# Patient Record
Sex: MALE | Race: WHITE | NOT HISPANIC OR LATINO | Employment: OTHER | URBAN - NONMETROPOLITAN AREA
[De-identification: names, ages, dates, MRNs, and addresses within clinical notes are randomized per-mention and may not be internally consistent; named-entity substitution may affect disease eponyms.]

---

## 2018-03-23 ENCOUNTER — OFFICE VISIT (OUTPATIENT)
Dept: UROLOGY | Facility: CLINIC | Age: 83
End: 2018-03-23

## 2018-03-23 DIAGNOSIS — N40.1 BPH WITH URINARY OBSTRUCTION: Primary | ICD-10-CM

## 2018-03-23 DIAGNOSIS — R33.9 URINARY RETENTION: ICD-10-CM

## 2018-03-23 DIAGNOSIS — N13.8 BPH WITH URINARY OBSTRUCTION: Primary | ICD-10-CM

## 2018-03-23 PROCEDURE — 51798 US URINE CAPACITY MEASURE: CPT | Performed by: UROLOGY

## 2018-03-23 PROCEDURE — 99204 OFFICE O/P NEW MOD 45 MIN: CPT | Performed by: UROLOGY

## 2018-03-23 RX ORDER — GABAPENTIN 600 MG/1
800 TABLET ORAL 2 TIMES DAILY
COMMUNITY

## 2018-03-23 RX ORDER — OXYCODONE HCL 10 MG/1
10 TABLET, FILM COATED, EXTENDED RELEASE ORAL EVERY 12 HOURS
COMMUNITY
End: 2018-08-27

## 2018-03-23 RX ORDER — FUROSEMIDE 40 MG/1
20 TABLET ORAL DAILY
COMMUNITY
End: 2023-03-14 | Stop reason: SDUPTHER

## 2018-03-23 RX ORDER — ATORVASTATIN CALCIUM 10 MG/1
10 TABLET, FILM COATED ORAL DAILY
COMMUNITY

## 2018-03-23 RX ORDER — TAMSULOSIN HYDROCHLORIDE 0.4 MG/1
1 CAPSULE ORAL NIGHTLY
COMMUNITY
End: 2019-08-19 | Stop reason: CLARIF

## 2018-03-23 RX ORDER — LISINOPRIL 10 MG/1
20 TABLET ORAL DAILY
COMMUNITY
End: 2021-10-15 | Stop reason: DRUGHIGH

## 2018-03-23 RX ORDER — WARFARIN SODIUM 2 MG/1
2 TABLET ORAL
COMMUNITY

## 2018-03-23 NOTE — PROGRESS NOTES
Chief Complaint:          Chief Complaint   Patient presents with   • Benign Prostatic Hypertrophy       HPI:   86 y.o. male.  86-year-old very pleasant white male presents today with significant intermittency.  He has no nocturia, no real dysuria, no urinary tract infections, no blood in the urine, he had a TURP done by Dr. Ramon in [place] many years ago.  He has a decreased force of stream, he gets up 1-2 times a night, he sits to void, he has no family history of prostate cancer.  He's had a total hip, hemorrhoidectomy, cholecystectomy, colon cancer and an angioplasty with 2 stents.    Past Medical History:        Past Medical History:   Diagnosis Date   • Benign prostatic hyperplasia    • Cancer    • Coronary artery disease    • Heart attack    • Hypertension          Current Meds:     Current Outpatient Prescriptions   Medication Sig Dispense Refill   • atorvastatin (LIPITOR) 10 MG tablet Take 10 mg by mouth Daily.     • furosemide (LASIX) 40 MG tablet Take 40 mg by mouth 2 (Two) Times a Day.     • gabapentin (NEURONTIN) 600 MG tablet Take 600 mg by mouth 3 (Three) Times a Day.     • lisinopril (PRINIVIL,ZESTRIL) 10 MG tablet Take 10 mg by mouth Daily.     • oxyCODONE (oxyCONTIN) 10 MG 12 hr tablet Take 10 mg by mouth Every 12 (Twelve) Hours.     • tamsulosin (FLOMAX) 0.4 MG capsule 24 hr capsule Take 1 capsule by mouth Every Night.     • warfarin (COUMADIN) 2 MG tablet Take 2 mg by mouth Daily.       No current facility-administered medications for this visit.         Allergies:      No Known Allergies     Past Surgical History:     Past Surgical History:   Procedure Laterality Date   • CHOLECYSTECTOMY     • HEMORROIDECTOMY     • PACEMAKER IMPLANTATION     • PROSTATE SURGERY           Social History:     Social History     Social History   • Marital status: Unknown     Spouse name: N/A   • Number of children: N/A   • Years of education: N/A     Occupational History   • Not on file.     Social History Main  Topics   • Smoking status: Former Smoker   • Smokeless tobacco: Never Used   • Alcohol use Yes      Comment: Occasional   • Drug use: No   • Sexual activity: Not on file     Other Topics Concern   • Not on file     Social History Narrative   • No narrative on file       Family History:     Family History   Problem Relation Age of Onset   • No Known Problems Father    • No Known Problems Mother        Review of Systems:     Review of Systems   Constitutional: Negative.    HENT: Negative.    Eyes: Negative.    Respiratory: Negative.    Cardiovascular: Negative.    Gastrointestinal: Negative.    Endocrine: Negative.    Genitourinary: Positive for difficulty urinating.   Musculoskeletal: Negative.    Allergic/Immunologic: Negative.    Neurological: Negative.    Hematological: Negative.    Psychiatric/Behavioral: Negative.        Physical Exam:     Physical Exam   Constitutional: He is oriented to person, place, and time. He appears well-developed and well-nourished.   HENT:   Head: Normocephalic and atraumatic.   Eyes: Conjunctivae and EOM are normal. Pupils are equal, round, and reactive to light.   Neck: Normal range of motion.   Cardiovascular: Normal rate, regular rhythm, normal heart sounds and intact distal pulses.    Pulmonary/Chest: Effort normal and breath sounds normal.   Abdominal: Soft. Bowel sounds are normal.   Genitourinary: Rectum normal, prostate normal and penis normal.   Genitourinary Comments: Soft nontender abdomen with no organomegaly, rigidity, or tenderness.  He has normal external genitalia and uncircumcised phallus with a freely movable foreskin bilaterally descended testes without masses there is no inguinal hernias adenopathy or abnormalities he had tight rectal tone and a small smooth firm prostate.  There is no nodularity or any suspicious rectal abnormalities     Musculoskeletal: Normal range of motion.   Neurological: He is alert and oriented to person, place, and time. He has normal  reflexes.   Skin: Skin is warm and dry.   Psychiatric: He has a normal mood and affect. His behavior is normal. Judgment and thought content normal.   Nursing note and vitals reviewed.      I have reviewed the following portions of the patient's history: allergies, current medications, past family history, past medical history, past social history, past surgical history, problem list and ROS and confirm it's accurate.      Procedure:       Assessment/Plan:   Urinary retention-he had a postvoid residual of greater than a liter.  Given his history this is silent prostatism likely on the basis of the scar.  I'm going to initiate a workup with both an upper and lower tract investigation and follow-up with him based on this.           This document has been electronically signed by TINY PENG MD March 23, 2018 10:42 AM

## 2018-04-09 ENCOUNTER — PROCEDURE VISIT (OUTPATIENT)
Dept: UROLOGY | Facility: CLINIC | Age: 83
End: 2018-04-09

## 2018-04-09 ENCOUNTER — HOSPITAL ENCOUNTER (OUTPATIENT)
Dept: CT IMAGING | Facility: HOSPITAL | Age: 83
Discharge: HOME | End: 2018-04-09
Attending: UROLOGY | Admitting: UROLOGY

## 2018-04-09 VITALS — BODY MASS INDEX: 30.31 KG/M2 | WEIGHT: 200 LBS | HEIGHT: 68 IN

## 2018-04-09 DIAGNOSIS — N40.1 BPH WITH URINARY OBSTRUCTION: ICD-10-CM

## 2018-04-09 DIAGNOSIS — N40.0 BENIGN PROSTATIC HYPERPLASIA WITHOUT LOWER URINARY TRACT SYMPTOMS: Primary | ICD-10-CM

## 2018-04-09 DIAGNOSIS — N13.8 BPH WITH URINARY OBSTRUCTION: ICD-10-CM

## 2018-04-09 PROCEDURE — 99213 OFFICE O/P EST LOW 20 MIN: CPT | Performed by: UROLOGY

## 2018-04-09 PROCEDURE — 52000 CYSTOURETHROSCOPY: CPT | Performed by: UROLOGY

## 2018-04-09 PROCEDURE — 74176 CT ABD & PELVIS W/O CONTRAST: CPT

## 2018-04-09 PROCEDURE — 74176 CT ABD & PELVIS W/O CONTRAST: CPT | Performed by: RADIOLOGY

## 2018-04-09 PROCEDURE — 96372 THER/PROPH/DIAG INJ SC/IM: CPT | Performed by: UROLOGY

## 2018-04-09 RX ORDER — GENTAMICIN SULFATE 40 MG/ML
80 INJECTION, SOLUTION INTRAMUSCULAR; INTRAVENOUS ONCE
Status: DISCONTINUED | OUTPATIENT
Start: 2018-04-09 | End: 2021-10-15

## 2018-04-09 NOTE — PROGRESS NOTES
Chief Complaint:          No chief complaint on file.      HPI:   87 y.o. male.  87-year-old very pleasant white male presents today with significant intermittency.  He has no nocturia, no real dysuria, no urinary tract infections, no blood in the urine, he had a TURP done by Dr. Ramon in [place] many years ago.  He has a decreased force of stream, he gets up 1-2 times a night, he sits to void, he has no family history of prostate cancer.  He's had a total hip, hemorrhoidectomy, cholecystectomy, colon cancer and an angioplasty with 2 stents.  He was found to the last visit to have a fairly substantial obstruction.  He's had a TURP now presents for cystoscopy the upper tract was reviewed and was essentially negative for hydronephrosis.  He has severe spinal scoliosis.       Past Medical History:        Past Medical History:   Diagnosis Date   • Benign prostatic hyperplasia    • Cancer    • Coronary artery disease    • Heart attack    • Hypertension          Current Meds:     Current Outpatient Prescriptions   Medication Sig Dispense Refill   • atorvastatin (LIPITOR) 10 MG tablet Take 10 mg by mouth Daily.     • furosemide (LASIX) 40 MG tablet Take 40 mg by mouth 2 (Two) Times a Day.     • gabapentin (NEURONTIN) 600 MG tablet Take 600 mg by mouth 3 (Three) Times a Day.     • lisinopril (PRINIVIL,ZESTRIL) 10 MG tablet Take 10 mg by mouth Daily.     • oxyCODONE (oxyCONTIN) 10 MG 12 hr tablet Take 10 mg by mouth Every 12 (Twelve) Hours.     • tamsulosin (FLOMAX) 0.4 MG capsule 24 hr capsule Take 1 capsule by mouth Every Night.     • warfarin (COUMADIN) 2 MG tablet Take 2 mg by mouth Daily.       No current facility-administered medications for this visit.         Allergies:      No Known Allergies     Past Surgical History:     Past Surgical History:   Procedure Laterality Date   • CHOLECYSTECTOMY     • HEMORROIDECTOMY     • PACEMAKER IMPLANTATION     • PROSTATE SURGERY           Social History:     Social History      Social History   • Marital status: Unknown     Spouse name: N/A   • Number of children: N/A   • Years of education: N/A     Occupational History   • Not on file.     Social History Main Topics   • Smoking status: Former Smoker   • Smokeless tobacco: Never Used   • Alcohol use Yes      Comment: Occasional   • Drug use: No   • Sexual activity: Not on file     Other Topics Concern   • Not on file     Social History Narrative   • No narrative on file       Family History:     Family History   Problem Relation Age of Onset   • No Known Problems Father    • No Known Problems Mother        Review of Systems:     Review of Systems   Constitutional: Negative.    HENT: Negative.    Eyes: Negative.    Respiratory: Negative.    Cardiovascular: Negative.    Gastrointestinal: Negative.    Endocrine: Negative.    Musculoskeletal: Negative.    Allergic/Immunologic: Negative.    Neurological: Negative.    Hematological: Negative.    Psychiatric/Behavioral: Negative.        Physical Exam:     Physical Exam   Constitutional: He is oriented to person, place, and time. He appears well-developed and well-nourished.   HENT:   Head: Normocephalic and atraumatic.   Eyes: Conjunctivae and EOM are normal. Pupils are equal, round, and reactive to light.   Neck: Normal range of motion.   Cardiovascular: Normal rate, regular rhythm, normal heart sounds and intact distal pulses.    Pulmonary/Chest: Effort normal and breath sounds normal.   Abdominal: Soft. Bowel sounds are normal.   Genitourinary: Rectum normal, prostate normal and penis normal.   Musculoskeletal: Normal range of motion.   Neurological: He is alert and oriented to person, place, and time. He has normal reflexes.   Skin: Skin is warm and dry.   Psychiatric: He has a normal mood and affect. His behavior is normal. Judgment and thought content normal.   Nursing note and vitals reviewed.      I have reviewed the following portions of the patient's history: allergies, current  medications, past family history, past medical history, past social history, past surgical history, problem list and ROS and confirm it's accurate.      Procedure:   Cystoscopy:  Patient presents today for cystourethroscopy.  I went ahead and obtained an informed consent including the risk of anesthesia, bleeding, infection, etc.  After prep and drape in a sterile fashion in the low dorsal lithotomy position the urethra was gently anesthetized with 10 cc of 2% viscous Xylocaine jelly.  After an appropriate period of topical anesthesia I used the Olympus digital 14 [place] flexible cystoscope to examine the anterior urethra which was completely normal, the ureteral orifices were visualized and normal in position and configuration there were no stones,  or foreign bodies.  There was a small superficial bladder tumor over the right orifice.  There was a significant bladder neck contracture and a large residual  The blue light was enabled and was negative allowing us to see small mucosal lesions. The patient was given 80 mg of gentamicin in an intramuscular fashion  as prophylaxis for the cystoscopy and released from the clinic.    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 BMI is above normal parameters. Follow-up plan includes:  educational material.          This document has been electronically signed by TINY PENG MD April 9, 2018 12:47 PM

## 2018-04-13 PROBLEM — N40.0 BENIGN PROSTATIC HYPERPLASIA WITHOUT LOWER URINARY TRACT SYMPTOMS: Status: ACTIVE | Noted: 2018-04-13

## 2018-04-13 RX ORDER — GENTAMICIN SULFATE 80 MG/100ML
80 INJECTION, SOLUTION INTRAVENOUS ONCE
Status: CANCELLED | OUTPATIENT
Start: 2018-04-18 | End: 2018-04-18

## 2018-04-18 ENCOUNTER — ANESTHESIA EVENT (OUTPATIENT)
Dept: PERIOP | Facility: HOSPITAL | Age: 83
End: 2018-04-18

## 2018-04-18 ENCOUNTER — APPOINTMENT (OUTPATIENT)
Dept: GENERAL RADIOLOGY | Facility: HOSPITAL | Age: 83
End: 2018-04-18
Attending: UROLOGY

## 2018-04-18 ENCOUNTER — ANESTHESIA (OUTPATIENT)
Dept: PERIOP | Facility: HOSPITAL | Age: 83
End: 2018-04-18

## 2018-04-18 ENCOUNTER — HOSPITAL ENCOUNTER (OUTPATIENT)
Facility: HOSPITAL | Age: 83
Discharge: HOME | End: 2018-04-18
Attending: UROLOGY | Admitting: UROLOGY

## 2018-04-18 VITALS
HEIGHT: 76 IN | BODY MASS INDEX: 21.43 KG/M2 | DIASTOLIC BLOOD PRESSURE: 81 MMHG | SYSTOLIC BLOOD PRESSURE: 128 MMHG | OXYGEN SATURATION: 99 % | WEIGHT: 176 LBS | TEMPERATURE: 98.1 F | HEART RATE: 72 BPM | RESPIRATION RATE: 18 BRPM

## 2018-04-18 DIAGNOSIS — N40.0 BENIGN PROSTATIC HYPERPLASIA WITHOUT LOWER URINARY TRACT SYMPTOMS: ICD-10-CM

## 2018-04-18 PROCEDURE — 25010000002 PROPOFOL 10 MG/ML EMULSION: Performed by: NURSE ANESTHETIST, CERTIFIED REGISTERED

## 2018-04-18 PROCEDURE — 25010000002 GENTAMICIN PER 80 MG: Performed by: UROLOGY

## 2018-04-18 PROCEDURE — 25010000002 FENTANYL CITRATE (PF) 100 MCG/2ML SOLUTION: Performed by: NURSE ANESTHETIST, CERTIFIED REGISTERED

## 2018-04-18 PROCEDURE — 52234 CYSTOSCOPY AND TREATMENT: CPT | Performed by: UROLOGY

## 2018-04-18 PROCEDURE — 63710000001 OPIUM-BELLADONNA 16.2-60 MG SUPPOSITORY: Performed by: UROLOGY

## 2018-04-18 PROCEDURE — A9270 NON-COVERED ITEM OR SERVICE: HCPCS | Performed by: UROLOGY

## 2018-04-18 PROCEDURE — 25010000002 ONDANSETRON PER 1 MG: Performed by: NURSE ANESTHETIST, CERTIFIED REGISTERED

## 2018-04-18 RX ORDER — SODIUM CHLORIDE 0.9 % (FLUSH) 0.9 %
1-10 SYRINGE (ML) INJECTION AS NEEDED
Status: DISCONTINUED | OUTPATIENT
Start: 2018-04-18 | End: 2018-04-18 | Stop reason: HOSPADM

## 2018-04-18 RX ORDER — OXYCODONE AND ACETAMINOPHEN 10; 325 MG/1; MG/1
1 TABLET ORAL EVERY 4 HOURS PRN
Qty: 12 TABLET | Refills: 0 | Status: SHIPPED | OUTPATIENT
Start: 2018-04-18 | End: 2018-08-27

## 2018-04-18 RX ORDER — ATROPA BELLADONNA AND OPIUM 16.2; 6 MG/1; MG/1
SUPPOSITORY RECTAL AS NEEDED
Status: DISCONTINUED | OUTPATIENT
Start: 2018-04-18 | End: 2018-04-18 | Stop reason: HOSPADM

## 2018-04-18 RX ORDER — FENTANYL CITRATE 50 UG/ML
INJECTION, SOLUTION INTRAMUSCULAR; INTRAVENOUS AS NEEDED
Status: DISCONTINUED | OUTPATIENT
Start: 2018-04-18 | End: 2018-04-18 | Stop reason: SURG

## 2018-04-18 RX ORDER — MIDAZOLAM HYDROCHLORIDE 1 MG/ML
1 INJECTION INTRAMUSCULAR; INTRAVENOUS
Status: DISCONTINUED | OUTPATIENT
Start: 2018-04-18 | End: 2018-04-18 | Stop reason: HOSPADM

## 2018-04-18 RX ORDER — PROPOFOL 10 MG/ML
VIAL (ML) INTRAVENOUS AS NEEDED
Status: DISCONTINUED | OUTPATIENT
Start: 2018-04-18 | End: 2018-04-18 | Stop reason: SURG

## 2018-04-18 RX ORDER — MAGNESIUM HYDROXIDE 1200 MG/15ML
LIQUID ORAL AS NEEDED
Status: DISCONTINUED | OUTPATIENT
Start: 2018-04-18 | End: 2018-04-18 | Stop reason: HOSPADM

## 2018-04-18 RX ORDER — GENTAMICIN SULFATE 80 MG/100ML
80 INJECTION, SOLUTION INTRAVENOUS ONCE
Status: COMPLETED | OUTPATIENT
Start: 2018-04-18 | End: 2018-04-18

## 2018-04-18 RX ORDER — ONDANSETRON 2 MG/ML
INJECTION INTRAMUSCULAR; INTRAVENOUS AS NEEDED
Status: DISCONTINUED | OUTPATIENT
Start: 2018-04-18 | End: 2018-04-18 | Stop reason: SURG

## 2018-04-18 RX ORDER — LIDOCAINE HYDROCHLORIDE 20 MG/ML
INJECTION, SOLUTION INFILTRATION; PERINEURAL AS NEEDED
Status: DISCONTINUED | OUTPATIENT
Start: 2018-04-18 | End: 2018-04-18 | Stop reason: SURG

## 2018-04-18 RX ORDER — MIDAZOLAM HYDROCHLORIDE 1 MG/ML
2 INJECTION INTRAMUSCULAR; INTRAVENOUS
Status: DISCONTINUED | OUTPATIENT
Start: 2018-04-18 | End: 2018-04-18 | Stop reason: HOSPADM

## 2018-04-18 RX ORDER — SODIUM CHLORIDE, SODIUM LACTATE, POTASSIUM CHLORIDE, CALCIUM CHLORIDE 600; 310; 30; 20 MG/100ML; MG/100ML; MG/100ML; MG/100ML
125 INJECTION, SOLUTION INTRAVENOUS CONTINUOUS
Status: DISCONTINUED | OUTPATIENT
Start: 2018-04-18 | End: 2018-04-18 | Stop reason: HOSPADM

## 2018-04-18 RX ORDER — FAMOTIDINE 10 MG/ML
INJECTION, SOLUTION INTRAVENOUS AS NEEDED
Status: DISCONTINUED | OUTPATIENT
Start: 2018-04-18 | End: 2018-04-18 | Stop reason: SURG

## 2018-04-18 RX ADMIN — FENTANYL CITRATE 50 MCG: 50 INJECTION INTRAMUSCULAR; INTRAVENOUS at 12:32

## 2018-04-18 RX ADMIN — LIDOCAINE HYDROCHLORIDE 40 MG: 20 INJECTION, SOLUTION INFILTRATION; PERINEURAL at 12:37

## 2018-04-18 RX ADMIN — FENTANYL CITRATE 37.5 MCG: 50 INJECTION INTRAMUSCULAR; INTRAVENOUS at 12:58

## 2018-04-18 RX ADMIN — FENTANYL CITRATE 12.5 MCG: 50 INJECTION INTRAMUSCULAR; INTRAVENOUS at 12:50

## 2018-04-18 RX ADMIN — PROPOFOL 150 MG: 10 INJECTION, EMULSION INTRAVENOUS at 12:37

## 2018-04-18 RX ADMIN — GENTAMICIN SULFATE 80 MG: 80 INJECTION, SOLUTION INTRAVENOUS at 12:32

## 2018-04-18 RX ADMIN — SODIUM CHLORIDE, POTASSIUM CHLORIDE, SODIUM LACTATE AND CALCIUM CHLORIDE 125 ML/HR: 600; 310; 30; 20 INJECTION, SOLUTION INTRAVENOUS at 10:30

## 2018-04-18 RX ADMIN — FAMOTIDINE 20 MG: 10 INJECTION, SOLUTION INTRAVENOUS at 12:41

## 2018-04-18 RX ADMIN — ONDANSETRON 4 MG: 2 INJECTION, SOLUTION INTRAMUSCULAR; INTRAVENOUS at 12:41

## 2018-04-18 NOTE — H&P (VIEW-ONLY)
Chief Complaint:          No chief complaint on file.      HPI:   87 y.o. male.  87-year-old very pleasant white male presents today with significant intermittency.  He has no nocturia, no real dysuria, no urinary tract infections, no blood in the urine, he had a TURP done by Dr. Ramon in [place] many years ago.  He has a decreased force of stream, he gets up 1-2 times a night, he sits to void, he has no family history of prostate cancer.  He's had a total hip, hemorrhoidectomy, cholecystectomy, colon cancer and an angioplasty with 2 stents.  He was found to the last visit to have a fairly substantial obstruction.  He's had a TURP now presents for cystoscopy the upper tract was reviewed and was essentially negative for hydronephrosis.  He has severe spinal scoliosis.       Past Medical History:        Past Medical History:   Diagnosis Date   • Benign prostatic hyperplasia    • Cancer    • Coronary artery disease    • Heart attack    • Hypertension          Current Meds:     Current Outpatient Prescriptions   Medication Sig Dispense Refill   • atorvastatin (LIPITOR) 10 MG tablet Take 10 mg by mouth Daily.     • furosemide (LASIX) 40 MG tablet Take 40 mg by mouth 2 (Two) Times a Day.     • gabapentin (NEURONTIN) 600 MG tablet Take 600 mg by mouth 3 (Three) Times a Day.     • lisinopril (PRINIVIL,ZESTRIL) 10 MG tablet Take 10 mg by mouth Daily.     • oxyCODONE (oxyCONTIN) 10 MG 12 hr tablet Take 10 mg by mouth Every 12 (Twelve) Hours.     • tamsulosin (FLOMAX) 0.4 MG capsule 24 hr capsule Take 1 capsule by mouth Every Night.     • warfarin (COUMADIN) 2 MG tablet Take 2 mg by mouth Daily.       No current facility-administered medications for this visit.         Allergies:      No Known Allergies     Past Surgical History:     Past Surgical History:   Procedure Laterality Date   • CHOLECYSTECTOMY     • HEMORROIDECTOMY     • PACEMAKER IMPLANTATION     • PROSTATE SURGERY           Social History:     Social History      Social History   • Marital status: Unknown     Spouse name: N/A   • Number of children: N/A   • Years of education: N/A     Occupational History   • Not on file.     Social History Main Topics   • Smoking status: Former Smoker   • Smokeless tobacco: Never Used   • Alcohol use Yes      Comment: Occasional   • Drug use: No   • Sexual activity: Not on file     Other Topics Concern   • Not on file     Social History Narrative   • No narrative on file       Family History:     Family History   Problem Relation Age of Onset   • No Known Problems Father    • No Known Problems Mother        Review of Systems:     Review of Systems   Constitutional: Negative.    HENT: Negative.    Eyes: Negative.    Respiratory: Negative.    Cardiovascular: Negative.    Gastrointestinal: Negative.    Endocrine: Negative.    Musculoskeletal: Negative.    Allergic/Immunologic: Negative.    Neurological: Negative.    Hematological: Negative.    Psychiatric/Behavioral: Negative.        Physical Exam:     Physical Exam   Constitutional: He is oriented to person, place, and time. He appears well-developed and well-nourished.   HENT:   Head: Normocephalic and atraumatic.   Eyes: Conjunctivae and EOM are normal. Pupils are equal, round, and reactive to light.   Neck: Normal range of motion.   Cardiovascular: Normal rate, regular rhythm, normal heart sounds and intact distal pulses.    Pulmonary/Chest: Effort normal and breath sounds normal.   Abdominal: Soft. Bowel sounds are normal.   Genitourinary: Rectum normal, prostate normal and penis normal.   Musculoskeletal: Normal range of motion.   Neurological: He is alert and oriented to person, place, and time. He has normal reflexes.   Skin: Skin is warm and dry.   Psychiatric: He has a normal mood and affect. His behavior is normal. Judgment and thought content normal.   Nursing note and vitals reviewed.      I have reviewed the following portions of the patient's history: allergies, current  medications, past family history, past medical history, past social history, past surgical history, problem list and ROS and confirm it's accurate.      Procedure:   Cystoscopy:  Patient presents today for cystourethroscopy.  I went ahead and obtained an informed consent including the risk of anesthesia, bleeding, infection, etc.  After prep and drape in a sterile fashion in the low dorsal lithotomy position the urethra was gently anesthetized with 10 cc of 2% viscous Xylocaine jelly.  After an appropriate period of topical anesthesia I used the Olympus digital 14 [place] flexible cystoscope to examine the anterior urethra which was completely normal, the ureteral orifices were visualized and normal in position and configuration there were no stones,  or foreign bodies.  There was a small superficial bladder tumor over the right orifice.  There was a significant bladder neck contracture and a large residual  The blue light was enabled and was negative allowing us to see small mucosal lesions. The patient was given 80 mg of gentamicin in an intramuscular fashion  as prophylaxis for the cystoscopy and released from the clinic.    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 BMI is above normal parameters. Follow-up plan includes:  educational material.          This document has been electronically signed by TINY PENG MD April 9, 2018 12:47 PM

## 2018-04-18 NOTE — ANESTHESIA PREPROCEDURE EVALUATION
Anesthesia Evaluation     no history of anesthetic complications:  NPO Solid Status: > 8 hours  NPO Liquid Status: > 8 hours           Airway   Mallampati: II  TM distance: >3 FB  Neck ROM: full  No difficulty expected  Dental    (+) upper dentures    Pulmonary - normal exam   Cardiovascular - normal exam    Patient on routine beta blocker    (+) hypertension, past MI  1-7 days, CAD, DVT, hyperlipidemia,       Neuro/Psych  (+) CVA, numbness,     GI/Hepatic/Renal/Endo      Musculoskeletal     (+) back pain,   Abdominal  - normal exam   Substance History      OB/GYN          Other   (+) arthritis                     Anesthesia Plan    ASA 3     general     intravenous induction   Anesthetic plan and risks discussed with patient.

## 2018-04-18 NOTE — ANESTHESIA POSTPROCEDURE EVALUATION
Patient: Alberto Guzman    Procedure Summary     Date:  04/18/18 Room / Location:  [facility] OR 06 /  [facility] OR    Anesthesia Start:  1232 Anesthesia Stop:  1316    Procedure:  CYSTOSCOPY TRANSURETHRAL RESECTION OF SMALL BLADDER TUMOR (N/A ) Diagnosis:       Benign prostatic hyperplasia without lower urinary tract symptoms      (Benign prostatic hyperplasia without lower urinary tract symptoms [N40.0])    Surgeon:  Alfonso Collins MD Provider:  Shan Barber MD    Anesthesia Type:  general ASA Status:  3          Anesthesia Type: general  Last vitals  BP   114/86 (04/18/18 1355)   Temp   98.3 °F (36.8 °C) (04/18/18 1355)   Pulse   61 (04/18/18 1355)   Resp   16 (04/18/18 1355)     SpO2   97 % (04/18/18 1355)     Post Anesthesia Care and Evaluation    Patient location during evaluation: PACU  Patient participation: complete - patient participated  Level of consciousness: awake and alert  Pain score: 1  Pain management: adequate  Airway patency: patent  Anesthetic complications: No anesthetic complications  PONV Status: controlled  Cardiovascular status: acceptable  Respiratory status: acceptable  Hydration status: acceptable

## 2018-04-18 NOTE — OP NOTE
CYSTOSCOPY TRANSURETHRAL RESECTION OF BLADDER TUMOR  Procedure Note    Alberto Guzman  4/18/2018    Pre-op Diagnosis:   Benign prostatic hyperplasia without lower urinary tract symptoms [N40.0]    Post-op Diagnosis:     Post-Op Diagnosis Codes:     * Benign prostatic hyperplasia without lower urinary tract symptoms [N40.0]    Procedure/CPT® Codes:   87-year-old white male presented with difficulty urinating, stable upper tracts, a residual of 900 cc found to have a bladder neck contracture and a small noninvasive bladder tumor less than 1 cm on the right posterior floor following informed consent is brought the operative suite under induction of general anesthesia placed the low dorsal lithotomy position I went ahead and inserted the 26 Turkmen resectoscope sheath use the bipolar button and took down the bladder neck contracture easily entering the bladder there was trabeculation.  I went ahead and fulgurated the small bladder tumors clearly noninvasive was photographed and it was minimal fulgurated less than a centimeter of a completed the electro dissection of the bladder neck hemostasis was excellent I anchored 20 Turkmen Michael catheter gravity drainage    Procedure(s):  CYSTOSCOPY TRANSURETHRAL RESECTION OF SMALL BLADDER TUMOR    Surgeon(s):  Alfonso Collins MD    Anesthesia: General    Staff:   Circulator: Brittney Marquez RN  Scrub Person: Deena Gu  Other: Joy Hamm    Estimated Blood Loss: none  Urine Voided: * No values recorded between 4/18/2018 12:33 PM and 4/18/2018  1:02 PM *    Specimens:                None      Drains: Michael catheter    Findings: Small noninvasive right posterior wall bladder tumor less than 1 cm    Blood: N/A    Complications: None      Alfonso Collins MD     Date: 4/18/2018  Time: 1:02 PM

## 2018-04-20 ENCOUNTER — OFFICE VISIT (OUTPATIENT)
Dept: UROLOGY | Facility: CLINIC | Age: 83
End: 2018-04-20

## 2018-04-20 VITALS — WEIGHT: 175.93 LBS | BODY MASS INDEX: 21.42 KG/M2 | HEIGHT: 76 IN

## 2018-04-20 DIAGNOSIS — N40.0 BENIGN PROSTATIC HYPERPLASIA WITHOUT LOWER URINARY TRACT SYMPTOMS: Primary | ICD-10-CM

## 2018-04-20 DIAGNOSIS — D49.4 BLADDER TUMOR: ICD-10-CM

## 2018-04-20 DIAGNOSIS — N32.0 BLADDER NECK CONTRACTURE: ICD-10-CM

## 2018-04-20 PROCEDURE — 99213 OFFICE O/P EST LOW 20 MIN: CPT | Performed by: UROLOGY

## 2018-04-20 NOTE — PROGRESS NOTES
Chief Complaint:          Chief Complaint   Patient presents with   • surgery follow up       HPI:   87 y.o. male.87-year-old white male status post a bladder neck incision and fulguration of a small clearly noninvasive bladder tumor returns today the urine is crystal-clear the catheter was removed I'll see him back in 2 weeks overall he did quite well.  Past Medical History:        Past Medical History:   Diagnosis Date   • Arthritis    • Back pain    • Benign prostatic hyperplasia    • Bladder tumor    • Cancer     [id number]/melanoma   • Coronary artery disease    • DVT (deep venous thrombosis)     r leg   • Elevated cholesterol    • Heart attack    • Hypertension    • Numbness     feet/hands   • Stroke          Current Meds:     Current Outpatient Prescriptions   Medication Sig Dispense Refill   • atorvastatin (LIPITOR) 10 MG tablet Take 10 mg by mouth Daily.     • furosemide (LASIX) 40 MG tablet Take 40 mg by mouth 2 (Two) Times a Day.     • gabapentin (NEURONTIN) 600 MG tablet Take 600 mg by mouth 3 (Three) Times a Day.     • lisinopril (PRINIVIL,ZESTRIL) 10 MG tablet Take 10 mg by mouth Daily.     • oxyCODONE (oxyCONTIN) 10 MG 12 hr tablet Take 10 mg by mouth Every 12 (Twelve) Hours.     • oxyCODONE-acetaminophen (PERCOCET)  MG per tablet Take 1 tablet by mouth Every 4 (Four) Hours As Needed for Moderate Pain. 12 tablet 0   • tamsulosin (FLOMAX) 0.4 MG capsule 24 hr capsule Take 1 capsule by mouth Every Night.     • warfarin (COUMADIN) 2 MG tablet Take 2 mg by mouth Daily.       Current Facility-Administered Medications   Medication Dose Route Frequency Provider Last Rate Last Dose   • gentamicin (GARAMYCIN) injection 80 mg  80 mg Intramuscular Once Alfonso Collins MD            Allergies:      No Known Allergies     Past Surgical History:     Past Surgical History:   Procedure Laterality Date   • CATARACT EXTRACTION Left    • CHOLECYSTECTOMY     • COLON RESECTION     • HEMORROIDECTOMY     •  HERNIA REPAIR      left inguinal/umbilical   • HIP ARTHROPLASTY Right    • PACEMAKER IMPLANTATION     • PROSTATE SURGERY     • TRANSURETHRAL RESECTION OF BLADDER TUMOR N/A 4/18/2018    Procedure: CYSTOSCOPY TRANSURETHRAL RESECTION OF SMALL BLADDER TUMOR;  Surgeon: Alfonso Collins MD;  Location: [facility];  Service: Urology         Social History:     Social History     Social History   • Marital status:      Spouse name: N/A   • Number of children: N/A   • Years of education: N/A     Occupational History   • Not on file.     Social History Main Topics   • Smoking status: Former Smoker     Years: 0.50   • Smokeless tobacco: Never Used   • Alcohol use Yes      Comment: Occasional   • Drug use: No   • Sexual activity: Defer     Other Topics Concern   • Not on file     Social History Narrative   • No narrative on file       Family History:     Family History   Problem Relation Age of Onset   • No Known Problems Father    • No Known Problems Mother        Review of Systems:     Review of Systems   Constitutional: Negative.    HENT: Negative.    Eyes: Negative.    Respiratory: Negative.    Cardiovascular: Negative.    Gastrointestinal: Negative.    Endocrine: Negative.    Musculoskeletal: Negative.    Allergic/Immunologic: Negative.    Neurological: Negative.    Hematological: Negative.    Psychiatric/Behavioral: Negative.        Physical Exam:     Physical Exam   Constitutional: He is oriented to person, place, and time. He appears well-developed and well-nourished.   HENT:   Head: Normocephalic and atraumatic.   Eyes: Conjunctivae and EOM are normal. Pupils are equal, round, and reactive to light.   Neck: Normal range of motion.   Cardiovascular: Normal rate, regular rhythm, normal heart sounds and intact distal pulses.    Pulmonary/Chest: Effort normal and breath sounds normal.   Abdominal: Soft. Bowel sounds are normal.   Musculoskeletal: Normal range of motion.   Neurological: He is alert and oriented  to person, place, and time. He has normal reflexes.   Skin: Skin is warm and dry.   Psychiatric: He has a normal mood and affect. His behavior is normal. Judgment and thought content normal.   Nursing note and vitals reviewed.      I have reviewed the following portions of the patient's history: allergies, current medications, past family history, past medical history, past social history, past surgical history, problem list and ROS and confirm it's accurate.      Procedure:       Assessment/Plan:   Bladder tumor-noninvasive status post a fulguration  Bladder neck contracture-status post surgical division     Patient's Body mass index is 21.42 kg/m². BMI is within normal parameters. No follow-up required.          This document has been electronically signed by TINY PENG MD April 20, 2018 8:06 AM

## 2018-05-10 ENCOUNTER — OFFICE VISIT (OUTPATIENT)
Dept: UROLOGY | Facility: CLINIC | Age: 83
End: 2018-05-10

## 2018-05-10 VITALS — HEIGHT: 76 IN | BODY MASS INDEX: 21.42 KG/M2 | WEIGHT: 175.93 LBS

## 2018-05-10 DIAGNOSIS — D49.4 BLADDER TUMOR: ICD-10-CM

## 2018-05-10 DIAGNOSIS — N32.0 BLADDER NECK CONTRACTURE: Primary | ICD-10-CM

## 2018-05-10 PROCEDURE — 99213 OFFICE O/P EST LOW 20 MIN: CPT | Performed by: UROLOGY

## 2018-05-10 NOTE — PROGRESS NOTES
Chief Complaint:          Chief Complaint   Patient presents with   • Benign Prostatic Hypertrophy     3 week follow up       HPI:   87 y.o. male.  87-year-old white male returns today having had a transurethral incision of the bladder neck and a transurethral fulguration of a small noninvasive papillary bladder tumor.  It's now been 3 weeks.  It is of interest that he had a 900 cc residual with stable upper tracts.  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  Today he says he gets up 1 time a night to urinate with some dribbling and occasional overflow leakage he originally had a residual of 900 cc today he voided and had a residual 700 double voided and voided another 200 cc giving him a residual about 500 cc.  He does not want to proceed with catheterization his upper tracts are stable so that is not really an indication for it other than incontinence and frequency.  And since he is able to double void I think be fine with regards to the small bladder tumor he'll need appropriate imaging at the appropriate time that would be about 2-1/2 months from now I'm to see him back in 3 weeks these can continue to try to double void.      Past Medical History:        Past Medical History:   Diagnosis Date   • Arthritis    • Back pain    • Benign prostatic hyperplasia    • Bladder tumor    • Cancer     [id number]   • Coronary artery disease    • DVT (deep venous thrombosis)     r leg   • Elevated cholesterol    • Heart attack    • Hypertension    • Numbness     feet/hands   • Stroke          Current Meds:     Current Outpatient Prescriptions   Medication Sig Dispense Refill   • atorvastatin (LIPITOR) 10 MG tablet Take 10 mg by mouth Daily.     • furosemide (LASIX) 40 MG tablet Take 40 mg by mouth 2 (Two) Times a Day.     • gabapentin (NEURONTIN) 600 MG tablet Take 600 mg by mouth 3 (Three) Times a Day.     • lisinopril (PRINIVIL,ZESTRIL) 10 MG tablet Take 10 mg by mouth Daily.     • oxyCODONE  (oxyCONTIN) 10 MG 12 hr tablet Take 10 mg by mouth Every 12 (Twelve) Hours.     • oxyCODONE-acetaminophen (PERCOCET)  MG per tablet Take 1 tablet by mouth Every 4 (Four) Hours As Needed for Moderate Pain. 12 tablet 0   • tamsulosin (FLOMAX) 0.4 MG capsule 24 hr capsule Take 1 capsule by mouth Every Night.     • warfarin (COUMADIN) 2 MG tablet Take 2 mg by mouth Daily.       Current Facility-Administered Medications   Medication Dose Route Frequency Provider Last Rate Last Dose   • gentamicin (GARAMYCIN) injection 80 mg  80 mg Intramuscular Once Alfonso Collins MD            Allergies:      No Known Allergies     Past Surgical History:     Past Surgical History:   Procedure Laterality Date   • CATARACT EXTRACTION Left    • CHOLECYSTECTOMY     • COLON RESECTION     • HEMORROIDECTOMY     • HERNIA REPAIR      left inguinal/umbilical   • HIP ARTHROPLASTY Right    • PACEMAKER IMPLANTATION     • PROSTATE SURGERY     • TRANSURETHRAL RESECTION OF BLADDER TUMOR N/A 4/18/2018    Procedure: CYSTOSCOPY TRANSURETHRAL RESECTION OF SMALL BLADDER TUMOR;  Surgeon: Alfonso Collins MD;  Location: [facility];  Service: Urology         Social History:     Social History     Social History   • Marital status:      Spouse name: N/A   • Number of children: N/A   • Years of education: N/A     Occupational History   • Not on file.     Social History Main Topics   • Smoking status: Former Smoker     Years: 0.50   • Smokeless tobacco: Never Used   • Alcohol use Yes      Comment: Occasional   • Drug use: No   • Sexual activity: Defer     Other Topics Concern   • Not on file     Social History Narrative   • No narrative on file       Family History:     Family History   Problem Relation Age of Onset   • No Known Problems Father    • No Known Problems Mother        Review of Systems:     Review of Systems   Constitutional: Negative.    HENT: Negative.    Eyes: Negative.    Respiratory: Negative.    Cardiovascular:  Negative.    Gastrointestinal: Negative.    Endocrine: Negative.    Musculoskeletal: Negative.    Allergic/Immunologic: Negative.    Neurological: Negative.    Hematological: Negative.    Psychiatric/Behavioral: Negative.        Physical Exam:     Physical Exam   Constitutional: He is oriented to person, place, and time. He appears well-developed and well-nourished.   HENT:   Head: Normocephalic and atraumatic.   Eyes: Conjunctivae and EOM are normal. Pupils are equal, round, and reactive to light.   Neck: Normal range of motion.   Cardiovascular: Normal rate, regular rhythm, normal heart sounds and intact distal pulses.    Pulmonary/Chest: Effort normal and breath sounds normal.   Abdominal: Soft. Bowel sounds are normal.   Musculoskeletal: Normal range of motion.   Neurological: He is alert and oriented to person, place, and time. He has normal reflexes.   Skin: Skin is warm and dry.   Psychiatric: He has a normal mood and affect. His behavior is normal. Judgment and thought content normal.   Nursing note and vitals reviewed.      I have reviewed the following portions of the patient's history: allergies, current medications, past family history, past medical history, past social history, past surgical history, problem list and ROS and confirm it's accurate.      Procedure:       Assessment/Plan:   Bladder tumor- small noninvasive recommend routine postoperative surveillance   Bladder neck contracture-doing better with regards to emptying his bladder with double voiding, is down to a residual of about 500 cc and he has stable upper tracts    Patient's Body mass index is 21.42 kg/m². BMI is within normal parameters. No follow-up required.          This document has been electronically signed by TINY PENG MD May 10, 2018 10:15 AM

## 2018-05-31 ENCOUNTER — OFFICE VISIT (OUTPATIENT)
Dept: UROLOGY | Facility: CLINIC | Age: 83
End: 2018-05-31

## 2018-05-31 VITALS — HEIGHT: 76 IN | WEIGHT: 175 LBS | BODY MASS INDEX: 21.31 KG/M2

## 2018-05-31 DIAGNOSIS — R33.9 INCOMPLETE BLADDER EMPTYING: Primary | ICD-10-CM

## 2018-05-31 DIAGNOSIS — D49.4 BLADDER TUMOR: ICD-10-CM

## 2018-05-31 DIAGNOSIS — N32.0 BLADDER NECK CONTRACTURE: ICD-10-CM

## 2018-05-31 PROCEDURE — 99213 OFFICE O/P EST LOW 20 MIN: CPT | Performed by: UROLOGY

## 2018-05-31 PROCEDURE — 51798 US URINE CAPACITY MEASURE: CPT | Performed by: UROLOGY

## 2018-06-12 ENCOUNTER — PROCEDURE VISIT (OUTPATIENT)
Dept: UROLOGY | Facility: CLINIC | Age: 83
End: 2018-06-12

## 2018-06-12 VITALS — HEIGHT: 76 IN | BODY MASS INDEX: 21.31 KG/M2 | WEIGHT: 175 LBS

## 2018-06-12 DIAGNOSIS — D49.4 BLADDER TUMOR: ICD-10-CM

## 2018-06-12 DIAGNOSIS — Z48.816 SURGICAL AFTERCARE, GENITOURINARY SYSTEM: Primary | ICD-10-CM

## 2018-06-12 DIAGNOSIS — R33.9 URINARY RETENTION: ICD-10-CM

## 2018-06-12 DIAGNOSIS — N32.0 BLADDER NECK CONTRACTURE: ICD-10-CM

## 2018-06-12 LAB
BILIRUB BLD-MCNC: NEGATIVE MG/DL
CLARITY, POC: CLEAR
COLOR UR: YELLOW
GLUCOSE UR STRIP-MCNC: NEGATIVE MG/DL
KETONES UR QL: NEGATIVE
LEUKOCYTE EST, POC: ABNORMAL
NITRITE UR-MCNC: NEGATIVE MG/ML
PH UR: 5.5 [PH] (ref 5–8)
PROT UR STRIP-MCNC: NEGATIVE MG/DL
RBC # UR STRIP: ABNORMAL /UL
SP GR UR: 1.01 (ref 1–1.03)
UROBILINOGEN UR QL: NORMAL

## 2018-06-12 PROCEDURE — 51798 US URINE CAPACITY MEASURE: CPT | Performed by: UROLOGY

## 2018-06-12 PROCEDURE — 81003 URINALYSIS AUTO W/O SCOPE: CPT | Performed by: UROLOGY

## 2018-06-12 PROCEDURE — 99213 OFFICE O/P EST LOW 20 MIN: CPT | Performed by: UROLOGY

## 2018-06-12 RX ORDER — GENTAMICIN SULFATE 40 MG/ML
80 INJECTION, SOLUTION INTRAMUSCULAR; INTRAVENOUS ONCE
Status: DISCONTINUED | OUTPATIENT
Start: 2018-06-12 | End: 2018-06-12

## 2018-06-12 NOTE — PROGRESS NOTES
Chief Complaint:          Chief Complaint   Patient presents with   • Urinary Retention       HPI:   87 y.o. male.  87-year-old white male status the bladder neck incision he's doing fantastic his postvoid stent 74 streams dramatically better he has the tiny superficial bladder tumor that will require endoscopic surveillance in about 2 months.  Overall is done extremely well from her standpoint    Past Medical History:        Past Medical History:   Diagnosis Date   • Arthritis    • Back pain    • Benign prostatic hyperplasia    • Bladder tumor    • Cancer     [id number]/melanoma   • Coronary artery disease    • DVT (deep venous thrombosis)     r leg   • Elevated cholesterol    • Heart attack    • Hypertension    • Numbness     feet/hands   • Stroke          Current Meds:     Current Outpatient Prescriptions   Medication Sig Dispense Refill   • atorvastatin (LIPITOR) 10 MG tablet Take 10 mg by mouth Daily.     • furosemide (LASIX) 40 MG tablet Take 40 mg by mouth 2 (Two) Times a Day.     • gabapentin (NEURONTIN) 600 MG tablet Take 600 mg by mouth 3 (Three) Times a Day.     • lisinopril (PRINIVIL,ZESTRIL) 10 MG tablet Take 10 mg by mouth Daily.     • oxyCODONE (oxyCONTIN) 10 MG 12 hr tablet Take 10 mg by mouth Every 12 (Twelve) Hours.     • oxyCODONE-acetaminophen (PERCOCET)  MG per tablet Take 1 tablet by mouth Every 4 (Four) Hours As Needed for Moderate Pain. 12 tablet 0   • tamsulosin (FLOMAX) 0.4 MG capsule 24 hr capsule Take 1 capsule by mouth Every Night.     • warfarin (COUMADIN) 2 MG tablet Take 2 mg by mouth Daily.       Current Facility-Administered Medications   Medication Dose Route Frequency Provider Last Rate Last Dose   • gentamicin (GARAMYCIN) injection 80 mg  80 mg Intramuscular Once Alfonso Collins MD            Allergies:      No Known Allergies     Past Surgical History:     Past Surgical History:   Procedure Laterality Date   • CATARACT EXTRACTION Left    • CHOLECYSTECTOMY     • COLON  RESECTION     • HEMORROIDECTOMY     • HERNIA REPAIR      left inguinal/umbilical   • HIP ARTHROPLASTY Right    • PACEMAKER IMPLANTATION     • PROSTATE SURGERY     • TRANSURETHRAL RESECTION OF BLADDER TUMOR N/A 4/18/2018    Procedure: CYSTOSCOPY TRANSURETHRAL RESECTION OF SMALL BLADDER TUMOR;  Surgeon: Alfonso Collins MD;  Location: [facility];  Service: Urology         Social History:     Social History     Social History   • Marital status:      Spouse name: N/A   • Number of children: N/A   • Years of education: N/A     Occupational History   • Not on file.     Social History Main Topics   • Smoking status: Former Smoker     Years: 0.50   • Smokeless tobacco: Never Used   • Alcohol use Yes      Comment: Occasional   • Drug use: No   • Sexual activity: Defer     Other Topics Concern   • Not on file     Social History Narrative   • No narrative on file       Family History:     Family History   Problem Relation Age of Onset   • No Known Problems Father    • No Known Problems Mother        Review of Systems:     Review of Systems   Constitutional: Negative.    HENT: Negative.    Eyes: Negative.    Respiratory: Negative.    Cardiovascular: Negative.    Gastrointestinal: Negative.    Endocrine: Negative.    Musculoskeletal: Negative.    Allergic/Immunologic: Negative.    Neurological: Negative.    Hematological: Negative.    Psychiatric/Behavioral: Negative.        Physical Exam:     Physical Exam   Constitutional: He is oriented to person, place, and time. He appears well-developed and well-nourished.   HENT:   Head: Normocephalic and atraumatic.   Eyes: Conjunctivae and EOM are normal. Pupils are equal, round, and reactive to light.   Neck: Normal range of motion.   Cardiovascular: Normal rate, regular rhythm, normal heart sounds and intact distal pulses.    Pulmonary/Chest: Effort normal and breath sounds normal.   Abdominal: Soft. Bowel sounds are normal.   Genitourinary: Rectum normal, prostate normal  and penis normal.   Musculoskeletal: Normal range of motion.   Neurological: He is alert and oriented to person, place, and time. He has normal reflexes.   Skin: Skin is warm and dry.   Psychiatric: He has a normal mood and affect. His behavior is normal. Judgment and thought content normal.   Nursing note and vitals reviewed.      I have reviewed the following portions of the patient's history: allergies, current medications, past family history, past medical history, past social history, past surgical history, problem list and ROS and confirm it's accurate.      Procedure:       Assessment/Plan:   Bladder neck contracture-status post endoscopic cautery and resection  Bladder tumor-will have surveillance cystoscopy in 2 months     Patient's Body mass index is 21.3 kg/m². BMI is within normal parameters. No follow-up required.          This document has been electronically signed by TINY PENG MD June 12, 2018 1:59 PM

## 2018-08-13 ENCOUNTER — PROCEDURE VISIT (OUTPATIENT)
Dept: UROLOGY | Facility: CLINIC | Age: 83
End: 2018-08-13

## 2018-08-13 VITALS — WEIGHT: 175 LBS | BODY MASS INDEX: 21.31 KG/M2 | HEIGHT: 76 IN

## 2018-08-13 DIAGNOSIS — D49.4 BLADDER TUMOR: ICD-10-CM

## 2018-08-13 DIAGNOSIS — Z48.816 AFTERCARE FOLLOWING SURGERY OF THE GENITOURINARY SYSTEM: Primary | ICD-10-CM

## 2018-08-13 PROCEDURE — 96372 THER/PROPH/DIAG INJ SC/IM: CPT | Performed by: UROLOGY

## 2018-08-13 PROCEDURE — 52000 CYSTOURETHROSCOPY: CPT | Performed by: UROLOGY

## 2018-08-13 RX ORDER — GENTAMICIN SULFATE 40 MG/ML
80 INJECTION, SOLUTION INTRAMUSCULAR; INTRAVENOUS ONCE
Status: COMPLETED | OUTPATIENT
Start: 2018-08-13 | End: 2018-08-13

## 2018-08-13 RX ADMIN — GENTAMICIN SULFATE 80 MG: 40 INJECTION, SOLUTION INTRAMUSCULAR; INTRAVENOUS at 13:37

## 2018-08-14 ENCOUNTER — DOCUMENTATION (OUTPATIENT)
Dept: UROLOGY | Facility: CLINIC | Age: 83
End: 2018-08-14

## 2018-08-14 ENCOUNTER — PRIOR AUTHORIZATION (OUTPATIENT)
Dept: UROLOGY | Facility: CLINIC | Age: 83
End: 2018-08-14

## 2018-08-14 RX ORDER — GENTAMICIN SULFATE 80 MG/50ML
80 INJECTION, SOLUTION INTRAVENOUS ONCE
Status: CANCELLED | OUTPATIENT
Start: 2018-08-29 | End: 2018-08-29

## 2018-08-14 NOTE — PROGRESS NOTES
Patient notified of all pre-op and surgery appointment dates and times. Pre-op instructions were reviewed with the patient, patient voiced understanding. Surgery instruction letter also mailed to patient.

## 2018-08-27 ENCOUNTER — APPOINTMENT (OUTPATIENT)
Dept: PREADMISSION TESTING | Facility: HOSPITAL | Age: 83
End: 2018-08-27

## 2018-08-27 PROCEDURE — 80048 BASIC METABOLIC PNL TOTAL CA: CPT | Performed by: ANESTHESIOLOGY

## 2018-08-27 PROCEDURE — 85027 COMPLETE CBC AUTOMATED: CPT | Performed by: ANESTHESIOLOGY

## 2018-08-27 PROCEDURE — 36415 COLL VENOUS BLD VENIPUNCTURE: CPT

## 2018-08-27 RX ORDER — METOPROLOL TARTRATE 50 MG/1
50 TABLET, FILM COATED ORAL DAILY
COMMUNITY

## 2018-08-29 ENCOUNTER — ANESTHESIA (OUTPATIENT)
Dept: PERIOP | Facility: HOSPITAL | Age: 83
End: 2018-08-29

## 2018-08-29 ENCOUNTER — ANESTHESIA EVENT (OUTPATIENT)
Dept: PERIOP | Facility: HOSPITAL | Age: 83
End: 2018-08-29

## 2018-08-29 ENCOUNTER — HOSPITAL ENCOUNTER (OUTPATIENT)
Facility: HOSPITAL | Age: 83
Discharge: HOME | End: 2018-08-29
Attending: UROLOGY | Admitting: UROLOGY

## 2018-08-29 ENCOUNTER — APPOINTMENT (OUTPATIENT)
Dept: GENERAL RADIOLOGY | Facility: HOSPITAL | Age: 83
End: 2018-08-29
Attending: UROLOGY

## 2018-08-29 VITALS
BODY MASS INDEX: 21.92 KG/M2 | RESPIRATION RATE: 15 BRPM | OXYGEN SATURATION: 99 % | SYSTOLIC BLOOD PRESSURE: 126 MMHG | HEART RATE: 68 BPM | HEIGHT: 76 IN | WEIGHT: 180 LBS | TEMPERATURE: 97.2 F | DIASTOLIC BLOOD PRESSURE: 78 MMHG

## 2018-08-29 DIAGNOSIS — D49.4 BLADDER TUMOR: ICD-10-CM

## 2018-08-29 PROBLEM — Z48.816 AFTERCARE FOLLOWING SURGERY OF THE GENITOURINARY SYSTEM: Status: ACTIVE | Noted: 2018-08-29

## 2018-08-29 PROCEDURE — 88307 TISSUE EXAM BY PATHOLOGIST: CPT | Performed by: UROLOGY

## 2018-08-29 PROCEDURE — 25010000002 PROPOFOL 10 MG/ML EMULSION: Performed by: NURSE ANESTHETIST, CERTIFIED REGISTERED

## 2018-08-29 PROCEDURE — 25010000002 FENTANYL CITRATE (PF) 100 MCG/2ML SOLUTION: Performed by: NURSE ANESTHETIST, CERTIFIED REGISTERED

## 2018-08-29 PROCEDURE — 25010000002 GENTAMICIN PER 80 MG: Performed by: UROLOGY

## 2018-08-29 PROCEDURE — 25010000002 ONDANSETRON PER 1 MG: Performed by: NURSE ANESTHETIST, CERTIFIED REGISTERED

## 2018-08-29 PROCEDURE — 52234 CYSTOSCOPY AND TREATMENT: CPT | Performed by: UROLOGY

## 2018-08-29 RX ORDER — FENTANYL CITRATE 50 UG/ML
INJECTION, SOLUTION INTRAMUSCULAR; INTRAVENOUS AS NEEDED
Status: DISCONTINUED | OUTPATIENT
Start: 2018-08-29 | End: 2018-08-29 | Stop reason: SURG

## 2018-08-29 RX ORDER — MIDAZOLAM HYDROCHLORIDE 1 MG/ML
1 INJECTION INTRAMUSCULAR; INTRAVENOUS
Status: DISCONTINUED | OUTPATIENT
Start: 2018-08-29 | End: 2018-08-29 | Stop reason: HOSPADM

## 2018-08-29 RX ORDER — FAMOTIDINE 10 MG/ML
INJECTION, SOLUTION INTRAVENOUS AS NEEDED
Status: DISCONTINUED | OUTPATIENT
Start: 2018-08-29 | End: 2018-08-29 | Stop reason: SURG

## 2018-08-29 RX ORDER — ONDANSETRON 2 MG/ML
INJECTION INTRAMUSCULAR; INTRAVENOUS AS NEEDED
Status: DISCONTINUED | OUTPATIENT
Start: 2018-08-29 | End: 2018-08-29 | Stop reason: SURG

## 2018-08-29 RX ORDER — ONDANSETRON 2 MG/ML
4 INJECTION INTRAMUSCULAR; INTRAVENOUS ONCE AS NEEDED
Status: DISCONTINUED | OUTPATIENT
Start: 2018-08-29 | End: 2018-08-29 | Stop reason: HOSPADM

## 2018-08-29 RX ORDER — OXYCODONE HYDROCHLORIDE AND ACETAMINOPHEN 5; 325 MG/1; MG/1
1 TABLET ORAL ONCE AS NEEDED
Status: DISCONTINUED | OUTPATIENT
Start: 2018-08-29 | End: 2018-08-29 | Stop reason: HOSPADM

## 2018-08-29 RX ORDER — SODIUM CHLORIDE, SODIUM LACTATE, POTASSIUM CHLORIDE, CALCIUM CHLORIDE 600; 310; 30; 20 MG/100ML; MG/100ML; MG/100ML; MG/100ML
125 INJECTION, SOLUTION INTRAVENOUS CONTINUOUS
Status: DISCONTINUED | OUTPATIENT
Start: 2018-08-29 | End: 2018-08-29 | Stop reason: HOSPADM

## 2018-08-29 RX ORDER — OXYCODONE AND ACETAMINOPHEN 10; 325 MG/1; MG/1
1 TABLET ORAL EVERY 4 HOURS PRN
Qty: 12 TABLET | Refills: 0 | Status: SHIPPED | OUTPATIENT
Start: 2018-08-29 | End: 2020-03-05 | Stop reason: ALTCHOICE

## 2018-08-29 RX ORDER — PROPOFOL 10 MG/ML
VIAL (ML) INTRAVENOUS AS NEEDED
Status: DISCONTINUED | OUTPATIENT
Start: 2018-08-29 | End: 2018-08-29 | Stop reason: SURG

## 2018-08-29 RX ORDER — MIDAZOLAM HYDROCHLORIDE 1 MG/ML
2 INJECTION INTRAMUSCULAR; INTRAVENOUS
Status: DISCONTINUED | OUTPATIENT
Start: 2018-08-29 | End: 2018-08-29 | Stop reason: HOSPADM

## 2018-08-29 RX ORDER — GENTAMICIN SULFATE 80 MG/50ML
80 INJECTION, SOLUTION INTRAVENOUS ONCE
Status: COMPLETED | OUTPATIENT
Start: 2018-08-29 | End: 2018-08-29

## 2018-08-29 RX ORDER — IPRATROPIUM BROMIDE AND ALBUTEROL SULFATE 2.5; .5 MG/3ML; MG/3ML
3 SOLUTION RESPIRATORY (INHALATION) ONCE AS NEEDED
Status: DISCONTINUED | OUTPATIENT
Start: 2018-08-29 | End: 2018-08-29 | Stop reason: HOSPADM

## 2018-08-29 RX ORDER — MEPERIDINE HYDROCHLORIDE 50 MG/ML
12.5 INJECTION INTRAMUSCULAR; INTRAVENOUS; SUBCUTANEOUS
Status: DISCONTINUED | OUTPATIENT
Start: 2018-08-29 | End: 2018-08-29 | Stop reason: HOSPADM

## 2018-08-29 RX ORDER — LIDOCAINE HYDROCHLORIDE 20 MG/ML
INJECTION, SOLUTION INFILTRATION; PERINEURAL AS NEEDED
Status: DISCONTINUED | OUTPATIENT
Start: 2018-08-29 | End: 2018-08-29 | Stop reason: SURG

## 2018-08-29 RX ORDER — SODIUM CHLORIDE 0.9 % (FLUSH) 0.9 %
1-10 SYRINGE (ML) INJECTION AS NEEDED
Status: DISCONTINUED | OUTPATIENT
Start: 2018-08-29 | End: 2018-08-29 | Stop reason: HOSPADM

## 2018-08-29 RX ORDER — FENTANYL CITRATE 50 UG/ML
50 INJECTION, SOLUTION INTRAMUSCULAR; INTRAVENOUS
Status: DISCONTINUED | OUTPATIENT
Start: 2018-08-29 | End: 2018-08-29 | Stop reason: HOSPADM

## 2018-08-29 RX ADMIN — ONDANSETRON 4 MG: 2 INJECTION, SOLUTION INTRAMUSCULAR; INTRAVENOUS at 08:13

## 2018-08-29 RX ADMIN — PROPOFOL 100 MG: 10 INJECTION, EMULSION INTRAVENOUS at 08:18

## 2018-08-29 RX ADMIN — SODIUM CHLORIDE, POTASSIUM CHLORIDE, SODIUM LACTATE AND CALCIUM CHLORIDE 125 ML/HR: 600; 310; 30; 20 INJECTION, SOLUTION INTRAVENOUS at 07:53

## 2018-08-29 RX ADMIN — FAMOTIDINE 20 MG: 10 INJECTION, SOLUTION INTRAVENOUS at 08:13

## 2018-08-29 RX ADMIN — FENTANYL CITRATE 50 MCG: 50 INJECTION INTRAMUSCULAR; INTRAVENOUS at 08:13

## 2018-08-29 RX ADMIN — FENTANYL CITRATE 50 MCG: 50 INJECTION INTRAMUSCULAR; INTRAVENOUS at 08:24

## 2018-08-29 RX ADMIN — LIDOCAINE HYDROCHLORIDE 40 MG: 20 INJECTION, SOLUTION INFILTRATION; PERINEURAL at 08:18

## 2018-08-29 RX ADMIN — GENTAMICIN SULFATE 80 MG: 80 INJECTION, SOLUTION INTRAVENOUS at 08:13

## 2018-08-29 NOTE — ANESTHESIA PREPROCEDURE EVALUATION
Anesthesia Evaluation     NPO Solid Status: > 8 hours  NPO Liquid Status: > 8 hours           Airway   Mallampati: II  TM distance: >3 FB  Neck ROM: full  No difficulty expected  Dental    (+) upper dentures    Pulmonary - normal exam   Cardiovascular - normal exam    Patient on routine beta blocker    (+) hypertension, past MI  1-7 days, CAD, DVT, hyperlipidemia,       Neuro/Psych  (+) CVA, numbness,     GI/Hepatic/Renal/Endo      Musculoskeletal     (+) back pain,   Abdominal  - normal exam   Substance History      OB/GYN          Other   (+) arthritis                       Anesthesia Plan    ASA 3     general     intravenous induction   Anesthetic plan and risks discussed with patient.

## 2018-08-29 NOTE — ANESTHESIA PROCEDURE NOTES
Airway  Urgency: elective    Date/Time: 8/29/2018 8:18 AM  Airway not difficult    General Information and Staff    Patient location during procedure: OR  Anesthesiologist: SAE RUIZ  CRNA: MONALISA GRACE    Indications and Patient Condition  Indications for airway management: airway protection    Preoxygenated: yes  Mask difficulty assessment: 0 - not attempted    Final Airway Details  Final airway type: supraglottic airway      Successful airway: classic  Size 4    Number of attempts at approach: 1    Additional Comments  LMA placed with no trauma noted. Patient tolerated well. Good seal. Secured.

## 2018-08-31 LAB
LAB AP CASE REPORT: NORMAL
PATH REPORT.FINAL DX SPEC: NORMAL

## 2018-09-04 ENCOUNTER — TRANSCRIBE ORDERS (OUTPATIENT)
Dept: ADMINISTRATIVE | Facility: HOSPITAL | Age: 83
End: 2018-09-04

## 2018-09-04 DIAGNOSIS — M79.605 PAIN IN LEFT LEG: Primary | ICD-10-CM

## 2018-09-04 DIAGNOSIS — I73.9 INTERMITTENT CLAUDICATION (HCC): ICD-10-CM

## 2018-09-04 DIAGNOSIS — M79.604 PAIN IN RIGHT LEG: ICD-10-CM

## 2018-09-06 ENCOUNTER — HOSPITAL ENCOUNTER (OUTPATIENT)
Dept: CARDIOLOGY | Facility: HOSPITAL | Age: 83
Discharge: HOME | End: 2018-09-06
Admitting: NURSE PRACTITIONER

## 2018-09-06 ENCOUNTER — HOSPITAL ENCOUNTER (OUTPATIENT)
Dept: CARDIOLOGY | Facility: HOSPITAL | Age: 83
Discharge: HOME | End: 2018-09-06

## 2018-09-06 ENCOUNTER — HOSPITAL ENCOUNTER (OUTPATIENT)
Dept: GENERAL RADIOLOGY | Facility: HOSPITAL | Age: 83
Discharge: HOME | End: 2018-09-06

## 2018-09-06 ENCOUNTER — TRANSCRIBE ORDERS (OUTPATIENT)
Dept: PET IMAGING | Facility: HOSPITAL | Age: 83
End: 2018-09-06

## 2018-09-06 DIAGNOSIS — I73.9 INTERMITTENT CLAUDICATION (HCC): ICD-10-CM

## 2018-09-06 DIAGNOSIS — M54.5 LOW BACK PAIN, UNSPECIFIED BACK PAIN LATERALITY, UNSPECIFIED CHRONICITY, WITH SCIATICA PRESENCE UNSPECIFIED: Primary | ICD-10-CM

## 2018-09-06 DIAGNOSIS — M54.5 LOW BACK PAIN, UNSPECIFIED BACK PAIN LATERALITY, UNSPECIFIED CHRONICITY, WITH SCIATICA PRESENCE UNSPECIFIED: ICD-10-CM

## 2018-09-06 DIAGNOSIS — M79.604 PAIN IN RIGHT LEG: ICD-10-CM

## 2018-09-06 DIAGNOSIS — M79.605 PAIN IN LEFT LEG: ICD-10-CM

## 2018-09-06 PROCEDURE — 72110 X-RAY EXAM L-2 SPINE 4/>VWS: CPT

## 2018-09-06 PROCEDURE — 93923 UPR/LXTR ART STDY 3+ LVLS: CPT

## 2018-09-06 PROCEDURE — 93970 EXTREMITY STUDY: CPT | Performed by: RADIOLOGY

## 2018-09-06 PROCEDURE — 93923 UPR/LXTR ART STDY 3+ LVLS: CPT | Performed by: RADIOLOGY

## 2018-09-06 PROCEDURE — 72110 X-RAY EXAM L-2 SPINE 4/>VWS: CPT | Performed by: RADIOLOGY

## 2018-09-06 PROCEDURE — 93970 EXTREMITY STUDY: CPT

## 2018-09-20 ENCOUNTER — OFFICE VISIT (OUTPATIENT)
Dept: UROLOGY | Facility: CLINIC | Age: 83
End: 2018-09-20

## 2018-09-20 VITALS — BODY MASS INDEX: 22.04 KG/M2 | HEIGHT: 76 IN | WEIGHT: 181 LBS

## 2018-09-20 DIAGNOSIS — N32.0 BLADDER NECK CONTRACTURE: Primary | ICD-10-CM

## 2018-09-20 DIAGNOSIS — D49.4 BLADDER TUMOR: ICD-10-CM

## 2018-09-20 PROCEDURE — 99024 POSTOP FOLLOW-UP VISIT: CPT | Performed by: UROLOGY

## 2018-09-20 NOTE — PROGRESS NOTES
Chief Complaint:          Chief Complaint   Patient presents with   • surgery follow up       HPI:   87 y.o. male.  87-year-old white male status post resection of some necrotic prostate tissue and fulguration of a small bladder tumor he's doing fantastic.  He has no burning, no discharge, no nocturia, I'll see him back in 6 months for a surveillance cystoscopy for what is a very superficial bladder tumor    Past Medical History:        Past Medical History:   Diagnosis Date   • Arthritis    • Back pain    • Benign prostatic hyperplasia    • Bladder tumor    • Cancer (CMS/HCC)     [id number]/melanoma   • Coronary artery disease    • DVT (deep venous thrombosis) (CMS/HCC)     r leg   • Elevated cholesterol    • Heart attack    • Hypertension    • Numbness     feet/hands   • Stroke (CMS/HCC)          Current Meds:     Current Outpatient Prescriptions   Medication Sig Dispense Refill   • atorvastatin (LIPITOR) 10 MG tablet Take 10 mg by mouth Daily.     • furosemide (LASIX) 40 MG tablet Take 40 mg by mouth Daily.     • gabapentin (NEURONTIN) 600 MG tablet Take 800 mg by mouth 2 (Two) Times a Day.     • lisinopril (PRINIVIL,ZESTRIL) 10 MG tablet Take 10 mg by mouth Daily.     • metoprolol tartrate (LOPRESSOR) 50 MG tablet Take 50 mg by mouth Daily.     • oxyCODONE-acetaminophen (PERCOCET)  MG per tablet Take 1 tablet by mouth Every 4 (Four) Hours As Needed for Moderate Pain 12 tablet 0   • tamsulosin (FLOMAX) 0.4 MG capsule 24 hr capsule Take 1 capsule by mouth Every Night.     • warfarin (COUMADIN) 2 MG tablet Take 2 mg by mouth Daily.       Current Facility-Administered Medications   Medication Dose Route Frequency Provider Last Rate Last Dose   • gentamicin (GARAMYCIN) injection 80 mg  80 mg Intramuscular Once Alfonso Collins MD            Allergies:      No Known Allergies     Past Surgical History:     Past Surgical History:   Procedure Laterality Date   • CATARACT EXTRACTION Left    • CHOLECYSTECTOMY      • COLON RESECTION     • COLONOSCOPY     • HEMORROIDECTOMY     • HERNIA REPAIR      left inguinal/umbilical   • HIP ARTHROPLASTY Right    • JOINT REPLACEMENT     • PACEMAKER IMPLANTATION     • PROSTATE SURGERY     • TRANSURETHRAL RESECTION OF BLADDER TUMOR N/A 4/18/2018    Procedure: CYSTOSCOPY TRANSURETHRAL RESECTION OF SMALL BLADDER TUMOR;  Surgeon: Alfonso Collins MD;  Location: [facility] OR;  Service: Urology   • TRANSURETHRAL RESECTION OF BLADDER TUMOR N/A 8/29/2018    Procedure: CYSTOSCOPY TRANSURETHRAL RESECTION OF BLADDER TUMOR;  Surgeon: Alfonso Collins MD;  Location: [facility];  Service: Urology         Social History:     Social History     Social History   • Marital status:      Spouse name: N/A   • Number of children: N/A   • Years of education: N/A     Occupational History   • Not on file.     Social History Main Topics   • Smoking status: Former Smoker     Years: 0.50   • Smokeless tobacco: Never Used   • Alcohol use Yes      Comment: Occasional   • Drug use: No   • Sexual activity: Defer     Other Topics Concern   • Not on file     Social History Narrative   • No narrative on file       Family History:     Family History   Problem Relation Age of Onset   • No Known Problems Father    • No Known Problems Mother        Review of Systems:     Review of Systems   Constitutional: Negative.    HENT: Negative.    Eyes: Negative.    Respiratory: Negative.    Cardiovascular: Negative.    Gastrointestinal: Negative.    Endocrine: Negative.    Musculoskeletal: Negative.    Allergic/Immunologic: Negative.    Neurological: Negative.    Hematological: Negative.    Psychiatric/Behavioral: Negative.        Physical Exam:     Physical Exam   Constitutional: He is oriented to person, place, and time. He appears well-developed and well-nourished.   HENT:   Head: Normocephalic and atraumatic.   Eyes: Pupils are equal, round, and reactive to light. Conjunctivae and EOM are normal.   Neck: Normal  range of motion.   Cardiovascular: Normal rate, regular rhythm, normal heart sounds and intact distal pulses.    Pulmonary/Chest: Effort normal and breath sounds normal.   Abdominal: Soft. Bowel sounds are normal.   Musculoskeletal: Normal range of motion.   Neurological: He is alert and oriented to person, place, and time. He has normal reflexes.   Skin: Skin is warm and dry.   Psychiatric: He has a normal mood and affect. His behavior is normal. Judgment and thought content normal.   Nursing note and vitals reviewed.      I have reviewed the following portions of the patient's history: allergies, current medications, past family history, past medical history, past social history, past surgical history, problem list and ROS and confirm it's accurate.      Procedure:       Assessment/Plan:   Bladder neck contracture-status post division doing great   Bladder tumor- small , noninvasive    Patient's Body mass index is 22.03 kg/m². BMI is within normal parameters. No follow-up required.          This document has been electronically signed by TINY PENG MD September 20, 2018 9:56 AM

## 2018-10-05 ENCOUNTER — TRANSCRIBE ORDERS (OUTPATIENT)
Dept: ADMINISTRATIVE | Facility: HOSPITAL | Age: 83
End: 2018-10-05

## 2018-10-05 DIAGNOSIS — M54.5 LOW BACK PAIN, UNSPECIFIED BACK PAIN LATERALITY, UNSPECIFIED CHRONICITY, WITH SCIATICA PRESENCE UNSPECIFIED: Primary | ICD-10-CM

## 2018-10-09 ENCOUNTER — HOSPITAL ENCOUNTER (OUTPATIENT)
Dept: MRI IMAGING | Facility: HOSPITAL | Age: 83
Discharge: HOME | End: 2018-10-09

## 2018-10-09 DIAGNOSIS — M54.5 LOW BACK PAIN, UNSPECIFIED BACK PAIN LATERALITY, UNSPECIFIED CHRONICITY, WITH SCIATICA PRESENCE UNSPECIFIED: ICD-10-CM

## 2018-10-10 ENCOUNTER — TRANSCRIBE ORDERS (OUTPATIENT)
Dept: ADMINISTRATIVE | Facility: HOSPITAL | Age: 83
End: 2018-10-10

## 2018-10-10 DIAGNOSIS — M54.5 LOW BACK PAIN, UNSPECIFIED BACK PAIN LATERALITY, UNSPECIFIED CHRONICITY, WITH SCIATICA PRESENCE UNSPECIFIED: Primary | ICD-10-CM

## 2018-10-15 ENCOUNTER — HOSPITAL ENCOUNTER (OUTPATIENT)
Dept: CT IMAGING | Facility: HOSPITAL | Age: 83
Discharge: HOME | End: 2018-10-15
Admitting: NURSE PRACTITIONER

## 2018-10-15 DIAGNOSIS — M54.5 LOW BACK PAIN, UNSPECIFIED BACK PAIN LATERALITY, UNSPECIFIED CHRONICITY, WITH SCIATICA PRESENCE UNSPECIFIED: ICD-10-CM

## 2018-10-15 PROCEDURE — 72131 CT LUMBAR SPINE W/O DYE: CPT | Performed by: RADIOLOGY

## 2018-10-15 PROCEDURE — 72131 CT LUMBAR SPINE W/O DYE: CPT

## 2019-03-21 ENCOUNTER — PROCEDURE VISIT (OUTPATIENT)
Dept: UROLOGY | Facility: CLINIC | Age: 84
End: 2019-03-21

## 2019-03-21 VITALS — WEIGHT: 181 LBS | BODY MASS INDEX: 22.04 KG/M2 | HEIGHT: 76 IN

## 2019-03-21 DIAGNOSIS — Z48.816 AFTERCARE FOLLOWING SURGERY OF THE GENITOURINARY SYSTEM: Primary | ICD-10-CM

## 2019-03-21 DIAGNOSIS — D49.4 BLADDER TUMOR: ICD-10-CM

## 2019-03-21 PROCEDURE — 96372 THER/PROPH/DIAG INJ SC/IM: CPT | Performed by: UROLOGY

## 2019-03-21 PROCEDURE — 52000 CYSTOURETHROSCOPY: CPT | Performed by: UROLOGY

## 2019-03-21 RX ORDER — GENTAMICIN SULFATE 40 MG/ML
80 INJECTION, SOLUTION INTRAMUSCULAR; INTRAVENOUS ONCE
Status: COMPLETED | OUTPATIENT
Start: 2019-03-21 | End: 2019-03-21

## 2019-03-21 RX ADMIN — GENTAMICIN SULFATE 80 MG: 40 INJECTION, SOLUTION INTRAMUSCULAR; INTRAVENOUS at 09:27

## 2019-03-21 NOTE — PROGRESS NOTES
Chief Complaint:          Chief Complaint   Patient presents with   • Bladder Tumor     6 month Cystoscopy        HPI:   87 y.o. male.  87-year-old white male status post a bladder neck incision and fulguration of small bladder tumor returns today.  He is doing great he has no recurrence  Past Medical History:        Past Medical History:   Diagnosis Date   • Arthritis    • Back pain    • Benign prostatic hyperplasia    • Bladder tumor    • Cancer (CMS/HCC)     [id number]   • Coronary artery disease    • DVT (deep venous thrombosis) (CMS/HCC)     r leg   • Elevated cholesterol    • Heart attack (CMS/HCC)    • Hypertension    • Numbness     feet/hands   • Stroke (CMS/HCC)          Current Meds:     Current Outpatient Medications   Medication Sig Dispense Refill   • atorvastatin (LIPITOR) 10 MG tablet Take 10 mg by mouth Daily.     • furosemide (LASIX) 40 MG tablet Take 40 mg by mouth Daily.     • gabapentin (NEURONTIN) 600 MG tablet Take 800 mg by mouth 2 (Two) Times a Day.     • lisinopril (PRINIVIL,ZESTRIL) 10 MG tablet Take 10 mg by mouth Daily.     • metoprolol tartrate (LOPRESSOR) 50 MG tablet Take 50 mg by mouth Daily.     • oxyCODONE-acetaminophen (PERCOCET)  MG per tablet Take 1 tablet by mouth Every 4 (Four) Hours As Needed for Moderate Pain 12 tablet 0   • tamsulosin (FLOMAX) 0.4 MG capsule 24 hr capsule Take 1 capsule by mouth Every Night.     • warfarin (COUMADIN) 2 MG tablet Take 2 mg by mouth Daily.       Current Facility-Administered Medications   Medication Dose Route Frequency Provider Last Rate Last Dose   • gentamicin (GARAMYCIN) injection 80 mg  80 mg Intramuscular Once Alfonso Collins MD            Allergies:      No Known Allergies     Past Surgical History:     Past Surgical History:   Procedure Laterality Date   • CATARACT EXTRACTION Left    • CHOLECYSTECTOMY     • COLON RESECTION     • COLONOSCOPY     • HEMORROIDECTOMY     • HERNIA REPAIR      left inguinal/umbilical   •  HIP ARTHROPLASTY Right    • JOINT REPLACEMENT     • PACEMAKER IMPLANTATION     • PROSTATE SURGERY     • TRANSURETHRAL RESECTION OF BLADDER TUMOR N/A 4/18/2018    Procedure: CYSTOSCOPY TRANSURETHRAL RESECTION OF SMALL BLADDER TUMOR;  Surgeon: Alfonso Collins MD;  Location: [facility];  Service: Urology   • TRANSURETHRAL RESECTION OF BLADDER TUMOR N/A 8/29/2018    Procedure: CYSTOSCOPY TRANSURETHRAL RESECTION OF BLADDER TUMOR;  Surgeon: Alfonso Collins MD;  Location: [facility];  Service: Urology         Social History:     Social History     Socioeconomic History   • Marital status:      Spouse name: Not on file   • Number of children: Not on file   • Years of education: Not on file   • Highest education level: Not on file   Tobacco Use   • Smoking status: Former Smoker     Years: 0.50   • Smokeless tobacco: Never Used   Substance and Sexual Activity   • Alcohol use: Yes     Comment: Occasional   • Drug use: No   • Sexual activity: Defer       Family History:     Family History   Problem Relation Age of Onset   • No Known Problems Father    • No Known Problems Mother        Review of Systems:     Review of Systems   Constitutional: Negative.    HENT: Negative.    Eyes: Negative.    Respiratory: Negative.    Cardiovascular: Negative.    Gastrointestinal: Negative.    Endocrine: Negative.    Musculoskeletal: Negative.    Allergic/Immunologic: Negative.    Neurological: Negative.    Hematological: Negative.    Psychiatric/Behavioral: Negative.        Physical Exam:     Physical Exam   Constitutional: He is oriented to person, place, and time. He appears well-developed and well-nourished.   HENT:   Head: Normocephalic and atraumatic.   Eyes: Conjunctivae and EOM are normal. Pupils are equal, round, and reactive to light.   Neck: Normal range of motion.   Cardiovascular: Normal rate, regular rhythm, normal heart sounds and intact distal pulses.   Pulmonary/Chest: Effort normal and breath sounds normal.    Abdominal: Soft. Bowel sounds are normal.   Genitourinary: Rectum normal, prostate normal and penis normal.   Musculoskeletal: Normal range of motion.   Neurological: He is alert and oriented to person, place, and time. He has normal reflexes.   Skin: Skin is warm and dry.   Psychiatric: He has a normal mood and affect. His behavior is normal. Judgment and thought content normal.   Nursing note and vitals reviewed.      I have reviewed the following portions of the patient's history: allergies, current medications, past family history, past medical history, past social history, past surgical history, problem list and ROS and confirm it's accurate.      Procedure:     Cystoscopy:  Patient presents today for cystourethroscopy.  I went ahead and obtained an informed consent including the risk of anesthesia, bleeding, infection, etc.  After prep and drape in a sterile fashion in the low dorsal lithotomy position the urethra was gently anesthetized with 10 cc of 2% viscous Xylocaine jelly.  After an appropriate period of topical anesthesia I used the Olympus digital 14 [place] flexible cystoscope to examine the anterior urethra which was completely normal, the ureteral orifices were visualized and normal in position and configuration there were no stones, tumors or foreign bodies.  The blue light was enabled and was negative allowing us to see small mucosal lesions. The patient was given 80 mg of gentamicin in an intramuscular fashion  as prophylaxis for the cystoscopy and released from the clinic.  Assessment/Plan:   Bladder cancer surveillance-patient returns today for surveillance cystoscopy.  We discussed the use of cystoscopy and the fact that we do it every 3 months for the first year to rule out recurrent tumors.  We discussed the use of the new technology using the blue light to outline even smaller areas of recurrence.  We discussed preventative mechanisms including increasing fluids use of broad-spectrum B  complex vitamins and cessation of smoking and behaviors that contributed to the diagnosis.    Patient's Body mass index is 22.03 kg/m². BMI is within normal parameters. No follow-up required..          This document has been electronically signed by TINY PENG MD March 21, 2019 9:20 AM

## 2019-07-29 ENCOUNTER — TRANSCRIBE ORDERS (OUTPATIENT)
Dept: ADMINISTRATIVE | Facility: HOSPITAL | Age: 84
End: 2019-07-29

## 2019-07-29 ENCOUNTER — HOSPITAL ENCOUNTER (OUTPATIENT)
Dept: GENERAL RADIOLOGY | Facility: HOSPITAL | Age: 84
Discharge: HOME | End: 2019-07-29
Admitting: NURSE PRACTITIONER

## 2019-07-29 DIAGNOSIS — M25.511 RIGHT SHOULDER PAIN, UNSPECIFIED CHRONICITY: ICD-10-CM

## 2019-07-29 DIAGNOSIS — M25.511 RIGHT SHOULDER PAIN, UNSPECIFIED CHRONICITY: Primary | ICD-10-CM

## 2019-07-29 PROCEDURE — 73030 X-RAY EXAM OF SHOULDER: CPT

## 2019-07-29 PROCEDURE — 73030 X-RAY EXAM OF SHOULDER: CPT | Performed by: RADIOLOGY

## 2019-08-19 ENCOUNTER — OFFICE VISIT (OUTPATIENT)
Dept: ORTHOPEDIC SURGERY | Facility: CLINIC | Age: 84
End: 2019-08-19

## 2019-08-19 VITALS
DIASTOLIC BLOOD PRESSURE: 73 MMHG | HEIGHT: 76 IN | WEIGHT: 181 LBS | HEART RATE: 81 BPM | SYSTOLIC BLOOD PRESSURE: 126 MMHG | BODY MASS INDEX: 22.04 KG/M2

## 2019-08-19 DIAGNOSIS — M19.011 OSTEOARTHRITIS OF RIGHT GLENOHUMERAL JOINT: Primary | ICD-10-CM

## 2019-08-19 PROCEDURE — 20610 DRAIN/INJ JOINT/BURSA W/O US: CPT | Performed by: ORTHOPAEDIC SURGERY

## 2019-08-19 PROCEDURE — 99203 OFFICE O/P NEW LOW 30 MIN: CPT | Performed by: ORTHOPAEDIC SURGERY

## 2019-08-19 RX ORDER — PANTOPRAZOLE SODIUM 40 MG/1
40 TABLET, DELAYED RELEASE ORAL DAILY
COMMUNITY

## 2019-08-19 RX ADMIN — METHYLPREDNISOLONE ACETATE 40 MG: 40 INJECTION, SUSPENSION INTRA-ARTICULAR; INTRALESIONAL; INTRAMUSCULAR; SOFT TISSUE at 11:14

## 2019-08-19 RX ADMIN — LIDOCAINE HYDROCHLORIDE 2 ML: 20 INJECTION, SOLUTION INFILTRATION; PERINEURAL at 11:14

## 2019-08-19 NOTE — PROGRESS NOTES
New Patient Visit      Patient: Alberto Guzman  YOB: 1931  Date of Encounter: 08/19/2019        Chief Complaint:   Chief Complaint   Patient presents with   • Right Shoulder - Pain         HPI:   Alberto Guzman, 88 y.o. male, referred by Lianet Dia APRN presents today with right shoulder pain by his report began last winter when he was getting on a tractor and he felt and heard a pop in his shoulder.  He does acknowledge some shoulder pain before then but pain progressed after that episode.  He continues to struggle with pain and is somewhat limited his activity he has pain at rest as well.  He denies weakness or numbness in his arm has no significant neck pain.  He also denies contralateral shoulder pain.      Active Problem List:  Patient Active Problem List   Diagnosis   • Urinary retention   • Bladder neck contracture   • Bladder tumor   • Aftercare following surgery of the genitourinary system         Past Medical History:  Past Medical History:   Diagnosis Date   • Arthritis    • Back pain    • Benign prostatic hyperplasia    • Bladder tumor    • Cancer (CMS/HCC)     [id number]/melanoma   • Colon cancer (CMS/HCC)    • Coronary artery disease    • DVT (deep venous thrombosis) (CMS/HCC)     r leg   • Elevated cholesterol    • Heart attack (CMS/HCC)    • Hypertension    • Numbness     feet/hands   • Osteoporosis    • Rheumatoid arthritis (CMS/HCC)    • Stroke (CMS/HCC)          Past Surgical History:  Past Surgical History:   Procedure Laterality Date   • CATARACT EXTRACTION Left    • CHOLECYSTECTOMY     • COLON RESECTION     • COLONOSCOPY     • HEMORROIDECTOMY     • HERNIA REPAIR      left inguinal/umbilical   • HIP ARTHROPLASTY Right    • JOINT REPLACEMENT     • PACEMAKER IMPLANTATION     • PROSTATE SURGERY     • TRANSURETHRAL RESECTION OF BLADDER TUMOR N/A 4/18/2018    Procedure: CYSTOSCOPY TRANSURETHRAL RESECTION OF SMALL BLADDER TUMOR;  Surgeon: Alfonso Collins MD;   Location: [facility];  Service: Urology   • TRANSURETHRAL RESECTION OF BLADDER TUMOR N/A 8/29/2018    Procedure: CYSTOSCOPY TRANSURETHRAL RESECTION OF BLADDER TUMOR;  Surgeon: Alfonso Collins MD;  Location: [facility];  Service: Urology         Family History:  Family History   Problem Relation Age of Onset   • No Known Problems Father    • No Known Problems Mother          Social History:  Social History     Socioeconomic History   • Marital status:      Spouse name: Not on file   • Number of children: Not on file   • Years of education: Not on file   • Highest education level: Not on file   Tobacco Use   • Smoking status: Former Smoker     Years: 0.50   • Smokeless tobacco: Never Used   Substance and Sexual Activity   • Alcohol use: Yes     Comment: Occasional   • Drug use: No   • Sexual activity: Defer     Body mass index is 22.03 kg/m².      Medications:  Current Outpatient Medications   Medication Sig Dispense Refill   • atorvastatin (LIPITOR) 10 MG tablet Take 10 mg by mouth Daily.     • furosemide (LASIX) 40 MG tablet Take 40 mg by mouth Daily.     • gabapentin (NEURONTIN) 600 MG tablet Take 800 mg by mouth 2 (Two) Times a Day.     • lisinopril (PRINIVIL,ZESTRIL) 10 MG tablet Take 10 mg by mouth Daily.     • metoprolol tartrate (LOPRESSOR) 50 MG tablet Take 50 mg by mouth Daily.     • oxyCODONE-acetaminophen (PERCOCET)  MG per tablet Take 1 tablet by mouth Every 4 (Four) Hours As Needed for Moderate Pain 12 tablet 0   • pantoprazole (PROTONIX) 40 MG EC tablet Take 40 mg by mouth Daily.     • warfarin (COUMADIN) 2 MG tablet Take 2 mg by mouth Daily.       Current Facility-Administered Medications   Medication Dose Route Frequency Provider Last Rate Last Dose   • gentamicin (GARAMYCIN) injection 80 mg  80 mg Intramuscular Once Alfonso Collins MD             Allergies:  No Known Allergies      Review of Systems:   Review of Systems   Constitutional: Positive for activity change and  "fatigue.   HENT: Positive for hearing loss.    Eyes: Negative.    Respiratory: Negative.    Cardiovascular: Positive for leg swelling.   Gastrointestinal: Positive for diarrhea.   Endocrine: Negative.    Genitourinary: Negative.    Musculoskeletal: Positive for arthralgias, back pain and myalgias.   Skin: Negative.    Allergic/Immunologic: Negative.    Neurological: Positive for weakness and numbness.   Hematological: Negative.    Psychiatric/Behavioral: Positive for dysphoric mood and sleep disturbance.         Physical Exam:   Physical Exam  GENERAL: 88 y.o. male, alert and oriented X 3 in no acute distress.   Visit Vitals  /73   Pulse 81   Ht 193 cm (76\")   Wt 82.1 kg (181 lb)   BMI 22.03 kg/m²     Musculoskeletal:   Right shoulder evaluation reveals normal muscle contour.  He has forward elevation to 160 degrees and only mild discomfort with Jobes maneuver, no significant weakness, he is slightly limited in his external rotation.  He has no tenderness along the bicipital groove or acromioclavicular joint, he does demonstrate moderate crepitance with motion.  Neurovascular examination grossly intact.      Radiology/Labs: Moderate osteoarthritis of right shoulder glenohumeral joint, subacromial space preserved.         Assessment & Plan:   88 y.o. male clinical findings today consistent with degenerative arthritis right glenohumeral joint we discussed his options and after that discussion he was provided Depo-Medrol 40 mg lidocaine block intra-articular right shoulder.  He demonstrated dramatic and almost complete relief of his shoulder pain.  I think his primary problem is his osteoarthritis I am not suspecting rotator cuff pathology.  He will return in the future as needed for repeat steroid injections.      ICD-10-CM ICD-9-CM   1. Osteoarthritis of right glenohumeral joint M19.011 715.91     Large Joint Arthrocentesis: R glenohumeral  Date/Time: 8/19/2019 11:14 AM  Consent given by: patient  Site marked: " site marked  Timeout: Immediately prior to procedure a time out was called to verify the correct patient, procedure, equipment, support staff and site/side marked as required   Supporting Documentation  Indications: pain   Procedure Details  Location: shoulder - R glenohumeral  Preparation: Patient was prepped and draped in the usual sterile fashion  Needle size: 25 G  Approach: lateral  Medications administered: 2 mL lidocaine 2%; 40 mg methylPREDNISolone acetate 40 MG/ML  Patient tolerance: patient tolerated the procedure well with no immediate complications            Cc:   Lianet Dia, FLOWER                This document has been electronically signed by Kannan Francis MD   August 21, 2019 11:14 AM

## 2019-08-19 NOTE — PROGRESS NOTES
Follow-up Visit         Patient: Alberto Guzman  YOB: 1931  Date of Encounter: 08/19/2019      Chief  Complaint:   Chief Complaint   Patient presents with   • Right Shoulder - Pain         HPI:  Alberto Guzman, 88 y.o. male     Medical History:  Patient Active Problem List   Diagnosis   • Urinary retention   • Bladder neck contracture   • Bladder tumor   • Aftercare following surgery of the genitourinary system     Past Medical History:   Diagnosis Date   • Arthritis    • Back pain    • Benign prostatic hyperplasia    • Bladder tumor    • Cancer (CMS/HCC)     [id number]/melanoma   • Coronary artery disease    • DVT (deep venous thrombosis) (CMS/HCC)     r leg   • Elevated cholesterol    • Heart attack (CMS/HCC)    • Hypertension    • Numbness     feet/hands   • Stroke (CMS/HCC)        Social History:  Social History     Socioeconomic History   • Marital status:      Spouse name: Not on file   • Number of children: Not on file   • Years of education: Not on file   • Highest education level: Not on file   Tobacco Use   • Smoking status: Former Smoker     Years: 0.50   • Smokeless tobacco: Never Used   Substance and Sexual Activity   • Alcohol use: Yes     Comment: Occasional   • Drug use: No   • Sexual activity: Defer       Surgical History:  Past Surgical History:   Procedure Laterality Date   • CATARACT EXTRACTION Left    • CHOLECYSTECTOMY     • COLON RESECTION     • COLONOSCOPY     • HEMORROIDECTOMY     • HERNIA REPAIR      left inguinal/umbilical   • HIP ARTHROPLASTY Right    • JOINT REPLACEMENT     • PACEMAKER IMPLANTATION     • PROSTATE SURGERY     • TRANSURETHRAL RESECTION OF BLADDER TUMOR N/A 4/18/2018    Procedure: CYSTOSCOPY TRANSURETHRAL RESECTION OF SMALL BLADDER TUMOR;  Surgeon: Alfonso Collins MD;  Location: [facility];  Service: Urology   • TRANSURETHRAL RESECTION OF BLADDER TUMOR N/A 8/29/2018    Procedure: CYSTOSCOPY TRANSURETHRAL RESECTION OF BLADDER TUMOR;  Surgeon:  Alfonso Collins MD;  Location: [facility];  Service: Urology       Radiology:       Examination:       Assessment & Plan:   88 y.o. male         No diagnosis found.          Cc:  Lianet Dia, FLOWER              This document has been electronically signed by Kannan Francis MD   August 19, 2019 2:38 PM

## 2019-08-22 RX ORDER — LIDOCAINE HYDROCHLORIDE 20 MG/ML
2 INJECTION, SOLUTION INFILTRATION; PERINEURAL
Status: COMPLETED | OUTPATIENT
Start: 2019-08-19 | End: 2019-08-19

## 2019-08-22 RX ORDER — METHYLPREDNISOLONE ACETATE 40 MG/ML
40 INJECTION, SUSPENSION INTRA-ARTICULAR; INTRALESIONAL; INTRAMUSCULAR; SOFT TISSUE
Status: COMPLETED | OUTPATIENT
Start: 2019-08-19 | End: 2019-08-19

## 2019-09-18 ENCOUNTER — OFFICE VISIT (OUTPATIENT)
Dept: ORTHOPEDIC SURGERY | Facility: CLINIC | Age: 84
End: 2019-09-18

## 2019-09-18 VITALS — BODY MASS INDEX: 22.04 KG/M2 | WEIGHT: 181 LBS | HEIGHT: 76 IN

## 2019-09-18 DIAGNOSIS — M19.011 OSTEOARTHRITIS OF RIGHT GLENOHUMERAL JOINT: Primary | ICD-10-CM

## 2019-09-18 PROCEDURE — 99212 OFFICE O/P EST SF 10 MIN: CPT | Performed by: ORTHOPAEDIC SURGERY

## 2019-09-18 NOTE — PROGRESS NOTES
Follow-up Visit         Patient: Alberto Guzman  YOB: 1931  Date of Encounter: 09/18/2019      Chief  Complaint:   Chief Complaint   Patient presents with   • Right Shoulder - Follow-up, Pain         HPI:  Alberto Guzman, 88 y.o. male resents today with complaints of right shoulder pain.  He was last treated with acromial steroid injection August 19 of 2019 he had good response for many weeks and then yesterday he was out helping a neighbor start his truck when he states he reinjured his shoulder and has had increased pain and some limitation of motion.    Medical History:  Patient Active Problem List   Diagnosis   • Urinary retention   • Bladder neck contracture   • Bladder tumor   • Aftercare following surgery of the genitourinary system     Past Medical History:   Diagnosis Date   • Arthritis    • Back pain    • Benign prostatic hyperplasia    • Bladder tumor    • Cancer (CMS/HCC)     [id number]/melanoma   • Colon cancer (CMS/HCC)    • Coronary artery disease    • DVT (deep venous thrombosis) (CMS/HCC)     r leg   • Elevated cholesterol    • Heart attack (CMS/HCC)    • Hypertension    • Numbness     feet/hands   • Osteoporosis    • Rheumatoid arthritis (CMS/HCC)    • Stroke (CMS/HCC)        Social History:  Social History     Socioeconomic History   • Marital status:      Spouse name: Not on file   • Number of children: Not on file   • Years of education: Not on file   • Highest education level: Not on file   Tobacco Use   • Smoking status: Former Smoker     Years: 0.50   • Smokeless tobacco: Never Used   Substance and Sexual Activity   • Alcohol use: Yes     Comment: Occasional   • Drug use: No   • Sexual activity: Defer       Surgical History:  Past Surgical History:   Procedure Laterality Date   • CATARACT EXTRACTION Left    • CHOLECYSTECTOMY     • COLON RESECTION     • COLONOSCOPY     • HEMORROIDECTOMY     • HERNIA REPAIR      left inguinal/umbilical   • HIP ARTHROPLASTY Right    •  JOINT REPLACEMENT     • PACEMAKER IMPLANTATION     • PROSTATE SURGERY     • TRANSURETHRAL RESECTION OF BLADDER TUMOR N/A 4/18/2018    Procedure: CYSTOSCOPY TRANSURETHRAL RESECTION OF SMALL BLADDER TUMOR;  Surgeon: Alfonso Collins MD;  Location: [facility];  Service: Urology   • TRANSURETHRAL RESECTION OF BLADDER TUMOR N/A 8/29/2018    Procedure: CYSTOSCOPY TRANSURETHRAL RESECTION OF BLADDER TUMOR;  Surgeon: Alfonso Collins MD;  Location: [facility];  Service: Urology         Examination:   Shoulder evaluation reveals forward elevation to 90 degrees, he has moderate pain with Jobes maneuver and moderate weakness.  He has good strength with internal and external rotation neurovascular examination grossly intact.    Assessment & Plan:   88 y.o. male with reinjury to right shoulder with known osteoarthritis glenohumeral joint..  We discussed his options unfortunately he is not a candidate for repeat injection today.  I do not think his symptoms warrant MRI as he is not a surgical candidate.  He was instructed to return in about 8 weeks and we will reevaluate and most likely provide steroid injection.         Diagnosis Plan   1. Osteoarthritis of right glenohumeral joint               Cc:  Lianet Dia, APRN              This document has been electronically signed by Kannan Francis MD   September 18, 2019 2:09 PM

## 2019-09-20 ENCOUNTER — PROCEDURE VISIT (OUTPATIENT)
Dept: UROLOGY | Facility: CLINIC | Age: 84
End: 2019-09-20

## 2019-09-20 VITALS — WEIGHT: 181 LBS | HEIGHT: 76 IN | BODY MASS INDEX: 22.04 KG/M2

## 2019-09-20 DIAGNOSIS — D49.4 BLADDER TUMOR: ICD-10-CM

## 2019-09-20 DIAGNOSIS — Z79.2 PROPHYLACTIC ANTIBIOTIC: Primary | ICD-10-CM

## 2019-09-20 DIAGNOSIS — N32.0 BLADDER NECK CONTRACTURE: ICD-10-CM

## 2019-09-20 PROCEDURE — 96372 THER/PROPH/DIAG INJ SC/IM: CPT | Performed by: UROLOGY

## 2019-09-20 PROCEDURE — 52000 CYSTOURETHROSCOPY: CPT | Performed by: UROLOGY

## 2019-09-20 RX ORDER — GENTAMICIN SULFATE 40 MG/ML
80 INJECTION, SOLUTION INTRAMUSCULAR; INTRAVENOUS ONCE
Status: COMPLETED | OUTPATIENT
Start: 2019-09-20 | End: 2019-09-20

## 2019-09-20 RX ADMIN — GENTAMICIN SULFATE 80 MG: 40 INJECTION, SOLUTION INTRAMUSCULAR; INTRAVENOUS at 13:46

## 2019-09-20 NOTE — PROGRESS NOTES
Chief Complaint:          Chief Complaint   Patient presents with   • Cystoscopy       HPI:   88 y.o. male here for follow-up with a history of a bladder neck contracture and small bladder tumor.  His wife of 68 years just passed away.  He is doing great he has no nocturia he has a great stream no burning blood discharge any other problems a cystoscopy was completely unremarkable I gave him reassurance and I will see him back in 1 year      Past Medical History:        Past Medical History:   Diagnosis Date   • Arthritis    • Back pain    • Benign prostatic hyperplasia    • Bladder tumor    • Cancer (CMS/HCC)     [id number]   • Colon cancer (CMS/HCC)    • Coronary artery disease    • DVT (deep venous thrombosis) (CMS/HCC)     r leg   • Elevated cholesterol    • Heart attack (CMS/HCC)    • Hypertension    • Numbness     feet/hands   • Osteoporosis    • Rheumatoid arthritis (CMS/HCC)    • Stroke (CMS/HCC)          Current Meds:     Current Outpatient Medications   Medication Sig Dispense Refill   • atorvastatin (LIPITOR) 10 MG tablet Take 10 mg by mouth Daily.     • furosemide (LASIX) 40 MG tablet Take 40 mg by mouth Daily.     • gabapentin (NEURONTIN) 600 MG tablet Take 800 mg by mouth 2 (Two) Times a Day.     • lisinopril (PRINIVIL,ZESTRIL) 10 MG tablet Take 10 mg by mouth Daily.     • metoprolol tartrate (LOPRESSOR) 50 MG tablet Take 50 mg by mouth Daily.     • oxyCODONE-acetaminophen (PERCOCET)  MG per tablet Take 1 tablet by mouth Every 4 (Four) Hours As Needed for Moderate Pain 12 tablet 0   • pantoprazole (PROTONIX) 40 MG EC tablet Take 40 mg by mouth Daily.     • warfarin (COUMADIN) 2 MG tablet Take 2 mg by mouth Daily.       Current Facility-Administered Medications   Medication Dose Route Frequency Provider Last Rate Last Dose   • gentamicin (GARAMYCIN) injection 80 mg  80 mg Intramuscular Once Alfonso Collins MD            Allergies:      No Known Allergies     Past Surgical History:      Past Surgical History:   Procedure Laterality Date   • CATARACT EXTRACTION Left    • CHOLECYSTECTOMY     • COLON RESECTION     • COLONOSCOPY     • HEMORROIDECTOMY     • HERNIA REPAIR      left inguinal/umbilical   • HIP ARTHROPLASTY Right    • JOINT REPLACEMENT     • PACEMAKER IMPLANTATION     • PROSTATE SURGERY     • TRANSURETHRAL RESECTION OF BLADDER TUMOR N/A 4/18/2018    Procedure: CYSTOSCOPY TRANSURETHRAL RESECTION OF SMALL BLADDER TUMOR;  Surgeon: Alfonso Collins MD;  Location: [facility];  Service: Urology   • TRANSURETHRAL RESECTION OF BLADDER TUMOR N/A 8/29/2018    Procedure: CYSTOSCOPY TRANSURETHRAL RESECTION OF BLADDER TUMOR;  Surgeon: Alfonso Collins MD;  Location: [facility] OR;  Service: Urology         Social History:     Social History     Socioeconomic History   • Marital status:      Spouse name: Not on file   • Number of children: Not on file   • Years of education: Not on file   • Highest education level: Not on file   Tobacco Use   • Smoking status: Former Smoker     Years: 0.50   • Smokeless tobacco: Never Used   Substance and Sexual Activity   • Alcohol use: Yes     Comment: Occasional   • Drug use: No   • Sexual activity: Defer       Family History:     Family History   Problem Relation Age of Onset   • No Known Problems Father    • No Known Problems Mother        Review of Systems:     Review of Systems   Constitutional: Negative.    HENT: Negative.    Eyes: Negative.    Respiratory: Negative.    Cardiovascular: Negative.    Gastrointestinal: Negative.    Endocrine: Negative.    Musculoskeletal: Negative.    Allergic/Immunologic: Negative.    Neurological: Negative.    Hematological: Negative.    Psychiatric/Behavioral: Negative.        Physical Exam:     Physical Exam   Constitutional: He is oriented to person, place, and time. He appears well-developed and well-nourished.   HENT:   Head: Normocephalic and atraumatic.   Eyes: Conjunctivae and EOM are normal. Pupils are  equal, round, and reactive to light.   Neck: Normal range of motion.   Cardiovascular: Normal rate, regular rhythm, normal heart sounds and intact distal pulses.   Pulmonary/Chest: Effort normal and breath sounds normal.   Abdominal: Soft. Bowel sounds are normal.   Genitourinary: Penis normal.   Musculoskeletal: Normal range of motion.   Neurological: He is alert and oriented to person, place, and time. He has normal reflexes.   Skin: Skin is warm and dry.   Psychiatric: He has a normal mood and affect. His behavior is normal. Judgment and thought content normal.   Nursing note and vitals reviewed.      I have reviewed the following portions of the patient's history: allergies, current medications, past family history, past medical history, past social history, past surgical history, problem list and ROS and confirm it's accurate.      Procedure:   Cystoscopy:  Patient presents today for cystourethroscopy.  I went ahead and obtained an informed consent including the risk of anesthesia, bleeding, infection, etc.  After prep and drape in a sterile fashion in the low dorsal lithotomy position the urethra was gently anesthetized with 10 cc of 2% viscous Xylocaine jelly.  After an appropriate period of topical anesthesia I used the Olympus digital 14 [place] flexible cystoscope to examine the anterior urethra which was completely normal, the ureteral orifices were visualized and normal in position and configuration there were no stones, tumors or foreign bodies.  He had a previously resected bladder neck that looked great. the patient was given 80 mg of gentamicin in an intramuscular fashion  as prophylaxis for the cystoscopy and released from the clinic.    Assessment/Plan:   Bladder neck contracture-resolved  Bladder cancer surveillance-patient returns today for surveillance cystoscopy.  We discussed the use of cystoscopy and the fact that we do it every 3 months for the first year to rule out recurrent tumors.  We  discussed the use of the new technology using the blue light to outline even smaller areas of recurrence.  We discussed preventative mechanisms including increasing fluids use of broad-spectrum B complex vitamins and cessation of smoking and behaviors that contributed to the diagnosis.            Patient's Body mass index is 22.03 kg/m². BMI is within normal parameters. No follow-up required..              This document has been electronically signed by TINY PENG MD September 20, 2019 2:39 PM

## 2020-03-04 ENCOUNTER — OFFICE VISIT (OUTPATIENT)
Dept: CARDIOLOGY | Facility: CLINIC | Age: 85
End: 2020-03-04

## 2020-03-04 VITALS
SYSTOLIC BLOOD PRESSURE: 128 MMHG | HEIGHT: 76 IN | BODY MASS INDEX: 25.57 KG/M2 | OXYGEN SATURATION: 99 % | WEIGHT: 210 LBS | HEART RATE: 88 BPM | DIASTOLIC BLOOD PRESSURE: 70 MMHG

## 2020-03-04 DIAGNOSIS — Z95.0 PRESENCE OF CARDIAC PACEMAKER: ICD-10-CM

## 2020-03-04 DIAGNOSIS — I25.10 CORONARY ARTERY DISEASE INVOLVING NATIVE CORONARY ARTERY OF NATIVE HEART WITHOUT ANGINA PECTORIS: Primary | ICD-10-CM

## 2020-03-04 DIAGNOSIS — I47.29 VENTRICULAR TACHYCARDIA (PAROXYSMAL) (HCC): ICD-10-CM

## 2020-03-04 DIAGNOSIS — Z79.01 CHRONIC ANTICOAGULATION: ICD-10-CM

## 2020-03-04 DIAGNOSIS — I48.92 ATRIAL FLUTTER WITH CONTROLLED RESPONSE (HCC): ICD-10-CM

## 2020-03-04 DIAGNOSIS — I73.9 ASYMPTOMATIC PVD (PERIPHERAL VASCULAR DISEASE) (HCC): ICD-10-CM

## 2020-03-04 DIAGNOSIS — E78.2 MIXED HYPERLIPIDEMIA: ICD-10-CM

## 2020-03-04 DIAGNOSIS — I25.5 ISCHEMIC CARDIOMYOPATHY: ICD-10-CM

## 2020-03-04 DIAGNOSIS — I10 ESSENTIAL HYPERTENSION: ICD-10-CM

## 2020-03-04 PROCEDURE — 99204 OFFICE O/P NEW MOD 45 MIN: CPT | Performed by: INTERNAL MEDICINE

## 2020-03-04 PROCEDURE — 93000 ELECTROCARDIOGRAM COMPLETE: CPT | Performed by: INTERNAL MEDICINE

## 2020-03-04 RX ORDER — HYDROCODONE BITARTRATE AND ACETAMINOPHEN 10; 325 MG/1; MG/1
1 TABLET ORAL EVERY 6 HOURS PRN
COMMUNITY
Start: 2020-02-06

## 2020-03-05 PROBLEM — I73.9 ASYMPTOMATIC PVD (PERIPHERAL VASCULAR DISEASE) (HCC): Status: ACTIVE | Noted: 2020-03-05

## 2020-03-05 PROBLEM — I48.92 ATRIAL FLUTTER WITH CONTROLLED RESPONSE: Status: ACTIVE | Noted: 2020-03-05

## 2020-03-05 PROBLEM — E78.2 MIXED HYPERLIPIDEMIA: Status: ACTIVE | Noted: 2020-03-05

## 2020-03-05 PROBLEM — Z79.01 CHRONIC ANTICOAGULATION: Status: ACTIVE | Noted: 2020-03-05

## 2020-03-05 PROBLEM — I47.20 VENTRICULAR TACHYCARDIA (PAROXYSMAL): Status: ACTIVE | Noted: 2020-03-05

## 2020-03-05 PROBLEM — I25.5 ISCHEMIC CARDIOMYOPATHY: Status: ACTIVE | Noted: 2020-03-05

## 2020-03-05 PROBLEM — I25.10 CORONARY ARTERY DISEASE INVOLVING NATIVE CORONARY ARTERY OF NATIVE HEART WITHOUT ANGINA PECTORIS: Status: ACTIVE | Noted: 2020-03-05

## 2020-03-05 PROBLEM — I47.29 VENTRICULAR TACHYCARDIA (PAROXYSMAL) (HCC): Status: ACTIVE | Noted: 2020-03-05

## 2020-03-05 PROBLEM — Z95.0 PRESENCE OF CARDIAC PACEMAKER: Status: ACTIVE | Noted: 2020-03-05

## 2020-03-05 PROBLEM — I10 ESSENTIAL HYPERTENSION: Status: ACTIVE | Noted: 2020-03-05

## 2020-03-05 NOTE — PROGRESS NOTES
Subjective   Chief Complaint   Patient presents with   • Establish Care     Transferring care from [facility] (retired)       History of Present Illness  Patient is 88 years old elderly gentleman who has known coronary artery disease patient had anterior wall myocardial infarction requiring directional atherectomy of LAD in 1983 and required bare-metal stent to LAD in 1995.  In 2015 patient had coronary angiography and showed nonobstructive disease.  Patient has been seeing Dr. Kim who is retiring.  Patient was to establish as a cardiology patient.  He states that he is on medical management and is not interested in interventional therapy at this time.    He denies any chest pain or palpitations.  Patient also has peripheral vascular disease however he is ambulatory but cannot walk too long.  Patient also has 4+ bilateral lower extremity edema.  He is taking Lasix 20 mg daily  Blood pressure is well controlled he is taking lisinopril and Lopressor.    Because of hyperlipidemia he is taking Lipitor 10 mg daily and is getting his lab work monitored closely.  He is anticoagulated with Coumadin because of chronic atrial fibrillation.  Patient has cardiac pacemaker in place  Patient is not taking any antiplatelet therapy at this time  Overall patient is asymptomatic    Past Surgical History:   Procedure Laterality Date   • CATARACT EXTRACTION Left    • CHOLECYSTECTOMY     • COLON RESECTION     • COLONOSCOPY     • HEMORROIDECTOMY     • HERNIA REPAIR      left inguinal/umbilical   • HIP ARTHROPLASTY Right    • INSERT / REPLACE / REMOVE PACEMAKER     • JOINT REPLACEMENT     • PACEMAKER IMPLANTATION     • PROSTATE SURGERY     • TRANSURETHRAL RESECTION OF BLADDER TUMOR N/A 4/18/2018    Procedure: CYSTOSCOPY TRANSURETHRAL RESECTION OF SMALL BLADDER TUMOR;  Surgeon: Alfonso Collins MD;  Location: [facility];  Service: Urology   • TRANSURETHRAL RESECTION OF BLADDER TUMOR N/A 8/29/2018    Procedure: CYSTOSCOPY  TRANSURETHRAL RESECTION OF BLADDER TUMOR;  Surgeon: Alfonso Collins MD;  Location: [facility];  Service: Urology     Family History   Problem Relation Age of Onset   • No Known Problems Father    • No Known Problems Mother      Past Medical History:   Diagnosis Date   • Arthritis    • Atrial flutter (CMS/HCC)    • Back pain    • Benign prostatic hyperplasia    • Bladder tumor    • Cancer (CMS/HCC)     [id number]/melanoma   • Colon cancer (CMS/HCC)    • Coronary artery disease    • DVT (deep venous thrombosis) (CMS/HCC)     r leg   • Elevated cholesterol    • Heart attack (CMS/HCC)    • Hypertension    • Numbness     feet/hands   • Osteoporosis    • PVD (peripheral vascular disease) (CMS/HCC)    • Rheumatoid arthritis (CMS/HCC)    • Stroke (CMS/HCC)    • Ventricular tachycardia (CMS/HCC)        Patient Active Problem List   Diagnosis   • Urinary retention   • Bladder neck contracture   • Bladder tumor   • Aftercare following surgery of the genitourinary system   • Atrial flutter with controlled response (CMS/HCC)   • Presence of cardiac pacemaker   • Mixed hyperlipidemia   • Essential hypertension   • Chronic anticoagulation with Coumadin   • Ventricular tachycardia (paroxysmal) (CMS/HCC)   • Asymptomatic PVD (peripheral vascular disease) (CMS/HCC)   • Coronary artery disease, anterior wall myocardial infarction with directional atherectomy of LAD in 1983 and bare-metal stent to the LAD in 1995, nonobstructive disease 2015   • Ischemic cardiomyopathy, LV ejection fraction around 45 to 50%         Social History     Tobacco Use   • Smoking status: Former Smoker     Years: 0.50   • Smokeless tobacco: Never Used   Substance Use Topics   • Alcohol use: Yes     Drinks per session: 1 or 2     Binge frequency: Less than monthly     Comment: Occasional few times a year   • Drug use: No         The following portions of the patient's history were reviewed and updated as appropriate: allergies, current medications, past  "family history, past medical history, past social history, past surgical history and problem list.    Not on File      Current Outpatient Medications:   •  atorvastatin (LIPITOR) 10 MG tablet, Take 10 mg by mouth Daily., Disp: , Rfl:   •  furosemide (LASIX) 40 MG tablet, Take 20 mg by mouth Daily., Disp: , Rfl:   •  gabapentin (NEURONTIN) 600 MG tablet, Take 800 mg by mouth 2 (Two) Times a Day., Disp: , Rfl:   •  HYDROcodone-acetaminophen (NORCO)  MG per tablet, , Disp: , Rfl:   •  lisinopril (PRINIVIL,ZESTRIL) 10 MG tablet, Take 20 mg by mouth Daily., Disp: , Rfl:   •  metoprolol tartrate (LOPRESSOR) 50 MG tablet, Take 50 mg by mouth Daily., Disp: , Rfl:   •  pantoprazole (PROTONIX) 40 MG EC tablet, Take 40 mg by mouth Daily., Disp: , Rfl:   •  warfarin (COUMADIN) 2 MG tablet, Take 2 mg by mouth Daily., Disp: , Rfl:   •  oxyCODONE-acetaminophen (PERCOCET)  MG per tablet, Take 1 tablet by mouth Every 4 (Four) Hours As Needed for Moderate Pain, Disp: 12 tablet, Rfl: 0    Current Facility-Administered Medications:   •  gentamicin (GARAMYCIN) injection 80 mg, 80 mg, Intramuscular, Once, Alfonso Collins MD    Review of Systems   Constitution: Negative.   HENT: Negative.  Negative for congestion.    Eyes: Negative.    Cardiovascular: Positive for leg swelling and palpitations. Negative for chest pain, cyanosis, dyspnea on exertion, irregular heartbeat, near-syncope, orthopnea, paroxysmal nocturnal dyspnea and syncope.   Respiratory: Negative.  Negative for shortness of breath.    Endocrine: Negative.    Hematologic/Lymphatic: Negative.    Skin: Negative.    Musculoskeletal: Negative.    Gastrointestinal: Negative.    Genitourinary: Negative.    Neurological: Negative.  Negative for headaches.        Objective      /70 (BP Location: Left arm, Patient Position: Sitting, Cuff Size: Adult)   Pulse 88   Ht 193 cm (76\")   Wt 95.3 kg (210 lb)   SpO2 99%   BMI 25.56 kg/m²     Physical Exam   "   Constitutional: He appears well-developed and well-nourished. No distress.   HENT:   Head: Normocephalic and atraumatic.   Mouth/Throat: Oropharynx is clear and moist. No oropharyngeal exudate.   Eyes: Pupils are equal, round, and reactive to light. Conjunctivae and EOM are normal. No scleral icterus.   Neck: Normal range of motion. Neck supple. No JVD present. No tracheal deviation present. No thyromegaly present.   Cardiovascular: Normal rate, regular rhythm and normal heart sounds. PMI is not displaced. Exam reveals no gallop, no friction rub and no decreased pulses.   No murmur heard.  Pulses:       Carotid pulses are 3+ on the right side, and 3+ on the left side.       Radial pulses are 3+ on the right side, and 3+ on the left side.   Pulmonary/Chest: Effort normal and breath sounds normal. No respiratory distress. He has no wheezes. He has no rales. He exhibits no tenderness.   Abdominal: Soft. Bowel sounds are normal. He exhibits no distension, no abdominal bruit and no mass. There is no splenomegaly or hepatomegaly. There is no tenderness. There is no rebound and no guarding.   Musculoskeletal: Normal range of motion. He exhibits edema. He exhibits no tenderness or deformity.   Lymphadenopathy:     He has no cervical adenopathy.   Neurological: He is alert. He has normal reflexes. No cranial nerve deficit. He exhibits normal muscle tone. Coordination normal.   Skin: Skin is warm and dry. No rash noted. He is not diaphoretic. No erythema.   Psychiatric: He has a normal mood and affect. His behavior is normal. Judgment and thought content normal.       Lab Review:              ECG 12 Lead  Date/Time: 3/5/2020 1:34 PM  Performed by: Selam Alcaraz MD  Authorized by: Selam Alcaraz MD   Comparison: compared with previous ECG from 2/19/2020  Similar to previous ECG  Rhythm: atrial flutter and paced  BPM: 62    Clinical impression: abnormal EKG  Comments: Ventricular paced rhythm at  62/min            I reviewed the patient's new clinical results.  I personally viewed and interpreted the patient's EKG/lab data        Assessment:   Diagnosis Plan   1. Coronary artery disease, anterior wall myocardial infarction with directional atherectomy of LAD in 1983 and bare-metal stent to the LAD in 1995, nonobstructive disease 2015  ECG 12 Lead   2. Ischemic cardiomyopathy, LV ejection fraction around 45 to 50%     3. Essential hypertension     4. Mixed hyperlipidemia     5. Presence of cardiac pacemaker     6. Ventricular tachycardia (paroxysmal) (CMS/HCC)     7. Atrial flutter with controlled response (CMS/HCC)     8. Asymptomatic PVD (peripheral vascular disease) (CMS/HCC)     9. Chronic anticoagulation with Coumadin            Plan:    Patient is here to establish as a cardiology patient.  He is regularly followed by Dr. Kim who is retiring.  Patient had myocardial infarction in 1983 requiring LAD atherectomy followed by bare-metal stent LAD in 1995.  Patient is on medical management and is doing very well.  He is not taking any antiplatelet therapy because of Coumadin.  He was advised to start baby aspirin daily.    Patient also has ischemic cardiomyopathy with LV ejection fraction of 45 to 50%  He is taking beta-blocker therapy lisinopril and Lasix.  No change in therapy was made.    Blood pressure is also very well controlled.  EKG today shows chronic atrial flutter with ventricular pacing.  Pacemaker will be checked every 6 months.  Lifestyle discussed and explained.  He also had nonsustained V. tach on the pacemaker monitoring and was asymptomatic with beta-blocker therapy he is doing well.  Pacemaker check in 3 months and follow-up in 6 months.    Thank you for giving me the oppertunity to participate in your patient's cardiac care.    Sincerely,    NIK Alcaraz M.D. FACP FACC     No follow-ups on file.

## 2020-05-07 ENCOUNTER — TRANSCRIBE ORDERS (OUTPATIENT)
Dept: ADMINISTRATIVE | Facility: HOSPITAL | Age: 85
End: 2020-05-07

## 2020-05-07 DIAGNOSIS — R42 DIZZINESS AND GIDDINESS: Primary | ICD-10-CM

## 2020-05-18 ENCOUNTER — TELEPHONE (OUTPATIENT)
Dept: GENERAL RADIOLOGY | Facility: HOSPITAL | Age: 85
End: 2020-05-18

## 2020-05-18 ENCOUNTER — HOSPITAL ENCOUNTER (OUTPATIENT)
Dept: CT IMAGING | Facility: HOSPITAL | Age: 85
Discharge: HOME | End: 2020-05-18
Admitting: NURSE PRACTITIONER

## 2020-05-18 ENCOUNTER — HOSPITAL ENCOUNTER (OUTPATIENT)
Dept: CARDIOLOGY | Facility: HOSPITAL | Age: 85
Discharge: HOME | End: 2020-05-18

## 2020-05-18 DIAGNOSIS — R42 DIZZINESS AND GIDDINESS: ICD-10-CM

## 2020-05-18 PROCEDURE — 93880 EXTRACRANIAL BILAT STUDY: CPT | Performed by: RADIOLOGY

## 2020-05-18 PROCEDURE — 70450 CT HEAD/BRAIN W/O DYE: CPT | Performed by: RADIOLOGY

## 2020-05-18 PROCEDURE — 93880 EXTRACRANIAL BILAT STUDY: CPT

## 2020-05-18 PROCEDURE — 70450 CT HEAD/BRAIN W/O DYE: CPT

## 2020-06-04 ENCOUNTER — TRANSCRIBE ORDERS (OUTPATIENT)
Dept: ADMINISTRATIVE | Facility: HOSPITAL | Age: 85
End: 2020-06-04

## 2020-06-04 DIAGNOSIS — R42 DIZZINESS AND GIDDINESS: Primary | ICD-10-CM

## 2020-06-04 DIAGNOSIS — R94.02 ABNORMAL BRAIN SCAN: ICD-10-CM

## 2020-06-12 ENCOUNTER — HOSPITAL ENCOUNTER (OUTPATIENT)
Dept: CT IMAGING | Facility: HOSPITAL | Age: 85
Discharge: HOME | End: 2020-06-12
Admitting: NURSE PRACTITIONER

## 2020-06-12 DIAGNOSIS — R94.02 ABNORMAL BRAIN SCAN: ICD-10-CM

## 2020-06-12 DIAGNOSIS — R42 DIZZINESS AND GIDDINESS: ICD-10-CM

## 2020-06-12 PROCEDURE — 70460 CT HEAD/BRAIN W/DYE: CPT

## 2020-06-12 PROCEDURE — 70460 CT HEAD/BRAIN W/DYE: CPT | Performed by: RADIOLOGY

## 2020-06-12 PROCEDURE — 82565 ASSAY OF CREATININE: CPT

## 2020-06-12 PROCEDURE — 0 IOVERSOL 68 % SOLUTION: Performed by: NURSE PRACTITIONER

## 2020-06-12 RX ADMIN — IOVERSOL 100 ML: 678 INJECTION INTRA-ARTERIAL; INTRAVENOUS at 09:13

## 2020-06-21 LAB — CREAT BLDA-MCNC: 1 MG/DL (ref 0.6–1.3)

## 2020-09-11 ENCOUNTER — OFFICE VISIT (OUTPATIENT)
Dept: CARDIOLOGY | Facility: CLINIC | Age: 85
End: 2020-09-11

## 2020-09-11 VITALS
HEART RATE: 66 BPM | BODY MASS INDEX: 24.84 KG/M2 | RESPIRATION RATE: 16 BRPM | TEMPERATURE: 98 F | DIASTOLIC BLOOD PRESSURE: 60 MMHG | WEIGHT: 204 LBS | SYSTOLIC BLOOD PRESSURE: 122 MMHG | HEIGHT: 76 IN | OXYGEN SATURATION: 95 %

## 2020-09-11 DIAGNOSIS — Z79.01 CHRONIC ANTICOAGULATION: ICD-10-CM

## 2020-09-11 DIAGNOSIS — I25.5 ISCHEMIC CARDIOMYOPATHY: ICD-10-CM

## 2020-09-11 DIAGNOSIS — I10 ESSENTIAL HYPERTENSION: ICD-10-CM

## 2020-09-11 DIAGNOSIS — I25.10 CORONARY ARTERY DISEASE INVOLVING NATIVE CORONARY ARTERY OF NATIVE HEART WITHOUT ANGINA PECTORIS: Primary | ICD-10-CM

## 2020-09-11 DIAGNOSIS — E78.2 MIXED HYPERLIPIDEMIA: ICD-10-CM

## 2020-09-11 PROCEDURE — 99214 OFFICE O/P EST MOD 30 MIN: CPT | Performed by: INTERNAL MEDICINE

## 2020-09-12 NOTE — PROGRESS NOTES
subjective     Chief Complaint   Patient presents with   • Hypertension   • Hyperlipidemia     History of Present Illness  Patient is 89 years old elderly gentleman who appears much younger than his stated age, he is here for cardiology follow-up.  Patient had anterior wall myocardial infarction requiring atherectomy of LAD and 83 followed by bare-metal stent in 1995.  Last coronary angiography in 2015 showed nonobstructive disease.  Currently patient is on medical therapy.  He has no symptoms and is not interested in any further invasive work-up or therapy.  He is taking baby aspirin daily along with beta-blocker therapy and statin therapy.  He denies any chest pain palpitations or shortness of breath.    Blood pressure is very well controlled he is taking lisinopril along with Lasix and Lopressor.  Hyperlipidemia is controlled with Lipitor.    Patient also has chronic atrial fibrillation rate is controlled, patient has cardiac pacemaker in VVI mode and is anticoagulated with Coumadin.  Ankle edema is better with Lasix therapy and pressure stockings.  Overall patient is doing very well with no new complaints.    Patient recently had CT scan of the brain and carotid Doppler study.  He states that he was feeling dizzy    Past Surgical History:   Procedure Laterality Date   • CATARACT EXTRACTION Left    • CHOLECYSTECTOMY     • COLON RESECTION     • COLONOSCOPY     • HEMORROIDECTOMY     • HERNIA REPAIR      left inguinal/umbilical   • HIP ARTHROPLASTY Right    • INSERT / REPLACE / REMOVE PACEMAKER     • JOINT REPLACEMENT     • PACEMAKER IMPLANTATION     • PROSTATE SURGERY     • TRANSURETHRAL RESECTION OF BLADDER TUMOR N/A 4/18/2018    Procedure: CYSTOSCOPY TRANSURETHRAL RESECTION OF SMALL BLADDER TUMOR;  Surgeon: Alfonso Collins MD;  Location: [facility];  Service: Urology   • TRANSURETHRAL RESECTION OF BLADDER TUMOR N/A 8/29/2018    Procedure: CYSTOSCOPY TRANSURETHRAL RESECTION OF BLADDER TUMOR;  Surgeon:  Alfonso Collins MD;  Location: [facility];  Service: Urology     Family History   Problem Relation Age of Onset   • No Known Problems Father    • No Known Problems Mother      Past Medical History:   Diagnosis Date   • Arthritis    • Atrial flutter (CMS/HCC)    • Back pain    • Benign prostatic hyperplasia    • Bladder tumor    • Cancer (CMS/HCC)     [id number]/melanoma   • Colon cancer (CMS/HCC)    • Coronary artery disease    • DVT (deep venous thrombosis) (CMS/HCC)     r leg   • Elevated cholesterol    • Heart attack (CMS/HCC)    • Hypertension    • Numbness     feet/hands   • Osteoporosis    • PVD (peripheral vascular disease) (CMS/HCC)    • Rheumatoid arthritis (CMS/HCC)    • Stroke (CMS/HCC)    • Ventricular tachycardia (CMS/HCC)      Patient Active Problem List   Diagnosis   • Urinary retention   • Bladder neck contracture   • Bladder tumor   • Aftercare following surgery of the genitourinary system   • Atrial flutter with controlled response (CMS/HCC)   • Presence of cardiac pacemaker   • Mixed hyperlipidemia   • Essential hypertension   • Chronic anticoagulation with Coumadin   • Ventricular tachycardia (paroxysmal) (CMS/HCC)   • Asymptomatic PVD (peripheral vascular disease) (CMS/HCC)   • Coronary artery disease, anterior wall myocardial infarction with directional atherectomy of LAD in 1983 and bare-metal stent to the LAD in 1995, nonobstructive disease 2015   • Ischemic cardiomyopathy, LV ejection fraction around 45 to 50%       Social History     Tobacco Use   • Smoking status: Former Smoker     Years: 0.50   • Smokeless tobacco: Never Used   Substance Use Topics   • Alcohol use: Yes     Drinks per session: 1 or 2     Binge frequency: Less than monthly     Comment: Occasional few times a year   • Drug use: No       No Known Allergies    Current Outpatient Medications on File Prior to Visit   Medication Sig   • aspirin 81 MG tablet Take 1 tablet by mouth Daily.   • atorvastatin (LIPITOR) 10 MG  "tablet Take 10 mg by mouth Daily.   • furosemide (LASIX) 40 MG tablet Take 20 mg by mouth Daily.   • gabapentin (NEURONTIN) 600 MG tablet Take 800 mg by mouth 2 (Two) Times a Day.   • HYDROcodone-acetaminophen (NORCO)  MG per tablet    • lisinopril (PRINIVIL,ZESTRIL) 10 MG tablet Take 20 mg by mouth Daily.   • metoprolol tartrate (LOPRESSOR) 50 MG tablet Take 50 mg by mouth Daily.   • pantoprazole (PROTONIX) 40 MG EC tablet Take 40 mg by mouth Daily.   • warfarin (COUMADIN) 2 MG tablet Take 2 mg by mouth Daily.     Current Facility-Administered Medications on File Prior to Visit   Medication   • gentamicin (GARAMYCIN) injection 80 mg         The following portions of the patient's history were reviewed and updated as appropriate: allergies, current medications, past family history, past medical history, past social history, past surgical history and problem list.    Review of Systems   Constitution: Negative.   HENT: Negative.  Negative for congestion.    Eyes: Negative.    Cardiovascular: Positive for leg swelling. Negative for chest pain, cyanosis, dyspnea on exertion, irregular heartbeat, near-syncope, orthopnea, palpitations, paroxysmal nocturnal dyspnea and syncope.   Respiratory: Negative.  Negative for shortness of breath.    Hematologic/Lymphatic: Negative.    Musculoskeletal: Negative.    Gastrointestinal: Negative.    Neurological: Negative.  Negative for headaches.          Objective:     /60 (BP Location: Left arm, Patient Position: Sitting, Cuff Size: Adult)   Pulse 66   Temp 98 °F (36.7 °C) (Temporal)   Resp 16   Ht 193 cm (75.98\")   Wt 92.5 kg (204 lb)   SpO2 95%   BMI 24.84 kg/m²   Constitutional:       Appearance: Healthy appearance. Not in distress.   Neck:      Vascular: No JVR. JVD normal.   Pulmonary:      Effort: Pulmonary effort is normal.      Breath sounds: Normal breath sounds. No wheezing. No rhonchi. No rales.   Chest:      Chest wall: Not tender to palpatation. "   Cardiovascular:      PMI at left midclavicular line. Normal rate. Regular rhythm.      Murmurs: There is no murmur.   Pulses:     Intact distal pulses.   Edema:     Ankle: bilateral 2+ edema of the ankle.     Feet: bilateral 2+ edema of the feet.  Abdominal:      General: Bowel sounds are normal.      Palpations: Abdomen is soft.      Tenderness: There is no abdominal tenderness.   Musculoskeletal: Normal range of motion.         General: No tenderness.   Skin:     General: Skin is warm and dry.   Neurological:      General: No focal deficit present.      Mental Status: Alert and oriented to person, place and time.           Lab Review  No lab work available this visit  Procedures       I personally viewed and interpreted the patient's LAB data         Assessment:     1. Coronary artery disease, anterior wall myocardial infarction with directional atherectomy of LAD in 1983 and bare-metal stent to the LAD in 1995, nonobstructive disease 2015    2. Ischemic cardiomyopathy, LV ejection fraction around 45 to 50%    3. Chronic anticoagulation with Coumadin    4. Essential hypertension    5. Mixed hyperlipidemia          Plan:     Patient is doing very well from cardiac standpoint he was advised to continue baby aspirin along with statin therapy and beta-blocker therapy.      He will continue Coumadin his INR has been therapeutic according to him.  He is scheduled to have pacemaker follow-up soon.  He has not had any further palpitations he has history of short run of nonsustained V. tach but is doing very well with beta-blocker therapy.  Blood pressure is controlled with lisinopril.  Ankle edema is also better.  Healthy lifestyle discussed.  Follow-up in 6 months.  CT scan of the brain and carotid Doppler was reviewed and discussed with the patient        No follow-ups on file.

## 2020-09-30 ENCOUNTER — CLINICAL SUPPORT (OUTPATIENT)
Dept: CARDIOLOGY | Facility: CLINIC | Age: 85
End: 2020-09-30

## 2020-09-30 DIAGNOSIS — Z95.0 PRESENCE OF CARDIAC PACEMAKER: ICD-10-CM

## 2020-09-30 DIAGNOSIS — I48.92 ATRIAL FLUTTER WITH CONTROLLED RESPONSE (HCC): ICD-10-CM

## 2020-09-30 PROCEDURE — 93288 INTERROG EVL PM/LDLS PM IP: CPT | Performed by: INTERNAL MEDICINE

## 2020-10-13 ENCOUNTER — PROCEDURE VISIT (OUTPATIENT)
Dept: UROLOGY | Facility: CLINIC | Age: 85
End: 2020-10-13

## 2020-10-13 VITALS — HEIGHT: 60 IN | BODY MASS INDEX: 160.29 KG/M2

## 2020-10-13 DIAGNOSIS — D49.4 BLADDER TUMOR: Primary | ICD-10-CM

## 2020-10-13 PROCEDURE — 99213 OFFICE O/P EST LOW 20 MIN: CPT | Performed by: UROLOGY

## 2020-10-13 PROCEDURE — 52000 CYSTOURETHROSCOPY: CPT | Performed by: UROLOGY

## 2020-10-13 NOTE — PROGRESS NOTES
Chief Complaint:          Chief Complaint   Patient presents with   • Incomplete emptying     Cystoscopy        HPI:   89 y.o. male with a history of bladder tumor.  He is currently completely asymptomatic.  There is no burning, blood in the urine, discharge, fevers, chills weight loss or constitutional symptomatology.      Past Medical History:        Past Medical History:   Diagnosis Date   • Arthritis    • Atrial flutter (CMS/HCC)    • Back pain    • Benign prostatic hyperplasia    • Bladder tumor    • Cancer (CMS/HCC)     [id number]   • Colon cancer (CMS/HCC)    • Coronary artery disease    • DVT (deep venous thrombosis) (CMS/HCC)     r leg   • Elevated cholesterol    • Heart attack (CMS/HCC)    • Hypertension    • Numbness     feet/hands   • Osteoporosis    • PVD (peripheral vascular disease) (CMS/HCC)    • Rheumatoid arthritis (CMS/HCC)    • Stroke (CMS/HCC)    • Ventricular tachycardia (CMS/HCC)          Current Meds:     Current Outpatient Medications   Medication Sig Dispense Refill   • aspirin 81 MG tablet Take 1 tablet by mouth Daily. 30 tablet 11   • atorvastatin (LIPITOR) 10 MG tablet Take 10 mg by mouth Daily.     • furosemide (LASIX) 40 MG tablet Take 20 mg by mouth Daily.     • gabapentin (NEURONTIN) 600 MG tablet Take 800 mg by mouth 2 (Two) Times a Day.     • HYDROcodone-acetaminophen (NORCO)  MG per tablet      • lisinopril (PRINIVIL,ZESTRIL) 10 MG tablet Take 20 mg by mouth Daily.     • metoprolol tartrate (LOPRESSOR) 50 MG tablet Take 50 mg by mouth Daily.     • pantoprazole (PROTONIX) 40 MG EC tablet Take 40 mg by mouth Daily.     • warfarin (COUMADIN) 2 MG tablet Take 2 mg by mouth Daily.       Current Facility-Administered Medications   Medication Dose Route Frequency Provider Last Rate Last Dose   • gentamicin (GARAMYCIN) injection 80 mg  80 mg Intramuscular Once Alfonso Collins MD            Allergies:      No Known Allergies     Past Surgical History:     Past  Surgical History:   Procedure Laterality Date   • CATARACT EXTRACTION Left    • CHOLECYSTECTOMY     • COLON RESECTION     • COLONOSCOPY     • HEMORROIDECTOMY     • HERNIA REPAIR      left inguinal/umbilical   • HIP ARTHROPLASTY Right    • INSERT / REPLACE / REMOVE PACEMAKER     • JOINT REPLACEMENT     • PACEMAKER IMPLANTATION     • PROSTATE SURGERY     • TRANSURETHRAL RESECTION OF BLADDER TUMOR N/A 4/18/2018    Procedure: CYSTOSCOPY TRANSURETHRAL RESECTION OF SMALL BLADDER TUMOR;  Surgeon: Alfonso Collins MD;  Location: [facility];  Service: Urology   • TRANSURETHRAL RESECTION OF BLADDER TUMOR N/A 8/29/2018    Procedure: CYSTOSCOPY TRANSURETHRAL RESECTION OF BLADDER TUMOR;  Surgeon: Alfonso Collins MD;  Location: [facility];  Service: Urology         Social History:     Social History     Socioeconomic History   • Marital status:      Spouse name: Not on file   • Number of children: Not on file   • Years of education: Not on file   • Highest education level: Not on file   Tobacco Use   • Smoking status: Former Smoker     Years: 0.50   • Smokeless tobacco: Never Used   Substance and Sexual Activity   • Alcohol use: Yes     Drinks per session: 1 or 2     Binge frequency: Less than monthly     Comment: Occasional few times a year   • Drug use: No   • Sexual activity: Defer       Family History:     Family History   Problem Relation Age of Onset   • No Known Problems Father    • No Known Problems Mother        Review of Systems:     Review of Systems   Constitutional: Negative.    HENT: Negative.    Eyes: Negative.    Respiratory: Negative.    Cardiovascular: Negative.    Gastrointestinal: Negative.    Endocrine: Negative.    Musculoskeletal: Negative.    Allergic/Immunologic: Negative.    Neurological: Negative.    Hematological: Negative.    Psychiatric/Behavioral: Negative.        Physical Exam:     Physical Exam  Vitals signs and nursing note reviewed.   Constitutional:       Appearance: He is  well-developed.   HENT:      Head: Normocephalic and atraumatic.   Eyes:      Conjunctiva/sclera: Conjunctivae normal.      Pupils: Pupils are equal, round, and reactive to light.   Neck:      Musculoskeletal: Normal range of motion.   Cardiovascular:      Rate and Rhythm: Normal rate and regular rhythm.      Heart sounds: Normal heart sounds.   Pulmonary:      Effort: Pulmonary effort is normal.      Breath sounds: Normal breath sounds.   Abdominal:      General: Bowel sounds are normal.      Palpations: Abdomen is soft.   Genitourinary:     Penis: Normal.       Scrotum/Testes: Normal.   Musculoskeletal: Normal range of motion.   Skin:     General: Skin is warm and dry.   Neurological:      Mental Status: He is alert and oriented to person, place, and time.      Deep Tendon Reflexes: Reflexes are normal and symmetric.   Psychiatric:         Behavior: Behavior normal.         Thought Content: Thought content normal.         Judgment: Judgment normal.         I have reviewed the following portions of the patient's history: allergies, current medications, past family history, past medical history, past social history, past surgical history, problem list and ROS and confirm it's accurate.      Procedure:   Cystoscopy:  Patient presents today for cystourethroscopy.  I went ahead and obtained an informed consent including the risk of anesthesia, bleeding, infection, etc.  After prep and drape in a sterile fashion in the low dorsal lithotomy position the urethra was gently anesthetized with 10 cc of 2% viscous Xylocaine jelly.  After an appropriate period of topical anesthesia I used the Olympus digital 14 [place] flexible cystoscope to examine the anterior urethra which was completely normal, the ureteral orifices were visualized and normal in position and configuration there were no stones, tumors or foreign bodies.  The blue light was enabled and was negative allowing us to see small mucosal lesions. The patient was  given 80 mg of gentamicin in an intramuscular fashion  as prophylaxis for the cystoscopy and released from the clinic.    Assessment/Plan:   Bladder cancer surveillance-patient returns today for surveillance cystoscopy.  We discussed the use of cystoscopy and the fact that we do it every 3 months for the first year to rule out recurrent tumors.  We discussed the use of the new technology using the blue light to outline even smaller areas of recurrence.  We discussed preventative mechanisms including increasing fluids use of broad-spectrum B complex vitamins and cessation of smoking and behaviors that contributed to the diagnosis.            Patient's Body mass index is 160.29 kg/m². BMI is within normal parameters. No follow-up required..              This document has been electronically signed by TINY PENG MD October 13, 2020 14:40 EDT

## 2021-03-11 ENCOUNTER — OFFICE VISIT (OUTPATIENT)
Dept: CARDIOLOGY | Facility: CLINIC | Age: 86
End: 2021-03-11

## 2021-03-11 ENCOUNTER — TRANSCRIBE ORDERS (OUTPATIENT)
Dept: ADMINISTRATIVE | Facility: HOSPITAL | Age: 86
End: 2021-03-11

## 2021-03-11 ENCOUNTER — HOSPITAL ENCOUNTER (OUTPATIENT)
Dept: GENERAL RADIOLOGY | Facility: HOSPITAL | Age: 86
Discharge: HOME | End: 2021-03-11
Admitting: REGISTERED NURSE

## 2021-03-11 VITALS
TEMPERATURE: 96.4 F | HEIGHT: 76 IN | SYSTOLIC BLOOD PRESSURE: 122 MMHG | BODY MASS INDEX: 25.74 KG/M2 | RESPIRATION RATE: 16 BRPM | WEIGHT: 211.4 LBS | HEART RATE: 66 BPM | DIASTOLIC BLOOD PRESSURE: 60 MMHG

## 2021-03-11 DIAGNOSIS — I48.92 ATRIAL FLUTTER WITH CONTROLLED RESPONSE (HCC): ICD-10-CM

## 2021-03-11 DIAGNOSIS — I47.29 VENTRICULAR TACHYCARDIA (PAROXYSMAL) (HCC): ICD-10-CM

## 2021-03-11 DIAGNOSIS — I10 ESSENTIAL HYPERTENSION: ICD-10-CM

## 2021-03-11 DIAGNOSIS — I25.10 CORONARY ARTERY DISEASE INVOLVING NATIVE CORONARY ARTERY OF NATIVE HEART WITHOUT ANGINA PECTORIS: Primary | ICD-10-CM

## 2021-03-11 DIAGNOSIS — Z79.01 CHRONIC ANTICOAGULATION: ICD-10-CM

## 2021-03-11 DIAGNOSIS — R10.30 LOWER ABDOMINAL PAIN: Primary | ICD-10-CM

## 2021-03-11 DIAGNOSIS — I25.5 ISCHEMIC CARDIOMYOPATHY: ICD-10-CM

## 2021-03-11 DIAGNOSIS — Z95.0 PRESENCE OF CARDIAC PACEMAKER: ICD-10-CM

## 2021-03-11 DIAGNOSIS — R10.30 LOWER ABDOMINAL PAIN: ICD-10-CM

## 2021-03-11 PROCEDURE — 74018 RADEX ABDOMEN 1 VIEW: CPT | Performed by: RADIOLOGY

## 2021-03-11 PROCEDURE — 74018 RADEX ABDOMEN 1 VIEW: CPT

## 2021-03-11 PROCEDURE — 99214 OFFICE O/P EST MOD 30 MIN: CPT | Performed by: INTERNAL MEDICINE

## 2021-03-11 PROCEDURE — 93000 ELECTROCARDIOGRAM COMPLETE: CPT | Performed by: INTERNAL MEDICINE

## 2021-03-11 NOTE — PROGRESS NOTES
subjective     Chief Complaint   Patient presents with   • Coronary Artery Disease     no chest pain and EKG done today   • Hyperlipidemia     follow up   • Hypertension     follow up     History of Present Illness  Patient is 89 years old gentleman who appears much younger than his stated age.  He is here for cardiology follow-up.  He denies any chest pain palpitations or shortness of breath.  He has history of coronary artery disease with prior atherectomy and LAD stent.  He is taking beta-blocker therapy with Lopressor, statin therapy with Lipitor and antiplatelet therapy with baby aspirin.    He also has history of hypertension and has been taking Lopressor and lisinopril.  Blood pressure is very well controlled according to him.  Hyperlipidemia is controlled with the Lipitor as mentioned.  Patient has no symptoms today.  He has history of ischemic cardiomyopathy ventricular tachycardia and has pacemaker in place.  He has atrial flutter and is anticoagulated with Coumadin.    Past Surgical History:   Procedure Laterality Date   • CATARACT EXTRACTION Left    • CHOLECYSTECTOMY     • COLON RESECTION     • COLONOSCOPY     • HEMORROIDECTOMY     • HERNIA REPAIR      left inguinal/umbilical   • HIP ARTHROPLASTY Right    • INSERT / REPLACE / REMOVE PACEMAKER     • JOINT REPLACEMENT     • PACEMAKER IMPLANTATION     • PROSTATE SURGERY     • TRANSURETHRAL RESECTION OF BLADDER TUMOR N/A 4/18/2018    Procedure: CYSTOSCOPY TRANSURETHRAL RESECTION OF SMALL BLADDER TUMOR;  Surgeon: Alfonso Collins MD;  Location: [facility] OR;  Service: Urology   • TRANSURETHRAL RESECTION OF BLADDER TUMOR N/A 8/29/2018    Procedure: CYSTOSCOPY TRANSURETHRAL RESECTION OF BLADDER TUMOR;  Surgeon: Alfonso Collins MD;  Location: [facility];  Service: Urology     Family History   Problem Relation Age of Onset   • No Known Problems Father    • No Known Problems Mother      Past Medical History:   Diagnosis Date   • Arthritis    • Atrial  flutter (CMS/HCC)    • Back pain    • Benign prostatic hyperplasia    • Bladder tumor    • Cancer (CMS/HCC)     [id number]   • Colon cancer (CMS/HCC)    • Coronary artery disease    • DVT (deep venous thrombosis) (CMS/HCC)     r leg   • Elevated cholesterol    • Heart attack (CMS/HCC)    • Hypertension    • Numbness     feet/hands   • Osteoporosis    • PVD (peripheral vascular disease) (CMS/HCC)    • Rheumatoid arthritis (CMS/HCC)    • Stroke (CMS/HCC)    • Ventricular tachycardia (CMS/HCC)      Patient Active Problem List   Diagnosis   • Urinary retention   • Bladder neck contracture   • Bladder tumor   • Aftercare following surgery of the genitourinary system   • Atrial flutter with controlled response (CMS/HCC)   • Presence of cardiac pacemaker   • Mixed hyperlipidemia   • Essential hypertension   • Chronic anticoagulation with Coumadin   • Ventricular tachycardia (paroxysmal) (CMS/HCC)   • Asymptomatic PVD (peripheral vascular disease) (CMS/HCC)   • Coronary artery disease, anterior wall myocardial infarction with directional atherectomy of LAD in 1983 and bare-metal stent to the LAD in 1995, nonobstructive disease 2015   • Ischemic cardiomyopathy, LV ejection fraction around 45 to 50%       Social History     Tobacco Use   • Smoking status: Former Smoker     Years: 0.50   • Smokeless tobacco: Never Used   Substance Use Topics   • Alcohol use: Yes     Comment: Occasional few times a year   • Drug use: No       No Known Allergies    Current Outpatient Medications on File Prior to Visit   Medication Sig   • aspirin 81 MG tablet Take 1 tablet by mouth Daily.   • atorvastatin (LIPITOR) 10 MG tablet Take 10 mg by mouth Daily.   • furosemide (LASIX) 40 MG tablet Take 20 mg by mouth Daily.   • gabapentin (NEURONTIN) 600 MG tablet Take 800 mg by mouth 2 (Two) Times a Day.   • HYDROcodone-acetaminophen (NORCO)  MG per tablet    • lisinopril (PRINIVIL,ZESTRIL) 10 MG tablet Take 20 mg by mouth Daily.   •  "metoprolol tartrate (LOPRESSOR) 50 MG tablet Take 50 mg by mouth Daily.   • pantoprazole (PROTONIX) 40 MG EC tablet Take 40 mg by mouth Daily.   • warfarin (COUMADIN) 2 MG tablet Take 2 mg by mouth Daily.     Current Facility-Administered Medications on File Prior to Visit   Medication   • gentamicin (GARAMYCIN) injection 80 mg         The following portions of the patient's history were reviewed and updated as appropriate: allergies, current medications, past family history, past medical history, past social history, past surgical history and problem list.    Review of Systems   Constitutional: Negative.   HENT: Negative.  Negative for congestion.    Eyes: Negative.    Cardiovascular: Negative.  Negative for chest pain, cyanosis, dyspnea on exertion, irregular heartbeat, leg swelling, near-syncope, orthopnea, palpitations, paroxysmal nocturnal dyspnea and syncope.   Respiratory: Negative.  Negative for shortness of breath.    Hematologic/Lymphatic: Negative.    Musculoskeletal: Negative.    Gastrointestinal: Negative.    Neurological: Negative.  Negative for headaches.          Objective:     /60 (BP Location: Left arm, Patient Position: Sitting, Cuff Size: Adult)   Pulse 66   Temp 96.4 °F (35.8 °C) (Temporal)   Resp 16   Ht 193 cm (76\")   Wt 95.9 kg (211 lb 6.4 oz)   BMI 25.73 kg/m²   Vitals and nursing note reviewed.   Constitutional:       General: Not in acute distress.     Appearance: Healthy appearance. Well-developed and not in distress. Not diaphoretic.   Eyes:      General: No scleral icterus.     Conjunctiva/sclera: Conjunctivae normal.      Pupils: Pupils are equal, round, and reactive to light.   HENT:      Head: Normocephalic and atraumatic.   Neck:      Thyroid: Thyroid normal. No thyromegaly.      Vascular: No JVD. JVD normal.      Trachea: No tracheal deviation.      Lymphadenopathy: No cervical adenopathy.   Pulmonary:      Effort: Pulmonary effort is normal. No respiratory distress.    "   Breath sounds: Normal breath sounds and air entry.   Chest:      Chest wall: Not tender to palpatation.   Cardiovascular:      PMI at left midclavicular line. Normal rate. Regular rhythm.      No gallop.   Pulses:     Intact distal pulses. No decreased pulses.   Abdominal:      General: Bowel sounds are normal. There is no distension or abdominal bruit.      Palpations: Abdomen is soft. There is no hepatomegaly, splenomegaly or abdominal mass.      Tenderness: There is no abdominal tenderness. There is no guarding or rebound.   Musculoskeletal:      Cervical back: Normal range of motion and neck supple. Skin:     General: Skin is warm and dry. There is no cyanosis.      Coloration: Skin is not jaundiced or pale.      Findings: No erythema or rash.   Neurological:      Mental Status: Alert, oriented to person, place, and time and oriented to person, place and time.   Psychiatric:         Attention and Perception: Attention normal.         Mood and Affect: Mood and affect normal.         Speech: Speech normal.         Behavior: Behavior is cooperative.           Lab Review  Lab Results   Component Value Date     08/27/2018    K 3.7 08/27/2018     08/27/2018    BUN 8 08/27/2018    CREATININE 1.00 06/12/2020    GLUCOSE 117 (H) 08/27/2018    CALCIUM 8.8 08/27/2018     No results found for: CKTOTAL  Lab Results   Component Value Date    WBC 6.26 08/27/2018    HGB 12.2 (L) 08/27/2018    HCT 36.7 (L) 08/27/2018     08/27/2018         ECG 12 Lead    Date/Time: 3/11/2021 4:36 PM  Performed by: Selam Alcaraz MD  Authorized by: Selam Alcaraz MD   Comparison: compared with previous ECG from 3/5/2020  Similar to previous ECG  Rhythm: atrial flutter and paced  Rate: normal  BPM: 78  QRS axis: left    Clinical impression: abnormal EKG               I personally viewed and interpreted the patient's LAB data         Assessment:     1. Coronary artery disease, anterior wall myocardial  infarction with directional atherectomy of LAD in 1983 and bare-metal stent to the LAD in 1995, nonobstructive disease 2015    2. Essential hypertension    3. Ischemic cardiomyopathy, LV ejection fraction around 45 to 50%    4. Atrial flutter with controlled response (CMS/HCC)    5. Chronic anticoagulation with Coumadin    6. Presence of cardiac pacemaker    7. Ventricular tachycardia (paroxysmal) (CMS/HCC)          Plan:   Patient is 89 years old gentleman who has known coronary artery disease requiring LAD atherectomy and bare-metal stent 1995 followed by nonobstructive disease in 2015.  He is doing very well was advised to continue baby aspirin and statin therapy and beta-blocker therapy.    EKG today does not show any acute changes it shows atrial flutter with ventricular pacemaker.    Blood pressure is also very well controlled no change in therapy was made.  Patient has hyperlipidemia and is taking Lipitor lab work from PCP were requested.  Clinically patient has no evidence of heart failure he does have ischemic cardiomyopathy but is stable.  Beta-blocker therapy as mentioned was continued.  He will have pacemaker follow-up scheduled.  Healthy lifestyle emphasized he is also anticoagulated with Coumadin.  Follow-up scheduled           No follow-ups on file.

## 2021-04-27 ENCOUNTER — CLINICAL SUPPORT (OUTPATIENT)
Dept: CARDIOLOGY | Facility: CLINIC | Age: 86
End: 2021-04-27

## 2021-04-27 DIAGNOSIS — N32.0 BLADDER NECK CONTRACTURE: ICD-10-CM

## 2021-04-27 DIAGNOSIS — Z95.0 PRESENCE OF CARDIAC PACEMAKER: ICD-10-CM

## 2021-04-27 PROCEDURE — 93288 INTERROG EVL PM/LDLS PM IP: CPT | Performed by: INTERNAL MEDICINE

## 2021-05-14 ENCOUNTER — HOSPITAL ENCOUNTER (EMERGENCY)
Facility: HOSPITAL | Age: 86
Discharge: HOME | End: 2021-05-14
Attending: STUDENT IN AN ORGANIZED HEALTH CARE EDUCATION/TRAINING PROGRAM | Admitting: STUDENT IN AN ORGANIZED HEALTH CARE EDUCATION/TRAINING PROGRAM

## 2021-05-14 ENCOUNTER — APPOINTMENT (OUTPATIENT)
Dept: ULTRASOUND IMAGING | Facility: HOSPITAL | Age: 86
End: 2021-05-14

## 2021-05-14 VITALS
DIASTOLIC BLOOD PRESSURE: 74 MMHG | WEIGHT: 210 LBS | HEART RATE: 89 BPM | BODY MASS INDEX: 25.57 KG/M2 | RESPIRATION RATE: 16 BRPM | OXYGEN SATURATION: 97 % | HEIGHT: 76 IN | SYSTOLIC BLOOD PRESSURE: 128 MMHG | TEMPERATURE: 97.3 F

## 2021-05-14 DIAGNOSIS — L03.115 CELLULITIS OF RIGHT LOWER EXTREMITY: Primary | ICD-10-CM

## 2021-05-14 LAB
ALBUMIN SERPL-MCNC: 4.05 G/DL (ref 3.5–5.2)
ALBUMIN/GLOB SERPL: 1.5 G/DL
ALP SERPL-CCNC: 88 U/L (ref 39–117)
ALT SERPL W P-5'-P-CCNC: 9 U/L (ref 1–41)
ANION GAP SERPL CALCULATED.3IONS-SCNC: 8.7 MMOL/L (ref 5–15)
AST SERPL-CCNC: 21 U/L (ref 1–40)
BASOPHILS # BLD AUTO: 0.01 10*3/MM3 (ref 0–0.2)
BASOPHILS NFR BLD AUTO: 0.2 % (ref 0–1.5)
BILIRUB SERPL-MCNC: 1.2 MG/DL (ref 0–1.2)
BUN SERPL-MCNC: 10 MG/DL (ref 8–23)
BUN/CREAT SERPL: 10.3 (ref 7–25)
CALCIUM SPEC-SCNC: 9.1 MG/DL (ref 8.2–9.6)
CHLORIDE SERPL-SCNC: 102 MMOL/L (ref 98–107)
CO2 SERPL-SCNC: 27.3 MMOL/L (ref 22–29)
CREAT SERPL-MCNC: 0.97 MG/DL (ref 0.76–1.27)
CRP SERPL-MCNC: <0.3 MG/DL (ref 0–0.5)
DEPRECATED RDW RBC AUTO: 46.6 FL (ref 37–54)
EOSINOPHIL # BLD AUTO: 0.05 10*3/MM3 (ref 0–0.4)
EOSINOPHIL NFR BLD AUTO: 1.2 % (ref 0.3–6.2)
ERYTHROCYTE [DISTWIDTH] IN BLOOD BY AUTOMATED COUNT: 12.4 % (ref 12.3–15.4)
GFR SERPL CREATININE-BSD FRML MDRD: 73 ML/MIN/1.73
GLOBULIN UR ELPH-MCNC: 2.8 GM/DL
GLUCOSE SERPL-MCNC: 99 MG/DL (ref 65–99)
HCT VFR BLD AUTO: 41.6 % (ref 37.5–51)
HGB BLD-MCNC: 13.6 G/DL (ref 13–17.7)
HOLD SPECIMEN: NORMAL
HOLD SPECIMEN: NORMAL
IMM GRANULOCYTES # BLD AUTO: 0.01 10*3/MM3 (ref 0–0.05)
IMM GRANULOCYTES NFR BLD AUTO: 0.2 % (ref 0–0.5)
LYMPHOCYTES # BLD AUTO: 0.77 10*3/MM3 (ref 0.7–3.1)
LYMPHOCYTES NFR BLD AUTO: 18.1 % (ref 19.6–45.3)
MCH RBC QN AUTO: 33.3 PG (ref 26.6–33)
MCHC RBC AUTO-ENTMCNC: 32.7 G/DL (ref 31.5–35.7)
MCV RBC AUTO: 101.7 FL (ref 79–97)
MONOCYTES # BLD AUTO: 0.46 10*3/MM3 (ref 0.1–0.9)
MONOCYTES NFR BLD AUTO: 10.8 % (ref 5–12)
NEUTROPHILS NFR BLD AUTO: 2.95 10*3/MM3 (ref 1.7–7)
NEUTROPHILS NFR BLD AUTO: 69.5 % (ref 42.7–76)
NRBC BLD AUTO-RTO: 0 /100 WBC (ref 0–0.2)
PLATELET # BLD AUTO: 148 10*3/MM3 (ref 140–450)
PMV BLD AUTO: 10.2 FL (ref 6–12)
POTASSIUM SERPL-SCNC: 4.3 MMOL/L (ref 3.5–5.2)
PROT SERPL-MCNC: 6.8 G/DL (ref 6–8.5)
RBC # BLD AUTO: 4.09 10*6/MM3 (ref 4.14–5.8)
SODIUM SERPL-SCNC: 138 MMOL/L (ref 136–145)
WBC # BLD AUTO: 4.25 10*3/MM3 (ref 3.4–10.8)
WHOLE BLOOD HOLD SPECIMEN: NORMAL
WHOLE BLOOD HOLD SPECIMEN: NORMAL

## 2021-05-14 PROCEDURE — 93971 EXTREMITY STUDY: CPT

## 2021-05-14 PROCEDURE — 96372 THER/PROPH/DIAG INJ SC/IM: CPT

## 2021-05-14 PROCEDURE — 93971 EXTREMITY STUDY: CPT | Performed by: RADIOLOGY

## 2021-05-14 PROCEDURE — 25010000002 CEFTRIAXONE PER 250 MG: Performed by: PHYSICIAN ASSISTANT

## 2021-05-14 PROCEDURE — 85025 COMPLETE CBC W/AUTO DIFF WBC: CPT | Performed by: PHYSICIAN ASSISTANT

## 2021-05-14 PROCEDURE — 86140 C-REACTIVE PROTEIN: CPT | Performed by: PHYSICIAN ASSISTANT

## 2021-05-14 PROCEDURE — 99283 EMERGENCY DEPT VISIT LOW MDM: CPT

## 2021-05-14 PROCEDURE — 80053 COMPREHEN METABOLIC PANEL: CPT | Performed by: PHYSICIAN ASSISTANT

## 2021-05-14 RX ORDER — CEPHALEXIN 500 MG/1
500 CAPSULE ORAL 3 TIMES DAILY
Qty: 30 CAPSULE | Refills: 0 | Status: SHIPPED | OUTPATIENT
Start: 2021-05-14 | End: 2021-09-14

## 2021-05-14 RX ADMIN — LIDOCAINE HYDROCHLORIDE 1 G: 10 INJECTION, SOLUTION EPIDURAL; INFILTRATION; INTRACAUDAL; PERINEURAL at 16:44

## 2021-09-14 ENCOUNTER — OFFICE VISIT (OUTPATIENT)
Dept: CARDIOLOGY | Facility: CLINIC | Age: 86
End: 2021-09-14

## 2021-09-14 VITALS
TEMPERATURE: 97.7 F | WEIGHT: 211 LBS | SYSTOLIC BLOOD PRESSURE: 122 MMHG | BODY MASS INDEX: 25.69 KG/M2 | HEART RATE: 78 BPM | HEIGHT: 76 IN | OXYGEN SATURATION: 97 % | DIASTOLIC BLOOD PRESSURE: 70 MMHG

## 2021-09-14 DIAGNOSIS — Z95.0 PRESENCE OF CARDIAC PACEMAKER: ICD-10-CM

## 2021-09-14 DIAGNOSIS — R60.0 BILATERAL LOWER EXTREMITY EDEMA: ICD-10-CM

## 2021-09-14 DIAGNOSIS — I50.22 CHRONIC SYSTOLIC HEART FAILURE (HCC): ICD-10-CM

## 2021-09-14 DIAGNOSIS — I25.5 ISCHEMIC CARDIOMYOPATHY: ICD-10-CM

## 2021-09-14 DIAGNOSIS — I10 ESSENTIAL HYPERTENSION: ICD-10-CM

## 2021-09-14 DIAGNOSIS — I48.92 ATRIAL FLUTTER WITH CONTROLLED RESPONSE (HCC): ICD-10-CM

## 2021-09-14 DIAGNOSIS — I25.10 CORONARY ARTERY DISEASE INVOLVING NATIVE CORONARY ARTERY OF NATIVE HEART WITHOUT ANGINA PECTORIS: Primary | ICD-10-CM

## 2021-09-14 PROBLEM — E78.2 MIXED HYPERLIPIDEMIA: Status: RESOLVED | Noted: 2020-03-05 | Resolved: 2021-09-14

## 2021-09-14 PROCEDURE — 99214 OFFICE O/P EST MOD 30 MIN: CPT | Performed by: INTERNAL MEDICINE

## 2021-09-14 NOTE — PROGRESS NOTES
subjective     Chief Complaint   Patient presents with   • Coronary Artery Disease     follow up   • Foot Swelling     bilateral, worse in left      History of Present Illness  Patient is 89 years old white male who is here for cardiology follow-up.  He states that his feet are swelling, left is slightly worse than the right .  He is taking Lasix which was recently increased very his PCP to 40 mg daily.  He still has mild bilateral lower extremity edema.  He denies any orthopnea or PND.  No chest pain or shortness of breath.    Patient has history of coronary artery disease with prior anterior wall myocardial infarction requiring atherectomy and stenting in 1995 and ischemic cardiomyopathy with ejection fraction 45 to 50% in the past.  He is taking beta-blocker therapy, lisinopril and Lasix.  Ankle swelling is getting worse.    Hyperlipidemia on Lipitor therapy.  He also has history of atrial flutter with controlled ventricular rate and ventricular paced rhythm  He is anticoagulated with Coumadin.    Past Surgical History:   Procedure Laterality Date   • CATARACT EXTRACTION Left    • CHOLECYSTECTOMY     • COLON RESECTION     • COLONOSCOPY     • HEMORROIDECTOMY     • HERNIA REPAIR      left inguinal/umbilical   • HIP ARTHROPLASTY Right    • INSERT / REPLACE / REMOVE PACEMAKER     • JOINT REPLACEMENT     • PACEMAKER IMPLANTATION     • PROSTATE SURGERY     • TRANSURETHRAL RESECTION OF BLADDER TUMOR N/A 4/18/2018    Procedure: CYSTOSCOPY TRANSURETHRAL RESECTION OF SMALL BLADDER TUMOR;  Surgeon: Alfonso Collins MD;  Location: [facility] OR;  Service: Urology   • TRANSURETHRAL RESECTION OF BLADDER TUMOR N/A 8/29/2018    Procedure: CYSTOSCOPY TRANSURETHRAL RESECTION OF BLADDER TUMOR;  Surgeon: Alfonso Collins MD;  Location: [facility] OR;  Service: Urology     Family History   Problem Relation Age of Onset   • No Known Problems Father    • No Known Problems Mother      Past Medical History:   Diagnosis Date   •  Arthritis    • Atrial flutter (CMS/HCC)    • Back pain    • Benign prostatic hyperplasia    • Bladder tumor    • Cancer (CMS/HCC)     [id number]/melanoma   • Chronic systolic heart failure (CMS/HCC) 9/14/2021   • Colon cancer (CMS/HCC)    • Coronary artery disease    • DVT (deep venous thrombosis) (CMS/HCC)     r leg   • Elevated cholesterol    • Heart attack (CMS/HCC)    • Hypertension    • Numbness     feet/hands   • Osteoporosis    • PVD (peripheral vascular disease) (CMS/HCC)    • Rheumatoid arthritis (CMS/HCC)    • Stroke (CMS/HCC)    • Ventricular tachycardia (CMS/HCC)      Patient Active Problem List   Diagnosis   • Urinary retention   • Bladder neck contracture   • Bladder tumor   • Aftercare following surgery of the genitourinary system   • Atrial flutter with controlled response (CMS/HCC)   • Presence of cardiac pacemaker   • Essential hypertension   • Chronic anticoagulation with Coumadin   • Ventricular tachycardia (paroxysmal) (CMS/HCC)   • Asymptomatic PVD (peripheral vascular disease) (CMS/HCC)   • Coronary artery disease, anterior wall myocardial infarction with directional atherectomy of LAD in 1983 and bare-metal stent to the LAD in 1995, nonobstructive disease 2015   • Ischemic cardiomyopathy, LV ejection fraction around 45 to 50%   • Chronic systolic heart failure (CMS/HCC)   • Bilateral lower extremity edema       Social History     Tobacco Use   • Smoking status: Former Smoker     Years: 0.50   • Smokeless tobacco: Never Used   Substance Use Topics   • Alcohol use: Yes     Comment: Occasional few times a year   • Drug use: No       No Known Allergies    Current Outpatient Medications on File Prior to Visit   Medication Sig   • aspirin 81 MG tablet Take 1 tablet by mouth Daily.   • atorvastatin (LIPITOR) 10 MG tablet Take 10 mg by mouth Daily.   • furosemide (LASIX) 40 MG tablet Take 20 mg by mouth Daily.   • gabapentin (NEURONTIN) 600 MG tablet Take 800 mg by mouth 2 (Two) Times a Day.   •  "HYDROcodone-acetaminophen (NORCO)  MG per tablet 1 tablet Every 6 (Six) Hours As Needed.   • lisinopril (PRINIVIL,ZESTRIL) 10 MG tablet Take 20 mg by mouth Daily.   • metoprolol tartrate (LOPRESSOR) 50 MG tablet Take 50 mg by mouth Daily.   • pantoprazole (PROTONIX) 40 MG EC tablet Take 40 mg by mouth Daily.   • warfarin (COUMADIN) 2 MG tablet Take 2 mg by mouth Daily.   • [DISCONTINUED] cephalexin (KEFLEX) 500 MG capsule Take 1 capsule by mouth 3 (Three) Times a Day.     Current Facility-Administered Medications on File Prior to Visit   Medication   • gentamicin (GARAMYCIN) injection 80 mg         The following portions of the patient's history were reviewed and updated as appropriate: allergies, current medications, past family history, past medical history, past social history, past surgical history and problem list.    Review of Systems   Constitutional: Negative.   HENT: Negative.  Negative for congestion.    Eyes: Negative.    Cardiovascular: Positive for leg swelling. Negative for chest pain, cyanosis, dyspnea on exertion, irregular heartbeat, near-syncope, orthopnea, palpitations, paroxysmal nocturnal dyspnea and syncope.   Respiratory: Negative.  Negative for shortness of breath.    Hematologic/Lymphatic: Negative.    Musculoskeletal: Positive for arthritis and joint pain.   Gastrointestinal: Negative.    Neurological: Negative.  Negative for headaches.          Objective:     /70 (BP Location: Left arm, Patient Position: Sitting, Cuff Size: Adult)   Pulse 78   Temp 97.7 °F (36.5 °C)   Ht 193 cm (76\")   Wt 95.7 kg (211 lb)   SpO2 97%   BMI 25.68 kg/m²   Cardiovascular:      PMI at left midclavicular line. Normal rate. Regular rhythm. Normal S1. Normal S2.      Murmurs: There is no murmur.      No gallop. No click. No rub.   Pulses:     Intact distal pulses.   Edema:     Peripheral edema present.     Pretibial: bilateral 3+ pitting edema of the pretibial area.     Ankle: bilateral 3+ " pitting edema of the ankle.     Feet: bilateral 3+ pitting edema of the feet.          Lab Review  Lab Results   Component Value Date     05/14/2021    K 4.3 05/14/2021     05/14/2021    BUN 10 05/14/2021    CREATININE 0.97 05/14/2021    GLUCOSE 99 05/14/2021    CALCIUM 9.1 05/14/2021    ALT 9 05/14/2021    ALKPHOS 88 05/14/2021     No results found for: CKTOTAL  Lab Results   Component Value Date    WBC 4.25 05/14/2021    HGB 13.6 05/14/2021    HCT 41.6 05/14/2021     05/14/2021     No results found for: INR  No results found for: MG  No results found for: PSA, TSH  No results found for: BNP  No results found for: CHLPL, CHOL, TRIG, HDL, VLDL, LDLHDL  No results found for: LDL    Procedures     EKG from PCP dated 9/9/2021 showing atrial flutter and ventricular paced rhythm         I personally viewed and interpreted the patient's LAB data         Assessment:     1. Coronary artery disease, anterior wall myocardial infarction with directional atherectomy of LAD in 1983 and bare-metal stent to the LAD in 1995, nonobstructive disease 2015    2. Ischemic cardiomyopathy, LV ejection fraction around 45 to 50%    3. Chronic systolic heart failure (CMS/HCC)    4. Atrial flutter with controlled response (CMS/HCC)    5. Presence of cardiac pacemaker    6. Essential hypertension    7. Bilateral lower extremity edema          Plan:     Patient is 89 years old white male with chronic systolic heart failure is here for follow-up.  Patient states that he has been having a lot of bilateral lower extremity edema and is taking Lasix 40 mg daily he is also taking beta-blocker therapy which is keeping his flutter rate very well controlled  Blood pressure is controlled with lisinopril.    Because of bilateral lower extremity edema we will check echocardiogram and proBNP  Salt restriction was emphasized  Electrolytes and renal functions are normal    Depending on echocardiogram findings and proBNP medication may need  to be adjusted.  Follow-up scheduled        No follow-ups on file.

## 2021-09-21 ENCOUNTER — HOSPITAL ENCOUNTER (OUTPATIENT)
Dept: CARDIOLOGY | Facility: HOSPITAL | Age: 86
Discharge: HOME | End: 2021-09-21
Admitting: INTERNAL MEDICINE

## 2021-09-21 DIAGNOSIS — I50.22 CHRONIC SYSTOLIC HEART FAILURE (HCC): ICD-10-CM

## 2021-09-21 LAB
BH CV ECHO MEAS - ACS: 3 CM
BH CV ECHO MEAS - AI DEC SLOPE: 196 CM/SEC^2
BH CV ECHO MEAS - AI MAX PG: 43.8 MMHG
BH CV ECHO MEAS - AI MAX VEL: 331 CM/SEC
BH CV ECHO MEAS - AI P1/2T: 494.6 MSEC
BH CV ECHO MEAS - AO MAX PG: 5.6 MMHG
BH CV ECHO MEAS - AO MEAN PG: 3 MMHG
BH CV ECHO MEAS - AO ROOT AREA (BSA CORRECTED): 1.7
BH CV ECHO MEAS - AO ROOT AREA: 11.9 CM^2
BH CV ECHO MEAS - AO ROOT DIAM: 3.9 CM
BH CV ECHO MEAS - AO V2 MAX: 118 CM/SEC
BH CV ECHO MEAS - AO V2 MEAN: 78.3 CM/SEC
BH CV ECHO MEAS - AO V2 VTI: 23.3 CM
BH CV ECHO MEAS - BSA(HAYCOCK): 2.3 M^2
BH CV ECHO MEAS - BSA: 2.3 M^2
BH CV ECHO MEAS - BZI_BMI: 25.7 KILOGRAMS/M^2
BH CV ECHO MEAS - BZI_METRIC_HEIGHT: 193 CM
BH CV ECHO MEAS - BZI_METRIC_WEIGHT: 95.7 KG
BH CV ECHO MEAS - EDV(CUBED): 103.8 ML
BH CV ECHO MEAS - EDV(MOD-SP4): 71.3 ML
BH CV ECHO MEAS - EDV(TEICH): 102.4 ML
BH CV ECHO MEAS - EF(CUBED): 55.1 %
BH CV ECHO MEAS - EF(MOD-SP4): 46.4 %
BH CV ECHO MEAS - EF(TEICH): 46.8 %
BH CV ECHO MEAS - ESV(CUBED): 46.7 ML
BH CV ECHO MEAS - ESV(MOD-SP4): 38.2 ML
BH CV ECHO MEAS - ESV(TEICH): 54.4 ML
BH CV ECHO MEAS - FS: 23.4 %
BH CV ECHO MEAS - IVS/LVPW: 1.1
BH CV ECHO MEAS - IVSD: 1.3 CM
BH CV ECHO MEAS - LA DIMENSION: 5 CM
BH CV ECHO MEAS - LA/AO: 1.3
BH CV ECHO MEAS - LV DIASTOLIC VOL/BSA (35-75): 31.5 ML/M^2
BH CV ECHO MEAS - LV MASS(C)D: 224.8 GRAMS
BH CV ECHO MEAS - LV MASS(C)DI: 99.2 GRAMS/M^2
BH CV ECHO MEAS - LV SYSTOLIC VOL/BSA (12-30): 16.9 ML/M^2
BH CV ECHO MEAS - LVIDD: 4.7 CM
BH CV ECHO MEAS - LVIDS: 3.6 CM
BH CV ECHO MEAS - LVLD AP4: 7.3 CM
BH CV ECHO MEAS - LVLS AP4: 6.9 CM
BH CV ECHO MEAS - LVOT AREA (M): 5.3 CM^2
BH CV ECHO MEAS - LVOT AREA: 5.3 CM^2
BH CV ECHO MEAS - LVOT DIAM: 2.6 CM
BH CV ECHO MEAS - LVPWD: 1.2 CM
BH CV ECHO MEAS - MV A MAX VEL: 49.4 CM/SEC
BH CV ECHO MEAS - MV E MAX VEL: 100 CM/SEC
BH CV ECHO MEAS - MV E/A: 2
BH CV ECHO MEAS - PA ACC TIME: 0.06 SEC
BH CV ECHO MEAS - PA PR(ACCEL): 53.8 MMHG
BH CV ECHO MEAS - RAP SYSTOLE: 10 MMHG
BH CV ECHO MEAS - RVSP: 44.2 MMHG
BH CV ECHO MEAS - SI(AO): 122.8 ML/M^2
BH CV ECHO MEAS - SI(CUBED): 25.2 ML/M^2
BH CV ECHO MEAS - SI(MOD-SP4): 14.6 ML/M^2
BH CV ECHO MEAS - SI(TEICH): 21.1 ML/M^2
BH CV ECHO MEAS - SV(AO): 278.3 ML
BH CV ECHO MEAS - SV(CUBED): 57.2 ML
BH CV ECHO MEAS - SV(MOD-SP4): 33.1 ML
BH CV ECHO MEAS - SV(TEICH): 47.9 ML
BH CV ECHO MEAS - TR MAX VEL: 292.5 CM/SEC
LV EF 2D ECHO EST: 46 %
MAXIMAL PREDICTED HEART RATE: 130 BPM
STRESS TARGET HR: 111 BPM

## 2021-09-21 PROCEDURE — 93306 TTE W/DOPPLER COMPLETE: CPT | Performed by: INTERNAL MEDICINE

## 2021-09-21 PROCEDURE — 93306 TTE W/DOPPLER COMPLETE: CPT

## 2021-09-22 ENCOUNTER — TELEPHONE (OUTPATIENT)
Dept: CARDIOLOGY | Facility: CLINIC | Age: 86
End: 2021-09-22

## 2021-09-22 NOTE — TELEPHONE ENCOUNTER
----- Message from Selam Alcaraz MD sent at 9/21/2021  4:21 PM EDT -----  Echocardiogram does not show any significant change. Heart muscle is slightly weak and strength is 46 to 50% like before.

## 2021-09-22 NOTE — TELEPHONE ENCOUNTER
Called daughter and given message per Dr Alcaraz.  She voiced understanding.  She requested to send copy to PCP.  She was asking if this would clear him for surgery.   I advised her that the surgeon he will see needs to send us a request for clearance for Alberto to have hip surgery.  Also will need to have a follow up visit for the clearance as well.  She will let us know when this will be.

## 2021-10-13 ENCOUNTER — CLINICAL SUPPORT (OUTPATIENT)
Dept: CARDIOLOGY | Facility: CLINIC | Age: 86
End: 2021-10-13

## 2021-10-13 DIAGNOSIS — Z95.0 PRESENCE OF CARDIAC PACEMAKER: ICD-10-CM

## 2021-10-13 PROCEDURE — 93288 INTERROG EVL PM/LDLS PM IP: CPT | Performed by: INTERNAL MEDICINE

## 2021-10-14 NOTE — PROGRESS NOTES
subjective   No chief complaint on file.    History of Present Illness      Past Surgical History:   Procedure Laterality Date   • CATARACT EXTRACTION Left    • CHOLECYSTECTOMY     • COLON RESECTION     • COLONOSCOPY     • HEMORROIDECTOMY     • HERNIA REPAIR      left inguinal/umbilical   • HIP ARTHROPLASTY Right    • INSERT / REPLACE / REMOVE PACEMAKER     • JOINT REPLACEMENT     • PACEMAKER IMPLANTATION     • PROSTATE SURGERY     • TRANSURETHRAL RESECTION OF BLADDER TUMOR N/A 4/18/2018    Procedure: CYSTOSCOPY TRANSURETHRAL RESECTION OF SMALL BLADDER TUMOR;  Surgeon: Alfonso Collins MD;  Location: [facility];  Service: Urology   • TRANSURETHRAL RESECTION OF BLADDER TUMOR N/A 8/29/2018    Procedure: CYSTOSCOPY TRANSURETHRAL RESECTION OF BLADDER TUMOR;  Surgeon: Alfonso Collins MD;  Location: [facility];  Service: Urology     Family History   Problem Relation Age of Onset   • No Known Problems Father    • No Known Problems Mother      Past Medical History:   Diagnosis Date   • Arthritis    • Atrial flutter (CMS/HCC)    • Back pain    • Benign prostatic hyperplasia    • Bladder tumor    • Cancer (CMS/HCC)     [id number]   • Chronic systolic heart failure (CMS/HCC) 9/14/2021   • Colon cancer (CMS/HCC)    • Coronary artery disease    • DVT (deep venous thrombosis) (CMS/HCC)     r leg   • Elevated cholesterol    • Heart attack (CMS/HCC)    • Hypertension    • Numbness     feet/hands   • Osteoporosis    • PVD (peripheral vascular disease) (CMS/HCC)    • Rheumatoid arthritis (CMS/HCC)    • Stroke (CMS/HCC)    • Ventricular tachycardia (CMS/HCC)      Patient Active Problem List   Diagnosis   • Urinary retention   • Bladder neck contracture   • Bladder tumor   • Aftercare following surgery of the genitourinary system   • Atrial flutter with controlled response (HCC)   • Presence of cardiac pacemaker   • Essential hypertension   • Chronic anticoagulation with Coumadin   • Ventricular tachycardia  (paroxysmal) (HCC)   • Asymptomatic PVD (peripheral vascular disease) (HCC)   • Coronary artery disease, anterior wall myocardial infarction with directional atherectomy of LAD in 1983 and bare-metal stent to the LAD in 1995, nonobstructive disease 2015   • Ischemic cardiomyopathy, LV ejection fraction around 45 to 50%   • Chronic systolic heart failure (HCC)   • Bilateral lower extremity edema       Social History     Tobacco Use   • Smoking status: Former Smoker     Years: 0.50   • Smokeless tobacco: Never Used   Substance Use Topics   • Alcohol use: Yes     Comment: Occasional few times a year   • Drug use: No       No Known Allergies    Current Outpatient Medications on File Prior to Visit   Medication Sig   • aspirin 81 MG tablet Take 1 tablet by mouth Daily.   • atorvastatin (LIPITOR) 10 MG tablet Take 10 mg by mouth Daily.   • furosemide (LASIX) 40 MG tablet Take 20 mg by mouth Daily.   • gabapentin (NEURONTIN) 600 MG tablet Take 800 mg by mouth 2 (Two) Times a Day.   • HYDROcodone-acetaminophen (NORCO)  MG per tablet 1 tablet Every 6 (Six) Hours As Needed.   • lisinopril (PRINIVIL,ZESTRIL) 10 MG tablet Take 20 mg by mouth Daily.   • metoprolol tartrate (LOPRESSOR) 50 MG tablet Take 50 mg by mouth Daily.   • pantoprazole (PROTONIX) 40 MG EC tablet Take 40 mg by mouth Daily.   • warfarin (COUMADIN) 2 MG tablet Take 2 mg by mouth Daily.     Current Facility-Administered Medications on File Prior to Visit   Medication   • gentamicin (GARAMYCIN) injection 80 mg         The following portions of the patient's history were reviewed and updated as appropriate: allergies, current medications, past family history, past medical history, past social history, past surgical history and problem list.    ROS       Objective:     There were no vitals taken for this visit.  Physical Exam      Lab Review  Lab Results   Component Value Date     05/14/2021    K 4.3 05/14/2021     05/14/2021    BUN 10 05/14/2021     CREATININE 0.97 05/14/2021    GLUCOSE 99 05/14/2021    CALCIUM 9.1 05/14/2021    ALT 9 05/14/2021    ALKPHOS 88 05/14/2021     No results found for: CKTOTAL  Lab Results   Component Value Date    WBC 4.25 05/14/2021    HGB 13.6 05/14/2021    HCT 41.6 05/14/2021     05/14/2021     No results found for: INR  No results found for: MG  No results found for: PSA, TSH  No results found for: BNP  No results found for: CHLPL, CHOL, TRIG, HDL, VLDL, LDLHDL  No results found for: LDL    Procedures       I personally viewed and interpreted the patient's LAB data         Assessment:   No diagnosis found.      Plan:              No follow-ups on file.

## 2021-10-15 ENCOUNTER — OFFICE VISIT (OUTPATIENT)
Dept: UROLOGY | Facility: CLINIC | Age: 86
End: 2021-10-15

## 2021-10-15 VITALS — BODY MASS INDEX: 25.68 KG/M2 | HEIGHT: 76 IN

## 2021-10-15 DIAGNOSIS — D49.4 BLADDER TUMOR: ICD-10-CM

## 2021-10-15 DIAGNOSIS — N32.0 BLADDER NECK CONTRACTURE: ICD-10-CM

## 2021-10-15 DIAGNOSIS — Z48.816 AFTERCARE FOLLOWING SURGERY OF THE GENITOURINARY SYSTEM: Primary | ICD-10-CM

## 2021-10-15 PROCEDURE — 52000 CYSTOURETHROSCOPY: CPT | Performed by: UROLOGY

## 2021-10-15 PROCEDURE — 96372 THER/PROPH/DIAG INJ SC/IM: CPT | Performed by: UROLOGY

## 2021-10-15 RX ORDER — GENTAMICIN SULFATE 40 MG/ML
80 INJECTION, SOLUTION INTRAMUSCULAR; INTRAVENOUS ONCE
Status: COMPLETED | OUTPATIENT
Start: 2021-10-15 | End: 2021-10-15

## 2021-10-15 RX ORDER — CHOLECALCIFEROL (VITAMIN D3) 1250 MCG
CAPSULE ORAL
COMMUNITY
Start: 2021-08-24

## 2021-10-15 RX ORDER — LISINOPRIL 20 MG/1
10 TABLET ORAL DAILY
COMMUNITY
Start: 2021-07-12 | End: 2023-03-14

## 2021-10-15 RX ADMIN — GENTAMICIN SULFATE 80 MG: 40 INJECTION, SOLUTION INTRAMUSCULAR; INTRAVENOUS at 16:07

## 2021-10-15 NOTE — PROGRESS NOTES
Chief Complaint:          Chief Complaint   Patient presents with   • Bladder neck contracture     cysto       HPI:   89 y.o. male here for follow-up.  He complains that he has some difficulty emptying his bladder.  He is here for bladder tumor surveillance he had a small bladder tumor noted on his treatment of his bladder neck contracture.  He is here for surveillance cystoscopy.      Past Medical History:        Past Medical History:   Diagnosis Date   • Arthritis    • Atrial flutter (HCC)    • Back pain    • Benign prostatic hyperplasia    • Bladder tumor    • Cancer (HCC)     [id number]   • Chronic systolic heart failure (HCC) 9/14/2021   • Colon cancer (HCC)    • Coronary artery disease    • DVT (deep venous thrombosis) (HCC)     r leg   • Elevated cholesterol    • Heart attack (HCC)    • Hypertension    • Numbness     feet/hands   • Osteoporosis    • PVD (peripheral vascular disease) (HCC)    • Rheumatoid arthritis (HCC)    • Stroke (HCC)    • Ventricular tachycardia (HCC)          Current Meds:     Current Outpatient Medications   Medication Sig Dispense Refill   • aspirin 81 MG tablet Take 1 tablet by mouth Daily. 30 tablet 11   • atorvastatin (LIPITOR) 10 MG tablet Take 10 mg by mouth Daily.     • Cholecalciferol (Vitamin D3) 1.25 MG (40864 UT) capsule TAKE ONE CAPSULE BY MOUTH ONCE A WEEK ON THE SAME DAY EVERY WEEK     • furosemide (LASIX) 40 MG tablet Take 20 mg by mouth Daily.     • gabapentin (NEURONTIN) 600 MG tablet Take 800 mg by mouth 2 (Two) Times a Day.     • HYDROcodone-acetaminophen (NORCO)  MG per tablet 1 tablet Every 6 (Six) Hours As Needed.     • lisinopril (PRINIVIL,ZESTRIL) 20 MG tablet Take 10 mg by mouth Daily.     • metoprolol tartrate (LOPRESSOR) 50 MG tablet Take 50 mg by mouth Daily.     • pantoprazole (PROTONIX) 40 MG EC tablet Take 40 mg by mouth Daily.     • warfarin (COUMADIN) 2 MG tablet Take 2 mg by mouth Daily.       Current Facility-Administered Medications    Medication Dose Route Frequency Provider Last Rate Last Admin   • gentamicin (GARAMYCIN) injection 80 mg  80 mg Intramuscular Once Alfonso Collins MD            Allergies:      No Known Allergies     Past Surgical History:     Past Surgical History:   Procedure Laterality Date   • CATARACT EXTRACTION Left    • CHOLECYSTECTOMY     • COLON RESECTION     • COLONOSCOPY     • HEMORROIDECTOMY     • HERNIA REPAIR      left inguinal/umbilical   • HIP ARTHROPLASTY Right    • INSERT / REPLACE / REMOVE PACEMAKER     • JOINT REPLACEMENT     • PACEMAKER IMPLANTATION     • PROSTATE SURGERY     • TRANSURETHRAL RESECTION OF BLADDER TUMOR N/A 4/18/2018    Procedure: CYSTOSCOPY TRANSURETHRAL RESECTION OF SMALL BLADDER TUMOR;  Surgeon: Alfonso Collins MD;  Location: [facility];  Service: Urology   • TRANSURETHRAL RESECTION OF BLADDER TUMOR N/A 8/29/2018    Procedure: CYSTOSCOPY TRANSURETHRAL RESECTION OF BLADDER TUMOR;  Surgeon: Alfonso Collins MD;  Location: [facility];  Service: Urology         Social History:     Social History     Socioeconomic History   • Marital status:    Tobacco Use   • Smoking status: Former Smoker     Years: 0.50   • Smokeless tobacco: Never Used   Substance and Sexual Activity   • Alcohol use: Yes     Comment: Occasional few times a year   • Drug use: No   • Sexual activity: Defer       Family History:     Family History   Problem Relation Age of Onset   • No Known Problems Father    • No Known Problems Mother        Review of Systems:     Review of Systems   Constitutional: Negative.    HENT: Negative.    Eyes: Negative.    Respiratory: Negative.    Cardiovascular: Negative.    Gastrointestinal: Negative.    Endocrine: Negative.    Musculoskeletal: Negative.    Allergic/Immunologic: Negative.    Neurological: Negative.    Hematological: Negative.    Psychiatric/Behavioral: Negative.        Physical Exam:     Physical Exam  Vitals and nursing note reviewed.   Constitutional:        Appearance: He is well-developed.   HENT:      Head: Normocephalic and atraumatic.   Eyes:      Conjunctiva/sclera: Conjunctivae normal.      Pupils: Pupils are equal, round, and reactive to light.   Cardiovascular:      Rate and Rhythm: Normal rate and regular rhythm.      Heart sounds: Normal heart sounds.   Pulmonary:      Effort: Pulmonary effort is normal.      Breath sounds: Normal breath sounds.   Abdominal:      General: Bowel sounds are normal.      Palpations: Abdomen is soft.   Genitourinary:     Penis: Normal.       Testes: Normal.   Musculoskeletal:         General: Normal range of motion.      Cervical back: Normal range of motion.   Skin:     General: Skin is warm and dry.   Neurological:      Mental Status: He is alert and oriented to person, place, and time.      Deep Tendon Reflexes: Reflexes are normal and symmetric.   Psychiatric:         Behavior: Behavior normal.         Thought Content: Thought content normal.         Judgment: Judgment normal.         I have reviewed the following portions of the patient's history: allergies, current medications, past family history, past medical history, past social history, past surgical history, problem list and ROS and confirm it's accurate.      Procedure:   Cystoscopy:  Patient presents today for cystourethroscopy.  I went ahead and obtained an informed consent including the risk of anesthesia, bleeding, infection, etc.  After prep and drape in a sterile fashion in the low dorsal lithotomy position the urethra was gently anesthetized with 10 cc of 2% viscous Xylocaine jelly.  After an appropriate period of topical anesthesia I used the Olympus digital 14 [place] flexible cystoscope to examine the anterior urethra which was completely normal, the ureteral orifices were visualized and normal in position and configuration there were no stones, tumors or foreign bodies.  The blue light was enabled and was negative allowing us to see small mucosal  lesions.  Bladder neck contracture is resolved there are no recurrent tumors the patient was given 80 mg of gentamicin in an intramuscular fashion  as prophylaxis for the cystoscopy and released from the clinic.    Assessment/Plan:   Bladder cancer-patient had bladder cancer we discussed the pathophysiology including staging a grading with low-grade to high-grade.  We discussed the use of intravesical chemotherapy and more aggressive grades and the recommendation for radical cystectomy in the face of muscle invasive disease.  We discussed the importance of every 3-month cystoscopy for 2 years in the postop surveillance.  We discussed avoidance of tobacco.  We discussed increasing fluid.  And we discussed his current pathology report.  Bladder neck contracture-resolved                    This document has been electronically signed by TINY PENG MD October 15, 2021 14:35 EDT

## 2022-03-14 ENCOUNTER — OFFICE VISIT (OUTPATIENT)
Dept: CARDIOLOGY | Facility: CLINIC | Age: 87
End: 2022-03-14

## 2022-03-14 VITALS
SYSTOLIC BLOOD PRESSURE: 122 MMHG | DIASTOLIC BLOOD PRESSURE: 64 MMHG | WEIGHT: 200.4 LBS | TEMPERATURE: 97.7 F | OXYGEN SATURATION: 95 % | HEIGHT: 76 IN | HEART RATE: 85 BPM | BODY MASS INDEX: 24.4 KG/M2

## 2022-03-14 DIAGNOSIS — I50.22 CHRONIC SYSTOLIC HEART FAILURE: ICD-10-CM

## 2022-03-14 DIAGNOSIS — I47.29 VENTRICULAR TACHYCARDIA (PAROXYSMAL): ICD-10-CM

## 2022-03-14 DIAGNOSIS — Z95.0 PRESENCE OF CARDIAC PACEMAKER: ICD-10-CM

## 2022-03-14 DIAGNOSIS — I25.10 CORONARY ARTERY DISEASE INVOLVING NATIVE CORONARY ARTERY OF NATIVE HEART WITHOUT ANGINA PECTORIS: ICD-10-CM

## 2022-03-14 DIAGNOSIS — I10 ESSENTIAL HYPERTENSION: ICD-10-CM

## 2022-03-14 DIAGNOSIS — I48.92 ATRIAL FLUTTER WITH CONTROLLED RESPONSE: Primary | ICD-10-CM

## 2022-03-14 DIAGNOSIS — I25.5 ISCHEMIC CARDIOMYOPATHY: ICD-10-CM

## 2022-03-14 PROCEDURE — 99214 OFFICE O/P EST MOD 30 MIN: CPT | Performed by: INTERNAL MEDICINE

## 2022-03-14 PROCEDURE — 93000 ELECTROCARDIOGRAM COMPLETE: CPT | Performed by: INTERNAL MEDICINE

## 2022-03-14 NOTE — PROGRESS NOTES
subjective     Chief Complaint   Patient presents with   • Coronary Artery Disease     History of Present Illness  Patient is 89 years old gentleman who is here for cardiology follow-up.    Patient has coronary artery disease with prior anterior wall myocardial infarction with atherectomy and bare-metal stent to LAD.  He is on antiplatelet therapy with aspirin beta-blocker therapy with metoprolol and statin therapy with Lipitor.   He denies any chest pain palpitations or shortness of breath.      He has history of atrial fibrillation with controlled ventricular rate.  Rate is controlled with metoprolol.    He is anticoagulated with Coumadin and is following with his PCP for Coumadin management.    Patient also has essential hypertension and has been taking lisinopril and metoprolol.    Hyperlipidemia on Lipitor therapy.    Patient has ischemic cardiomyopathy with LV dysfunction ejection fraction around 40 to 45%.  He is taking Lasix and beta-blocker therapy.  Ankle edema is significantly better.  He still has 1+ left lower extremity edema.  He denies orthopnea or PND.    He also has ventricular pacemaker      Past Surgical History:   Procedure Laterality Date   • CATARACT EXTRACTION Left    • CHOLECYSTECTOMY     • COLON RESECTION     • COLONOSCOPY     • HEMORROIDECTOMY     • HERNIA REPAIR      left inguinal/umbilical   • HIP ARTHROPLASTY Right    • INSERT / REPLACE / REMOVE PACEMAKER     • JOINT REPLACEMENT     • PACEMAKER IMPLANTATION     • PROSTATE SURGERY     • TRANSURETHRAL RESECTION OF BLADDER TUMOR N/A 4/18/2018    Procedure: CYSTOSCOPY TRANSURETHRAL RESECTION OF SMALL BLADDER TUMOR;  Surgeon: Alfonso Collins MD;  Location: [facility];  Service: Urology   • TRANSURETHRAL RESECTION OF BLADDER TUMOR N/A 8/29/2018    Procedure: CYSTOSCOPY TRANSURETHRAL RESECTION OF BLADDER TUMOR;  Surgeon: Alfonso Collins MD;  Location: [facility] OR;  Service: Urology     Family History   Problem Relation Age of Onset    • No Known Problems Father    • No Known Problems Mother      Past Medical History:   Diagnosis Date   • Arthritis    • Atrial flutter (HCC)    • Back pain    • Benign prostatic hyperplasia    • Bladder tumor    • Cancer (HCC)     [id number]/melanoma   • Chronic systolic heart failure (HCC) 9/14/2021   • Colon cancer (HCC)    • Coronary artery disease    • DVT (deep venous thrombosis) (HCC)     r leg   • Elevated cholesterol    • Heart attack (HCC)    • Hypertension    • Numbness     feet/hands   • Osteoporosis    • PVD (peripheral vascular disease) (HCC)    • Rheumatoid arthritis (HCC)    • Stroke (HCC)    • Ventricular tachycardia (HCC)      Patient Active Problem List   Diagnosis   • Urinary retention   • Bladder neck contracture   • Bladder tumor   • Aftercare following surgery of the genitourinary system   • Atrial flutter with controlled response (HCC)   • Presence of cardiac pacemaker   • Essential hypertension   • Chronic anticoagulation with Coumadin   • Ventricular tachycardia (paroxysmal) (HCC)   • Asymptomatic PVD (peripheral vascular disease) (HCC)   • Coronary artery disease, anterior wall myocardial infarction with directional atherectomy of LAD in 1983 and bare-metal stent to the LAD in 1995, nonobstructive disease 2015   • Ischemic cardiomyopathy, LV ejection fraction around 45 to 50%   • Chronic systolic heart failure (HCC)   • Bilateral lower extremity edema       Social History     Tobacco Use   • Smoking status: Former Smoker     Years: 0.50   • Smokeless tobacco: Never Used   Vaping Use   • Vaping Use: Never used   Substance Use Topics   • Alcohol use: Yes     Comment: Occasional few times a year   • Drug use: No       No Known Allergies    Current Outpatient Medications on File Prior to Visit   Medication Sig   • aspirin 81 MG tablet Take 1 tablet by mouth Daily.   • atorvastatin (LIPITOR) 10 MG tablet Take 10 mg by mouth Daily.   • Cholecalciferol (Vitamin D3) 1.25 MG (01454 UT) capsule TAKE  "ONE CAPSULE BY MOUTH ONCE A WEEK ON THE SAME DAY EVERY WEEK   • furosemide (LASIX) 40 MG tablet Take 20 mg by mouth Daily.   • gabapentin (NEURONTIN) 600 MG tablet Take 800 mg by mouth 2 (Two) Times a Day.   • HYDROcodone-acetaminophen (NORCO)  MG per tablet 1 tablet Every 6 (Six) Hours As Needed.   • lisinopril (PRINIVIL,ZESTRIL) 20 MG tablet Take 10 mg by mouth Daily.   • metoprolol tartrate (LOPRESSOR) 50 MG tablet Take 50 mg by mouth Daily.   • pantoprazole (PROTONIX) 40 MG EC tablet Take 40 mg by mouth Daily.   • warfarin (COUMADIN) 2 MG tablet Take 2 mg by mouth Daily.     No current facility-administered medications on file prior to visit.         The following portions of the patient's history were reviewed and updated as appropriate: allergies, current medications, past family history, past medical history, past social history, past surgical history and problem list.    Review of Systems   Constitutional: Negative.   HENT: Negative.  Negative for congestion.    Eyes: Negative.    Cardiovascular: Negative.  Negative for chest pain, cyanosis, dyspnea on exertion, irregular heartbeat, leg swelling, near-syncope, orthopnea, palpitations, paroxysmal nocturnal dyspnea and syncope.   Respiratory: Negative.  Negative for shortness of breath.    Hematologic/Lymphatic: Negative.    Musculoskeletal: Negative.    Gastrointestinal: Negative.    Neurological: Negative.  Negative for headaches.          Objective:     /64 (BP Location: Left arm, Patient Position: Sitting, Cuff Size: Adult)   Pulse 85   Temp 97.7 °F (36.5 °C) (Temporal)   Ht 193 cm (76\")   Wt 90.9 kg (200 lb 6.4 oz)   SpO2 95%   BMI 24.39 kg/m²   Pulmonary:      Effort: Pulmonary effort is normal.      Breath sounds: Normal breath sounds. No stridor. No wheezing. No rhonchi. No rales.   Cardiovascular:      PMI at left midclavicular line. Normal rate. Regular rhythm. Normal S1. Normal S2.      Murmurs: There is no murmur.      No gallop. " No click. No rub.   Pulses:     Intact distal pulses.   Edema:     Peripheral edema absent.           Lab Review            ECG 12 Lead    Date/Time: 3/14/2022 11:05 AM  Performed by: Selam Alcaraz MD  Authorized by: Selam Alcaraz MD   Comparison: compared with previous ECG from 9/9/2021  Similar to previous ECG  Rhythm: atrial flutter and paced  Rate: normal  BPM: 74  QRS axis: left  Other findings: non-specific ST-T wave changes  Pacing: ventricular paced rhythm  Clinical impression: abnormal EKG               I personally viewed and interpreted the patient's LAB data         Assessment:     1. Atrial flutter with controlled response ([facility])    2. Chronic systolic heart failure ([facility])    3. Coronary artery disease, anterior wall myocardial infarction with directional atherectomy of LAD in 1983 and bare-metal stent to the LAD in 1995, nonobstructive disease 2015    4. Essential hypertension    5. Ischemic cardiomyopathy, LV ejection fraction around 45 to 50%    6. Presence of cardiac pacemaker    7. Ventricular tachycardia (paroxysmal) ([facility])          Plan:     Patient has chronic atrial fibrillation rate is very well controlled with metoprolol.  Is also anticoagulated with Coumadin.    Pacemaker in place EKG shows normal pacemaker capture.    Lab work reviewed BNP is mildly elevated.  Patient is doing very well there is no evidence of decompensation of heart failure.  He will continue Lasix 20 mg daily and will take extra Lasix depending on weight and edema.  We will also continue antiplatelet therapy beta-blocker therapy and statin therapy with Lipitor.  Because of hypertension and heart failure he will also continue lisinopril along with metoprolol and Lasix.  Patient is doing well follow-up scheduled  He is taking metoprolol tartrate.  Tartrate is twice daily dose he is to switch it to succinate which is metoprolol ER 50 mg daily or metoprolol tartrate 25 twice daily.  Follow-up  scheduled      No follow-ups on file.

## 2022-04-20 ENCOUNTER — CLINICAL SUPPORT (OUTPATIENT)
Dept: CARDIOLOGY | Facility: CLINIC | Age: 87
End: 2022-04-20

## 2022-04-20 DIAGNOSIS — Z95.0 PRESENCE OF CARDIAC PACEMAKER: ICD-10-CM

## 2022-04-20 PROCEDURE — 93280 PM DEVICE PROGR EVAL DUAL: CPT | Performed by: INTERNAL MEDICINE

## 2022-05-10 ENCOUNTER — APPOINTMENT (OUTPATIENT)
Dept: CT IMAGING | Facility: HOSPITAL | Age: 87
End: 2022-05-10

## 2022-05-10 ENCOUNTER — HOSPITAL ENCOUNTER (EMERGENCY)
Facility: HOSPITAL | Age: 87
Discharge: HOME | End: 2022-05-10
Attending: STUDENT IN AN ORGANIZED HEALTH CARE EDUCATION/TRAINING PROGRAM | Admitting: STUDENT IN AN ORGANIZED HEALTH CARE EDUCATION/TRAINING PROGRAM

## 2022-05-10 VITALS
BODY MASS INDEX: 25.33 KG/M2 | HEART RATE: 78 BPM | OXYGEN SATURATION: 100 % | RESPIRATION RATE: 18 BRPM | SYSTOLIC BLOOD PRESSURE: 107 MMHG | DIASTOLIC BLOOD PRESSURE: 70 MMHG | HEIGHT: 76 IN | TEMPERATURE: 98.7 F | WEIGHT: 208 LBS

## 2022-05-10 DIAGNOSIS — R33.8 ACUTE URINARY RETENTION: Primary | ICD-10-CM

## 2022-05-10 DIAGNOSIS — K59.00 CONSTIPATION, UNSPECIFIED CONSTIPATION TYPE: ICD-10-CM

## 2022-05-10 LAB
ALBUMIN SERPL-MCNC: 4.06 G/DL (ref 3.5–5.2)
ALBUMIN/GLOB SERPL: 1.5 G/DL
ALP SERPL-CCNC: 94 U/L (ref 39–117)
ALT SERPL W P-5'-P-CCNC: 11 U/L (ref 1–41)
ANION GAP SERPL CALCULATED.3IONS-SCNC: 10.7 MMOL/L (ref 5–15)
AST SERPL-CCNC: 20 U/L (ref 1–40)
BASOPHILS # BLD AUTO: 0.01 10*3/MM3 (ref 0–0.2)
BASOPHILS NFR BLD AUTO: 0.1 % (ref 0–1.5)
BILIRUB SERPL-MCNC: 1.8 MG/DL (ref 0–1.2)
BILIRUB UR QL STRIP: NEGATIVE
BUN SERPL-MCNC: 14 MG/DL (ref 8–23)
BUN/CREAT SERPL: 11.4 (ref 7–25)
CALCIUM SPEC-SCNC: 9.1 MG/DL (ref 8.2–9.6)
CHLORIDE SERPL-SCNC: 97 MMOL/L (ref 98–107)
CLARITY UR: CLEAR
CO2 SERPL-SCNC: 24.3 MMOL/L (ref 22–29)
COLOR UR: YELLOW
CREAT SERPL-MCNC: 1.23 MG/DL (ref 0.76–1.27)
D-LACTATE SERPL-SCNC: 1.2 MMOL/L (ref 0.5–2)
DEPRECATED RDW RBC AUTO: 48.5 FL (ref 37–54)
EGFRCR SERPLBLD CKD-EPI 2021: 55.4 ML/MIN/1.73
EOSINOPHIL # BLD AUTO: 0.02 10*3/MM3 (ref 0–0.4)
EOSINOPHIL NFR BLD AUTO: 0.2 % (ref 0.3–6.2)
ERYTHROCYTE [DISTWIDTH] IN BLOOD BY AUTOMATED COUNT: 13.1 % (ref 12.3–15.4)
GLOBULIN UR ELPH-MCNC: 2.6 GM/DL
GLUCOSE SERPL-MCNC: 108 MG/DL (ref 65–99)
GLUCOSE UR STRIP-MCNC: NEGATIVE MG/DL
HCT VFR BLD AUTO: 37.3 % (ref 37.5–51)
HGB BLD-MCNC: 12.4 G/DL (ref 13–17.7)
HGB UR QL STRIP.AUTO: NEGATIVE
HOLD SPECIMEN: NORMAL
HOLD SPECIMEN: NORMAL
IMM GRANULOCYTES # BLD AUTO: 0.02 10*3/MM3 (ref 0–0.05)
IMM GRANULOCYTES NFR BLD AUTO: 0.2 % (ref 0–0.5)
INR PPP: 2.66 (ref 0.9–1.1)
KETONES UR QL STRIP: NEGATIVE
LEUKOCYTE ESTERASE UR QL STRIP.AUTO: NEGATIVE
LIPASE SERPL-CCNC: 12 U/L (ref 13–60)
LYMPHOCYTES # BLD AUTO: 0.81 10*3/MM3 (ref 0.7–3.1)
LYMPHOCYTES NFR BLD AUTO: 9.6 % (ref 19.6–45.3)
MCH RBC QN AUTO: 33.3 PG (ref 26.6–33)
MCHC RBC AUTO-ENTMCNC: 33.2 G/DL (ref 31.5–35.7)
MCV RBC AUTO: 100.3 FL (ref 79–97)
MONOCYTES # BLD AUTO: 0.93 10*3/MM3 (ref 0.1–0.9)
MONOCYTES NFR BLD AUTO: 11 % (ref 5–12)
NEUTROPHILS NFR BLD AUTO: 6.67 10*3/MM3 (ref 1.7–7)
NEUTROPHILS NFR BLD AUTO: 78.9 % (ref 42.7–76)
NITRITE UR QL STRIP: NEGATIVE
NRBC BLD AUTO-RTO: 0 /100 WBC (ref 0–0.2)
PH UR STRIP.AUTO: 6.5 [PH] (ref 5–8)
PLATELET # BLD AUTO: 168 10*3/MM3 (ref 140–450)
PMV BLD AUTO: 9.6 FL (ref 6–12)
POTASSIUM SERPL-SCNC: 4.3 MMOL/L (ref 3.5–5.2)
PROT SERPL-MCNC: 6.7 G/DL (ref 6–8.5)
PROT UR QL STRIP: NEGATIVE
PROTHROMBIN TIME: 29.1 SECONDS (ref 12.1–14.7)
RBC # BLD AUTO: 3.72 10*6/MM3 (ref 4.14–5.8)
SODIUM SERPL-SCNC: 132 MMOL/L (ref 136–145)
SP GR UR STRIP: 1.01 (ref 1–1.03)
UROBILINOGEN UR QL STRIP: NORMAL
WBC NRBC COR # BLD: 8.46 10*3/MM3 (ref 3.4–10.8)
WHOLE BLOOD HOLD COAG: NORMAL
WHOLE BLOOD HOLD SPECIMEN: NORMAL

## 2022-05-10 PROCEDURE — 51798 US URINE CAPACITY MEASURE: CPT

## 2022-05-10 PROCEDURE — 74176 CT ABD & PELVIS W/O CONTRAST: CPT | Performed by: RADIOLOGY

## 2022-05-10 PROCEDURE — 83605 ASSAY OF LACTIC ACID: CPT | Performed by: STUDENT IN AN ORGANIZED HEALTH CARE EDUCATION/TRAINING PROGRAM

## 2022-05-10 PROCEDURE — 81003 URINALYSIS AUTO W/O SCOPE: CPT | Performed by: STUDENT IN AN ORGANIZED HEALTH CARE EDUCATION/TRAINING PROGRAM

## 2022-05-10 PROCEDURE — 51702 INSERT TEMP BLADDER CATH: CPT

## 2022-05-10 PROCEDURE — 83690 ASSAY OF LIPASE: CPT | Performed by: STUDENT IN AN ORGANIZED HEALTH CARE EDUCATION/TRAINING PROGRAM

## 2022-05-10 PROCEDURE — 85610 PROTHROMBIN TIME: CPT | Performed by: STUDENT IN AN ORGANIZED HEALTH CARE EDUCATION/TRAINING PROGRAM

## 2022-05-10 PROCEDURE — 99284 EMERGENCY DEPT VISIT MOD MDM: CPT

## 2022-05-10 PROCEDURE — 80053 COMPREHEN METABOLIC PANEL: CPT | Performed by: STUDENT IN AN ORGANIZED HEALTH CARE EDUCATION/TRAINING PROGRAM

## 2022-05-10 PROCEDURE — 74176 CT ABD & PELVIS W/O CONTRAST: CPT

## 2022-05-10 PROCEDURE — 36415 COLL VENOUS BLD VENIPUNCTURE: CPT

## 2022-05-10 PROCEDURE — 85025 COMPLETE CBC W/AUTO DIFF WBC: CPT | Performed by: STUDENT IN AN ORGANIZED HEALTH CARE EDUCATION/TRAINING PROGRAM

## 2022-05-10 RX ORDER — SODIUM CHLORIDE 0.9 % (FLUSH) 0.9 %
10 SYRINGE (ML) INJECTION AS NEEDED
Status: DISCONTINUED | OUTPATIENT
Start: 2022-05-10 | End: 2022-05-10 | Stop reason: HOSPADM

## 2022-05-10 RX ORDER — HYDROCODONE BITARTRATE AND ACETAMINOPHEN 5; 325 MG/1; MG/1
1 TABLET ORAL ONCE
Status: COMPLETED | OUTPATIENT
Start: 2022-05-10 | End: 2022-05-10

## 2022-05-10 RX ADMIN — HYDROCODONE BITARTRATE AND ACETAMINOPHEN 1 TABLET: 5; 325 TABLET ORAL at 14:24

## 2022-05-10 NOTE — ED NOTES
Small amount of stool removed via manual disimpaction, stool was soft, large ammount noted in rectum

## 2022-05-10 NOTE — ED PROVIDER NOTES
Subjective   89-year-old male with a past medical history of colon cancer and BPH presents to the ER due to concerns for severe abdominal pain, difficulty urinating, and constipation.  Patient has symptoms been present for the last 2 to 3 days.  Denied significant fever or chills.  Denied cough or congestion.  Denies significant nausea or vomiting.  Noted radiating abdominal pain without obvious injury or trauma.  No obvious aggravating or alleviating factors.  Vitals stable          Review of Systems   Gastrointestinal: Positive for abdominal pain and constipation.   Genitourinary: Positive for difficulty urinating.       Past Medical History:   Diagnosis Date   • Arthritis    • Atrial flutter (HCC)    • Back pain    • Benign prostatic hyperplasia    • Bladder tumor    • Cancer (HCC)     [id number]   • Chronic systolic heart failure (HCC) 9/14/2021   • Colon cancer (HCC)    • Coronary artery disease    • DVT (deep venous thrombosis) (HCC)     r leg   • Elevated cholesterol    • Heart attack (HCC)    • Hypertension    • Numbness     feet/hands   • Osteoporosis    • PVD (peripheral vascular disease) (HCC)    • Rheumatoid arthritis (HCC)    • Stroke (HCC)    • Ventricular tachycardia (HCC)        No Known Allergies    Past Surgical History:   Procedure Laterality Date   • CATARACT EXTRACTION Left    • CHOLECYSTECTOMY     • COLON RESECTION     • COLONOSCOPY     • HEMORROIDECTOMY     • HERNIA REPAIR      left inguinal/umbilical   • HIP ARTHROPLASTY Right    • INSERT / REPLACE / REMOVE PACEMAKER     • JOINT REPLACEMENT     • PACEMAKER IMPLANTATION     • PROSTATE SURGERY     • TRANSURETHRAL RESECTION OF BLADDER TUMOR N/A 4/18/2018    Procedure: CYSTOSCOPY TRANSURETHRAL RESECTION OF SMALL BLADDER TUMOR;  Surgeon: Alfonso Collins MD;  Location: [facility];  Service: Urology   • TRANSURETHRAL RESECTION OF BLADDER TUMOR N/A 8/29/2018    Procedure: CYSTOSCOPY TRANSURETHRAL RESECTION OF BLADDER TUMOR;  Surgeon:  Alfonso Collins MD;  Location: [facility];  Service: Urology       Family History   Problem Relation Age of Onset   • No Known Problems Father    • No Known Problems Mother        Social History     Socioeconomic History   • Marital status:    Tobacco Use   • Smoking status: Former Smoker     Years: 0.50   • Smokeless tobacco: Never Used   Vaping Use   • Vaping Use: Never used   Substance and Sexual Activity   • Alcohol use: Yes     Comment: Occasional few times a year   • Drug use: No   • Sexual activity: Defer           Objective   Physical Exam  Constitutional:       General: He is not in acute distress.     Appearance: He is well-developed. He is not ill-appearing.   HENT:      Head: Normocephalic and atraumatic.   Eyes:      Extraocular Movements: Extraocular movements intact.      Pupils: Pupils are equal, round, and reactive to light.   Neck:      Vascular: No JVD.   Cardiovascular:      Rate and Rhythm: Normal rate and regular rhythm.      Heart sounds: Normal heart sounds. No murmur heard.  Pulmonary:      Effort: No tachypnea, accessory muscle usage or respiratory distress.      Breath sounds: Normal breath sounds. No stridor. No decreased breath sounds, wheezing, rhonchi or rales.   Chest:      Chest wall: No deformity, tenderness or crepitus.   Abdominal:      General: Bowel sounds are normal.      Palpations: Abdomen is soft.      Tenderness: There is generalized abdominal tenderness and tenderness in the suprapubic area. There is no guarding or rebound.   Musculoskeletal:         General: Normal range of motion.      Cervical back: Normal range of motion and neck supple.      Right lower leg: No tenderness. No edema.      Left lower leg: No tenderness. No edema.   Lymphadenopathy:      Cervical: No cervical adenopathy.   Skin:     General: Skin is warm and dry.      Coloration: Skin is not cyanotic.      Findings: No ecchymosis or erythema.   Neurological:      General: No focal deficit  present.      Mental Status: He is alert and oriented to person, place, and time.      Cranial Nerves: No cranial nerve deficit.      Motor: No weakness.   Psychiatric:         Mood and Affect: Mood normal. Mood is not anxious.         Behavior: Behavior normal. Behavior is not agitated.         Procedures           ED Course  ED Course as of 05/10/22 1550   Tue May 10, 2022   1548 Initial bladder scan imaging noted 900+ cc of urine.  Michael catheter was placed.  Over 900 cc of urine was removed.  Michael catheter will remain in place.  CT of the abdomen pelvis noted no acute abnormality but noted prominent stool within the rectal vault.  Rectal disimpaction will be attempted.  CBC and CCP unremarkable.  Urinalysis unremarkable.  Coagulation studies unremarkable.  Lipase within normal limits.  Patient will be discharged with a indwelling Michael catheter and leg bag in to have close follow-up with outpatient urology for further treatment and care.  Work up and results were discussed throughly with the patient.  The patient will be discharged for further monitoring and management with their PCP.  Red flags, warning signs, worsening symptoms, and when to return to the ER discussed with and understood by the patient.  Patient will follow up with their PCP in a timely manner.  Vitals stable at discharge. [SF]      ED Course User Index  [SF] Deandre Tovar DO                                                 [facility]    Final diagnoses:   Acute urinary retention   Constipation, unspecified constipation type       ED Disposition  ED Disposition     ED Disposition   Discharge    Condition   Stable    Comment   --             No follow-up provider specified.       Medication List      No changes were made to your prescriptions during this visit.          Deandre Tovar DO  05/10/22 155

## 2022-05-12 ENCOUNTER — OFFICE VISIT (OUTPATIENT)
Dept: UROLOGY | Facility: CLINIC | Age: 87
End: 2022-05-12

## 2022-05-12 VITALS — WEIGHT: 208 LBS | BODY MASS INDEX: 25.33 KG/M2 | HEIGHT: 76 IN

## 2022-05-12 DIAGNOSIS — R33.9 URINARY RETENTION: Primary | ICD-10-CM

## 2022-05-12 PROCEDURE — 99213 OFFICE O/P EST LOW 20 MIN: CPT | Performed by: UROLOGY

## 2022-05-12 NOTE — PROGRESS NOTES
Chief Complaint:          Chief Complaint   Patient presents with   • Urinary Retention     Er follow up        HPI:   89 y.o. maleVery well-known to me who had a small bladder tumor and a bladder neck contracture he has had a catheter since Tuesday went into retention he went into severe constipation had to be disimpacted in the emergency room and had retention he had a 900 cc residual.  He was then given Lasix.  His legs are swollen.  He is very well-known to me he has no outlet obstruction he is done this before I move his catheter and I will see him back on Tuesday 5-17 for rechecking of his residual    Past Medical History:        Past Medical History:   Diagnosis Date   • Arthritis    • Atrial flutter (HCC)    • Back pain    • Benign prostatic hyperplasia    • Bladder tumor    • Cancer (HCC)     [id number]/melanoma   • Chronic systolic heart failure (HCC) 9/14/2021   • Colon cancer (HCC)    • Coronary artery disease    • DVT (deep venous thrombosis) (HCC)     r leg   • Elevated cholesterol    • Heart attack (HCC)    • Hypertension    • Numbness     feet/hands   • Osteoporosis    • PVD (peripheral vascular disease) (HCC)    • Rheumatoid arthritis (HCC)    • Stroke (HCC)    • Ventricular tachycardia (HCC)          Current Meds:     Current Outpatient Medications   Medication Sig Dispense Refill   • aspirin 81 MG tablet Take 1 tablet by mouth Daily. 30 tablet 11   • atorvastatin (LIPITOR) 10 MG tablet Take 10 mg by mouth Daily.     • Cholecalciferol (Vitamin D3) 1.25 MG (15238 UT) capsule TAKE ONE CAPSULE BY MOUTH ONCE A WEEK ON THE SAME DAY EVERY WEEK     • furosemide (LASIX) 40 MG tablet Take 20 mg by mouth Daily.     • gabapentin (NEURONTIN) 600 MG tablet Take 800 mg by mouth 2 (Two) Times a Day.     • HYDROcodone-acetaminophen (NORCO)  MG per tablet 1 tablet Every 6 (Six) Hours As Needed.     • lisinopril (PRINIVIL,ZESTRIL) 20 MG tablet Take 10 mg by mouth Daily.     • metoprolol tartrate (LOPRESSOR) 50  MG tablet Take 50 mg by mouth Daily.     • pantoprazole (PROTONIX) 40 MG EC tablet Take 40 mg by mouth Daily.     • warfarin (COUMADIN) 2 MG tablet Take 2 mg by mouth Daily.       No current facility-administered medications for this visit.        Allergies:      No Known Allergies     Past Surgical History:     Past Surgical History:   Procedure Laterality Date   • CATARACT EXTRACTION Left    • CHOLECYSTECTOMY     • COLON RESECTION     • COLONOSCOPY     • HEMORROIDECTOMY     • HERNIA REPAIR      left inguinal/umbilical   • HIP ARTHROPLASTY Right    • INSERT / REPLACE / REMOVE PACEMAKER     • JOINT REPLACEMENT     • PACEMAKER IMPLANTATION     • PROSTATE SURGERY     • TRANSURETHRAL RESECTION OF BLADDER TUMOR N/A 4/18/2018    Procedure: CYSTOSCOPY TRANSURETHRAL RESECTION OF SMALL BLADDER TUMOR;  Surgeon: Alfonso Collins MD;  Location: [facility];  Service: Urology   • TRANSURETHRAL RESECTION OF BLADDER TUMOR N/A 8/29/2018    Procedure: CYSTOSCOPY TRANSURETHRAL RESECTION OF BLADDER TUMOR;  Surgeon: Alfonso Collins MD;  Location: [facility];  Service: Urology         Social History:     Social History     Socioeconomic History   • Marital status:    Tobacco Use   • Smoking status: Former Smoker     Years: 0.50   • Smokeless tobacco: Never Used   Vaping Use   • Vaping Use: Never used   Substance and Sexual Activity   • Alcohol use: Yes     Comment: Occasional few times a year   • Drug use: No   • Sexual activity: Defer       Family History:     Family History   Problem Relation Age of Onset   • No Known Problems Father    • No Known Problems Mother        Review of Systems:     Review of Systems   Constitutional: Negative.    HENT: Negative.    Eyes: Negative.    Respiratory: Negative.    Cardiovascular: Negative.    Gastrointestinal: Negative.    Endocrine: Negative.    Musculoskeletal: Negative.    Allergic/Immunologic: Negative.    Neurological: Negative.    Hematological: Negative.     Psychiatric/Behavioral: Negative.        Physical Exam:     Physical Exam  Vitals and nursing note reviewed.   Constitutional:       Appearance: He is well-developed.   HENT:      Head: Normocephalic and atraumatic.   Eyes:      Conjunctiva/sclera: Conjunctivae normal.      Pupils: Pupils are equal, round, and reactive to light.   Cardiovascular:      Rate and Rhythm: Normal rate and regular rhythm.      Heart sounds: Normal heart sounds.   Pulmonary:      Effort: Pulmonary effort is normal.      Breath sounds: Normal breath sounds.   Abdominal:      General: Bowel sounds are normal.      Palpations: Abdomen is soft.   Musculoskeletal:         General: Normal range of motion.      Cervical back: Normal range of motion.   Skin:     General: Skin is warm and dry.   Neurological:      Mental Status: He is alert and oriented to person, place, and time.      Deep Tendon Reflexes: Reflexes are normal and symmetric.   Psychiatric:         Behavior: Behavior normal.         Thought Content: Thought content normal.         Judgment: Judgment normal.         I have reviewed the following portions of the patient's history: Allergies, current medications, past family history, past medical history, past social history, past surgical history, problem list, and ROS and confirm it is accurate.  Urinary retention secondary to BPH and severe constipation he was disimpacted he had a 900 cc residual catheter is out I am sure he will void fine    Procedure:       Assessment/Plan:   Urinary retention secondary to BPH and severe constipation he was disimpacted he had a 900 cc residual catheter is out I am sure he will void fine                       This document has been electronically signed by TINY PENG MD May 12, 2022 14:56 EDT

## 2022-05-17 ENCOUNTER — OFFICE VISIT (OUTPATIENT)
Dept: UROLOGY | Facility: CLINIC | Age: 87
End: 2022-05-17

## 2022-05-17 VITALS — BODY MASS INDEX: 25.45 KG/M2 | HEIGHT: 76 IN | WEIGHT: 209 LBS

## 2022-05-17 DIAGNOSIS — R33.9 INCOMPLETE EMPTYING OF BLADDER: ICD-10-CM

## 2022-05-17 DIAGNOSIS — N32.0 BLADDER NECK CONTRACTURE: Primary | ICD-10-CM

## 2022-05-17 DIAGNOSIS — R33.9 URINARY RETENTION: ICD-10-CM

## 2022-05-17 PROCEDURE — 99213 OFFICE O/P EST LOW 20 MIN: CPT | Performed by: UROLOGY

## 2022-05-17 PROCEDURE — 51798 US URINE CAPACITY MEASURE: CPT | Performed by: UROLOGY

## 2022-06-16 ENCOUNTER — OFFICE VISIT (OUTPATIENT)
Dept: UROLOGY | Facility: CLINIC | Age: 87
End: 2022-06-16

## 2022-06-16 VITALS — BODY MASS INDEX: 25.45 KG/M2 | HEIGHT: 76 IN | WEIGHT: 209 LBS

## 2022-06-16 DIAGNOSIS — D49.4 BLADDER TUMOR: ICD-10-CM

## 2022-06-16 DIAGNOSIS — R33.9 URINARY RETENTION: ICD-10-CM

## 2022-06-16 DIAGNOSIS — N32.0 BLADDER NECK CONTRACTURE: ICD-10-CM

## 2022-06-16 DIAGNOSIS — R33.9 INCOMPLETE EMPTYING OF BLADDER: Primary | ICD-10-CM

## 2022-06-16 PROCEDURE — 51798 US URINE CAPACITY MEASURE: CPT | Performed by: UROLOGY

## 2022-06-16 PROCEDURE — 99213 OFFICE O/P EST LOW 20 MIN: CPT | Performed by: UROLOGY

## 2022-07-28 ENCOUNTER — OFFICE VISIT (OUTPATIENT)
Dept: UROLOGY | Facility: CLINIC | Age: 87
End: 2022-07-28

## 2022-07-28 DIAGNOSIS — N32.0 BLADDER NECK CONTRACTURE: ICD-10-CM

## 2022-07-28 DIAGNOSIS — D49.4 BLADDER TUMOR: ICD-10-CM

## 2022-07-28 DIAGNOSIS — R33.9 INCOMPLETE EMPTYING OF BLADDER: ICD-10-CM

## 2022-07-28 DIAGNOSIS — R33.9 URINARY RETENTION: Primary | ICD-10-CM

## 2022-07-28 PROCEDURE — 51798 US URINE CAPACITY MEASURE: CPT | Performed by: UROLOGY

## 2022-07-28 PROCEDURE — 99213 OFFICE O/P EST LOW 20 MIN: CPT | Performed by: UROLOGY

## 2022-07-28 NOTE — PROGRESS NOTES
Chief Complaint:   Urinary retention    HPI:   89 y.o. male with a history of a tiny bladder neck tumor bladder neck contracture and postop retention he recently had a procedure he went into retention he is doing much better he has no nocturia his residuals down to 200 cc I will see him back in 1 year.  I do not think he needs further surveillance cystoscopy at this juncture    Past Medical History:     Past Medical History:   Diagnosis Date   • Arthritis    • Atrial flutter (HCC)    • Back pain    • Benign prostatic hyperplasia    • Bladder tumor    • Cancer (HCC)     [id number]/melanoma   • Chronic systolic heart failure (HCC) 9/14/2021   • Colon cancer (HCC)    • Coronary artery disease    • DVT (deep venous thrombosis) (HCC)     r leg   • Elevated cholesterol    • Heart attack (HCC)    • Hypertension    • Numbness     feet/hands   • Osteoporosis    • PVD (peripheral vascular disease) (HCC)    • Rheumatoid arthritis (HCC)    • Stroke (HCC)    • Ventricular tachycardia (HCC)        Current Meds:     Current Outpatient Medications   Medication Sig Dispense Refill   • aspirin 81 MG tablet Take 1 tablet by mouth Daily. 30 tablet 11   • atorvastatin (LIPITOR) 10 MG tablet Take 10 mg by mouth Daily.     • Cholecalciferol (Vitamin D3) 1.25 MG (82878 UT) capsule TAKE ONE CAPSULE BY MOUTH ONCE A WEEK ON THE SAME DAY EVERY WEEK     • furosemide (LASIX) 40 MG tablet Take 20 mg by mouth Daily.     • gabapentin (NEURONTIN) 600 MG tablet Take 800 mg by mouth 2 (Two) Times a Day.     • HYDROcodone-acetaminophen (NORCO)  MG per tablet 1 tablet Every 6 (Six) Hours As Needed.     • lisinopril (PRINIVIL,ZESTRIL) 20 MG tablet Take 10 mg by mouth Daily.     • metoprolol tartrate (LOPRESSOR) 50 MG tablet Take 50 mg by mouth Daily.     • pantoprazole (PROTONIX) 40 MG EC tablet Take 40 mg by mouth Daily.     • warfarin (COUMADIN) 2 MG tablet Take 2 mg by mouth Daily.       No current facility-administered medications for this  visit.        Allergies:      No Known Allergies     Past Surgical History:     Past Surgical History:   Procedure Laterality Date   • CATARACT EXTRACTION Left    • CHOLECYSTECTOMY     • COLON RESECTION     • COLONOSCOPY     • HEMORROIDECTOMY     • HERNIA REPAIR      left inguinal/umbilical   • HIP ARTHROPLASTY Right    • INSERT / REPLACE / REMOVE PACEMAKER     • JOINT REPLACEMENT     • PACEMAKER IMPLANTATION     • PROSTATE SURGERY     • TRANSURETHRAL RESECTION OF BLADDER TUMOR N/A 4/18/2018    Procedure: CYSTOSCOPY TRANSURETHRAL RESECTION OF SMALL BLADDER TUMOR;  Surgeon: Alfonso Collins MD;  Location: [facility];  Service: Urology   • TRANSURETHRAL RESECTION OF BLADDER TUMOR N/A 8/29/2018    Procedure: CYSTOSCOPY TRANSURETHRAL RESECTION OF BLADDER TUMOR;  Surgeon: Alfonso Collins MD;  Location: [facility];  Service: Urology       Social History:     Social History     Socioeconomic History   • Marital status:    Tobacco Use   • Smoking status: Former Smoker     Years: 0.50   • Smokeless tobacco: Never Used   Vaping Use   • Vaping Use: Never used   Substance and Sexual Activity   • Alcohol use: Yes     Comment: Occasional few times a year   • Drug use: No   • Sexual activity: Defer       Family History:     Family History   Problem Relation Age of Onset   • No Known Problems Father    • No Known Problems Mother        Review of Systems:     Review of Systems   Constitutional: Negative.    HENT: Negative.    Eyes: Negative.    Respiratory: Negative.    Cardiovascular: Negative.    Gastrointestinal: Negative.    Endocrine: Negative.    Musculoskeletal: Negative.    Allergic/Immunologic: Negative.    Neurological: Negative.    Hematological: Negative.    Psychiatric/Behavioral: Negative.        Physical Exam:     Physical Exam  Vitals and nursing note reviewed.   Constitutional:       Appearance: He is well-developed.   HENT:      Head: Normocephalic and atraumatic.   Eyes:      Conjunctiva/sclera:  Conjunctivae normal.      Pupils: Pupils are equal, round, and reactive to light.   Cardiovascular:      Rate and Rhythm: Normal rate and regular rhythm.      Heart sounds: Normal heart sounds.   Pulmonary:      Effort: Pulmonary effort is normal.      Breath sounds: Normal breath sounds.   Abdominal:      General: Bowel sounds are normal.      Palpations: Abdomen is soft.   Musculoskeletal:         General: Normal range of motion.      Cervical back: Normal range of motion.   Skin:     General: Skin is warm and dry.   Neurological:      Mental Status: He is alert and oriented to person, place, and time.      Deep Tendon Reflexes: Reflexes are normal and symmetric.   Psychiatric:         Behavior: Behavior normal.         Thought Content: Thought content normal.         Judgment: Judgment normal.         I have reviewed the following portions of the patient's history: Allergies, current medications, past family history, past medical history, past social history, past surgical history, problem list, and ROS and confirm it is accurate.    Recent Image (CT and/or KUB):      CT Abdomen and Pelvis: No results found for this or any previous visit.       CT Stone Protocol: Results for orders placed during the hospital encounter of 04/09/18    CT Abdomen Pelvis Stone Protocol    Narrative  CT ABDOMEN AND PELVIS STONE PROTOCOL-    REASON FOR EXAM: Abdominal pain, unspecified; N40.1-Benign prostatic  hyperplasia with lower urinary tract symptoms; N13.8-Other obstructive  and reflux uropathy.    FINDINGS: Spiral scans were obtained through the kidneys, ureterS and  bladder. The kidneys showed no evidence of hydronephrosis. There were no  stones in the intrarenal collecting systems or in the intrarenal  parenchyma. Ureters were not dilated as they coursed through the abdomen  and pelvis. No stones were noted along the course of the ureters.  Urinary bladder was fairly well-distended with urine and was normal in  contour. No  focal areas of bladder wall thickening were demonstrated.    Impression  No evidence of stone or obstruction was seen involving the  collecting system of either kidney. The CT does demonstrate marked  scoliosis and degenerative changes in the lumbar spine.      The radiation dose reduction device was utilized for each scan per the  ALARA (as low as reasonably achievable) protocol.    This report was finalized on 4/9/2018 2:16 PM by Dr. Jairo Maya II, MD.       KUB: Results for orders placed during the hospital encounter of 03/11/21    XR Abdomen KUB    Narrative  XR ABDOMEN KUB-    REASON FOR EXAM:  R10.30; R10.30-Lower abdominal pain, unspecified    COMPARISON: None.    FINDINGS: The bowel gas pattern of the abdomen shows no evidence of  obstruction. No soft tissue masses or pathological calcifications other  than vascular ones are demonstrated. There are advanced degenerative  disc changes in the lumbar spine with scoliosis. A prosthesis is noted  in the right hip. 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    Impression  A source of patient's lower abdominal pain is not  identified.    This report was finalized on 3/11/2021 1:18 PM by Dr. Jairo Maya II, MD.       Labs (past 3 months):      Admission on 05/10/2022, Discharged on 05/10/2022   Component Date Value Ref Range Status   • Extra Tube 05/10/2022 Hold for add-ons.   Final    Auto resulted.   • Extra Tube 05/10/2022 hold for add-on   Final    Auto resulted   • Extra Tube 05/10/2022 Hold for add-ons.   Final    Auto resulted.   • Extra Tube 05/10/2022 Hold for add-ons.   Final    Auto resulted   • Glucose 05/10/2022 108 (A) 65 - 99 mg/dL Final   • BUN 05/10/2022 14  8 - 23 mg/dL Final   • Creatinine 05/10/2022 1.23  0.76 - 1.27 mg/dL Final   • Sodium 05/10/2022 132 (A) 136 - 145 mmol/L Final   • Potassium 05/10/2022 4.3  3.5 - 5.2 mmol/L Final   • Chloride 05/10/2022 97 (A) 98 - 107 mmol/L Final   • CO2 05/10/2022 24.3  22.0 - 29.0 mmol/L  Final   • Calcium 05/10/2022 9.1  8.2 - 9.6 mg/dL Final   • Total Protein 05/10/2022 6.7  6.0 - 8.5 g/dL Final   • Albumin 05/10/2022 4.06  3.50 - 5.20 g/dL Final   • ALT (SGPT) 05/10/2022 11  1 - 41 U/L Final   • AST (SGOT) 05/10/2022 20  1 - 40 U/L Final   • Alkaline Phosphatase 05/10/2022 94  39 - 117 U/L Final   • Total Bilirubin 05/10/2022 1.8 (A) 0.0 - 1.2 mg/dL Final   • Globulin 05/10/2022 2.6  gm/dL Final   • A/G Ratio 05/10/2022 1.5  g/dL Final   • BUN/Creatinine Ratio 05/10/2022 11.4  7.0 - 25.0 Final   • Anion Gap 05/10/2022 10.7  5.0 - 15.0 mmol/L Final   • eGFR 05/10/2022 55.4 (A) >60.0 mL/min/1.73 Final    National Kidney Foundation and American Society of Nephrology (ASN) Task Force recommended calculation based on the Chronic Kidney Disease Epidemiology Collaboration (CKD-EPI) equation refit without adjustment for race.   • Lipase 05/10/2022 12 (A) 13 - 60 U/L Final   • Color, UA 05/10/2022 Yellow  Yellow, Straw Final   • Appearance, UA 05/10/2022 Clear  Clear Final   • pH, UA 05/10/2022 6.5  5.0 - 8.0 Final   • Specific Gravity, UA 05/10/2022 1.009  1.005 - 1.030 Final   • Glucose, UA 05/10/2022 Negative  Negative Final   • Ketones, UA 05/10/2022 Negative  Negative Final   • Bilirubin, UA 05/10/2022 Negative  Negative Final   • Blood, UA 05/10/2022 Negative  Negative Final   • Protein, UA 05/10/2022 Negative  Negative Final   • Leuk Esterase, UA 05/10/2022 Negative  Negative Final   • Nitrite, UA 05/10/2022 Negative  Negative Final   • Urobilinogen, UA 05/10/2022 0.2 E.U./dL  0.2 - 1.0 E.U./dL Final   • Lactate 05/10/2022 1.2  0.5 - 2.0 mmol/L Final   • Protime 05/10/2022 29.1 (A) 12.1 - 14.7 Seconds Final   • INR 05/10/2022 2.66 (A) 0.90 - 1.10 Final   • WBC 05/10/2022 8.46  3.40 - 10.80 10*3/mm3 Final   • RBC 05/10/2022 3.72 (A) 4.14 - 5.80 10*6/mm3 Final   • Hemoglobin 05/10/2022 12.4 (A) 13.0 - 17.7 g/dL Final   • Hematocrit 05/10/2022 37.3 (A) 37.5 - 51.0 % Final   • MCV 05/10/2022 100.3 (A)  79.0 - 97.0 fL Final   • MCH 05/10/2022 33.3 (A) 26.6 - 33.0 pg Final   • MCHC 05/10/2022 33.2  31.5 - 35.7 g/dL Final   • RDW 05/10/2022 13.1  12.3 - 15.4 % Final   • RDW-SD 05/10/2022 48.5  37.0 - 54.0 fl Final   • MPV 05/10/2022 9.6  6.0 - 12.0 fL Final   • Platelets 05/10/2022 168  140 - 450 10*3/mm3 Final   • Neutrophil % 05/10/2022 78.9 (A) 42.7 - 76.0 % Final   • Lymphocyte % 05/10/2022 9.6 (A) 19.6 - 45.3 % Final   • Monocyte % 05/10/2022 11.0  5.0 - 12.0 % Final   • Eosinophil % 05/10/2022 0.2 (A) 0.3 - 6.2 % Final   • Basophil % 05/10/2022 0.1  0.0 - 1.5 % Final   • Immature Grans % 05/10/2022 0.2  0.0 - 0.5 % Final   • Neutrophils, Absolute 05/10/2022 6.67  1.70 - 7.00 10*3/mm3 Final   • Lymphocytes, Absolute 05/10/2022 0.81  0.70 - 3.10 10*3/mm3 Final   • Monocytes, Absolute 05/10/2022 0.93 (A) 0.10 - 0.90 10*3/mm3 Final   • Eosinophils, Absolute 05/10/2022 0.02  0.00 - 0.40 10*3/mm3 Final   • Basophils, Absolute 05/10/2022 0.01  0.00 - 0.20 10*3/mm3 Final   • Immature Grans, Absolute 05/10/2022 0.02  0.00 - 0.05 10*3/mm3 Final   • nRBC 05/10/2022 0.0  0.0 - 0.2 /100 WBC Final        Procedure:       Assessment/Plan:   Urinary retention-postop was resolved  Bladder cancer-patient had bladder cancer. We discussed the pathophysiology including staging a grading with low-grade to high-grade.  We discussed the use of intravesical chemotherapy and more aggressive grades and the recommendation for radical cystectomy in the face of muscle invasive disease.  We discussed the importance of every 3-month cystoscopy for 2 years in the postop surveillance.  We discussed avoidance of tobacco.  We discussed increasing fluid and we discussed his current pathology report.                This document has been electronically signed by TINY PENG MD July 28, 2022 13:44 EDT    Dictated Utilizing Dragon Dictation: Part of this note may be an electronic transcription/translation of spoken language to printed text  using the Dragon Dictation System.

## 2022-09-14 ENCOUNTER — OFFICE VISIT (OUTPATIENT)
Dept: CARDIOLOGY | Facility: CLINIC | Age: 87
End: 2022-09-14

## 2022-09-14 VITALS
DIASTOLIC BLOOD PRESSURE: 74 MMHG | OXYGEN SATURATION: 98 % | BODY MASS INDEX: 25.79 KG/M2 | SYSTOLIC BLOOD PRESSURE: 124 MMHG | HEIGHT: 76 IN | WEIGHT: 211.8 LBS | HEART RATE: 88 BPM

## 2022-09-14 DIAGNOSIS — Z95.0 PRESENCE OF CARDIAC PACEMAKER: ICD-10-CM

## 2022-09-14 DIAGNOSIS — I48.92 ATRIAL FLUTTER WITH CONTROLLED RESPONSE: ICD-10-CM

## 2022-09-14 DIAGNOSIS — I25.5 ISCHEMIC CARDIOMYOPATHY: Primary | ICD-10-CM

## 2022-09-14 DIAGNOSIS — I25.10 CORONARY ARTERY DISEASE INVOLVING NATIVE CORONARY ARTERY OF NATIVE HEART WITHOUT ANGINA PECTORIS: ICD-10-CM

## 2022-09-14 DIAGNOSIS — I10 ESSENTIAL HYPERTENSION: ICD-10-CM

## 2022-09-14 DIAGNOSIS — I50.22 CHRONIC SYSTOLIC HEART FAILURE: ICD-10-CM

## 2022-09-14 DIAGNOSIS — Z79.01 CHRONIC ANTICOAGULATION: ICD-10-CM

## 2022-09-14 PROBLEM — I63.9 STROKE (CEREBRUM): Status: ACTIVE | Noted: 2022-09-14

## 2022-09-14 PROCEDURE — 99214 OFFICE O/P EST MOD 30 MIN: CPT | Performed by: INTERNAL MEDICINE

## 2022-09-14 PROCEDURE — 93000 ELECTROCARDIOGRAM COMPLETE: CPT | Performed by: INTERNAL MEDICINE

## 2022-09-14 NOTE — PROGRESS NOTES
subjective     Chief Complaint   Patient presents with   • Atrial Fibrillation     F/U   • Coronary Artery Disease     F/U   • Follow-up     6MO F/U,pt denies CP,SOA on exertion,severe left leg swelling   • Med Management     verbally     History of Present Illness  Patient is 89 years old white male who is here for cardiology follow-up.  He is doing remarkably well for his age  He denies any chest pain palpitations or shortness of breath.  He has prior history of anterior wall myocardial infarction with atherectomy and bare-metal stent to LAD.  He is taking antiplatelet therapy with aspirin, beta-blocker therapy with metoprolol and statin therapy with Lipitor.    Chronic atrial fibrillation ventricular rate is controlled with metoprolol.  He is also anticoagulated with Coumadin  Blood pressure is normal he is taking lisinopril and metoprolol.    Patient still has lower extremity edema he is taking Lasix    Past Surgical History:   Procedure Laterality Date   • CATARACT EXTRACTION Left    • CHOLECYSTECTOMY     • COLON RESECTION     • COLONOSCOPY     • HEMORROIDECTOMY     • HERNIA REPAIR      left inguinal/umbilical   • HIP ARTHROPLASTY Right    • INSERT / REPLACE / REMOVE PACEMAKER     • JOINT REPLACEMENT     • PACEMAKER IMPLANTATION     • PROSTATE SURGERY     • TRANSURETHRAL RESECTION OF BLADDER TUMOR N/A 4/18/2018    Procedure: CYSTOSCOPY TRANSURETHRAL RESECTION OF SMALL BLADDER TUMOR;  Surgeon: Aflonso Collins MD;  Location: [facility] OR;  Service: Urology   • TRANSURETHRAL RESECTION OF BLADDER TUMOR N/A 8/29/2018    Procedure: CYSTOSCOPY TRANSURETHRAL RESECTION OF BLADDER TUMOR;  Surgeon: Alfonso Collins MD;  Location: [facility];  Service: Urology     Family History   Problem Relation Age of Onset   • No Known Problems Mother    • No Known Problems Father    • Heart disease Brother      Past Medical History:   Diagnosis Date   • Arthritis    • Atrial flutter (HCC)    • Back pain    • Benign prostatic  hyperplasia    • Bladder tumor    • Cancer (HCC)     [id number]   • Chronic systolic heart failure (HCC) 9/14/2021   • Colon cancer (HCC)    • Coronary artery disease    • DVT (deep venous thrombosis) (HCC)     r leg   • Elevated cholesterol    • Heart attack (HCC)    • Hypertension    • Numbness     feet/hands   • Osteoporosis    • PVD (peripheral vascular disease) (HCC)    • Rheumatoid arthritis (HCC)    • Stroke (HCC)    • Ventricular tachycardia (HCC)      Patient Active Problem List   Diagnosis   • Urinary retention   • Bladder neck contracture   • Bladder tumor   • Aftercare following surgery of the genitourinary system   • Atrial flutter with controlled response (HCC)   • Presence of cardiac pacemaker   • Essential hypertension   • Chronic anticoagulation with Coumadin   • Ventricular tachycardia (paroxysmal) (HCC)   • Asymptomatic PVD (peripheral vascular disease) (HCC)   • Coronary artery disease, anterior wall myocardial infarction with directional atherectomy of LAD in 1983 and bare-metal stent to the LAD in 1995, nonobstructive disease 2015   • Ischemic cardiomyopathy, LV ejection fraction around 45 to 50%   • Chronic systolic heart failure (HCC)   • Bilateral lower extremity edema   • Stroke (cerebrum) (HCC)       Social History     Tobacco Use   • Smoking status: Former Smoker     Years: 0.50   • Smokeless tobacco: Never Used   Vaping Use   • Vaping Use: Never used   Substance Use Topics   • Alcohol use: Yes     Comment: Occasional few times a year   • Drug use: No       No Known Allergies    Current Outpatient Medications on File Prior to Visit   Medication Sig   • aspirin 81 MG tablet Take 1 tablet by mouth Daily.   • atorvastatin (LIPITOR) 10 MG tablet Take 10 mg by mouth Daily.   • Cholecalciferol (Vitamin D3) 1.25 MG (62437 UT) capsule TAKE ONE CAPSULE BY MOUTH ONCE A WEEK ON THE SAME DAY EVERY WEEK   • furosemide (LASIX) 40 MG tablet Take 20 mg by mouth Daily.   • gabapentin (NEURONTIN)  "600 MG tablet Take 800 mg by mouth 2 (Two) Times a Day.   • HYDROcodone-acetaminophen (NORCO)  MG per tablet 1 tablet Every 6 (Six) Hours As Needed.   • lisinopril (PRINIVIL,ZESTRIL) 20 MG tablet Take 10 mg by mouth Daily.   • metoprolol tartrate (LOPRESSOR) 50 MG tablet Take 50 mg by mouth Daily.   • pantoprazole (PROTONIX) 40 MG EC tablet Take 40 mg by mouth Daily.   • warfarin (COUMADIN) 2 MG tablet Take 2 mg by mouth Daily.     No current facility-administered medications on file prior to visit.         The following portions of the patient's history were reviewed and updated as appropriate: allergies, current medications, past family history, past medical history, past social history, past surgical history and problem list.    Review of Systems   Constitutional: Negative.   HENT: Negative.  Negative for congestion.    Eyes: Negative.    Cardiovascular: Negative.  Negative for chest pain, cyanosis, dyspnea on exertion, irregular heartbeat, leg swelling, near-syncope, orthopnea, palpitations, paroxysmal nocturnal dyspnea and syncope.   Respiratory: Negative.  Negative for shortness of breath.    Hematologic/Lymphatic: Negative.    Musculoskeletal: Negative.    Gastrointestinal: Negative.    Neurological: Negative.  Negative for headaches.          Objective:     /74   Pulse 88   Ht 193 cm (76\")   Wt 96.1 kg (211 lb 12.8 oz)   SpO2 98%   BMI 25.78 kg/m²   Pulmonary:      Effort: Pulmonary effort is normal.      Breath sounds: Normal breath sounds. No stridor. No wheezing. No rhonchi. No rales.   Cardiovascular:      PMI at left midclavicular line. Normal rate. Irregular rhythm. Normal S1. Normal S2.      Murmurs: There is no murmur.      No gallop. No click. No rub.   Pulses:     Intact distal pulses.   Edema:     Peripheral edema absent.           Lab Review  Lab Results   Component Value Date     (L) 05/10/2022    K 4.3 05/10/2022    CL 97 (L) 05/10/2022    BUN 14 05/10/2022    CREATININE " 1.23 05/10/2022    GLUCOSE 108 (H) 05/10/2022    CALCIUM 9.1 05/10/2022    ALT 11 05/10/2022    ALKPHOS 94 05/10/2022     No results found for: CKTOTAL  Lab Results   Component Value Date    WBC 8.46 05/10/2022    HGB 12.4 (L) 05/10/2022    HCT 37.3 (L) 05/10/2022     05/10/2022     Lab Results   Component Value Date    INR 2.66 (H) 05/10/2022         ECG 12 Lead    Date/Time: 2022 5:13 PM  Performed by: Selam Alcaraz MD  Authorized by: Selam Alcaraz MD   Comparison: compared with previous ECG from 3/14/2022  Similar to previous ECG  Rhythm: paced  Rate: normal  BPM: 76  QRS axis: left  Pacin% capture and ventricular pacing  Clinical impression: abnormal EKG               I personally viewed and interpreted the patient's LAB data         Assessment:     1. Ischemic cardiomyopathy, LV ejection fraction around 45 to 50%    2. Coronary artery disease, anterior wall myocardial infarction with directional atherectomy of LAD in  and bare-metal stent to the LAD in , nonobstructive disease     3. Chronic systolic heart failure (HCC)    4. Presence of cardiac pacemaker    5. Essential hypertension    6. Atrial flutter with controlled response (HCC)    7. Chronic anticoagulation with Coumadin          Plan:     Patient is 89 years old gentleman who is doing very well  Patient has history of coronary artery disease with anterior wall myocardial infarction in the past history of atherectomy and bare-metal stent to LAD  He denies any chest pain palpitations or shortness of breath.  He is taking antiplatelet therapy statin therapy and beta-blocker therapy.    Patient has chronic systolic heart failure and ischemic cardiomyopathy LV ejection fraction 45 to 50%.  He is taking metoprolol lisinopril and Lasix.    Entresto is an option however he will require holding of lisinopril.  Patient is doing very well on current therapy.  Chronic anticoagulation with Coumadin.  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          No follow-ups on file.

## 2022-10-18 ENCOUNTER — OFFICE VISIT (OUTPATIENT)
Dept: UROLOGY | Facility: CLINIC | Age: 87
End: 2022-10-18

## 2022-10-18 VITALS — BODY MASS INDEX: 25.69 KG/M2 | HEIGHT: 76 IN | WEIGHT: 211 LBS

## 2022-10-18 DIAGNOSIS — D49.4 BLADDER TUMOR: ICD-10-CM

## 2022-10-18 DIAGNOSIS — N32.0 BLADDER NECK CONTRACTURE: Primary | ICD-10-CM

## 2022-10-18 PROCEDURE — 99213 OFFICE O/P EST LOW 20 MIN: CPT | Performed by: UROLOGY

## 2022-10-18 NOTE — PROGRESS NOTES
Chief Complaint:      Chief Complaint   Patient presents with   • Urinary Retention       HPI:   89 y.o. male with a history of a tiny neoplasm is not recurrent history of a bladder neck contracture doing great no nocturia minimal residual he is doing fantastic I will see him back in a year given his age he is fantastic shape    Past Medical History:     Past Medical History:   Diagnosis Date   • Arthritis    • Atrial flutter (HCC)    • Back pain    • Benign prostatic hyperplasia    • Bladder tumor    • Cancer (HCC)     [id number]/melanoma   • Chronic systolic heart failure (HCC) 9/14/2021   • Colon cancer (HCC)    • Coronary artery disease    • DVT (deep venous thrombosis) (HCC)     r leg   • Elevated cholesterol    • Heart attack (HCC)    • Hypertension    • Numbness     feet/hands   • Osteoporosis    • PVD (peripheral vascular disease) (HCC)    • Rheumatoid arthritis (HCC)    • Stroke (HCC)    • Ventricular tachycardia        Current Meds:     Current Outpatient Medications   Medication Sig Dispense Refill   • aspirin 81 MG tablet Take 1 tablet by mouth Daily. 30 tablet 11   • atorvastatin (LIPITOR) 10 MG tablet Take 10 mg by mouth Daily.     • Cholecalciferol (Vitamin D3) 1.25 MG (01551 UT) capsule TAKE ONE CAPSULE BY MOUTH ONCE A WEEK ON THE SAME DAY EVERY WEEK     • furosemide (LASIX) 40 MG tablet Take 20 mg by mouth Daily.     • gabapentin (NEURONTIN) 600 MG tablet Take 800 mg by mouth 2 (Two) Times a Day.     • HYDROcodone-acetaminophen (NORCO)  MG per tablet 1 tablet Every 6 (Six) Hours As Needed.     • lisinopril (PRINIVIL,ZESTRIL) 20 MG tablet Take 10 mg by mouth Daily.     • metoprolol tartrate (LOPRESSOR) 50 MG tablet Take 50 mg by mouth Daily.     • pantoprazole (PROTONIX) 40 MG EC tablet Take 40 mg by mouth Daily.     • warfarin (COUMADIN) 2 MG tablet Take 2 mg by mouth Daily.       No current facility-administered medications for this visit.        Allergies:      No Known Allergies     Past  Surgical History:     Past Surgical History:   Procedure Laterality Date   • CATARACT EXTRACTION Left    • CHOLECYSTECTOMY     • COLON RESECTION     • COLONOSCOPY     • HEMORROIDECTOMY     • HERNIA REPAIR      left inguinal/umbilical   • HIP ARTHROPLASTY Right    • INSERT / REPLACE / REMOVE PACEMAKER     • JOINT REPLACEMENT     • PACEMAKER IMPLANTATION     • PROSTATE SURGERY     • TRANSURETHRAL RESECTION OF BLADDER TUMOR N/A 4/18/2018    Procedure: CYSTOSCOPY TRANSURETHRAL RESECTION OF SMALL BLADDER TUMOR;  Surgeon: Alfonso Collins MD;  Location: [facility];  Service: Urology   • TRANSURETHRAL RESECTION OF BLADDER TUMOR N/A 8/29/2018    Procedure: CYSTOSCOPY TRANSURETHRAL RESECTION OF BLADDER TUMOR;  Surgeon: Alfonso Collins MD;  Location: [facility];  Service: Urology       Social History:     Social History     Socioeconomic History   • Marital status:    Tobacco Use   • Smoking status: Former     Years: 0.50     Types: Cigarettes   • Smokeless tobacco: Never   Vaping Use   • Vaping Use: Never used   Substance and Sexual Activity   • Alcohol use: Yes     Comment: Occasional few times a year   • Drug use: No   • Sexual activity: Defer       Family History:     Family History   Problem Relation Age of Onset   • No Known Problems Mother    • No Known Problems Father    • Heart disease Brother        Review of Systems:     Review of Systems   Constitutional: Negative.    HENT: Negative.    Eyes: Negative.    Respiratory: Negative.    Cardiovascular: Negative.    Gastrointestinal: Negative.    Endocrine: Negative.    Musculoskeletal: Negative.    Allergic/Immunologic: Negative.    Neurological: Negative.    Hematological: Negative.    Psychiatric/Behavioral: Negative.        Physical Exam:     Physical Exam  Vitals and nursing note reviewed.   Constitutional:       Appearance: He is well-developed.   HENT:      Head: Normocephalic and atraumatic.   Eyes:      Conjunctiva/sclera: Conjunctivae normal.       Pupils: Pupils are equal, round, and reactive to light.   Cardiovascular:      Rate and Rhythm: Normal rate and regular rhythm.      Heart sounds: Normal heart sounds.   Pulmonary:      Effort: Pulmonary effort is normal.      Breath sounds: Normal breath sounds.   Abdominal:      General: Bowel sounds are normal.      Palpations: Abdomen is soft.   Musculoskeletal:         General: Normal range of motion.      Cervical back: Normal range of motion.   Skin:     General: Skin is warm and dry.   Neurological:      Mental Status: He is alert and oriented to person, place, and time.      Deep Tendon Reflexes: Reflexes are normal and symmetric.   Psychiatric:         Behavior: Behavior normal.         Thought Content: Thought content normal.         Judgment: Judgment normal.         I have reviewed the following portions of the patient's history: Allergies, current medications, past family history, past medical history, past social history, past surgical history, problem list, and ROS and confirm it is accurate.    Recent Image (CT and/or KUB):      CT Abdomen and Pelvis: No results found for this or any previous visit.       CT Stone Protocol: Results for orders placed during the hospital encounter of 04/09/18    CT Abdomen Pelvis Stone Protocol    Narrative  CT ABDOMEN AND PELVIS STONE PROTOCOL-    REASON FOR EXAM: Abdominal pain, unspecified; N40.1-Benign prostatic  hyperplasia with lower urinary tract symptoms; N13.8-Other obstructive  and reflux uropathy.    FINDINGS: Spiral scans were obtained through the kidneys, ureterS and  bladder. The kidneys showed no evidence of hydronephrosis. There were no  stones in the intrarenal collecting systems or in the intrarenal  parenchyma. Ureters were not dilated as they coursed through the abdomen  and pelvis. No stones were noted along the course of the ureters.  Urinary bladder was fairly well-distended with urine and was normal in  contour. No focal areas of bladder  wall thickening were demonstrated.    Impression  No evidence of stone or obstruction was seen involving the  collecting system of either kidney. The CT does demonstrate marked  scoliosis and degenerative changes in the lumbar spine.      The radiation dose reduction device was utilized for each scan per the  ALARA (as low as reasonably achievable) protocol.    This report was finalized on 4/9/2018 2:16 PM by Dr. Jairo Maya II, MD.       KUB: Results for orders placed during the hospital encounter of 03/11/21    XR Abdomen KUB    Narrative  XR ABDOMEN KUB-    REASON FOR EXAM:  R10.30; R10.30-Lower abdominal pain, unspecified    COMPARISON: None.    FINDINGS: The bowel gas pattern of the abdomen shows no evidence of  obstruction. No soft tissue masses or pathological calcifications other  than vascular ones are demonstrated. There are advanced degenerative  disc changes in the lumbar spine with scoliosis. A prosthesis is noted  in the right hip. 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    Impression  A source of patient's lower abdominal pain is not  identified.    This report was finalized on 3/11/2021 1:18 PM by Dr. Jairo Maya II, MD.       Labs (past 3 months):      No visits with results within 3 Month(s) from this visit.   Latest known visit with results is:   Admission on 05/10/2022, Discharged on 05/10/2022   Component Date Value Ref Range Status   • Extra Tube 05/10/2022 Hold for add-ons.   Final    Auto resulted.   • Extra Tube 05/10/2022 hold for add-on   Final    Auto resulted   • Extra Tube 05/10/2022 Hold for add-ons.   Final    Auto resulted.   • Extra Tube 05/10/2022 Hold for add-ons.   Final    Auto resulted   • Glucose 05/10/2022 108 (H)  65 - 99 mg/dL Final   • BUN 05/10/2022 14  8 - 23 mg/dL Final   • Creatinine 05/10/2022 1.23  0.76 - 1.27 mg/dL Final   • Sodium 05/10/2022 132 (L)  136 - 145 mmol/L Final   • Potassium 05/10/2022 4.3  3.5 - 5.2 mmol/L Final   • Chloride  05/10/2022 97 (L)  98 - 107 mmol/L Final   • CO2 05/10/2022 24.3  22.0 - 29.0 mmol/L Final   • Calcium 05/10/2022 9.1  8.2 - 9.6 mg/dL Final   • Total Protein 05/10/2022 6.7  6.0 - 8.5 g/dL Final   • Albumin 05/10/2022 4.06  3.50 - 5.20 g/dL Final   • ALT (SGPT) 05/10/2022 11  1 - 41 U/L Final   • AST (SGOT) 05/10/2022 20  1 - 40 U/L Final   • Alkaline Phosphatase 05/10/2022 94  39 - 117 U/L Final   • Total Bilirubin 05/10/2022 1.8 (H)  0.0 - 1.2 mg/dL Final   • Globulin 05/10/2022 2.6  gm/dL Final   • A/G Ratio 05/10/2022 1.5  g/dL Final   • BUN/Creatinine Ratio 05/10/2022 11.4  7.0 - 25.0 Final   • Anion Gap 05/10/2022 10.7  5.0 - 15.0 mmol/L Final   • eGFR 05/10/2022 55.4 (L)  >60.0 mL/min/1.73 Final    National Kidney Foundation and American Society of Nephrology (ASN) Task Force recommended calculation based on the Chronic Kidney Disease Epidemiology Collaboration (CKD-EPI) equation refit without adjustment for race.   • Lipase 05/10/2022 12 (L)  13 - 60 U/L Final   • Color, UA 05/10/2022 Yellow  Yellow, Straw Final   • Appearance, UA 05/10/2022 Clear  Clear Final   • pH, UA 05/10/2022 6.5  5.0 - 8.0 Final   • Specific Gravity, UA 05/10/2022 1.009  1.005 - 1.030 Final   • Glucose, UA 05/10/2022 Negative  Negative Final   • Ketones, UA 05/10/2022 Negative  Negative Final   • Bilirubin, UA 05/10/2022 Negative  Negative Final   • Blood, UA 05/10/2022 Negative  Negative Final   • Protein, UA 05/10/2022 Negative  Negative Final   • Leuk Esterase, UA 05/10/2022 Negative  Negative Final   • Nitrite, UA 05/10/2022 Negative  Negative Final   • Urobilinogen, UA 05/10/2022 0.2 E.U./dL  0.2 - 1.0 E.U./dL Final   • Lactate 05/10/2022 1.2  0.5 - 2.0 mmol/L Final   • Protime 05/10/2022 29.1 (H)  12.1 - 14.7 Seconds Final   • INR 05/10/2022 2.66 (H)  0.90 - 1.10 Final   • WBC 05/10/2022 8.46  3.40 - 10.80 10*3/mm3 Final   • RBC 05/10/2022 3.72 (L)  4.14 - 5.80 10*6/mm3 Final   • Hemoglobin 05/10/2022 12.4 (L)  13.0 - 17.7 g/dL  Final   • Hematocrit 05/10/2022 37.3 (L)  37.5 - 51.0 % Final   • MCV 05/10/2022 100.3 (H)  79.0 - 97.0 fL Final   • MCH 05/10/2022 33.3 (H)  26.6 - 33.0 pg Final   • MCHC 05/10/2022 33.2  31.5 - 35.7 g/dL Final   • RDW 05/10/2022 13.1  12.3 - 15.4 % Final   • RDW-SD 05/10/2022 48.5  37.0 - 54.0 fl Final   • MPV 05/10/2022 9.6  6.0 - 12.0 fL Final   • Platelets 05/10/2022 168  140 - 450 10*3/mm3 Final   • Neutrophil % 05/10/2022 78.9 (H)  42.7 - 76.0 % Final   • Lymphocyte % 05/10/2022 9.6 (L)  19.6 - 45.3 % Final   • Monocyte % 05/10/2022 11.0  5.0 - 12.0 % Final   • Eosinophil % 05/10/2022 0.2 (L)  0.3 - 6.2 % Final   • Basophil % 05/10/2022 0.1  0.0 - 1.5 % Final   • Immature Grans % 05/10/2022 0.2  0.0 - 0.5 % Final   • Neutrophils, Absolute 05/10/2022 6.67  1.70 - 7.00 10*3/mm3 Final   • Lymphocytes, Absolute 05/10/2022 0.81  0.70 - 3.10 10*3/mm3 Final   • Monocytes, Absolute 05/10/2022 0.93 (H)  0.10 - 0.90 10*3/mm3 Final   • Eosinophils, Absolute 05/10/2022 0.02  0.00 - 0.40 10*3/mm3 Final   • Basophils, Absolute 05/10/2022 0.01  0.00 - 0.20 10*3/mm3 Final   • Immature Grans, Absolute 05/10/2022 0.02  0.00 - 0.05 10*3/mm3 Final   • nRBC 05/10/2022 0.0  0.0 - 0.2 /100 WBC Final        Procedure:       Assessment/Plan:   Bladder neck contracture-resolved  Bladder tumor-tiny I do not think further interventions indicated            This document has been electronically signed by TINY PENG MD October 18, 2022 12:41 EDT    Dictated Utilizing Dragon Dictation: Part of this note may be an electronic transcription/translation of spoken language to printed text using the Dragon Dictation System.

## 2022-11-02 ENCOUNTER — CLINICAL SUPPORT NO REQUIREMENTS (OUTPATIENT)
Dept: CARDIOLOGY | Facility: CLINIC | Age: 87
End: 2022-11-02

## 2022-11-02 DIAGNOSIS — Z95.0 PRESENCE OF CARDIAC PACEMAKER: ICD-10-CM

## 2022-11-02 PROCEDURE — 93280 PM DEVICE PROGR EVAL DUAL: CPT | Performed by: INTERNAL MEDICINE

## 2023-03-14 ENCOUNTER — OFFICE VISIT (OUTPATIENT)
Dept: CARDIOLOGY | Facility: CLINIC | Age: 88
End: 2023-03-14
Payer: MEDICARE

## 2023-03-14 VITALS
DIASTOLIC BLOOD PRESSURE: 76 MMHG | HEIGHT: 76 IN | HEART RATE: 86 BPM | WEIGHT: 229 LBS | BODY MASS INDEX: 27.89 KG/M2 | OXYGEN SATURATION: 98 % | SYSTOLIC BLOOD PRESSURE: 142 MMHG

## 2023-03-14 DIAGNOSIS — E78.5 HYPERLIPIDEMIA LDL GOAL <70: ICD-10-CM

## 2023-03-14 DIAGNOSIS — I50.22 CHRONIC SYSTOLIC HEART FAILURE: Primary | ICD-10-CM

## 2023-03-14 DIAGNOSIS — I25.10 CORONARY ARTERY DISEASE INVOLVING NATIVE CORONARY ARTERY OF NATIVE HEART WITHOUT ANGINA PECTORIS: ICD-10-CM

## 2023-03-14 DIAGNOSIS — I10 ESSENTIAL HYPERTENSION: ICD-10-CM

## 2023-03-14 PROCEDURE — 99214 OFFICE O/P EST MOD 30 MIN: CPT | Performed by: INTERNAL MEDICINE

## 2023-03-14 PROCEDURE — 93000 ELECTROCARDIOGRAM COMPLETE: CPT | Performed by: INTERNAL MEDICINE

## 2023-03-14 RX ORDER — FUROSEMIDE 40 MG/1
40 TABLET ORAL DAILY
Qty: 90 TABLET | Refills: 0 | Status: SHIPPED | OUTPATIENT
Start: 2023-03-14

## 2023-03-14 NOTE — PROGRESS NOTES
subjective     Chief Complaint   Patient presents with   • Coronary Artery Disease     Follow up   • Hypertension     today     History of Present Illness     Patient is 89 years old gentleman who is here for follow-up.  Blood pressure is running high.  He is taking lisinopril.  He complains of cough and bilateral lower extremity edema.    He has chronic HFrEF with ejection fraction around 45 to 50%    Patient also has coronary artery disease with prior anterior wall myocardial infarction requiring coronary artery stents.  He denies any chest pain palpitations or shortness of breath.    Hyperlipidemia on Lipitor therapy which is tolerating very well.  History of ventricular tachycardia nonsustained on beta-blocker therapy with Lopressor overall he is doing well except for blood pressure is high and ankle edema.    Past Surgical History:   Procedure Laterality Date   • CATARACT EXTRACTION Left    • CHOLECYSTECTOMY     • COLON RESECTION     • COLONOSCOPY     • HEMORROIDECTOMY     • HERNIA REPAIR      left inguinal/umbilical   • HIP ARTHROPLASTY Right    • INSERT / REPLACE / REMOVE PACEMAKER     • JOINT REPLACEMENT     • PACEMAKER IMPLANTATION     • PROSTATE SURGERY     • TRANSURETHRAL RESECTION OF BLADDER TUMOR N/A 4/18/2018    Procedure: CYSTOSCOPY TRANSURETHRAL RESECTION OF SMALL BLADDER TUMOR;  Surgeon: Alfonso Collins MD;  Location: [facility] OR;  Service: Urology   • TRANSURETHRAL RESECTION OF BLADDER TUMOR N/A 8/29/2018    Procedure: CYSTOSCOPY TRANSURETHRAL RESECTION OF BLADDER TUMOR;  Surgeon: Alfonso Collins MD;  Location: [facility];  Service: Urology     Family History   Problem Relation Age of Onset   • No Known Problems Mother    • No Known Problems Father    • Heart disease Brother      Past Medical History:   Diagnosis Date   • Arthritis    • Atrial flutter (HCC)    • Back pain    • Benign prostatic hyperplasia    • Bladder tumor    • Cancer (HCC)     [id number]   • Chronic systolic  heart failure ([facility]) 9/14/2021   • Colon cancer ([facility])    • Coronary artery disease    • DVT (deep venous thrombosis) ([facility])     r leg   • Elevated cholesterol    • Heart attack ([facility])    • Hypertension    • Numbness     feet/hands   • Osteoporosis    • PVD (peripheral vascular disease) ([facility])    • Rheumatoid arthritis ([facility])    • Stroke ([facility])    • Ventricular tachycardia      Patient Active Problem List   Diagnosis   • Urinary retention   • Bladder neck contracture   • Bladder tumor   • Aftercare following surgery of the genitourinary system   • Atrial flutter with controlled response ([facility])   • Presence of cardiac pacemaker   • Hyperlipidemia LDL goal <70   • Essential hypertension   • Chronic anticoagulation with Coumadin   • Ventricular tachycardia (paroxysmal)   • Asymptomatic PVD (peripheral vascular disease) ([facility])   • Coronary artery disease, anterior wall myocardial infarction with directional atherectomy of LAD in 1983 and bare-metal stent to the LAD in 1995, nonobstructive disease 2015   • Ischemic cardiomyopathy, LV ejection fraction around 45 to 50%   • Chronic systolic heart failure ([facility])   • Bilateral lower extremity edema   • Stroke (cerebrum) ([facility])       Social History     Tobacco Use   • Smoking status: Former     Years: 0.50     Types: Cigarettes   • Smokeless tobacco: Never   Vaping Use   • Vaping Use: Never used   Substance Use Topics   • Alcohol use: Yes     Comment: Occasional few times a year   • Drug use: No       No Known Allergies    Current Outpatient Medications on File Prior to Visit   Medication Sig   • aspirin 81 MG tablet Take 1 tablet by mouth Daily.   • atorvastatin (LIPITOR) 10 MG tablet Take 1 tablet by mouth Daily.   • Cholecalciferol (Vitamin D3) 1.25 MG (78746 UT) capsule TAKE ONE CAPSULE BY MOUTH ONCE A WEEK ON THE SAME DAY EVERY WEEK   • gabapentin (NEURONTIN) 600 MG tablet Take 800 mg by mouth 2 (Two) Times a Day.   • HYDROcodone-acetaminophen (NORCO)  MG per tablet 1 tablet  "Every 6 (Six) Hours As Needed.   • metoprolol tartrate (LOPRESSOR) 50 MG tablet Take 1 tablet by mouth Daily.   • pantoprazole (PROTONIX) 40 MG EC tablet Take 1 tablet by mouth Daily.   • warfarin (COUMADIN) 2 MG tablet Take 1 tablet by mouth Daily.     No current facility-administered medications on file prior to visit.         The following portions of the patient's history were reviewed and updated as appropriate: allergies, current medications, past family history, past medical history, past social history, past surgical history and problem list.    Review of Systems   Constitutional: Negative.   HENT: Negative.  Negative for congestion.    Eyes: Negative.    Cardiovascular: Positive for leg swelling. Negative for chest pain, cyanosis, dyspnea on exertion, irregular heartbeat, near-syncope, orthopnea, palpitations, paroxysmal nocturnal dyspnea and syncope.   Respiratory: Negative.  Negative for shortness of breath.    Hematologic/Lymphatic: Negative.    Musculoskeletal: Negative.    Gastrointestinal: Negative.    Neurological: Negative.  Negative for headaches.          Objective:     /76 (BP Location: Left arm, Patient Position: Sitting, Cuff Size: Adult)   Pulse 86   Ht 193 cm (75.98\")   Wt 104 kg (229 lb)   SpO2 98%   BMI 27.89 kg/m²   Cardiovascular:      PMI at left midclavicular line. Normal rate. Regular rhythm. Normal S1. Normal S2.      Murmurs: There is no murmur.      No gallop. No click. No rub.   Pulses:     Intact distal pulses.   Edema:     Peripheral edema present.     Pretibial: bilateral 1+ edema of the pretibial area.     Ankle: bilateral 2+ edema of the ankle.     Feet: bilateral 2+ edema of the feet.          Lab Review  Lab Results   Component Value Date     (L) 05/10/2022    K 4.3 05/10/2022    CL 97 (L) 05/10/2022    BUN 14 05/10/2022    CREATININE 1.23 05/10/2022    GLUCOSE 108 (H) 05/10/2022    CALCIUM 9.1 05/10/2022    ALT 11 05/10/2022    ALKPHOS 94 05/10/2022     No " results found for: CKTOTAL  Lab Results   Component Value Date    WBC 8.46 05/10/2022    HGB 12.4 (L) 05/10/2022    HCT 37.3 (L) 05/10/2022     05/10/2022     Lab Results   Component Value Date    INR 2.66 (H) 05/10/2022         ECG 12 Lead    Date/Time: 3/26/2023 11:07 AM  Performed by: Selam Alcaraz MD  Authorized by: Selam Alcaraz MD   Comparison: compared with previous ECG from 9/14/2022  Similar to previous ECG  Rhythm: paced  Rate: normal  BPM: 71  QRS axis: left  Pacing: ventricular paced rhythm  Clinical impression: abnormal EKG               I personally viewed and interpreted the patient's LAB data         Assessment:     1. Chronic systolic heart failure (HCC)    2. Hyperlipidemia LDL goal <70    3. Coronary artery disease, anterior wall myocardial infarction with directional atherectomy of LAD in 1983 and bare-metal stent to the LAD in 1995, nonobstructive disease 2015    4. Essential hypertension          Plan:     Chronic HFrEF  Patient was advised to DC lisinopril.  Was started on Entresto 24/26 twice daily.  He was also started on Lasix 40 mg daily.  He will check his blood pressure closely and will call for further instructions.  Lisinopril was discontinued.    Hyperlipidemia  Patient is taking Lipitor 10 mg daily.  Lab work scheduled for next visit.    Coronary artery disease  Stable asymptomatic.  He will continue antiplatelet therapy statin therapy and beta-blocker therapy.    Hypertension  Not well controlled.  Lisinopril was switched to Entresto.    Follow-up scheduled        No follow-ups on file.

## 2023-04-18 ENCOUNTER — OFFICE VISIT (OUTPATIENT)
Dept: CARDIOLOGY | Facility: CLINIC | Age: 88
End: 2023-04-18
Payer: MEDICARE

## 2023-04-18 VITALS
WEIGHT: 227.8 LBS | HEIGHT: 78 IN | HEART RATE: 91 BPM | DIASTOLIC BLOOD PRESSURE: 69 MMHG | OXYGEN SATURATION: 97 % | BODY MASS INDEX: 26.36 KG/M2 | SYSTOLIC BLOOD PRESSURE: 121 MMHG

## 2023-04-18 DIAGNOSIS — I50.22 CHRONIC HFREF (HEART FAILURE WITH REDUCED EJECTION FRACTION): ICD-10-CM

## 2023-04-18 DIAGNOSIS — I25.10 CORONARY ARTERY DISEASE INVOLVING NATIVE CORONARY ARTERY OF NATIVE HEART WITHOUT ANGINA PECTORIS: Primary | ICD-10-CM

## 2023-04-18 DIAGNOSIS — I48.92 ATRIAL FLUTTER WITH CONTROLLED RESPONSE: ICD-10-CM

## 2023-04-18 PROCEDURE — 99214 OFFICE O/P EST MOD 30 MIN: CPT | Performed by: INTERNAL MEDICINE

## 2023-04-18 RX ORDER — FUROSEMIDE 40 MG/1
20 TABLET ORAL 2 TIMES WEEKLY
Qty: 90 TABLET | Refills: 0 | Status: SHIPPED | OUTPATIENT
Start: 2023-04-20

## 2023-04-18 RX ORDER — LISINOPRIL 10 MG/1
10 TABLET ORAL DAILY
Qty: 90 TABLET | Refills: 3 | Status: SHIPPED | OUTPATIENT
Start: 2023-04-18

## 2023-04-18 NOTE — LETTER
April 18, 2023     FLOWER Pickard  [address] [place] [address]    Patient: Alberto Guzman   YOB: 1931   Date of Visit: 4/18/2023       Dear FLOWER Pickard    Alberto Guzman was in my office today. Below is a copy of my note.    If you have questions, please do not hesitate to call me. I look forward to following Alberto along with you.         Sincerely,        Selam Alcaraz MD        CC: No Recipients    subjective     Chief Complaint   Patient presents with   • Heart Problem     Follow up      History of Present Illness          Past Surgical History:   Procedure Laterality Date   • CATARACT EXTRACTION Left    • CHOLECYSTECTOMY     • COLON RESECTION     • COLONOSCOPY     • HEMORROIDECTOMY     • HERNIA REPAIR      left inguinal/umbilical   • HIP ARTHROPLASTY Right    • INSERT / REPLACE / REMOVE PACEMAKER     • JOINT REPLACEMENT     • PACEMAKER IMPLANTATION     • PROSTATE SURGERY     • TRANSURETHRAL RESECTION OF BLADDER TUMOR N/A 4/18/2018    Procedure: CYSTOSCOPY TRANSURETHRAL RESECTION OF SMALL BLADDER TUMOR;  Surgeon: Alfonso Collins MD;  Location: [facility];  Service: Urology   • TRANSURETHRAL RESECTION OF BLADDER TUMOR N/A 8/29/2018    Procedure: CYSTOSCOPY TRANSURETHRAL RESECTION OF BLADDER TUMOR;  Surgeon: Alfonso Collins MD;  Location: [facility];  Service: Urology     Family History   Problem Relation Age of Onset   • No Known Problems Mother    • No Known Problems Father    • Heart disease Brother      Past Medical History:   Diagnosis Date   • Arthritis    • Atrial flutter    • Back pain    • Benign prostatic hyperplasia    • Bladder tumor    • Cancer     [id number]/melanoma   • Chronic systolic heart failure 9/14/2021   • Colon cancer    • Coronary artery disease    • DVT (deep venous thrombosis)     r leg   • Elevated cholesterol    • Heart attack    • Hypertension    • Numbness     feet/hands   • Osteoporosis    • PVD (peripheral  vascular disease)    • Rheumatoid arthritis    • Stroke    • Ventricular tachycardia      Patient Active Problem List   Diagnosis   • Urinary retention   • Bladder neck contracture   • Bladder tumor   • Aftercare following surgery of the genitourinary system   • Atrial flutter with controlled response   • Presence of cardiac pacemaker   • Hyperlipidemia LDL goal <70   • Essential hypertension   • Chronic anticoagulation with Coumadin   • Ventricular tachycardia (paroxysmal)   • Asymptomatic PVD (peripheral vascular disease)   • Coronary artery disease, anterior wall myocardial infarction with directional atherectomy of LAD in 1983 and bare-metal stent to the LAD in 1995, nonobstructive disease 2015   • Ischemic cardiomyopathy, LV ejection fraction around 45 to 50%   • Chronic systolic heart failure   • Bilateral lower extremity edema   • Stroke (cerebrum)       Social History     Tobacco Use   • Smoking status: Former     Years: 0.50     Types: Cigarettes   • Smokeless tobacco: Never   Vaping Use   • Vaping Use: Never used   Substance Use Topics   • Alcohol use: Yes     Comment: Occasional few times a year   • Drug use: No       No Known Allergies    Current Outpatient Medications on File Prior to Visit   Medication Sig   • aspirin 81 MG tablet Take 1 tablet by mouth Daily.   • atorvastatin (LIPITOR) 10 MG tablet Take 1 tablet by mouth Daily.   • Cholecalciferol (Vitamin D3) 1.25 MG (31218 UT) capsule TAKE ONE CAPSULE BY MOUTH ONCE A WEEK ON THE SAME DAY EVERY WEEK   • furosemide (LASIX) 40 MG tablet Take 1 tablet by mouth Daily.   • gabapentin (NEURONTIN) 600 MG tablet Take 800 mg by mouth 2 (Two) Times a Day.   • HYDROcodone-acetaminophen (NORCO)  MG per tablet 1 tablet Every 6 (Six) Hours As Needed.   • metoprolol tartrate (LOPRESSOR) 50 MG tablet Take 1 tablet by mouth Daily.   • pantoprazole (PROTONIX) 40 MG EC tablet Take 1 tablet by mouth Daily.   • sacubitril-valsartan (ENTRESTO) 24-26 MG tablet Take 1  "tablet by mouth 2 (Two) Times a Day.   • warfarin (COUMADIN) 2 MG tablet Take 1 tablet by mouth Daily.     No current facility-administered medications on file prior to visit.         The following portions of the patient's history were reviewed and updated as appropriate: allergies, current medications, past family history, past medical history, past social history, past surgical history and problem list.    ROS      Objective:     /69 (BP Location: Left arm, Patient Position: Sitting, Cuff Size: Adult)   Pulse 91   Ht 231.1 cm (91\")   Wt 103 kg (227 lb 12.8 oz)   SpO2 97%   BMI 19.34 kg/m²   Physical Exam      Lab Review  Lab Results   Component Value Date     (L) 05/10/2022    K 4.3 05/10/2022    CL 97 (L) 05/10/2022    BUN 14 05/10/2022    CREATININE 1.23 05/10/2022    GLUCOSE 108 (H) 05/10/2022    CALCIUM 9.1 05/10/2022    ALT 11 05/10/2022    ALKPHOS 94 05/10/2022     No results found for: CKTOTAL  Lab Results   Component Value Date    WBC 8.46 05/10/2022    HGB 12.4 (L) 05/10/2022    HCT 37.3 (L) 05/10/2022     05/10/2022     Lab Results   Component Value Date    INR 2.66 (H) 05/10/2022     No results found for: MG  No results found for: PSA, TSH  No results found for: BNP  No results found for: CHLPL, CHOL, TRIG, HDL, VLDL, LDLHDL  No results found for: LDL  No results found for: PROBNP            Procedures       I personally viewed and interpreted the patient's LAB data        Assessment:   No diagnosis found.      Plan:              No follow-ups on file.    "

## 2023-04-18 NOTE — PROGRESS NOTES
subjective     Chief Complaint   Patient presents with   • Heart Problem     Follow up      History of Present Illness    Patient is 89 years old gentleman who is here for cardiology follow-up.  He has known coronary artery disease status post anterior wall myocardial infarction with a directional atherectomy of LAD in 1983, bare-metal stent to LAD in 1995.  He is found to have nonobstructive disease in 2015.  He has been doing very well .    Last visit his lisinopril was switched to Entresto and he was also started on Lasix.  He states that Entresto cost him $600 per month and he cannot afford it even with the samples.  He feels that he definitely cannot use it long-term even with samples.  He was to go back to lisinopril and will be switched however we will give 36 hours gap.  Patient has HFrEF and had bilateral lower extremity edema however with Lasix edema is completely gone and he complains of dry mouth.    Lasix will be decreased to 20 mg twice a week.    He denies any chest pain or palpitations.  He is taking metoprolol 50 mg daily        Past Surgical History:   Procedure Laterality Date   • CATARACT EXTRACTION Left    • CHOLECYSTECTOMY     • COLON RESECTION     • COLONOSCOPY     • HEMORROIDECTOMY     • HERNIA REPAIR      left inguinal/umbilical   • HIP ARTHROPLASTY Right    • INSERT / REPLACE / REMOVE PACEMAKER     • JOINT REPLACEMENT     • PACEMAKER IMPLANTATION     • PROSTATE SURGERY     • TRANSURETHRAL RESECTION OF BLADDER TUMOR N/A 4/18/2018    Procedure: CYSTOSCOPY TRANSURETHRAL RESECTION OF SMALL BLADDER TUMOR;  Surgeon: Alfonso Collins MD;  Location: [facility] OR;  Service: Urology   • TRANSURETHRAL RESECTION OF BLADDER TUMOR N/A 8/29/2018    Procedure: CYSTOSCOPY TRANSURETHRAL RESECTION OF BLADDER TUMOR;  Surgeon: Alfonso Collins MD;  Location: [facility] OR;  Service: Urology     Family History   Problem Relation Age of Onset   • No Known Problems Mother    • No Known Problems Father    •  Heart disease Brother      Past Medical History:   Diagnosis Date   • Arthritis    • Atrial flutter    • Back pain    • Benign prostatic hyperplasia    • Bladder tumor    • Cancer     [id number]/melanoma   • Chronic systolic heart failure 9/14/2021   • Colon cancer    • Coronary artery disease    • DVT (deep venous thrombosis)     r leg   • Elevated cholesterol    • Heart attack    • Hypertension    • Numbness     feet/hands   • Osteoporosis    • PVD (peripheral vascular disease)    • Rheumatoid arthritis    • Stroke    • Ventricular tachycardia      Patient Active Problem List   Diagnosis   • Urinary retention   • Bladder neck contracture   • Bladder tumor   • Aftercare following surgery of the genitourinary system   • Atrial flutter , ventricular paced rhythm   • Presence of cardiac pacemaker   • Hyperlipidemia LDL goal <70   • Essential hypertension   • Chronic anticoagulation with Coumadin   • Ventricular tachycardia (paroxysmal)   • Asymptomatic PVD (peripheral vascular disease)   • Coronary artery disease, anterior wall myocardial infarction with directional atherectomy of LAD in 1983 and bare-metal stent to the LAD in 1995, nonobstructive disease 2015   • Ischemic cardiomyopathy, LV ejection fraction around 45 to 50%   • 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, 46 to 50%)   • Bilateral lower extremity edema   • Stroke (cerebrum)       Social History     Tobacco Use   • Smoking status: Former     Years: 0.50     Types: Cigarettes   • Smokeless tobacco: Never   Vaping Use   • Vaping Use: Never used   Substance Use Topics   • Alcohol use: Yes     Comment: Occasional few times a year   • Drug use: No       No Known Allergies    Current Outpatient Medications on File Prior to Visit   Medication Sig   • aspirin 81 MG tablet Take 1 tablet by mouth Daily.   • atorvastatin (LIPITOR) 10 MG tablet Take 1 tablet by mouth Daily.   • Cholecalciferol (Vitamin D3) 1.25 MG (01037 UT) capsule TAKE ONE  "CAPSULE BY MOUTH ONCE A WEEK ON THE SAME DAY EVERY WEEK   • gabapentin (NEURONTIN) 600 MG tablet Take 800 mg by mouth 2 (Two) Times a Day.   • HYDROcodone-acetaminophen (NORCO)  MG per tablet 1 tablet Every 6 (Six) Hours As Needed.   • metoprolol tartrate (LOPRESSOR) 50 MG tablet Take 1 tablet by mouth Daily.   • pantoprazole (PROTONIX) 40 MG EC tablet Take 1 tablet by mouth Daily.   • warfarin (COUMADIN) 2 MG tablet Take 1 tablet by mouth Daily.   • [DISCONTINUED] furosemide (LASIX) 40 MG tablet Take 1 tablet by mouth Daily.   • [DISCONTINUED] sacubitril-valsartan (ENTRESTO) 24-26 MG tablet Take 1 tablet by mouth 2 (Two) Times a Day.     No current facility-administered medications on file prior to visit.         The following portions of the patient's history were reviewed and updated as appropriate: allergies, current medications, past family history, past medical history, past social history, past surgical history and problem list.    Review of Systems   Constitutional: Negative.   HENT: Negative.  Negative for congestion.         Dry mouth.   Eyes: Negative.    Cardiovascular: Negative.  Negative for chest pain, cyanosis, dyspnea on exertion, irregular heartbeat, leg swelling, near-syncope, orthopnea, palpitations, paroxysmal nocturnal dyspnea and syncope.   Respiratory: Negative.  Negative for shortness of breath.    Hematologic/Lymphatic: Negative.    Musculoskeletal: Negative.    Gastrointestinal: Negative.    Neurological: Negative.  Negative for headaches.          Objective:     /69 (BP Location: Left arm, Patient Position: Sitting, Cuff Size: Adult)   Pulse 91   Ht 231.1 cm (91\")   Wt 103 kg (227 lb 12.8 oz)   SpO2 97%   BMI 19.34 kg/m²   Physical Exam      Lab Review  Lab Results   Component Value Date     (L) 05/10/2022    K 4.3 05/10/2022    CL 97 (L) 05/10/2022    BUN 14 05/10/2022    CREATININE 1.23 05/10/2022    GLUCOSE 108 (H) 05/10/2022    CALCIUM 9.1 05/10/2022    ALT 11 " 05/10/2022    ALKPHOS 94 05/10/2022     No results found for: CKTOTAL  Lab Results   Component Value Date    WBC 8.46 05/10/2022    HGB 12.4 (L) 05/10/2022    HCT 37.3 (L) 05/10/2022     05/10/2022     Lab Results   Component Value Date    INR 2.66 (H) 05/10/2022     Lab work was requested last visit including proBNP lipid panel, CMP and CBC however patient has not had lab work done yet.            Procedures               Assessment:     1. Coronary artery disease, anterior wall myocardial infarction with directional atherectomy of LAD in 1983 and bare-metal stent to the LAD in 1995, nonobstructive disease 2015    2. Atrial flutter with controlled response    3. 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, 46 to 50%)          Plan:     Patient is 89 years old gentleman with HFrEF ejection fraction 46 to 50%.  He was started on Entresto and Lasix.  Ankle edema is all gone and he complains of dry mouth.  Blood pressure is much better controlled however patient states that he cannot continue with Entresto because of cost.  Even with samples he cannot go on on long-term.  Cost him $600 a month.    Entresto was discontinued.  Patient was to go back on Zestril 10 mg daily which was started.  He will check his blood pressure closely.  He was advised to start Zestril 36 hours after stopping Entresto.    Patient has chronic atrial flutter with controlled rate and ventricular paced rhythm    He did not have lab work done he was advised to have lab work done before next visit.    Follow-up scheduled        No follow-ups on file.

## 2023-07-25 ENCOUNTER — TELEPHONE (OUTPATIENT)
Dept: CARDIOLOGY | Facility: CLINIC | Age: 88
End: 2023-07-25
Payer: MEDICARE

## 2023-07-25 NOTE — TELEPHONE ENCOUNTER
----- Message from Selam Alcaraz MD sent at 7/25/2023  9:46 AM EDT -----  Lab work is good.  There is still evidence of extra fluid retention.  Continue current medications

## 2023-07-25 NOTE — TELEPHONE ENCOUNTER
Called and given message per Dr Alcaraz.  He thanked me for calling.      Lab work is good. There is still evidence of extra fluid retention. Continue current medications

## 2023-09-28 ENCOUNTER — TELEPHONE (OUTPATIENT)
Dept: CARDIOLOGY | Facility: CLINIC | Age: 88
End: 2023-09-28

## 2023-09-28 NOTE — TELEPHONE ENCOUNTER
Caller: Alberto Guzman    Relationship: Self    Best call back number: [phone]     What is the best time to reach you: ANYTIME BETWEEN 10AM-12PM     What was the call regarding: PT REPORTS THAT THEY WERE GIVEN A CALL TODAY @ 9:44AM  BUT THERE IS NO DOCUMENTATION IN THE CHART ABOUT THIS. IF THIS WAS NOT A MISTAKE, PLEASE CALL PT BACK.     Is it okay if the provider responds through MyChart: CALL

## 2023-10-11 ENCOUNTER — PATIENT ROUNDING (BHMG ONLY) (OUTPATIENT)
Dept: CARDIOLOGY | Facility: CLINIC | Age: 88
End: 2023-10-11
Payer: MEDICARE

## 2023-10-11 ENCOUNTER — OFFICE VISIT (OUTPATIENT)
Dept: CARDIOLOGY | Facility: CLINIC | Age: 88
End: 2023-10-11
Payer: MEDICARE

## 2023-10-11 VITALS
DIASTOLIC BLOOD PRESSURE: 82 MMHG | BODY MASS INDEX: 27.79 KG/M2 | HEIGHT: 76 IN | SYSTOLIC BLOOD PRESSURE: 126 MMHG | WEIGHT: 228.2 LBS | HEART RATE: 77 BPM | OXYGEN SATURATION: 96 %

## 2023-10-11 DIAGNOSIS — I50.22 CHRONIC HFREF (HEART FAILURE WITH REDUCED EJECTION FRACTION): Primary | ICD-10-CM

## 2023-10-11 DIAGNOSIS — Z79.01 CHRONIC ANTICOAGULATION: ICD-10-CM

## 2023-10-11 DIAGNOSIS — I10 ESSENTIAL HYPERTENSION: ICD-10-CM

## 2023-10-11 DIAGNOSIS — I25.10 CORONARY ARTERY DISEASE INVOLVING NATIVE CORONARY ARTERY OF NATIVE HEART WITHOUT ANGINA PECTORIS: ICD-10-CM

## 2023-10-11 DIAGNOSIS — Z95.0 PRESENCE OF CARDIAC PACEMAKER: ICD-10-CM

## 2023-10-11 DIAGNOSIS — I48.92 ATRIAL FLUTTER WITH CONTROLLED RESPONSE: ICD-10-CM

## 2023-10-11 RX ORDER — FUROSEMIDE 40 MG/1
60 TABLET ORAL DAILY
Qty: 135 TABLET | Refills: 0 | OUTPATIENT
Start: 2023-10-11

## 2023-10-11 NOTE — PROGRESS NOTES
subjective     Chief Complaint   Patient presents with    Chronic HFrEF     Follow up        Problems Addressed This Visit  1. 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 Coronary artery disease, anterior wall myocardial infarction with directional atherectomy of LAD in 1983 and bare-metal stent to the LAD in 1995, nonobstructive disease 2015    3. Essential hypertension    4. Presence of cardiac pacemaker    5. Atrial flutter , ventricular paced rhythm    6. Chronic anticoagulation with Coumadin        History of Present Illness    Patient is 89 years old white male who is here for cardiology follow-up.  Chronic HFrEF  Patient is doing very well he still complains of lot of lower extremity edema.  He is taking Lasix 40 mg daily but could not afford Entresto.  He is back on lisinopril Lasix and metoprolol.  Denies orthopnea or PND but 4+ bilateral lower extremity edema.    Coronary artery disease prior history of anterior wall myocardial infarction with LAD atherectomy and 83 and bare-metal stent in 95.  No chest pain palpitations or shortness of breath.    Chronic atrial flutter and VVI pacemaker which has been functioning normally.  Pacemaker checkup this month sometimes.  Will notify patient with the exact date.    Chronic anticoagulation with Coumadin.  INR has been therapeutic.  Hyperlipidemia on Lipitor.  Overall patient is doing better.      Past Surgical History:   Procedure Laterality Date    CATARACT EXTRACTION Left     CHOLECYSTECTOMY      COLON RESECTION      COLONOSCOPY      HEMORROIDECTOMY      HERNIA REPAIR      left inguinal/umbilical    HIP ARTHROPLASTY Right     INSERT / REPLACE / REMOVE PACEMAKER      JOINT REPLACEMENT      PACEMAKER IMPLANTATION      PROSTATE SURGERY      TRANSURETHRAL RESECTION OF BLADDER TUMOR N/A 4/18/2018    Procedure: CYSTOSCOPY TRANSURETHRAL RESECTION OF SMALL BLADDER TUMOR;  Surgeon: Alfonso Collins MD;  Location: [facility] OR;   Service: Urology    TRANSURETHRAL RESECTION OF BLADDER TUMOR N/A 8/29/2018    Procedure: CYSTOSCOPY TRANSURETHRAL RESECTION OF BLADDER TUMOR;  Surgeon: Alfonso Collins MD;  Location: [facility];  Service: Urology     Family History   Problem Relation Age of Onset    No Known Problems Mother     No Known Problems Father     Heart disease Brother      Past Medical History:   Diagnosis Date    Arthritis     Atrial flutter     Back pain     Benign prostatic hyperplasia     Bladder tumor     Cancer     [id number]    Chronic systolic heart failure 9/14/2021    Colon cancer     Coronary artery disease     DVT (deep venous thrombosis)     r leg    Elevated cholesterol     Heart attack     Hypertension     Numbness     feet/hands    Osteoporosis     PVD (peripheral vascular disease)     Rheumatoid arthritis     Stroke     Ventricular tachycardia      Patient Active Problem List   Diagnosis    Urinary retention    Bladder neck contracture    Bladder tumor    Aftercare following surgery of the genitourinary system    Atrial flutter , ventricular paced rhythm    Presence of cardiac pacemaker    Hyperlipidemia LDL goal <70    Essential hypertension    Chronic anticoagulation with Coumadin    Ventricular tachycardia (paroxysmal)    Asymptomatic PVD (peripheral vascular disease)    Coronary artery disease, anterior wall myocardial infarction with directional atherectomy of LAD in 1983 and bare-metal stent to the LAD in 1995, nonobstructive disease 2015    Ischemic cardiomyopathy, LV ejection fraction around 45 to 50%    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, 46 to 50%)    Bilateral lower extremity edema    Stroke (cerebrum)       Social History     Tobacco Use    Smoking status: Former     Years: .5     Types: Cigarettes    Smokeless tobacco: Never   Vaping Use    Vaping Use: Never used   Substance Use Topics    Alcohol use: Yes     Comment: Occasional few times a year    Drug use: No       No  "Known Allergies    Current Outpatient Medications on File Prior to Visit   Medication Sig    aspirin 81 MG tablet Take 1 tablet by mouth Daily.    atorvastatin (LIPITOR) 10 MG tablet Take 1 tablet by mouth Daily.    Cholecalciferol (Vitamin D3) 1.25 MG (48614 UT) capsule TAKE ONE CAPSULE BY MOUTH ONCE A WEEK ON THE SAME DAY EVERY WEEK    gabapentin (NEURONTIN) 600 MG tablet Take 800 mg by mouth 2 (Two) Times a Day.    HYDROcodone-acetaminophen (NORCO)  MG per tablet 1 tablet Every 6 (Six) Hours As Needed.    lisinopril (PRINIVIL,ZESTRIL) 10 MG tablet Take 1 tablet by mouth Daily.    metoprolol tartrate (LOPRESSOR) 50 MG tablet Take 1 tablet by mouth Daily.    pantoprazole (PROTONIX) 40 MG EC tablet Take 1 tablet by mouth Daily.    warfarin (COUMADIN) 2 MG tablet Take 1 tablet by mouth Daily.    [DISCONTINUED] furosemide (LASIX) 40 MG tablet Take 1 tablet by mouth Daily.     No current facility-administered medications on file prior to visit.         The following portions of the patient's history were reviewed and updated as appropriate: allergies, current medications, past family history, past medical history, past social history, past surgical history and problem list.    Review of Systems   Constitutional: Negative.   HENT: Negative.  Negative for congestion.    Eyes: Negative.    Cardiovascular:  Positive for leg swelling. Negative for chest pain, cyanosis, dyspnea on exertion, irregular heartbeat, near-syncope, orthopnea, palpitations, paroxysmal nocturnal dyspnea and syncope.   Respiratory: Negative.  Negative for shortness of breath.    Hematologic/Lymphatic: Negative.    Musculoskeletal: Negative.    Gastrointestinal: Negative.    Neurological: Negative.  Negative for headaches.          Objective:     /82 (BP Location: Right arm, Patient Position: Sitting, Cuff Size: Adult)   Pulse 77   Ht 193 cm (76\")   Wt 104 kg (228 lb 3.2 oz)   SpO2 96%   BMI 27.78 kg/mý   Cardiovascular:      PMI at " left midclavicular line. Normal rate. Regular rhythm. Normal S1. Normal S2.       Murmurs: There is no murmur.      No gallop.  No click. No rub.   Pulses:     Intact distal pulses.   Edema:     Peripheral edema present.     Pretibial: bilateral 2+ pitting edema of the pretibial area.     Ankle: bilateral 3+ pitting edema of the ankle.     Feet: bilateral 3+ pitting edema of the feet.          Lab Review              ECG 12 Lead    Date/Time: 10/11/2023 1:42 PM  Performed by: Selam Alcaraz MD    Authorized by: Selam Alcaraz MD  Comparison: compared with previous ECG from 3/14/2023  Similar to previous ECG  Rhythm: atrial flutter and paced  Rate: normal  BPM: 71  QRS axis: left  Other findings: non-specific ST-T wave changes  Pacin% capture and ventricular pacing  Clinical impression: abnormal EKG             I personally viewed and interpreted the patient's LAB data         Assessment:     1. 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 Coronary artery disease, anterior wall myocardial infarction with directional atherectomy of LAD in  and bare-metal stent to the LAD in , nonobstructive disease     3. Essential hypertension    4. Presence of cardiac pacemaker    5. Atrial flutter , ventricular paced rhythm    6. Chronic anticoagulation with Coumadin          Plan:     Patient has known coronary artery disease with prior myocardial infarction and multiple stents.  He is doing very well he denies any chest pain palpitations or shortness of breath.  He will continue current medications including beta-blocker therapy with Lopressor and statin therapy with Lipitor.    Chronic HFrEF  Patient could not afford Entresto but is taking lisinopril 40 mg daily along with beta-blocker therapy and Lasix.  Because of bilateral lower extremity edema Lasix dose was increased to 60 mg daily.  Hypertension is very well controlled with lisinopril which was  continued.    Pacemaker  Patient has VVI pacemaker checkup will be in October.  Exact date will be notified.    Chronic anticoagulation with Coumadin INR has been therapeutic.  Follow-up scheduled        No follow-ups on file.

## 2023-10-11 NOTE — PROGRESS NOTES
Rambo. My name is Eleonora. I am a MA at [facility] [place]      I want to officially welcome you to our practice and ask about your recent visit.         What things do you feel went well with your visit?        Do you have recommendations on ways we may improve?        Overall were you satisfied with your first visit to our practice?        I appreciate you taking the time to answer a few questions today.         We're always looking for ways to make our patients' experiences even better. Please complete the [employer] Survey after your visits. We would love to receive feedback about your visits. You may also share any suggestions or concerns by replying to this message or calling our office.         Thank you for allowing us to participate in your healthcare. Please let me know if I can be of further assistance.                Kind regards,      Eleonora

## 2023-10-11 NOTE — LETTER
October 11, 2023     [facility]  [address]  [place] [address]    Patient: Alberto Guzman   YOB: 1931   Date of Visit: 10/11/2023       Dear [facility]ard    Alberto Guzman was in my office today. Below is a copy of my note.    If you have questions, please do not hesitate to call me. I look forward to following Alberto along with you.         Sincerely,        Selam Alcaraz MD        CC: No Recipients    subjective     Chief Complaint   Patient presents with    Chronic HFrEF     Follow up        Problems Addressed This Visit  No diagnosis found.    History of Present Illness          Past Surgical History:   Procedure Laterality Date    CATARACT EXTRACTION Left     CHOLECYSTECTOMY      COLON RESECTION      COLONOSCOPY      HEMORROIDECTOMY      HERNIA REPAIR      left inguinal/umbilical    HIP ARTHROPLASTY Right     INSERT / REPLACE / REMOVE PACEMAKER      JOINT REPLACEMENT      PACEMAKER IMPLANTATION      PROSTATE SURGERY      TRANSURETHRAL RESECTION OF BLADDER TUMOR N/A 4/18/2018    Procedure: CYSTOSCOPY TRANSURETHRAL RESECTION OF SMALL BLADDER TUMOR;  Surgeon: Alfonso Collins MD;  Location: [facility];  Service: Urology    TRANSURETHRAL RESECTION OF BLADDER TUMOR N/A 8/29/2018    Procedure: CYSTOSCOPY TRANSURETHRAL RESECTION OF BLADDER TUMOR;  Surgeon: Alfonso Collins MD;  Location: [facility];  Service: Urology     Family History   Problem Relation Age of Onset    No Known Problems Mother     No Known Problems Father     Heart disease Brother      Past Medical History:   Diagnosis Date    Arthritis     Atrial flutter     Back pain     Benign prostatic hyperplasia     Bladder tumor     Cancer     [id number]    Chronic systolic heart failure 9/14/2021    Colon cancer     Coronary artery disease     DVT (deep venous thrombosis)     r leg    Elevated cholesterol     Heart attack     Hypertension      Numbness     feet/hands    Osteoporosis     PVD (peripheral vascular disease)     Rheumatoid arthritis     Stroke     Ventricular tachycardia      Patient Active Problem List   Diagnosis    Urinary retention    Bladder neck contracture    Bladder tumor    Aftercare following surgery of the genitourinary system    Atrial flutter , ventricular paced rhythm    Presence of cardiac pacemaker    Hyperlipidemia LDL goal <70    Essential hypertension    Chronic anticoagulation with Coumadin    Ventricular tachycardia (paroxysmal)    Asymptomatic PVD (peripheral vascular disease)    Coronary artery disease, anterior wall myocardial infarction with directional atherectomy of LAD in 1983 and bare-metal stent to the LAD in 1995, nonobstructive disease 2015    Ischemic cardiomyopathy, LV ejection fraction around 45 to 50%    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, 46 to 50%)    Bilateral lower extremity edema    Stroke (cerebrum)       Social History     Tobacco Use    Smoking status: Former     Years: .5     Types: Cigarettes    Smokeless tobacco: Never   Vaping Use    Vaping Use: Never used   Substance Use Topics    Alcohol use: Yes     Comment: Occasional few times a year    Drug use: No       No Known Allergies    Current Outpatient Medications on File Prior to Visit   Medication Sig    aspirin 81 MG tablet Take 1 tablet by mouth Daily.    atorvastatin (LIPITOR) 10 MG tablet Take 1 tablet by mouth Daily.    Cholecalciferol (Vitamin D3) 1.25 MG (66033 UT) capsule TAKE ONE CAPSULE BY MOUTH ONCE A WEEK ON THE SAME DAY EVERY WEEK    furosemide (LASIX) 40 MG tablet Take 1 tablet by mouth Daily.    gabapentin (NEURONTIN) 600 MG tablet Take 800 mg by mouth 2 (Two) Times a Day.    HYDROcodone-acetaminophen (NORCO)  MG per tablet 1 tablet Every 6 (Six) Hours As Needed.    lisinopril (PRINIVIL,ZESTRIL) 10 MG tablet Take 1 tablet by mouth Daily.    metoprolol tartrate  "(LOPRESSOR) 50 MG tablet Take 1 tablet by mouth Daily.    pantoprazole (PROTONIX) 40 MG EC tablet Take 1 tablet by mouth Daily.    warfarin (COUMADIN) 2 MG tablet Take 1 tablet by mouth Daily.     No current facility-administered medications on file prior to visit.         The following portions of the patient's history were reviewed and updated as appropriate: allergies, current medications, past family history, past medical history, past social history, past surgical history and problem list.    ROS       Objective:     /82 (BP Location: Right arm, Patient Position: Sitting, Cuff Size: Adult)   Pulse 77   Ht 193 cm (76\")   Wt 104 kg (228 lb 3.2 oz)   SpO2 96%   BMI 27.78 kg/mý   Physical Exam      Lab Review  Lab Results   Component Value Date     (L) 05/10/2022    K 4.3 05/10/2022    CL 97 (L) 05/10/2022    BUN 14 05/10/2022    CREATININE 1.23 05/10/2022    GLUCOSE 108 (H) 05/10/2022    CALCIUM 9.1 05/10/2022    ALT 11 05/10/2022    ALKPHOS 94 05/10/2022     No results found for: \"CKTOTAL\"  Lab Results   Component Value Date    WBC 8.46 05/10/2022    HGB 12.4 (L) 05/10/2022    HCT 37.3 (L) 05/10/2022     05/10/2022     Lab Results   Component Value Date    INR 2.66 (H) 05/10/2022     No results found for: \"MG\"  No results found for: \"PSA\", \"TSH\"  No results found for: \"BNP\"  No results found for: \"CHLPL\", \"CHOL\", \"TRIG\", \"HDL\", \"VLDL\", \"LDL\"  No results found for: \"LDL\"  No results found for: \"PROBNP\"            Procedures       I personally viewed and interpreted the patient's LAB data         Assessment:   No diagnosis found.      Plan:              No follow-ups on file.    "

## 2023-10-17 ENCOUNTER — OFFICE VISIT (OUTPATIENT)
Dept: UROLOGY | Facility: CLINIC | Age: 88
End: 2023-10-17
Payer: MEDICARE

## 2023-10-17 VITALS
HEART RATE: 93 BPM | WEIGHT: 227 LBS | HEIGHT: 76 IN | SYSTOLIC BLOOD PRESSURE: 146 MMHG | DIASTOLIC BLOOD PRESSURE: 74 MMHG | BODY MASS INDEX: 27.64 KG/M2

## 2023-10-17 DIAGNOSIS — N32.0 BLADDER NECK CONTRACTURE: ICD-10-CM

## 2023-10-17 DIAGNOSIS — D49.4 BLADDER TUMOR: Primary | ICD-10-CM

## 2023-10-17 PROCEDURE — 1159F MED LIST DOCD IN RCRD: CPT | Performed by: UROLOGY

## 2023-10-17 PROCEDURE — 99213 OFFICE O/P EST LOW 20 MIN: CPT | Performed by: UROLOGY

## 2023-10-17 PROCEDURE — 1160F RVW MEDS BY RX/DR IN RCRD: CPT | Performed by: UROLOGY

## 2023-10-17 NOTE — PROGRESS NOTES
Chief Complaint:      Chief Complaint   Patient presents with    Bladder neck contracture       HPI:   89 y.o. 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  Had a bladder neck contracture that was resolved he has no symptoms whatsoever given his age I will see him back on an as-needed basis.  There is nothing else I have to offer him at this time.    Past Medical History:     Past Medical History:   Diagnosis Date    Arthritis     Atrial flutter     Back pain     Benign prostatic hyperplasia     Bladder tumor     Cancer     [id number]    Chronic systolic heart failure 9/14/2021    Colon cancer     Coronary artery disease     DVT (deep venous thrombosis)     r leg    Elevated cholesterol     Heart attack     Hypertension     Numbness     feet/hands    Osteoporosis     PVD (peripheral vascular disease)     Rheumatoid arthritis     Stroke     Ventricular tachycardia        Current Meds:     Current Outpatient Medications   Medication Sig Dispense Refill    aspirin 81 MG tablet Take 1 tablet by mouth Daily. 30 tablet 11    atorvastatin (LIPITOR) 10 MG tablet Take 1 tablet by mouth Daily.      Cholecalciferol (Vitamin D3) 1.25 MG (26334 UT) capsule TAKE ONE CAPSULE BY MOUTH ONCE A WEEK ON THE SAME DAY EVERY WEEK      furosemide (LASIX) 40 MG tablet Take 1.5 tablets by mouth Daily. 135 tablet 0    gabapentin (NEURONTIN) 600 MG tablet Take 800 mg by mouth 2 (Two) Times a Day.      HYDROcodone-acetaminophen (NORCO)  MG per tablet 1 tablet Every 6 (Six) Hours As Needed.      lisinopril (PRINIVIL,ZESTRIL) 10 MG tablet Take 1 tablet by mouth Daily. 90 tablet 3    metoprolol tartrate (LOPRESSOR) 50 MG tablet Take 1 tablet by mouth Daily.      pantoprazole (PROTONIX) 40 MG EC tablet Take 1 tablet by mouth Daily.      warfarin (COUMADIN) 2 MG tablet Take 1 tablet by mouth Daily.       No current facility-administered medications for this visit.         Allergies:      No Known Allergies     Past Surgical History:     Past Surgical History:   Procedure Laterality Date    CATARACT EXTRACTION Left     CHOLECYSTECTOMY      COLON RESECTION      COLONOSCOPY      HEMORROIDECTOMY      HERNIA REPAIR      left inguinal/umbilical    HIP ARTHROPLASTY Right     INSERT / REPLACE / REMOVE PACEMAKER      JOINT REPLACEMENT      PACEMAKER IMPLANTATION      PROSTATE SURGERY      TRANSURETHRAL RESECTION OF BLADDER TUMOR N/A 4/18/2018    Procedure: CYSTOSCOPY TRANSURETHRAL RESECTION OF SMALL BLADDER TUMOR;  Surgeon: Alfonso Collins MD;  Location: [facility];  Service: Urology    TRANSURETHRAL RESECTION OF BLADDER TUMOR N/A 8/29/2018    Procedure: CYSTOSCOPY TRANSURETHRAL RESECTION OF BLADDER TUMOR;  Surgeon: Alfonso Collins MD;  Location: [facility];  Service: Urology       Social History:     Social History     Socioeconomic History    Marital status:    Tobacco Use    Smoking status: Former     Years: .5     Types: Cigarettes    Smokeless tobacco: Never   Vaping Use    Vaping Use: Never used   Substance and Sexual Activity    Alcohol use: Yes     Comment: Occasional few times a year    Drug use: No    Sexual activity: Defer       Family History:     Family History   Problem Relation Age of Onset    No Known Problems Mother     No Known Problems Father     Heart disease Brother        Review of Systems:     Review of Systems   Constitutional: Negative.    HENT: Negative.     Eyes: Negative.    Respiratory: Negative.     Cardiovascular: Negative.    Gastrointestinal: Negative.    Endocrine: Negative.    Musculoskeletal: Negative.    Allergic/Immunologic: Negative.    Neurological: Negative.    Hematological: Negative.    Psychiatric/Behavioral: Negative.         Physical Exam:     Physical Exam  Vitals and nursing note reviewed.   Constitutional:       Appearance: He is well-developed.   HENT:      Head: Normocephalic and atraumatic.   Eyes:       Conjunctiva/sclera: Conjunctivae normal.      Pupils: Pupils are equal, round, and reactive to light.   Cardiovascular:      Rate and Rhythm: Normal rate and regular rhythm.      Heart sounds: Normal heart sounds.   Pulmonary:      Effort: Pulmonary effort is normal.      Breath sounds: Normal breath sounds.   Abdominal:      General: Bowel sounds are normal.      Palpations: Abdomen is soft.   Musculoskeletal:         General: Normal range of motion.      Cervical back: Normal range of motion.   Skin:     General: Skin is warm and dry.   Neurological:      Mental Status: He is alert and oriented to person, place, and time.      Deep Tendon Reflexes: Reflexes are normal and symmetric.   Psychiatric:         Behavior: Behavior normal.         Thought Content: Thought content normal.         Judgment: Judgment normal.         I have reviewed the following portions of the patient's history: Allergies, current medications, past family history, past medical history, past social history, past surgical history, problem list, and ROS and confirm it is accurate.    Recent Image (CT and/or KUB):      CT Abdomen and Pelvis: No results found for this or any previous visit.       CT Stone Protocol: Results for orders placed during the hospital encounter of 04/09/18    CT Abdomen Pelvis Stone Protocol    Narrative  CT ABDOMEN AND PELVIS STONE PROTOCOL-    REASON FOR EXAM: Abdominal pain, unspecified; N40.1-Benign prostatic  hyperplasia with lower urinary tract symptoms; N13.8-Other obstructive  and reflux uropathy.    FINDINGS: Spiral scans were obtained through the kidneys, ureterS and  bladder. The kidneys showed no evidence of hydronephrosis. There were no  stones in the intrarenal collecting systems or in the intrarenal  parenchyma. Ureters were not dilated as they coursed through the abdomen  and pelvis. No stones were noted along the course of the ureters.  Urinary bladder was fairly well-distended with urine and was normal  in  contour. No focal areas of bladder wall thickening were demonstrated.    Impression  No evidence of stone or obstruction was seen involving the  collecting system of either kidney. The CT does demonstrate marked  scoliosis and degenerative changes in the lumbar spine.      The radiation dose reduction device was utilized for each scan per the  ALARA (as low as reasonably achievable) protocol.    This report was finalized on 4/9/2018 2:16 PM by Dr. Jairo Maya II, MD.       KUB: Results for orders placed during the hospital encounter of 03/11/21    XR Abdomen KUB    Narrative  XR ABDOMEN KUB-    REASON FOR EXAM:  R10.30; R10.30-Lower abdominal pain, unspecified    COMPARISON: None.    FINDINGS: The bowel gas pattern of the abdomen shows no evidence of  obstruction. No soft tissue masses or pathological calcifications other  than vascular ones are demonstrated. There are advanced degenerative  disc changes in the lumbar spine with scoliosis. A prosthesis is noted  in the right hip. 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    Impression  A source of patient's lower abdominal pain is not  identified.    This report was finalized on 3/11/2021 1:18 PM by Dr. Jairo Maya II, MD.       Labs (past 3 months):      No visits with results within 3 Month(s) from this visit.   Latest known visit with results is:   Admission on 05/10/2022, Discharged on 05/10/2022   Component Date Value Ref Range Status    Extra Tube 05/10/2022 Hold for add-ons.   Final    Auto resulted.    Extra Tube 05/10/2022 hold for add-on   Final    Auto resulted    Extra Tube 05/10/2022 Hold for add-ons.   Final    Auto resulted.    Extra Tube 05/10/2022 Hold for add-ons.   Final    Auto resulted    Glucose 05/10/2022 108 (H)  65 - 99 mg/dL Final    BUN 05/10/2022 14  8 - 23 mg/dL Final    Creatinine 05/10/2022 1.23  0.76 - 1.27 mg/dL Final    Sodium 05/10/2022 132 (L)  136 - 145 mmol/L Final    Potassium 05/10/2022 4.3  3.5 - 5.2  mmol/L Final    Chloride 05/10/2022 97 (L)  98 - 107 mmol/L Final    CO2 05/10/2022 24.3  22.0 - 29.0 mmol/L Final    Calcium 05/10/2022 9.1  8.2 - 9.6 mg/dL Final    Total Protein 05/10/2022 6.7  6.0 - 8.5 g/dL Final    Albumin 05/10/2022 4.06  3.50 - 5.20 g/dL Final    ALT (SGPT) 05/10/2022 11  1 - 41 U/L Final    AST (SGOT) 05/10/2022 20  1 - 40 U/L Final    Alkaline Phosphatase 05/10/2022 94  39 - 117 U/L Final    Total Bilirubin 05/10/2022 1.8 (H)  0.0 - 1.2 mg/dL Final    Globulin 05/10/2022 2.6  gm/dL Final    A/G Ratio 05/10/2022 1.5  g/dL Final    BUN/Creatinine Ratio 05/10/2022 11.4  7.0 - 25.0 Final    Anion Gap 05/10/2022 10.7  5.0 - 15.0 mmol/L Final    eGFR 05/10/2022 55.4 (L)  >60.0 mL/min/1.73 Final    National Kidney Foundation and American Society of Nephrology (ASN) Task Force recommended calculation based on the Chronic Kidney Disease Epidemiology Collaboration (CKD-EPI) equation refit without adjustment for race.    Lipase 05/10/2022 12 (L)  13 - 60 U/L Final    Color, UA 05/10/2022 Yellow  Yellow, Straw Final    Appearance, UA 05/10/2022 Clear  Clear Final    pH, UA 05/10/2022 6.5  5.0 - 8.0 Final    Specific Gravity, UA 05/10/2022 1.009  1.005 - 1.030 Final    Glucose, UA 05/10/2022 Negative  Negative Final    Ketones, UA 05/10/2022 Negative  Negative Final    Bilirubin, UA 05/10/2022 Negative  Negative Final    Blood, UA 05/10/2022 Negative  Negative Final    Protein, UA 05/10/2022 Negative  Negative Final    Leuk Esterase, UA 05/10/2022 Negative  Negative Final    Nitrite, UA 05/10/2022 Negative  Negative Final    Urobilinogen, UA 05/10/2022 0.2 E.U./dL  0.2 - 1.0 E.U./dL Final    Lactate 05/10/2022 1.2  0.5 - 2.0 mmol/L Final    Protime 05/10/2022 29.1 (H)  12.1 - 14.7 Seconds Final    INR 05/10/2022 2.66 (H)  0.90 - 1.10 Final    WBC 05/10/2022 8.46  3.40 - 10.80 10*3/mm3 Final    RBC 05/10/2022 3.72 (L)  4.14 - 5.80 10*6/mm3 Final    Hemoglobin 05/10/2022 12.4 (L)  13.0 - 17.7 g/dL Final     Hematocrit 05/10/2022 37.3 (L)  37.5 - 51.0 % Final    MCV 05/10/2022 100.3 (H)  79.0 - 97.0 fL Final    MCH 05/10/2022 33.3 (H)  26.6 - 33.0 pg Final    MCHC 05/10/2022 33.2  31.5 - 35.7 g/dL Final    RDW 05/10/2022 13.1  12.3 - 15.4 % Final    RDW-SD 05/10/2022 48.5  37.0 - 54.0 fl Final    MPV 05/10/2022 9.6  6.0 - 12.0 fL Final    Platelets 05/10/2022 168  140 - 450 10*3/mm3 Final    Neutrophil % 05/10/2022 78.9 (H)  42.7 - 76.0 % Final    Lymphocyte % 05/10/2022 9.6 (L)  19.6 - 45.3 % Final    Monocyte % 05/10/2022 11.0  5.0 - 12.0 % Final    Eosinophil % 05/10/2022 0.2 (L)  0.3 - 6.2 % Final    Basophil % 05/10/2022 0.1  0.0 - 1.5 % Final    Immature Grans % 05/10/2022 0.2  0.0 - 0.5 % Final    Neutrophils, Absolute 05/10/2022 6.67  1.70 - 7.00 10*3/mm3 Final    Lymphocytes, Absolute 05/10/2022 0.81  0.70 - 3.10 10*3/mm3 Final    Monocytes, Absolute 05/10/2022 0.93 (H)  0.10 - 0.90 10*3/mm3 Final    Eosinophils, Absolute 05/10/2022 0.02  0.00 - 0.40 10*3/mm3 Final    Basophils, Absolute 05/10/2022 0.01  0.00 - 0.20 10*3/mm3 Final    Immature Grans, Absolute 05/10/2022 0.02  0.00 - 0.05 10*3/mm3 Final    nRBC 05/10/2022 0.0  0.0 - 0.2 /100 WBC Final        Procedure:       Assessment/Plan:   Bladder papilloma-resolved  Bladder neck contracture-resolved        This document has been electronically signed by TINY PENG MD October 17, 2023 13:13 EDT    Dictated Utilizing Dragon Dictation: Part of this note may be an electronic transcription/translation of spoken language to printed text using the Dragon Dictation System.

## 2023-11-01 ENCOUNTER — CLINICAL SUPPORT NO REQUIREMENTS (OUTPATIENT)
Dept: CARDIOLOGY | Facility: CLINIC | Age: 88
End: 2023-11-01
Payer: MEDICARE

## 2023-11-01 DIAGNOSIS — Z95.0 PRESENCE OF CARDIAC PACEMAKER: Primary | ICD-10-CM

## 2024-01-09 ENCOUNTER — OFFICE VISIT (OUTPATIENT)
Dept: CARDIOLOGY | Facility: CLINIC | Age: 89
End: 2024-01-09
Payer: MEDICARE

## 2024-01-09 VITALS
BODY MASS INDEX: 27.2 KG/M2 | WEIGHT: 223.4 LBS | HEIGHT: 76 IN | RESPIRATION RATE: 18 BRPM | HEART RATE: 87 BPM | SYSTOLIC BLOOD PRESSURE: 146 MMHG | OXYGEN SATURATION: 99 % | DIASTOLIC BLOOD PRESSURE: 80 MMHG

## 2024-01-09 DIAGNOSIS — R60.0 BILATERAL LOWER EXTREMITY EDEMA: ICD-10-CM

## 2024-01-09 DIAGNOSIS — Z79.01 CHRONIC ANTICOAGULATION: ICD-10-CM

## 2024-01-09 DIAGNOSIS — I25.10 CORONARY ARTERY DISEASE INVOLVING NATIVE CORONARY ARTERY OF NATIVE HEART WITHOUT ANGINA PECTORIS: ICD-10-CM

## 2024-01-09 DIAGNOSIS — I48.92 ATRIAL FLUTTER WITH CONTROLLED RESPONSE: ICD-10-CM

## 2024-01-09 DIAGNOSIS — I50.22 CHRONIC HFREF (HEART FAILURE WITH REDUCED EJECTION FRACTION): ICD-10-CM

## 2024-01-09 DIAGNOSIS — E78.5 HYPERLIPIDEMIA LDL GOAL <70: ICD-10-CM

## 2024-01-09 DIAGNOSIS — I10 ESSENTIAL HYPERTENSION: Primary | ICD-10-CM

## 2024-01-09 NOTE — LETTER
January 9, 2024       No Recipients    Patient: Alberto Guzman   YOB: 1931   Date of Visit: 1/9/2024     Dear FLOWER Pickard:       Thank you for referring Alberto Guzman to me for evaluation. Below are the relevant portions of my assessment and plan of care.    If you have questions, please do not hesitate to call me. I look forward to following Alberto along with you.         Sincerely,        Selam Alcaraz MD        CC:   No Recipients    Selam Alcaraz MD  01/09/24 1501  Signed  subjective     Chief Complaint   Patient presents with   • Chronic HFrEF       Problems Addressed This Visit  1. Essential hypertension    2. 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, 46 to 50%)    3. Coronary artery disease, anterior wall myocardial infarction with directional atherectomy of LAD in 1983 and bare-metal stent to the LAD in 1995, nonobstructive disease 2015    4. Hyperlipidemia LDL goal <70    5. Atrial flutter , ventricular paced rhythm    6. Chronic anticoagulation with Coumadin    7. Bilateral lower extremity edema        History of Present Illness  Patient is 89 years old elderly gentleman who is doing very well for his age.  He is here for cardiology follow-up.    Blood pressure is mildly elevated today  He states that he was feeling slightly dizzy and his PCP decrease lisinopril from 10 mg to 5 mg daily.  Blood pressure is around 146/80.  Patient is afraid of increasing the medication.  He was advised to continue lisinopril 5 mg in the morning.  Check blood pressure in the evening and if it is more than 140 he can take extra 5 mg .  He will need to check her blood pressure every night also.    Coronary artery disease status post anterior wall myocardial infarction with bare-metal stent and HFrEF.  He was started on Entresto however he could not  afford it.  He is doing fairly well with the lisinopril.  He denies any orthopnea or PND but has  significant lower extremity edema.  He is taking Lasix 60 mg daily.    Atrial flutter with ventricular pacemaker functioning normally.  He is anticoagulated with Coumadin INR has been therapeutic.    Paroxysmal ventricular tachycardia.  No recent V. tach on the monitor no dizziness syncope or presyncope.  He is taking beta-blocker therapy with Lopressor 50 twice daily.      Past Surgical History:   Procedure Laterality Date   • CATARACT EXTRACTION Left    • CHOLECYSTECTOMY     • COLON RESECTION     • COLONOSCOPY     • HEMORROIDECTOMY     • HERNIA REPAIR      left inguinal/umbilical   • HIP ARTHROPLASTY Right    • INSERT / REPLACE / REMOVE PACEMAKER     • JOINT REPLACEMENT     • PACEMAKER IMPLANTATION     • PROSTATE SURGERY     • TRANSURETHRAL RESECTION OF BLADDER TUMOR N/A 4/18/2018    Procedure: CYSTOSCOPY TRANSURETHRAL RESECTION OF SMALL BLADDER TUMOR;  Surgeon: Alfonso Collins MD;  Location: [facility];  Service: Urology   • TRANSURETHRAL RESECTION OF BLADDER TUMOR N/A 8/29/2018    Procedure: CYSTOSCOPY TRANSURETHRAL RESECTION OF BLADDER TUMOR;  Surgeon: Alfonso Collins MD;  Location: [facility];  Service: Urology     Family History   Problem Relation Age of Onset   • No Known Problems Mother    • No Known Problems Father    • Heart disease Brother      Past Medical History:   Diagnosis Date   • Arthritis    • Atrial flutter    • Back pain    • Benign prostatic hyperplasia    • Bladder tumor    • Cancer     [id number]   • Chronic systolic heart failure 9/14/2021   • Colon cancer    • Coronary artery disease    • DVT (deep venous thrombosis)     r leg   • Elevated cholesterol    • Heart attack    • Hypertension    • Numbness     feet/hands   • Osteoporosis    • PVD (peripheral vascular disease)    • Rheumatoid arthritis    • Stroke    • Ventricular tachycardia      Patient Active Problem List   Diagnosis   • Urinary retention   • Bladder neck contracture   • Bladder tumor   • Aftercare  following surgery of the genitourinary system   • Atrial flutter , ventricular paced rhythm   • Presence of cardiac pacemaker   • Hyperlipidemia LDL goal <70   • Essential hypertension   • Chronic anticoagulation with Coumadin   • Ventricular tachycardia (paroxysmal)   • Asymptomatic PVD (peripheral vascular disease)   • Coronary artery disease, anterior wall myocardial infarction with directional atherectomy of LAD in 1983 and bare-metal stent to the LAD in 1995, nonobstructive disease 2015   • Ischemic cardiomyopathy, LV ejection fraction around 45 to 50%   • 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, 46 to 50%)   • Bilateral lower extremity edema   • Stroke (cerebrum)       Social History     Tobacco Use   • Smoking status: Former     Years: .5     Types: Cigarettes   • Smokeless tobacco: Never   Vaping Use   • Vaping Use: Never used   Substance Use Topics   • Alcohol use: Yes     Comment: Occasional few times a year   • Drug use: No       No Known Allergies    Current Outpatient Medications on File Prior to Visit   Medication Sig   • aspirin 81 MG tablet Take 1 tablet by mouth Daily.   • atorvastatin (LIPITOR) 10 MG tablet Take 1 tablet by mouth Daily.   • Cholecalciferol (Vitamin D3) 1.25 MG (60019 UT) capsule TAKE ONE CAPSULE BY MOUTH ONCE A WEEK ON THE SAME DAY EVERY WEEK   • furosemide (LASIX) 40 MG tablet Take 1.5 tablets by mouth Daily.   • gabapentin (NEURONTIN) 600 MG tablet Take 800 mg by mouth 2 (Two) Times a Day.   • HYDROcodone-acetaminophen (NORCO)  MG per tablet 1 tablet Every 6 (Six) Hours As Needed.   • lisinopril (PRINIVIL,ZESTRIL) 10 MG tablet Take 1 tablet by mouth Daily.   • metoprolol tartrate (LOPRESSOR) 50 MG tablet Take 1 tablet by mouth Daily.   • pantoprazole (PROTONIX) 40 MG EC tablet Take 1 tablet by mouth Daily.   • warfarin (COUMADIN) 2 MG tablet Take 1 tablet by mouth Daily.     No current facility-administered medications on file prior to visit.         The  "following portions of the patient's history were reviewed and updated as appropriate: allergies, current medications, past family history, past medical history, past social history, past surgical history and problem list.    Review of Systems   Constitutional: Negative.   HENT: Negative.  Negative for congestion.    Eyes: Negative.    Cardiovascular:  Positive for leg swelling. Negative for chest pain, cyanosis, dyspnea on exertion, irregular heartbeat, near-syncope, orthopnea, palpitations, paroxysmal nocturnal dyspnea and syncope.   Respiratory: Negative.  Negative for shortness of breath.    Hematologic/Lymphatic: Negative.    Musculoskeletal: Negative.    Gastrointestinal: Negative.    Neurological: Negative.  Negative for headaches.          Objective:     /80   Pulse 87   Resp 18   Ht 193 cm (75.98\")   Wt 101 kg (223 lb 6.4 oz)   SpO2 99%   BMI 27.21 kg/m²   Pulmonary:      Effort: Pulmonary effort is normal.      Breath sounds: Normal breath sounds. No stridor. No wheezing. No rhonchi. No rales.   Cardiovascular:      PMI at left midclavicular line. Normal rate. Regular rhythm. Normal S1. Normal S2.       Murmurs: There is no murmur.      No gallop.  No click. No rub.   Pulses:     Intact distal pulses.   Edema:     Peripheral edema present.     Pretibial: 3+ edema of the left pretibial area and 2+ edema of the right pretibial area.     Ankle: 3+ edema of the left ankle and 2+ edema of the right ankle.     Feet: bilateral 2+ edema of the feet.          Lab Review                      Procedures       I personally viewed and interpreted the patient's LAB data         Assessment:     1. Essential hypertension    2. 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, 46 to 50%)    3. Coronary artery disease, anterior wall myocardial infarction with directional atherectomy of LAD in 1983 and bare-metal stent to the LAD in 1995, nonobstructive disease 2015    4. Hyperlipidemia LDL goal <70  "   5. Atrial flutter , ventricular paced rhythm    6. Chronic anticoagulation with Coumadin    7. Bilateral lower extremity edema          Plan:     Essential hypertension  Blood pressure is elevated today it is 146/80.  Patient states that he decreased his lisinopril to 5 mg daily because blood pressure might be getting too low.  He was advised to continue 5 mg in the morning.  Check blood pressure in the evening and may need another 5 at that time.    Coronary artery disease status post anterior wall myocardial infarction and HFrEF with ejection fraction 46 to 50%.  proBNP was elevated and patient has mild bilateral lower extremity edema  He could not afford Entresto which was tried.  However he is doing better with lisinopril which will be continued.    Hyperlipidemia  He is taking Lipitor, last LDL was 34    Atrial flutter chronic anticoagulation with Coumadin  Ventricular paced rhythm pacemaker functioning normally with battery life around 19 months.    Marked bilateral lower extremity edema  He will continue Lasix but estimated GFR is 67.  Lab work with PCP prior to next visit.    Follow-up scheduled        No follow-ups on file.

## 2024-01-09 NOTE — PROGRESS NOTES
subjective     Chief Complaint   Patient presents with    Chronic HFrEF       Problems Addressed This Visit  1. Essential hypertension    2. 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, 46 to 50%)    3. Coronary artery disease, anterior wall myocardial infarction with directional atherectomy of LAD in 1983 and bare-metal stent to the LAD in 1995, nonobstructive disease 2015    4. Hyperlipidemia LDL goal <70    5. Atrial flutter , ventricular paced rhythm    6. Chronic anticoagulation with Coumadin    7. Bilateral lower extremity edema        History of Present Illness  Patient is 89 years old elderly gentleman who is doing very well for his age.  He is here for cardiology follow-up.    Blood pressure is mildly elevated today  He states that he was feeling slightly dizzy and his PCP decrease lisinopril from 10 mg to 5 mg daily.  Blood pressure is around 146/80.  Patient is afraid of increasing the medication.  He was advised to continue lisinopril 5 mg in the morning.  Check blood pressure in the evening and if it is more than 140 he can take extra 5 mg .  He will need to check her blood pressure every night also.    Coronary artery disease status post anterior wall myocardial infarction with bare-metal stent and HFrEF.  He was started on Entresto however he could not  afford it.  He is doing fairly well with the lisinopril.  He denies any orthopnea or PND but has significant lower extremity edema.  He is taking Lasix 60 mg daily.    Atrial flutter with ventricular pacemaker functioning normally.  He is anticoagulated with Coumadin INR has been therapeutic.    Paroxysmal ventricular tachycardia.  No recent V. tach on the monitor no dizziness syncope or presyncope.  He is taking beta-blocker therapy with Lopressor 50 twice daily.      Past Surgical History:   Procedure Laterality Date    CATARACT EXTRACTION Left     CHOLECYSTECTOMY      COLON RESECTION      COLONOSCOPY      HEMORROIDECTOMY      HERNIA  REPAIR      left inguinal/umbilical    HIP ARTHROPLASTY Right     INSERT / REPLACE / REMOVE PACEMAKER      JOINT REPLACEMENT      PACEMAKER IMPLANTATION      PROSTATE SURGERY      TRANSURETHRAL RESECTION OF BLADDER TUMOR N/A 4/18/2018    Procedure: CYSTOSCOPY TRANSURETHRAL RESECTION OF SMALL BLADDER TUMOR;  Surgeon: Alfonso Collins MD;  Location: [facility];  Service: Urology    TRANSURETHRAL RESECTION OF BLADDER TUMOR N/A 8/29/2018    Procedure: CYSTOSCOPY TRANSURETHRAL RESECTION OF BLADDER TUMOR;  Surgeon: Alfonso Collins MD;  Location: [facility];  Service: Urology     Family History   Problem Relation Age of Onset    No Known Problems Mother     No Known Problems Father     Heart disease Brother      Past Medical History:   Diagnosis Date    Arthritis     Atrial flutter     Back pain     Benign prostatic hyperplasia     Bladder tumor     Cancer     [id number]    Chronic systolic heart failure 9/14/2021    Colon cancer     Coronary artery disease     DVT (deep venous thrombosis)     r leg    Elevated cholesterol     Heart attack     Hypertension     Numbness     feet/hands    Osteoporosis     PVD (peripheral vascular disease)     Rheumatoid arthritis     Stroke     Ventricular tachycardia      Patient Active Problem List   Diagnosis    Urinary retention    Bladder neck contracture    Bladder tumor    Aftercare following surgery of the genitourinary system    Atrial flutter , ventricular paced rhythm    Presence of cardiac pacemaker    Hyperlipidemia LDL goal <70    Essential hypertension    Chronic anticoagulation with Coumadin    Ventricular tachycardia (paroxysmal)    Asymptomatic PVD (peripheral vascular disease)    Coronary artery disease, anterior wall myocardial infarction with directional atherectomy of LAD in 1983 and bare-metal stent to the LAD in 1995, nonobstructive disease 2015    Ischemic cardiomyopathy, LV ejection fraction around 45 to 50%    Chronic HFrEF (heart failure with  reduced ejection fraction, 46 to 50%)    Bilateral lower extremity edema    Stroke (cerebrum)       Social History     Tobacco Use    Smoking status: Former     Years: .5     Types: Cigarettes    Smokeless tobacco: Never   Vaping Use    Vaping Use: Never used   Substance Use Topics    Alcohol use: Yes     Comment: Occasional few times a year    Drug use: No       No Known Allergies    Current Outpatient Medications on File Prior to Visit   Medication Sig    aspirin 81 MG tablet Take 1 tablet by mouth Daily.    atorvastatin (LIPITOR) 10 MG tablet Take 1 tablet by mouth Daily.    Cholecalciferol (Vitamin D3) 1.25 MG (37261 UT) capsule TAKE ONE CAPSULE BY MOUTH ONCE A WEEK ON THE SAME DAY EVERY WEEK    furosemide (LASIX) 40 MG tablet Take 1.5 tablets by mouth Daily.    gabapentin (NEURONTIN) 600 MG tablet Take 800 mg by mouth 2 (Two) Times a Day.    HYDROcodone-acetaminophen (NORCO)  MG per tablet 1 tablet Every 6 (Six) Hours As Needed.    lisinopril (PRINIVIL,ZESTRIL) 10 MG tablet Take 1 tablet by mouth Daily.    metoprolol tartrate (LOPRESSOR) 50 MG tablet Take 1 tablet by mouth Daily.    pantoprazole (PROTONIX) 40 MG EC tablet Take 1 tablet by mouth Daily.    warfarin (COUMADIN) 2 MG tablet Take 1 tablet by mouth Daily.     No current facility-administered medications on file prior to visit.         The following portions of the patient's history were reviewed and updated as appropriate: allergies, current medications, past family history, past medical history, past social history, past surgical history and problem list.    Review of Systems   Constitutional: Negative.   HENT: Negative.  Negative for congestion.    Eyes: Negative.    Cardiovascular:  Positive for leg swelling. Negative for chest pain, cyanosis, dyspnea on exertion, irregular heartbeat, near-syncope, orthopnea, palpitations, paroxysmal nocturnal dyspnea and syncope.   Respiratory: Negative.  Negative for shortness of breath.   "  Hematologic/Lymphatic: Negative.    Musculoskeletal: Negative.    Gastrointestinal: Negative.    Neurological: Negative.  Negative for headaches.          Objective:     /80   Pulse 87   Resp 18   Ht 193 cm (75.98\")   Wt 101 kg (223 lb 6.4 oz)   SpO2 99%   BMI 27.21 kg/m²   Pulmonary:      Effort: Pulmonary effort is normal.      Breath sounds: Normal breath sounds. No stridor. No wheezing. No rhonchi. No rales.   Cardiovascular:      PMI at left midclavicular line. Normal rate. Regular rhythm. Normal S1. Normal S2.       Murmurs: There is no murmur.      No gallop.  No click. No rub.   Pulses:     Intact distal pulses.   Edema:     Peripheral edema present.     Pretibial: 3+ edema of the left pretibial area and 2+ edema of the right pretibial area.     Ankle: 3+ edema of the left ankle and 2+ edema of the right ankle.     Feet: bilateral 2+ edema of the feet.          Lab Review                      Procedures       I personally viewed and interpreted the patient's LAB data         Assessment:     1. Essential hypertension    2. 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, 46 to 50%)    3. Coronary artery disease, anterior wall myocardial infarction with directional atherectomy of LAD in 1983 and bare-metal stent to the LAD in 1995, nonobstructive disease 2015    4. Hyperlipidemia LDL goal <70    5. Atrial flutter , ventricular paced rhythm    6. Chronic anticoagulation with Coumadin    7. Bilateral lower extremity edema          Plan:     Essential hypertension  Blood pressure is elevated today it is 146/80.  Patient states that he decreased his lisinopril to 5 mg daily because blood pressure might be getting too low.  He was advised to continue 5 mg in the morning.  Check blood pressure in the evening and may need another 5 at that time.    Coronary artery disease status post anterior wall myocardial infarction and HFrEF with ejection fraction 46 to 50%.  proBNP was elevated and " patient has mild bilateral lower extremity edema  He could not afford Entresto which was tried.  However he is doing better with lisinopril which will be continued.    Hyperlipidemia  He is taking Lipitor, last LDL was 34    Atrial flutter chronic anticoagulation with Coumadin  Ventricular paced rhythm pacemaker functioning normally with battery life around 19 months.    Marked bilateral lower extremity edema  He will continue Lasix but estimated GFR is 67.  Lab work with PCP prior to next visit.    Follow-up scheduled        No follow-ups on file.

## 2024-04-09 ENCOUNTER — OFFICE VISIT (OUTPATIENT)
Dept: CARDIOLOGY | Facility: CLINIC | Age: 89
End: 2024-04-09
Payer: MEDICARE

## 2024-04-09 VITALS
SYSTOLIC BLOOD PRESSURE: 144 MMHG | HEIGHT: 76 IN | WEIGHT: 224 LBS | DIASTOLIC BLOOD PRESSURE: 80 MMHG | BODY MASS INDEX: 27.28 KG/M2 | OXYGEN SATURATION: 98 % | HEART RATE: 77 BPM

## 2024-04-09 DIAGNOSIS — E78.5 HYPERLIPIDEMIA LDL GOAL <70: ICD-10-CM

## 2024-04-09 DIAGNOSIS — Z79.01 CHRONIC ANTICOAGULATION: ICD-10-CM

## 2024-04-09 DIAGNOSIS — I50.22 CHRONIC HFREF (HEART FAILURE WITH REDUCED EJECTION FRACTION): ICD-10-CM

## 2024-04-09 DIAGNOSIS — I10 ESSENTIAL HYPERTENSION: ICD-10-CM

## 2024-04-09 DIAGNOSIS — I25.5 ISCHEMIC CARDIOMYOPATHY: ICD-10-CM

## 2024-04-09 DIAGNOSIS — I25.10 CORONARY ARTERY DISEASE INVOLVING NATIVE CORONARY ARTERY OF NATIVE HEART WITHOUT ANGINA PECTORIS: Primary | ICD-10-CM

## 2024-04-09 PROCEDURE — 99214 OFFICE O/P EST MOD 30 MIN: CPT | Performed by: INTERNAL MEDICINE

## 2024-04-09 NOTE — LETTER
April 9, 2024     FLOWER Pickard  [address] [place] [address]    Patient: Alberto Guzman   YOB: 1931   Date of Visit: 4/9/2024       Dear FLOWER Pickard    Alberto Guzman was in my office today. Below is a copy of my note.    If you have questions, please do not hesitate to call me. I look forward to following Alberto along with you.         Sincerely,        Selam Alcaraz MD        CC: No Recipients    subjective     Chief Complaint   Patient presents with   • Hypertension       Problems Addressed This Visit  No diagnosis found.    History of Present Illness          Past Surgical History:   Procedure Laterality Date   • CATARACT EXTRACTION Left    • CHOLECYSTECTOMY     • COLON RESECTION     • COLONOSCOPY     • HEMORROIDECTOMY     • HERNIA REPAIR      left inguinal/umbilical   • HIP ARTHROPLASTY Right    • INSERT / REPLACE / REMOVE PACEMAKER     • JOINT REPLACEMENT     • PACEMAKER IMPLANTATION     • PROSTATE SURGERY     • TRANSURETHRAL RESECTION OF BLADDER TUMOR N/A 4/18/2018    Procedure: CYSTOSCOPY TRANSURETHRAL RESECTION OF SMALL BLADDER TUMOR;  Surgeon: Alfonso Collins MD;  Location: [facility];  Service: Urology   • TRANSURETHRAL RESECTION OF BLADDER TUMOR N/A 8/29/2018    Procedure: CYSTOSCOPY TRANSURETHRAL RESECTION OF BLADDER TUMOR;  Surgeon: Alfonso Collins MD;  Location: [facility];  Service: Urology     Family History   Problem Relation Age of Onset   • No Known Problems Mother    • No Known Problems Father    • Heart disease Brother      Past Medical History:   Diagnosis Date   • Arthritis    • Atrial flutter    • Back pain    • Benign prostatic hyperplasia    • Bladder tumor    • Cancer     [id number]/melanoma   • Chronic systolic heart failure 9/14/2021   • Colon cancer    • Coronary artery disease    • DVT (deep venous thrombosis)     r leg   • Elevated cholesterol    • Heart attack    • Hypertension    • Numbness     feet/hands    • Osteoporosis    • PVD (peripheral vascular disease)    • Rheumatoid arthritis    • Stroke    • Ventricular tachycardia      Patient Active Problem List   Diagnosis   • Urinary retention   • Bladder neck contracture   • Bladder tumor   • Aftercare following surgery of the genitourinary system   • Atrial flutter , ventricular paced rhythm   • Presence of cardiac pacemaker   • Hyperlipidemia LDL goal <70   • Essential hypertension   • Chronic anticoagulation with Coumadin   • Ventricular tachycardia (paroxysmal)   • Asymptomatic PVD (peripheral vascular disease)   • Coronary artery disease, anterior wall myocardial infarction with directional atherectomy of LAD in 1983 and bare-metal stent to the LAD in 1995, nonobstructive disease 2015   • Ischemic cardiomyopathy, LV ejection fraction around 45 to 50%   • 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, 46 to 50%)   • Bilateral lower extremity edema   • Stroke (cerebrum)       Social History     Tobacco Use   • Smoking status: Former     Types: Cigarettes     Passive exposure: Past   • Smokeless tobacco: Never   Vaping Use   • Vaping status: Never Used   Substance Use Topics   • Alcohol use: Yes     Comment: Occasional few times a year   • Drug use: No       No Known Allergies    Current Outpatient Medications on File Prior to Visit   Medication Sig   • aspirin 81 MG tablet Take 1 tablet by mouth Daily.   • atorvastatin (LIPITOR) 10 MG tablet Take 1 tablet by mouth Daily.   • Cholecalciferol (Vitamin D3) 1.25 MG (86013 UT) capsule TAKE ONE CAPSULE BY MOUTH ONCE A WEEK ON THE SAME DAY EVERY WEEK   • furosemide (LASIX) 40 MG tablet Take 1.5 tablets by mouth Daily.   • gabapentin (NEURONTIN) 600 MG tablet Take 800 mg by mouth 2 (Two) Times a Day.   • HYDROcodone-acetaminophen (NORCO)  MG per tablet 1 tablet Every 6 (Six) Hours As Needed.   • lisinopril (PRINIVIL,ZESTRIL) 10 MG tablet Take 1 tablet by mouth Daily.   • metoprolol tartrate (LOPRESSOR)  "50 MG tablet Take 1 tablet by mouth Daily.   • pantoprazole (PROTONIX) 40 MG EC tablet Take 1 tablet by mouth Daily.   • warfarin (COUMADIN) 2 MG tablet Take 1 tablet by mouth Daily.     No current facility-administered medications on file prior to visit.         The following portions of the patient's history were reviewed and updated as appropriate: allergies, current medications, past family history, past medical history, past social history, past surgical history and problem list.    ROS       Objective:     /80 (BP Location: Left arm, Patient Position: Sitting, Cuff Size: Large Adult)   Pulse 77   Ht 193 cm (75.98\")   Wt 102 kg (224 lb)   SpO2 98%   BMI 27.28 kg/m²   Physical Exam      Lab Review  Lab Results   Component Value Date     (L) 05/10/2022    K 4.3 05/10/2022    CL 97 (L) 05/10/2022    BUN 14 05/10/2022    CREATININE 1.23 05/10/2022    GLUCOSE 108 (H) 05/10/2022    CALCIUM 9.1 05/10/2022    ALT 11 05/10/2022    ALKPHOS 94 05/10/2022     No results found for: \"CKTOTAL\"  Lab Results   Component Value Date    WBC 8.46 05/10/2022    HGB 12.4 (L) 05/10/2022    HCT 37.3 (L) 05/10/2022     05/10/2022     Lab Results   Component Value Date    INR 2.66 (H) 05/10/2022     No results found for: \"MG\"  No results found for: \"PSA\", \"TSH\"  No results found for: \"BNP\"  No results found for: \"CHLPL\", \"CHOL\", \"TRIG\", \"HDL\", \"VLDL\", \"LDL\"  No results found for: \"LDL\"  No results found for: \"PROBNP\"            Procedures       I personally viewed and interpreted the patient's LAB data         Assessment:   No diagnosis found.      Plan:              No follow-ups on file.    "

## 2024-04-09 NOTE — PROGRESS NOTES
subjective     Chief Complaint   Patient presents with    Hypertension       Problems Addressed This Visit  1. Coronary artery disease, anterior wall myocardial infarction with directional atherectomy of LAD in 1983 and bare-metal stent to the LAD in 1995, nonobstructive disease 2015    2. Essential hypertension    3. Hyperlipidemia LDL goal <70    4. Ischemic cardiomyopathy, LV ejection fraction around 45 to 50%    5. 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, 46 to 50%)    6. Chronic anticoagulation with Coumadin        History of Present Illness    Patient is 89 years old gentleman who is doing remarkably well for his age.  He is ambulatory without any assistance and is mentally in good shape.  Patient has a history of HFrEF however he could not afford Entresto  He is doing very well with lisinopril Lasix.  He has mild bilateral lower extremity edema but denies any orthopnea or PND.    No chest pain palpitations or shortness of breath.  Patient has history of anterior wall myocardial infarction with LAD atherectomy and bare-metal stent.  Hyperlipidemia on Lipitor therapy patient is trying to follow diet also.  Chronic atrial flutter with dual-chamber pacemaker    Past Surgical History:   Procedure Laterality Date    CATARACT EXTRACTION Left     CHOLECYSTECTOMY      COLON RESECTION      COLONOSCOPY      HEMORROIDECTOMY      HERNIA REPAIR      left inguinal/umbilical    HIP ARTHROPLASTY Right     INSERT / REPLACE / REMOVE PACEMAKER      JOINT REPLACEMENT      PACEMAKER IMPLANTATION      PROSTATE SURGERY      TRANSURETHRAL RESECTION OF BLADDER TUMOR N/A 4/18/2018    Procedure: CYSTOSCOPY TRANSURETHRAL RESECTION OF SMALL BLADDER TUMOR;  Surgeon: Alfonso Collins MD;  Location: [facility];  Service: Urology    TRANSURETHRAL RESECTION OF BLADDER TUMOR N/A 8/29/2018    Procedure: CYSTOSCOPY TRANSURETHRAL RESECTION OF BLADDER TUMOR;  Surgeon: Alfonso Collins MD;  Location: [facility];   Service: Urology     Family History   Problem Relation Age of Onset    No Known Problems Mother     No Known Problems Father     Heart disease Brother      Past Medical History:   Diagnosis Date    Arthritis     Atrial flutter     Back pain     Benign prostatic hyperplasia     Bladder tumor     Cancer     [id number]/melanoma    Chronic systolic heart failure 9/14/2021    Colon cancer     Coronary artery disease     DVT (deep venous thrombosis)     r leg    Elevated cholesterol     Heart attack     Hypertension     Numbness     feet/hands    Osteoporosis     PVD (peripheral vascular disease)     Rheumatoid arthritis     Stroke     Ventricular tachycardia      Patient Active Problem List   Diagnosis    Urinary retention    Bladder neck contracture    Bladder tumor    Aftercare following surgery of the genitourinary system    Atrial flutter , ventricular paced rhythm    Presence of cardiac pacemaker 6/3/2025    Hyperlipidemia LDL goal <70    Essential hypertension    Chronic anticoagulation with Coumadin    Ventricular tachycardia (paroxysmal)    Asymptomatic PVD (peripheral vascular disease)    Coronary artery disease, anterior wall myocardial infarction with directional atherectomy of LAD in 1983 and bare-metal stent to the LAD in 1995, nonobstructive disease 2015    Ischemic cardiomyopathy, LV ejection fraction around 45 to 50%    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, 46 to 50%)    Bilateral lower extremity edema    Stroke (cerebrum)       Social History     Tobacco Use    Smoking status: Former     Types: Cigarettes     Passive exposure: Past    Smokeless tobacco: Never   Vaping Use    Vaping status: Never Used   Substance Use Topics    Alcohol use: Yes     Comment: Occasional few times a year    Drug use: No       No Known Allergies    Current Outpatient Medications on File Prior to Visit   Medication Sig    aspirin 81 MG tablet Take 1 tablet by mouth Daily.    atorvastatin (LIPITOR) 10 MG  "tablet Take 1 tablet by mouth Daily.    Cholecalciferol (Vitamin D3) 1.25 MG (65680 UT) capsule TAKE ONE CAPSULE BY MOUTH ONCE A WEEK ON THE SAME DAY EVERY WEEK    furosemide (LASIX) 40 MG tablet Take 1.5 tablets by mouth Daily.    gabapentin (NEURONTIN) 600 MG tablet Take 800 mg by mouth 2 (Two) Times a Day.    HYDROcodone-acetaminophen (NORCO)  MG per tablet 1 tablet Every 6 (Six) Hours As Needed.    lisinopril (PRINIVIL,ZESTRIL) 10 MG tablet Take 1 tablet by mouth Daily.    metoprolol tartrate (LOPRESSOR) 50 MG tablet Take 1 tablet by mouth Daily.    pantoprazole (PROTONIX) 40 MG EC tablet Take 1 tablet by mouth Daily.    warfarin (COUMADIN) 2 MG tablet Take 1 tablet by mouth Daily.     No current facility-administered medications on file prior to visit.         The following portions of the patient's history were reviewed and updated as appropriate: allergies, current medications, past family history, past medical history, past social history, past surgical history and problem list.    Review of Systems   Constitutional: Positive for malaise/fatigue.   HENT: Negative.  Negative for congestion.    Eyes: Negative.    Cardiovascular:  Positive for leg swelling. Negative for chest pain, cyanosis, dyspnea on exertion, irregular heartbeat, near-syncope, orthopnea, palpitations, paroxysmal nocturnal dyspnea and syncope.   Respiratory: Negative.  Negative for shortness of breath.    Hematologic/Lymphatic: Negative.    Musculoskeletal: Negative.    Gastrointestinal: Negative.    Neurological: Negative.  Negative for headaches.          Objective:     /80 (BP Location: Left arm, Patient Position: Sitting, Cuff Size: Large Adult)   Pulse 77   Ht 193 cm (75.98\")   Wt 102 kg (224 lb)   SpO2 98%   BMI 27.28 kg/m²   Cardiovascular:      PMI at left midclavicular line. Normal rate. Regular rhythm. Normal S1. Normal S2.       Murmurs: There is no murmur.      No gallop.  No click. No rub.   Pulses:     Intact " "distal pulses.   Edema:     Peripheral edema present.     Pretibial: bilateral 1+ edema of the pretibial area.     Ankle: bilateral 2+ edema of the ankle.     Feet: bilateral 2+ edema of the feet.          Lab Review  Lab Results   Component Value Date     (L) 05/10/2022    K 4.3 05/10/2022    CL 97 (L) 05/10/2022    BUN 14 05/10/2022    CREATININE 1.23 05/10/2022    GLUCOSE 108 (H) 05/10/2022    CALCIUM 9.1 05/10/2022    ALT 11 05/10/2022    ALKPHOS 94 05/10/2022     No results found for: \"CKTOTAL\"  Lab Results   Component Value Date    WBC 8.46 05/10/2022    HGB 12.4 (L) 05/10/2022    HCT 37.3 (L) 05/10/2022     05/10/2022     Lab Results   Component Value Date    INR 2.66 (H) 05/10/2022       Procedures       I personally viewed and interpreted the patient's LAB data         Assessment:     1. Coronary artery disease, anterior wall myocardial infarction with directional atherectomy of LAD in 1983 and bare-metal stent to the LAD in 1995, nonobstructive disease 2015    2. Essential hypertension    3. Hyperlipidemia LDL goal <70    4. Ischemic cardiomyopathy, LV ejection fraction around 45 to 50%    5. 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, 46 to 50%)    6. Chronic anticoagulation with Coumadin          Plan:     Coronary artery disease status post anterior wall myocardial infarction and decreased ejection fraction 46 to 50%.  Patient is doing very well and is totally asymptomatic he does complain of bilateral lower extremity edema.  He could not afford Entresto but is doing very well with lisinopril and Lasix.    He is taking antiplatelet therapy with aspirin, beta-blocker therapy with Lopressor and statin therapy with Lipitor which will be continued.  Copy of lab work from PCP requested.    Paroxysmal atrial flutter fibrillation on Coumadin  No abnormal bleeding.    Overall patient is doing very well will review lab reports  Copy requested.  Pacemaker check in next few " months.  Follow-up scheduled        No follow-ups on file.

## 2024-04-16 ENCOUNTER — TELEPHONE (OUTPATIENT)
Dept: CARDIOLOGY | Facility: CLINIC | Age: 89
End: 2024-04-16
Payer: MEDICARE

## 2024-05-07 ENCOUNTER — CLINICAL SUPPORT NO REQUIREMENTS (OUTPATIENT)
Dept: CARDIOLOGY | Facility: CLINIC | Age: 89
End: 2024-05-07
Payer: MEDICARE

## 2024-05-07 DIAGNOSIS — Z95.0 PRESENCE OF CARDIAC PACEMAKER: Primary | ICD-10-CM

## 2024-06-09 ENCOUNTER — HOSPITAL ENCOUNTER (INPATIENT)
Facility: HOSPITAL | Age: 89
LOS: 10 days | Discharge: HOME | End: 2024-06-19
Attending: EMERGENCY MEDICINE | Admitting: SURGERY
Payer: MEDICARE

## 2024-06-09 ENCOUNTER — APPOINTMENT (OUTPATIENT)
Dept: CT IMAGING | Facility: HOSPITAL | Age: 89
End: 2024-06-09
Payer: MEDICARE

## 2024-06-09 ENCOUNTER — APPOINTMENT (OUTPATIENT)
Dept: GENERAL RADIOLOGY | Facility: HOSPITAL | Age: 89
End: 2024-06-09
Payer: MEDICARE

## 2024-06-09 DIAGNOSIS — K56.609 SMALL BOWEL OBSTRUCTION: Primary | ICD-10-CM

## 2024-06-09 LAB
ALBUMIN SERPL-MCNC: 3.8 G/DL (ref 3.5–5.2)
ALBUMIN/GLOB SERPL: 1.5 G/DL
ALP SERPL-CCNC: 91 U/L (ref 39–117)
ALT SERPL W P-5'-P-CCNC: 9 U/L (ref 1–41)
ANION GAP SERPL CALCULATED.3IONS-SCNC: 12.4 MMOL/L (ref 5–15)
APTT PPP: 32 SECONDS (ref 26.5–34.5)
AST SERPL-CCNC: 18 U/L (ref 1–40)
BASOPHILS # BLD AUTO: 0.01 10*3/MM3 (ref 0–0.2)
BASOPHILS NFR BLD AUTO: 0.2 % (ref 0–1.5)
BILIRUB SERPL-MCNC: 1.2 MG/DL (ref 0–1.2)
BILIRUB UR QL STRIP: NEGATIVE
BILIRUB UR QL STRIP: NEGATIVE
BUN SERPL-MCNC: 11 MG/DL (ref 8–23)
BUN/CREAT SERPL: 9.6 (ref 7–25)
CALCIUM SPEC-SCNC: 9.3 MG/DL (ref 8.2–9.6)
CHLORIDE SERPL-SCNC: 101 MMOL/L (ref 98–107)
CLARITY UR: CLEAR
CLARITY UR: CLEAR
CO2 SERPL-SCNC: 26.6 MMOL/L (ref 22–29)
COLOR UR: YELLOW
COLOR UR: YELLOW
CREAT SERPL-MCNC: 1.14 MG/DL (ref 0.76–1.27)
CRP SERPL-MCNC: 0.31 MG/DL (ref 0–0.5)
D-LACTATE SERPL-SCNC: 1.6 MMOL/L (ref 0.5–2)
DEPRECATED RDW RBC AUTO: 48.2 FL (ref 37–54)
EGFRCR SERPLBLD CKD-EPI 2021: 60 ML/MIN/1.73
EOSINOPHIL # BLD AUTO: 0.07 10*3/MM3 (ref 0–0.4)
EOSINOPHIL NFR BLD AUTO: 1.2 % (ref 0.3–6.2)
ERYTHROCYTE [DISTWIDTH] IN BLOOD BY AUTOMATED COUNT: 12.6 % (ref 12.3–15.4)
ERYTHROCYTE [SEDIMENTATION RATE] IN BLOOD: 12 MM/HR (ref 0–20)
GEN 5 2HR TROPONIN T REFLEX: 38 NG/L
GLOBULIN UR ELPH-MCNC: 2.5 GM/DL
GLUCOSE SERPL-MCNC: 107 MG/DL (ref 65–99)
GLUCOSE UR STRIP-MCNC: NEGATIVE MG/DL
GLUCOSE UR STRIP-MCNC: NEGATIVE MG/DL
HCT VFR BLD AUTO: 38.8 % (ref 37.5–51)
HGB BLD-MCNC: 13 G/DL (ref 13–17.7)
HGB UR QL STRIP.AUTO: NEGATIVE
HGB UR QL STRIP.AUTO: NEGATIVE
HOLD SPECIMEN: NORMAL
HOLD SPECIMEN: NORMAL
IMM GRANULOCYTES # BLD AUTO: 0.01 10*3/MM3 (ref 0–0.05)
IMM GRANULOCYTES NFR BLD AUTO: 0.2 % (ref 0–0.5)
INR PPP: 1.54 (ref 0.9–1.1)
KETONES UR QL STRIP: ABNORMAL
KETONES UR QL STRIP: ABNORMAL
LEUKOCYTE ESTERASE UR QL STRIP.AUTO: NEGATIVE
LEUKOCYTE ESTERASE UR QL STRIP.AUTO: NEGATIVE
LYMPHOCYTES # BLD AUTO: 0.73 10*3/MM3 (ref 0.7–3.1)
LYMPHOCYTES NFR BLD AUTO: 12.6 % (ref 19.6–45.3)
MCH RBC QN AUTO: 34.5 PG (ref 26.6–33)
MCHC RBC AUTO-ENTMCNC: 33.5 G/DL (ref 31.5–35.7)
MCV RBC AUTO: 102.9 FL (ref 79–97)
MONOCYTES # BLD AUTO: 0.57 10*3/MM3 (ref 0.1–0.9)
MONOCYTES NFR BLD AUTO: 9.8 % (ref 5–12)
NEUTROPHILS NFR BLD AUTO: 4.41 10*3/MM3 (ref 1.7–7)
NEUTROPHILS NFR BLD AUTO: 76 % (ref 42.7–76)
NITRITE UR QL STRIP: NEGATIVE
NITRITE UR QL STRIP: NEGATIVE
NRBC BLD AUTO-RTO: 0 /100 WBC (ref 0–0.2)
NT-PROBNP SERPL-MCNC: 986.4 PG/ML (ref 0–1800)
PH UR STRIP.AUTO: 7 [PH] (ref 5–8)
PH UR STRIP.AUTO: 7 [PH] (ref 5–8)
PLATELET # BLD AUTO: 167 10*3/MM3 (ref 140–450)
PMV BLD AUTO: 9.8 FL (ref 6–12)
POTASSIUM SERPL-SCNC: 4.6 MMOL/L (ref 3.5–5.2)
PROT SERPL-MCNC: 6.3 G/DL (ref 6–8.5)
PROT UR QL STRIP: NEGATIVE
PROT UR QL STRIP: NEGATIVE
PROTHROMBIN TIME: 18.6 SECONDS (ref 12.1–14.7)
QT INTERVAL: 478 MS
QT INTERVAL: 500 MS
QTC INTERVAL: 497 MS
QTC INTERVAL: 500 MS
RBC # BLD AUTO: 3.77 10*6/MM3 (ref 4.14–5.8)
SODIUM SERPL-SCNC: 140 MMOL/L (ref 136–145)
SP GR UR STRIP: 1.01 (ref 1–1.03)
SP GR UR STRIP: >1.03 (ref 1–1.03)
TROPONIN T DELTA: 0 NG/L
TROPONIN T SERPL HS-MCNC: 38 NG/L
UROBILINOGEN UR QL STRIP: ABNORMAL
UROBILINOGEN UR QL STRIP: ABNORMAL
WBC NRBC COR # BLD AUTO: 5.8 10*3/MM3 (ref 3.4–10.8)
WHOLE BLOOD HOLD COAG: NORMAL
WHOLE BLOOD HOLD SPECIMEN: NORMAL

## 2024-06-09 PROCEDURE — 71045 X-RAY EXAM CHEST 1 VIEW: CPT

## 2024-06-09 PROCEDURE — 84484 ASSAY OF TROPONIN QUANT: CPT | Performed by: NURSE PRACTITIONER

## 2024-06-09 PROCEDURE — 25510000001 IOPAMIDOL PER 1 ML: Performed by: EMERGENCY MEDICINE

## 2024-06-09 PROCEDURE — 25010000002 MORPHINE PER 10 MG: Performed by: EMERGENCY MEDICINE

## 2024-06-09 PROCEDURE — 25010000002 ONDANSETRON PER 1 MG: Performed by: EMERGENCY MEDICINE

## 2024-06-09 PROCEDURE — 93010 ELECTROCARDIOGRAM REPORT: CPT | Performed by: INTERNAL MEDICINE

## 2024-06-09 PROCEDURE — 83880 ASSAY OF NATRIURETIC PEPTIDE: CPT | Performed by: NURSE PRACTITIONER

## 2024-06-09 PROCEDURE — 85730 THROMBOPLASTIN TIME PARTIAL: CPT | Performed by: NURSE PRACTITIONER

## 2024-06-09 PROCEDURE — 85025 COMPLETE CBC W/AUTO DIFF WBC: CPT | Performed by: NURSE PRACTITIONER

## 2024-06-09 PROCEDURE — 80053 COMPREHEN METABOLIC PANEL: CPT | Performed by: NURSE PRACTITIONER

## 2024-06-09 PROCEDURE — 86140 C-REACTIVE PROTEIN: CPT | Performed by: NURSE PRACTITIONER

## 2024-06-09 PROCEDURE — 81003 URINALYSIS AUTO W/O SCOPE: CPT | Performed by: SURGERY

## 2024-06-09 PROCEDURE — 71045 X-RAY EXAM CHEST 1 VIEW: CPT | Performed by: RADIOLOGY

## 2024-06-09 PROCEDURE — 83605 ASSAY OF LACTIC ACID: CPT | Performed by: NURSE PRACTITIONER

## 2024-06-09 PROCEDURE — 36415 COLL VENOUS BLD VENIPUNCTURE: CPT

## 2024-06-09 PROCEDURE — 74177 CT ABD & PELVIS W/CONTRAST: CPT

## 2024-06-09 PROCEDURE — 99223 1ST HOSP IP/OBS HIGH 75: CPT | Performed by: STUDENT IN AN ORGANIZED HEALTH CARE EDUCATION/TRAINING PROGRAM

## 2024-06-09 PROCEDURE — 93005 ELECTROCARDIOGRAM TRACING: CPT | Performed by: NURSE PRACTITIONER

## 2024-06-09 PROCEDURE — 99222 1ST HOSP IP/OBS MODERATE 55: CPT | Performed by: SURGERY

## 2024-06-09 PROCEDURE — 25010000002 MORPHINE PER 10 MG: Performed by: NURSE PRACTITIONER

## 2024-06-09 PROCEDURE — 99285 EMERGENCY DEPT VISIT HI MDM: CPT

## 2024-06-09 PROCEDURE — 85652 RBC SED RATE AUTOMATED: CPT | Performed by: NURSE PRACTITIONER

## 2024-06-09 PROCEDURE — 71275 CT ANGIOGRAPHY CHEST: CPT | Performed by: RADIOLOGY

## 2024-06-09 PROCEDURE — 25810000003 SODIUM CHLORIDE 0.9 % SOLUTION: Performed by: SURGERY

## 2024-06-09 PROCEDURE — 25010000002 MORPHINE PER 10 MG: Performed by: SURGERY

## 2024-06-09 PROCEDURE — 81003 URINALYSIS AUTO W/O SCOPE: CPT | Performed by: NURSE PRACTITIONER

## 2024-06-09 PROCEDURE — 71275 CT ANGIOGRAPHY CHEST: CPT

## 2024-06-09 PROCEDURE — 85610 PROTHROMBIN TIME: CPT | Performed by: NURSE PRACTITIONER

## 2024-06-09 PROCEDURE — 74177 CT ABD & PELVIS W/CONTRAST: CPT | Performed by: RADIOLOGY

## 2024-06-09 RX ORDER — ASCORBIC ACID 500 MG
500 TABLET ORAL DAILY
Status: CANCELLED | OUTPATIENT
Start: 2024-06-09

## 2024-06-09 RX ORDER — SODIUM CHLORIDE 9 MG/ML
75 INJECTION, SOLUTION INTRAVENOUS CONTINUOUS
Status: DISCONTINUED | OUTPATIENT
Start: 2024-06-09 | End: 2024-06-14

## 2024-06-09 RX ORDER — GABAPENTIN 400 MG/1
800 CAPSULE ORAL NIGHTLY
Status: CANCELLED | OUTPATIENT
Start: 2024-06-09

## 2024-06-09 RX ORDER — ONDANSETRON 2 MG/ML
4 INJECTION INTRAMUSCULAR; INTRAVENOUS ONCE
Status: COMPLETED | OUTPATIENT
Start: 2024-06-09 | End: 2024-06-09

## 2024-06-09 RX ORDER — BUMETANIDE 1 MG/1
2 TABLET ORAL DAILY
Status: CANCELLED | OUTPATIENT
Start: 2024-06-09

## 2024-06-09 RX ORDER — HYDROCODONE BITARTRATE AND ACETAMINOPHEN 10; 325 MG/1; MG/1
1 TABLET ORAL EVERY 6 HOURS PRN
Status: CANCELLED | OUTPATIENT
Start: 2024-06-09

## 2024-06-09 RX ORDER — MORPHINE SULFATE 2 MG/ML
2 INJECTION, SOLUTION INTRAMUSCULAR; INTRAVENOUS ONCE
Status: COMPLETED | OUTPATIENT
Start: 2024-06-09 | End: 2024-06-09

## 2024-06-09 RX ORDER — ASPIRIN 81 MG/1
81 TABLET ORAL DAILY
Status: CANCELLED | OUTPATIENT
Start: 2024-06-09

## 2024-06-09 RX ORDER — LISINOPRIL 2.5 MG/1
5 TABLET ORAL DAILY
Status: CANCELLED | OUTPATIENT
Start: 2024-06-09

## 2024-06-09 RX ORDER — SODIUM CHLORIDE 9 MG/ML
125 INJECTION, SOLUTION INTRAVENOUS CONTINUOUS
Status: DISCONTINUED | OUTPATIENT
Start: 2024-06-09 | End: 2024-06-09

## 2024-06-09 RX ORDER — PANTOPRAZOLE SODIUM 40 MG/1
40 TABLET, DELAYED RELEASE ORAL DAILY
COMMUNITY

## 2024-06-09 RX ORDER — BUMETANIDE 1 MG/1
2 TABLET ORAL DAILY
COMMUNITY

## 2024-06-09 RX ORDER — WARFARIN SODIUM 5 MG/1
5 TABLET ORAL
Status: CANCELLED | OUTPATIENT
Start: 2024-06-09

## 2024-06-09 RX ORDER — MORPHINE SULFATE 2 MG/ML
2 INJECTION, SOLUTION INTRAMUSCULAR; INTRAVENOUS
Status: DISCONTINUED | OUTPATIENT
Start: 2024-06-09 | End: 2024-06-12

## 2024-06-09 RX ORDER — NITROGLYCERIN 0.4 MG/1
0.4 TABLET SUBLINGUAL
COMMUNITY

## 2024-06-09 RX ORDER — GABAPENTIN 800 MG/1
800 TABLET ORAL NIGHTLY
COMMUNITY

## 2024-06-09 RX ORDER — PANTOPRAZOLE SODIUM 40 MG/10ML
40 INJECTION, POWDER, LYOPHILIZED, FOR SOLUTION INTRAVENOUS
Status: DISCONTINUED | OUTPATIENT
Start: 2024-06-09 | End: 2024-06-11

## 2024-06-09 RX ORDER — SODIUM CHLORIDE 0.9 % (FLUSH) 0.9 %
10 SYRINGE (ML) INJECTION AS NEEDED
Status: DISCONTINUED | OUTPATIENT
Start: 2024-06-09 | End: 2024-06-19 | Stop reason: HOSPADM

## 2024-06-09 RX ORDER — LISINOPRIL 5 MG/1
5 TABLET ORAL DAILY
COMMUNITY
End: 2024-06-19

## 2024-06-09 RX ORDER — METOPROLOL SUCCINATE 50 MG/1
50 TABLET, EXTENDED RELEASE ORAL DAILY
Status: CANCELLED | OUTPATIENT
Start: 2024-06-09

## 2024-06-09 RX ORDER — METOPROLOL SUCCINATE 50 MG/1
50 TABLET, EXTENDED RELEASE ORAL DAILY
Status: ON HOLD | COMMUNITY
End: 2024-06-19

## 2024-06-09 RX ORDER — PANTOPRAZOLE SODIUM 40 MG/1
40 TABLET, DELAYED RELEASE ORAL DAILY
Status: CANCELLED | OUTPATIENT
Start: 2024-06-09

## 2024-06-09 RX ORDER — ATORVASTATIN CALCIUM 20 MG/1
20 TABLET, FILM COATED ORAL DAILY
Status: CANCELLED | OUTPATIENT
Start: 2024-06-09

## 2024-06-09 RX ORDER — UREA 10 %
500 LOTION (ML) TOPICAL DAILY
COMMUNITY

## 2024-06-09 RX ORDER — ASCORBIC ACID 500 MG
500 TABLET ORAL DAILY
COMMUNITY

## 2024-06-09 RX ADMIN — MORPHINE SULFATE 2 MG: 2 INJECTION, SOLUTION INTRAMUSCULAR; INTRAVENOUS at 21:30

## 2024-06-09 RX ADMIN — MORPHINE SULFATE 2 MG: 2 INJECTION, SOLUTION INTRAMUSCULAR; INTRAVENOUS at 08:39

## 2024-06-09 RX ADMIN — SODIUM CHLORIDE 125 ML/HR: 9 INJECTION, SOLUTION INTRAVENOUS at 06:26

## 2024-06-09 RX ADMIN — Medication 10 ML: at 21:30

## 2024-06-09 RX ADMIN — MORPHINE SULFATE 4 MG: 4 INJECTION, SOLUTION INTRAMUSCULAR; INTRAVENOUS at 05:41

## 2024-06-09 RX ADMIN — MORPHINE SULFATE 2 MG: 2 INJECTION, SOLUTION INTRAMUSCULAR; INTRAVENOUS at 15:30

## 2024-06-09 RX ADMIN — MORPHINE SULFATE 2 MG: 2 INJECTION, SOLUTION INTRAMUSCULAR; INTRAVENOUS at 04:11

## 2024-06-09 RX ADMIN — MORPHINE SULFATE 2 MG: 2 INJECTION, SOLUTION INTRAMUSCULAR; INTRAVENOUS at 13:28

## 2024-06-09 RX ADMIN — MORPHINE SULFATE 2 MG: 2 INJECTION, SOLUTION INTRAMUSCULAR; INTRAVENOUS at 18:04

## 2024-06-09 RX ADMIN — PANTOPRAZOLE SODIUM 40 MG: 40 INJECTION, POWDER, FOR SOLUTION INTRAVENOUS at 10:52

## 2024-06-09 RX ADMIN — ONDANSETRON 4 MG: 2 INJECTION INTRAMUSCULAR; INTRAVENOUS at 04:10

## 2024-06-09 RX ADMIN — MORPHINE SULFATE 2 MG: 2 INJECTION, SOLUTION INTRAMUSCULAR; INTRAVENOUS at 10:52

## 2024-06-09 RX ADMIN — IOPAMIDOL 70 ML: 755 INJECTION, SOLUTION INTRAVENOUS at 03:27

## 2024-06-09 RX ADMIN — SODIUM CHLORIDE 75 ML/HR: 9 INJECTION, SOLUTION INTRAVENOUS at 18:01

## 2024-06-09 NOTE — H&P
[facility]   HISTORY AND PHYSICAL    Patient Name: Alberto Gumzan  : 3/29/1931  MRN: [id number]  Primary Care Physician:  Lianet Dia APRN  Date of admission: 2024    Subjective   Subjective     Chief Complaint: Abdominal pain    History of Present Illness  With acute onset of periumbilical abdominal pain over the last 24 to 48 hours.  No peritoneal signs.  Passed flatus on the way to the facility for presentation to the ER.  He has a history of colon cancer status post left colectomy, he has a history of melanoma, coronary artery disease, as well as peripheral vascular disease, history of stroke and atrial fibrillation.  He is on chronic anticoagulation with Coumadin.  INR is 1.5.  Patient on imaging has signs concerning for possible partial small bowel obstruction.  Last bowel movement was yesterday evening.  Patient is not massively distended and has not had any significant vomiting.  Patient does have a distended bladder and states he has not voided since presentation.  Patient has history of ventral hernia repair and the mesh is palpable in the left abdomen and this has been the case since the surgery per the patient.  There is a small recurrence of fat on imaging but no signs of incarceration or ischemia.  He has a history of BPH as well.  Abdominal Pain  Pertinent negatives include no constipation, diarrhea, dysuria, fever, headaches, nausea or vomiting.       Review of Systems   Constitutional:  Negative for activity change, appetite change, chills and fever.   HENT:  Negative for sore throat and trouble swallowing.    Eyes:  Negative for visual disturbance.   Respiratory:  Negative for cough and shortness of breath.    Cardiovascular:  Negative for chest pain and palpitations.   Gastrointestinal:  Positive for abdominal pain. Negative for abdominal distention, blood in stool, constipation, diarrhea, nausea and vomiting.   Endocrine: Negative for cold intolerance and heat  intolerance.   Genitourinary:  Negative for dysuria.   Musculoskeletal:  Negative for joint swelling.   Skin:  Negative for color change, rash and wound.   Allergic/Immunologic: Negative for immunocompromised state.   Neurological:  Negative for dizziness, seizures, weakness and headaches.   Hematological:  Negative for adenopathy. Does not bruise/bleed easily.   Psychiatric/Behavioral:  Negative for agitation and confusion.         Personal History     Past Medical History:   Diagnosis Date    Arthritis     Atrial flutter     Back pain     Benign prostatic hyperplasia     Bladder tumor     Cancer     [id number]/melanoma    Chronic systolic heart failure 9/14/2021    Colon cancer     Coronary artery disease     DVT (deep venous thrombosis)     r leg    Elevated cholesterol     Heart attack     Hypertension     Numbness     feet/hands    Osteoporosis     PVD (peripheral vascular disease)     Rheumatoid arthritis     Stroke     Ventricular tachycardia        Past Surgical History:   Procedure Laterality Date    CATARACT EXTRACTION Left     CHOLECYSTECTOMY      COLON RESECTION      COLONOSCOPY      HEMORROIDECTOMY      HERNIA REPAIR      left inguinal/umbilical    HIP ARTHROPLASTY Right     INSERT / REPLACE / REMOVE PACEMAKER      JOINT REPLACEMENT      PACEMAKER IMPLANTATION      PROSTATE SURGERY      TRANSURETHRAL RESECTION OF BLADDER TUMOR N/A 4/18/2018    Procedure: CYSTOSCOPY TRANSURETHRAL RESECTION OF SMALL BLADDER TUMOR;  Surgeon: Alfonso Collins MD;  Location: [facility];  Service: Urology    TRANSURETHRAL RESECTION OF BLADDER TUMOR N/A 8/29/2018    Procedure: CYSTOSCOPY TRANSURETHRAL RESECTION OF BLADDER TUMOR;  Surgeon: Alfonso Collins MD;  Location: [facility];  Service: Urology       Family History: family history includes Heart disease in his brother; No Known Problems in his father and mother. Otherwise pertinent FHx was reviewed and not pertinent to current issue.    Social History:  reports  that he has quit smoking. His smoking use included cigarettes. He has been exposed to tobacco smoke. He has never used smokeless tobacco. He reports current alcohol use. He reports that he does not use drugs.    Home Medications:  HYDROcodone-acetaminophen, aspirin, atorvastatin, bumetanide, lisinopril, metoprolol succinate XL, nitroglycerin, and warfarin    Allergies:  No Known Allergies    Objective    Objective     Vitals:   Temp:  [97.6 °F (36.4 °C)] 97.6 °F (36.4 °C)  Heart Rate:  [60-67] 62  Resp:  [18] 18  BP: (113-144)/(61-85) 144/72    Physical Exam  Constitutional:       Appearance: He is well-developed.   HENT:      Head: Normocephalic and atraumatic.   Eyes:      Conjunctiva/sclera: Conjunctivae normal.      Pupils: Pupils are equal, round, and reactive to light.   Neck:      Thyroid: No thyromegaly.      Vascular: No JVD.      Trachea: No tracheal deviation.   Cardiovascular:      Rate and Rhythm: Normal rate and regular rhythm.      Heart sounds: No murmur heard.     No friction rub. No gallop.   Pulmonary:      Effort: Pulmonary effort is normal.      Breath sounds: Normal breath sounds.   Abdominal:      General: There is no distension.      Palpations: Abdomen is soft. There is no hepatomegaly or splenomegaly.      Tenderness: There is no abdominal tenderness.      Hernia: No hernia is present.          Comments: Palpable mesh from hernia repair, tender to palpation throughout the periumbilical region without rebound or guarding   Musculoskeletal:         General: No deformity. Normal range of motion.      Cervical back: Neck supple.   Skin:     General: Skin is warm and dry.   Neurological:      Mental Status: He is alert and oriented to person, place, and time.         Result Review    Result Review:  I have personally reviewed the results from the time of this admission to 6/9/2024 09:32 EDT and agree with these findings:  [x]  Laboratory list / accordion  []  Microbiology  [x]  Radiology  []   EKG/Telemetry   []  Cardiology/Vascular   []  Pathology  []  Old records  []  Other:      Assessment & Plan   Assessment / Plan     Brief Patient Summary:  Alberto Guzman is a 89 y.o. male who presents with abdominal pain of uncertain etiology though bowel obstruction secondary to adhesions cannot be excluded though he had flatus on the way to the emergency department.  Patient may have evolving small bowel obstruction versus enteritis.    Active Hospital Problems:  Active Hospital Problems    Diagnosis     **Small bowel obstruction      Plan:   NG tube  Bowel rest with n.p.o. status  IV fluids  Consult hospitalist for medical management given his multiple medical comorbidities  Hold anticoagulation  GI DVT prophylaxis with Protonix and bilateral sequential compression devices for now    DVT prophylaxis:  No DVT prophylaxis order currently exists.        CODE STATUS:    Level Of Support Discussed With: Patient  Code Status (Patient has no pulse and is not breathing): CPR (Attempt to Resuscitate)  Medical Interventions (Patient has pulse or is breathing): Full Support    Admission Status:  I believe this patient meets inpatient status.    Deo Lennon MD

## 2024-06-09 NOTE — CONSULTS
[facility]    Patient Identification:  Name:  Alberto Guzman  Age:  89 y.o.  Sex:  male  :  3/29/1931  MRN:  [id number]   Visit Number:  [id number]  Admit Date: 2024   Room number:  3327/1P  Primary Care Physician:  Lianet Dia APRN    Date of Admission: 2024     Subjective     Chief complaint:    Chief Complaint   Patient presents with    Abdominal Pain     History of presenting illness:  89 y.o. male who was admitted on 2024 with concern for SBO.    Alberto Guzman has relevant PMH of prior colon ca s/p colectomy, bladder ca s/p TURBT, CAD s/p bare metal stenting x2, HTN, HLD, HFmrEF (45-50%), pAF on Warfarin presenting with abd pain. Patient states he had colicky abd pain over last 3 days that became more persistent, severe, and with associated nausea and voiting the morning of presentation. No prior hx of SBO and no major surgeries since aforementioned surgical hx. Patient evaluated in ED and noted to CT imaging consistent with possible early SBO thus he was admitted under general surgery for management after placement of NG to LWS in ED. Approx. 1L stomach content drained prior to arrival to floor.      Of note, patient is of advanced age but was independent of all ADLs prior to arrival and independently ambulatory.    Prior to admission I discussed patient's presentation and management with attending ER physician and verbal handoff received.  ---------------------------------------------------------------------------------------------------------------------   A thorough systems based relevant ROS was asked and was negative except as noted above.  ---------------------------------------------------------------------------------------------------------------------   Past Medical History:   Diagnosis Date    Arthritis     Atrial flutter     Back pain     Benign prostatic hyperplasia     Bladder tumor     Cancer     [id number]/melanoma     Chronic systolic heart failure 9/14/2021    Colon cancer     Coronary artery disease     DVT (deep venous thrombosis)     r leg    Elevated cholesterol     Heart attack     Hypertension     Numbness     feet/hands    Osteoporosis     PVD (peripheral vascular disease)     Rheumatoid arthritis     Stroke     Ventricular tachycardia      Past Surgical History:   Procedure Laterality Date    CATARACT EXTRACTION Left     CHOLECYSTECTOMY      COLON RESECTION      COLONOSCOPY      HEMORROIDECTOMY      HERNIA REPAIR      left inguinal/umbilical    HIP ARTHROPLASTY Right     INSERT / REPLACE / REMOVE PACEMAKER      JOINT REPLACEMENT      PACEMAKER IMPLANTATION      PROSTATE SURGERY      TRANSURETHRAL RESECTION OF BLADDER TUMOR N/A 4/18/2018    Procedure: CYSTOSCOPY TRANSURETHRAL RESECTION OF SMALL BLADDER TUMOR;  Surgeon: Alfonso Collins MD;  Location: [facility];  Service: Urology    TRANSURETHRAL RESECTION OF BLADDER TUMOR N/A 8/29/2018    Procedure: CYSTOSCOPY TRANSURETHRAL RESECTION OF BLADDER TUMOR;  Surgeon: Alfonso Collins MD;  Location: [facility];  Service: Urology     Family History   Problem Relation Age of Onset    No Known Problems Mother     No Known Problems Father     Heart disease Brother      Social History     Socioeconomic History    Marital status:    Tobacco Use    Smoking status: Former     Types: Cigarettes     Passive exposure: Past    Smokeless tobacco: Never   Vaping Use    Vaping status: Never Used   Substance and Sexual Activity    Alcohol use: Yes     Comment: Occasional few times a year    Drug use: No    Sexual activity: Defer         Objective     Vital Signs:  Temp:  [97.6 °F (36.4 °C)] 97.6 °F (36.4 °C)  Heart Rate:  [60-67] 62  Resp:  [18] 18  BP: (113-144)/(61-85) 144/72    Mean Arterial Pressure (Non-Invasive) for the past 24 hrs (Last 3 readings):   Noninvasive MAP (mmHg)   06/09/24 0720 100   06/09/24 0545 88   06/09/24 0530 96     SpO2:  [92 %-99 %] 94 %  on   ;    Device (Oxygen Therapy): room air  Body mass index is 26.3 kg/m².     ----------------------------------------------------------------------------------------------------------------------  Physical Exam  Vitals and nursing note reviewed.   Constitutional:       General: He is not in acute distress.  HENT:      Head: Normocephalic and atraumatic.   Eyes:      General: No scleral icterus.     Extraocular Movements: Extraocular movements intact.   Cardiovascular:      Rate and Rhythm: Normal rate and regular rhythm.   Abdominal:      General: There is no distension.      Palpations: Abdomen is soft.      Tenderness: There is abdominal tenderness. There is no guarding.   Musculoskeletal:         General: No signs of injury.      Right lower leg: No edema.      Left lower leg: No edema.   Skin:     General: Skin is warm and dry.   Neurological:      General: No focal deficit present.      Mental Status: He is alert.      Motor: No weakness.   Psychiatric:         Mood and Affect: Mood normal.         Behavior: Behavior normal.       --------------------------------------------------------------------------------------------------------------------    ECG with v-paced rhythm     Assessment & Plan       #Acute early SBO  #Troponin elevation likely secondary to demand ischemia, no delta and no ischemia on ecg  #pAF on Warfarin w/PM  #Mild macrocytosis w/o anemia  #hx of colon and bladder ca in sustained remission    Plan:  -Hold warfarin for now pending any surgical procedure, no mechanical valve hx thus do not have to bridge with heparin   -Vit B12 level added to am labs  -Agree with NG to LWS for now, further management per primary team  -Patient high surgical risk, no evidence of HF currently and no ischemia on ecg thus safe to proceed with any urgent procedure w/o TTE first however will added routine TTE to f/u last imaging 2021 with mrEF          VTE Prophylaxis:   Mechanical Order History:        Ordered         06/09/24 0935  Place Sequential Compression Device  Once            06/09/24 0935  Maintain Sequential Compression Device  Continuous                          Pharmalogical Order History:       None            Code Status and Medical Interventions:   Ordered at: 06/09/24 0852     Level Of Support Discussed With:    Patient     Code Status (Patient has no pulse and is not breathing):    CPR (Attempt to Resuscitate)     Medical Interventions (Patient has pulse or is breathing):    Full Support        Disposition:  service will follow    Joselito Durbin MD  [facility]  06/09/24  14:45 EDT

## 2024-06-09 NOTE — PLAN OF CARE
Goal Outcome Evaluation:  Plan of Care Reviewed With: patient        Progress: no change  Outcome Evaluation: Pt resting in bed at this time. Pt has been up and ambulated this shift. Wound care completed per orders. Pt has refused to bath this shift. Pt has continually refused catheter care as well. Pt and family has been educated multiple times about the importance of bathing and catheter care. Pt and family state he is too tired and not up to either activities. Pts family has asked for pain medication around the clock this shift. PRN medication given. No acute changes at this time. Will continue with plan of care.

## 2024-06-09 NOTE — ED PROVIDER NOTES
Subjective   History of Present Illness  Patient is a 89-year-old male with significant past medical history positive for arthritis, a flutter, BPH, CAD, history of colon cancer with resection, hypertension, osteoporosis, PVD presenting to the ER complaints of periumbilical abdominal pain.  Patient reports it started a couple hours prior to arrival.  Patient reports nausea but no vomiting.  Patient has no known sick contacts or recent foreign travel or suspicious to food intake.  Patient denies any diarrhea.  Patient had bowel movement 2 hours prior to arrival.  Patient denies chest pain, cough, fever, headache, known sick contacts or any additional symptoms at this time.    History provided by:  Patient   used: No        Review of Systems   Constitutional: Negative.  Negative for fever.   HENT: Negative.     Respiratory: Negative.     Cardiovascular: Negative.  Negative for chest pain.   Gastrointestinal:  Positive for abdominal pain and nausea.   Endocrine: Negative.    Genitourinary: Negative.  Negative for dysuria.   Skin: Negative.    Neurological: Negative.    Psychiatric/Behavioral: Negative.     All other systems reviewed and are negative.      Past Medical History:   Diagnosis Date    Arthritis     Atrial flutter     Back pain     Benign prostatic hyperplasia     Bladder tumor     Cancer     [id number]    Chronic systolic heart failure 9/14/2021    Colon cancer     Coronary artery disease     DVT (deep venous thrombosis)     r leg    Elevated cholesterol     Heart attack     Hypertension     Numbness     feet/hands    Osteoporosis     PVD (peripheral vascular disease)     Rheumatoid arthritis     Stroke     Ventricular tachycardia        No Known Allergies    Past Surgical History:   Procedure Laterality Date    CATARACT EXTRACTION Left     CHOLECYSTECTOMY      COLON RESECTION      COLONOSCOPY      HEMORROIDECTOMY      HERNIA REPAIR      left inguinal/umbilical    HIP  ARTHROPLASTY Right     INSERT / REPLACE / REMOVE PACEMAKER      JOINT REPLACEMENT      PACEMAKER IMPLANTATION      PROSTATE SURGERY      TRANSURETHRAL RESECTION OF BLADDER TUMOR N/A 4/18/2018    Procedure: CYSTOSCOPY TRANSURETHRAL RESECTION OF SMALL BLADDER TUMOR;  Surgeon: Alfonso Collins MD;  Location: [facility];  Service: Urology    TRANSURETHRAL RESECTION OF BLADDER TUMOR N/A 8/29/2018    Procedure: CYSTOSCOPY TRANSURETHRAL RESECTION OF BLADDER TUMOR;  Surgeon: Alfonso Collins MD;  Location: [facility];  Service: Urology       Family History   Problem Relation Age of Onset    No Known Problems Mother     No Known Problems Father     Heart disease Brother        Social History     Socioeconomic History    Marital status:    Tobacco Use    Smoking status: Former     Types: Cigarettes     Passive exposure: Past    Smokeless tobacco: Never   Vaping Use    Vaping status: Never Used   Substance and Sexual Activity    Alcohol use: Yes     Comment: Occasional few times a year    Drug use: No    Sexual activity: Defer           Objective   Physical Exam  Vitals and nursing note reviewed.   Constitutional:       General: He is not in acute distress.     Appearance: He is well-developed. He is not diaphoretic.   HENT:      Head: Normocephalic and atraumatic.      Right Ear: External ear normal.      Left Ear: External ear normal.      Nose: Nose normal.   Eyes:      Conjunctiva/sclera: Conjunctivae normal.      Pupils: Pupils are equal, round, and reactive to light.   Neck:      Vascular: No JVD.      Trachea: No tracheal deviation.   Cardiovascular:      Rate and Rhythm: Normal rate and regular rhythm.      Heart sounds: Normal heart sounds. No murmur heard.  Pulmonary:      Effort: Pulmonary effort is normal. No respiratory distress.      Breath sounds: Normal breath sounds. No wheezing.   Abdominal:      General: Bowel sounds are normal.      Palpations: Abdomen is soft.      Tenderness: There is no  abdominal tenderness in the periumbilical area.   Musculoskeletal:         General: No deformity. Normal range of motion.      Cervical back: Normal range of motion and neck supple.   Skin:     General: Skin is warm and dry.      Coloration: Skin is not pale.      Findings: No erythema or rash.   Neurological:      Mental Status: He is alert and oriented to person, place, and time.      Cranial Nerves: No cranial nerve deficit.   Psychiatric:         Behavior: Behavior normal.         Thought Content: Thought content normal.         Procedures       Results for orders placed or performed during the hospital encounter of 06/09/24   Comprehensive Metabolic Panel    Specimen: Blood   Result Value Ref Range    Glucose 107 (H) 65 - 99 mg/dL    BUN 11 8 - 23 mg/dL    Creatinine 1.14 0.76 - 1.27 mg/dL    Sodium 140 136 - 145 mmol/L    Potassium 4.6 3.5 - 5.2 mmol/L    Chloride 101 98 - 107 mmol/L    CO2 26.6 22.0 - 29.0 mmol/L    Calcium 9.3 8.2 - 9.6 mg/dL    Total Protein 6.3 6.0 - 8.5 g/dL    Albumin 3.8 3.5 - 5.2 g/dL    ALT (SGPT) 9 1 - 41 U/L    AST (SGOT) 18 1 - 40 U/L    Alkaline Phosphatase 91 39 - 117 U/L    Total Bilirubin 1.2 0.0 - 1.2 mg/dL    Globulin 2.5 gm/dL    A/G Ratio 1.5 g/dL    BUN/Creatinine Ratio 9.6 7.0 - 25.0    Anion Gap 12.4 5.0 - 15.0 mmol/L    eGFR 60.0 (L) >60.0 mL/min/1.73   Protime-INR    Specimen: Blood   Result Value Ref Range    Protime 18.6 (H) 12.1 - 14.7 Seconds    INR 1.54 (H) 0.90 - 1.10   aPTT    Specimen: Blood   Result Value Ref Range    PTT 32.0 26.5 - 34.5 seconds   Urinalysis With Microscopic If Indicated (No Culture) - Urine, Clean Catch    Specimen: Urine, Clean Catch   Result Value Ref Range    Color, UA Yellow Yellow, Straw    Appearance, UA Clear Clear    pH, UA 7.0 5.0 - 8.0    Specific Gravity, UA 1.012 1.005 - 1.030    Glucose, UA Negative Negative    Ketones, UA 15 mg/dL (1+) (A) Negative    Bilirubin, UA Negative Negative    Blood, UA Negative Negative    Protein, UA  Negative Negative    Leuk Esterase, UA Negative Negative    Nitrite, UA Negative Negative    Urobilinogen, UA 0.2 E.U./dL 0.2 - 1.0 E.U./dL   BNP    Specimen: Blood   Result Value Ref Range    proBNP 986.4 0.0 - 1,800.0 pg/mL   High Sensitivity Troponin T    Specimen: Blood   Result Value Ref Range    HS Troponin T 38 (H) <22 ng/L   C-reactive Protein    Specimen: Blood   Result Value Ref Range    C-Reactive Protein 0.31 0.00 - 0.50 mg/dL   Sedimentation Rate    Specimen: Blood   Result Value Ref Range    Sed Rate 12 0 - 20 mm/hr   Lactic Acid, Plasma    Specimen: Blood   Result Value Ref Range    Lactate 1.6 0.5 - 2.0 mmol/L   CBC Auto Differential    Specimen: Blood   Result Value Ref Range    WBC 5.80 3.40 - 10.80 10*3/mm3    RBC 3.77 (L) 4.14 - 5.80 10*6/mm3    Hemoglobin 13.0 13.0 - 17.7 g/dL    Hematocrit 38.8 37.5 - 51.0 %    .9 (H) 79.0 - 97.0 fL    MCH 34.5 (H) 26.6 - 33.0 pg    MCHC 33.5 31.5 - 35.7 g/dL    RDW 12.6 12.3 - 15.4 %    RDW-SD 48.2 37.0 - 54.0 fl    MPV 9.8 6.0 - 12.0 fL    Platelets 167 140 - 450 10*3/mm3    Neutrophil % 76.0 42.7 - 76.0 %    Lymphocyte % 12.6 (L) 19.6 - 45.3 %    Monocyte % 9.8 5.0 - 12.0 %    Eosinophil % 1.2 0.3 - 6.2 %    Basophil % 0.2 0.0 - 1.5 %    Immature Grans % 0.2 0.0 - 0.5 %    Neutrophils, Absolute 4.41 1.70 - 7.00 10*3/mm3    Lymphocytes, Absolute 0.73 0.70 - 3.10 10*3/mm3    Monocytes, Absolute 0.57 0.10 - 0.90 10*3/mm3    Eosinophils, Absolute 0.07 0.00 - 0.40 10*3/mm3    Basophils, Absolute 0.01 0.00 - 0.20 10*3/mm3    Immature Grans, Absolute 0.01 0.00 - 0.05 10*3/mm3    nRBC 0.0 0.0 - 0.2 /100 WBC   High Sensitivity Troponin T 2Hr    Specimen: Blood   Result Value Ref Range    HS Troponin T 38 (H) <22 ng/L    Troponin T Delta 0 >=-4 - <+4 ng/L   ECG 12 Lead Chest Pain   Result Value Ref Range    QT Interval 478 ms    QTC Interval 497 ms   ECG 12 Lead Chest Pain   Result Value Ref Range    QT Interval 500 ms    QTC Interval 500 ms   Green Top (Gel)    Result Value Ref Range    Extra Tube Hold for add-ons.    Lavender Top   Result Value Ref Range    Extra Tube hold for add-on    Gold Top - SST   Result Value Ref Range    Extra Tube Hold for add-ons.    Light Blue Top   Result Value Ref Range    Extra Tube Hold for add-ons.        CT Abdomen Pelvis With Contrast   Final Result       1.  Dilated air and fluid-filled small bowel loops throughout the   abdomen pelvis with multiple air-fluid levels consistent with underlying   partial small bowel obstruction.   2.  Decompressed distal small bowel segments in the right lower   quadrant.   3.  Surgical anastomosis at the distal sigmoid colon segment with   features of prior partial colon resection.   4.  Cholecystectomy with ectatic common bile duct   5.  Mild chronic bilateral renal cortical volume loss   6.  Significant thoracolumbar junction scoliosis with levoconvex   curvature and mild dextroconvex curvature at the lower lumbar spine.   7.  Significant multilevel degenerative disc disease throughout the   lumbar spine with endplate and facet hypertrophic changes.   8.  Fluid-filled distended bladder.       This report was finalized on 6/9/2024 5:38 AM by Cuba Gomez MD.          CT Angiogram Chest Pulmonary Embolism   Final Result       1.  Bronchial inflammation.   2.  No lobar consolidation to suggest pneumonia.   3.  No interstitial edema, pleural effusion, or pneumothorax.   4.  Mild enlarged heart size   5.  Ectatic ascending thoracic aorta up to 4.33 cm in diameter.   6.  No pulmonary embolus.       This report was finalized on 6/9/2024 5:33 AM by Cuba Gomez MD.          XR Chest 1 View   Final Result       1.  Coarsened bronchovascular pattern to the lungs.   2.  No lobar consolidation or edema.   3.  Normal heart size.   4.  Left-sided pacemaker in place.   5.  Mild dextroconvex curve at the mid thoracic spine.   6.  No free air in the upper abdomen.       This report was finalized on 6/9/2024 4:18  AM by Cuba Gomez MD.                ED Course  ED Course as of 06/09/24 0600   Sun Jun 09, 2024 0319 EKG at 0221 shows an atrial sensed ventricular paced rhythm, rate 65.  AL interval 272, QRS duration 188, QTc 497 ms. [CM]   0447 XR Chest 1 View [SM]   0542 CT Angiogram Chest Pulmonary Embolism []   0557 Discussed case with Dr. Garcia agrees to admit.  Advised to give 125 of an hour of fluids of normal saline and NG tube.  Patient to be admitted for small bowel obstruction. [SM]      ED Course User Index  [CM] Juve Elmore MD  [SM] aFtou Alegria, APRN                                             Medical Decision Making  Patient is a 89-year-old male with significant past medical history positive for arthritis, a flutter, BPH, CAD, history of colon cancer with resection, hypertension, osteoporosis, PVD presenting to the ER complaints of periumbilical abdominal pain.  Patient reports it started a couple hours prior to arrival.  Patient reports nausea but no vomiting.  Patient has no known sick contacts or recent foreign travel or suspicious to food intake.  Patient denies any diarrhea.  Patient had bowel movement 2 hours prior to arrival.  Patient denies chest pain, cough, fever, headache, known sick contacts or any additional symptoms at this time.    Patient to be admitted for further evaluation and treatment.    Problems Addressed:  Small bowel obstruction: complicated acute illness or injury    Amount and/or Complexity of Data Reviewed  Labs: ordered.  Radiology: ordered. Decision-making details documented in ED Course.  ECG/medicine tests: ordered.    Risk  Prescription drug management.  Decision regarding hospitalization.        Final diagnoses:   Small bowel obstruction       ED Disposition  ED Disposition       ED Disposition   Decision to Admit    Condition   --    Comment   Level of Care: Med/Surg [1]   Diagnosis: Small bowel obstruction [070185]   Admitting Physician: DENNYS GARCIA  ANA [[id number]]   Attending Physician: DENNYS GARCIA [[id number]]   Certification: I Certify That Inpatient Hospital Services Are Medically Necessary For Greater Than 2 Midnights                 No follow-up provider specified.       Medication List      No changes were made to your prescriptions during this visit.            Fatou Alegria, APRN  06/09/24 0600

## 2024-06-10 ENCOUNTER — APPOINTMENT (OUTPATIENT)
Dept: CARDIOLOGY | Facility: HOSPITAL | Age: 89
End: 2024-06-10
Payer: MEDICARE

## 2024-06-10 LAB
ANION GAP SERPL CALCULATED.3IONS-SCNC: 10.2 MMOL/L (ref 5–15)
BH CV ECHO MEAS - AO MAX PG: 4.4 MMHG
BH CV ECHO MEAS - AO MEAN PG: 3 MMHG
BH CV ECHO MEAS - AO ROOT DIAM: 3.7 CM
BH CV ECHO MEAS - AO V2 MAX: 105 CM/SEC
BH CV ECHO MEAS - AO V2 VTI: 24.8 CM
BH CV ECHO MEAS - EDV(CUBED): 94.8 ML
BH CV ECHO MEAS - EDV(MOD-SP4): 64.3 ML
BH CV ECHO MEAS - EF(MOD-SP4): 48.4 %
BH CV ECHO MEAS - ESV(CUBED): 90.5 ML
BH CV ECHO MEAS - ESV(MOD-SP4): 33.2 ML
BH CV ECHO MEAS - FS: 1.54 %
BH CV ECHO MEAS - IVS/LVPW: 1.14 CM
BH CV ECHO MEAS - IVSD: 1.58 CM
BH CV ECHO MEAS - LA DIMENSION: 4.5 CM
BH CV ECHO MEAS - LAT PEAK E' VEL: 8.1 CM/SEC
BH CV ECHO MEAS - LV DIASTOLIC VOL/BSA (35-75): 28.1 CM2
BH CV ECHO MEAS - LV MASS(C)D: 275.5 GRAMS
BH CV ECHO MEAS - LV SYSTOLIC VOL/BSA (12-30): 14.5 CM2
BH CV ECHO MEAS - LVIDD: 4.6 CM
BH CV ECHO MEAS - LVIDS: 4.5 CM
BH CV ECHO MEAS - LVOT AREA: 5.7 CM2
BH CV ECHO MEAS - LVOT DIAM: 2.7 CM
BH CV ECHO MEAS - LVPWD: 1.38 CM
BH CV ECHO MEAS - MED PEAK E' VEL: 9.7 CM/SEC
BH CV ECHO MEAS - MV A MAX VEL: 49.8 CM/SEC
BH CV ECHO MEAS - MV E MAX VEL: 115 CM/SEC
BH CV ECHO MEAS - MV E/A: 2.31
BH CV ECHO MEAS - RAP SYSTOLE: 10 MMHG
BH CV ECHO MEAS - RVSP: 35.4 MMHG
BH CV ECHO MEAS - SV(MOD-SP4): 31.1 ML
BH CV ECHO MEAS - SVI(MOD-SP4): 13.6 ML/M2
BH CV ECHO MEAS - TR MAX PG: 25.4 MMHG
BH CV ECHO MEAS - TR MAX VEL: 252 CM/SEC
BH CV ECHO MEASUREMENTS AVERAGE E/E' RATIO: 12.92
BUN SERPL-MCNC: 14 MG/DL (ref 8–23)
BUN/CREAT SERPL: 12.5 (ref 7–25)
CALCIUM SPEC-SCNC: 8.8 MG/DL (ref 8.2–9.6)
CHLORIDE SERPL-SCNC: 103 MMOL/L (ref 98–107)
CO2 SERPL-SCNC: 23.8 MMOL/L (ref 22–29)
CREAT SERPL-MCNC: 1.12 MG/DL (ref 0.76–1.27)
DEPRECATED RDW RBC AUTO: 50.6 FL (ref 37–54)
EGFRCR SERPLBLD CKD-EPI 2021: 61.3 ML/MIN/1.73
ERYTHROCYTE [DISTWIDTH] IN BLOOD BY AUTOMATED COUNT: 12.7 % (ref 12.3–15.4)
FOLATE SERPL-MCNC: 8.07 NG/ML (ref 4.78–24.2)
GLUCOSE SERPL-MCNC: 104 MG/DL (ref 65–99)
HCT VFR BLD AUTO: 42.2 % (ref 37.5–51)
HGB BLD-MCNC: 13.4 G/DL (ref 13–17.7)
LEFT ATRIUM VOLUME INDEX: 32.4 ML/M2
MAGNESIUM SERPL-MCNC: 2 MG/DL (ref 1.7–2.3)
MCH RBC QN AUTO: 34.4 PG (ref 26.6–33)
MCHC RBC AUTO-ENTMCNC: 31.8 G/DL (ref 31.5–35.7)
MCV RBC AUTO: 108.2 FL (ref 79–97)
PLATELET # BLD AUTO: 176 10*3/MM3 (ref 140–450)
PMV BLD AUTO: 10.1 FL (ref 6–12)
POTASSIUM SERPL-SCNC: 4 MMOL/L (ref 3.5–5.2)
RBC # BLD AUTO: 3.9 10*6/MM3 (ref 4.14–5.8)
SODIUM SERPL-SCNC: 137 MMOL/L (ref 136–145)
TSH SERPL DL<=0.05 MIU/L-ACNC: 1.25 UIU/ML (ref 0.27–4.2)
VIT B12 BLD-MCNC: 532 PG/ML (ref 211–946)
WBC NRBC COR # BLD AUTO: 7.22 10*3/MM3 (ref 3.4–10.8)

## 2024-06-10 PROCEDURE — 82607 VITAMIN B-12: CPT | Performed by: STUDENT IN AN ORGANIZED HEALTH CARE EDUCATION/TRAINING PROGRAM

## 2024-06-10 PROCEDURE — 83735 ASSAY OF MAGNESIUM: CPT | Performed by: STUDENT IN AN ORGANIZED HEALTH CARE EDUCATION/TRAINING PROGRAM

## 2024-06-10 PROCEDURE — 84443 ASSAY THYROID STIM HORMONE: CPT | Performed by: INTERNAL MEDICINE

## 2024-06-10 PROCEDURE — 85027 COMPLETE CBC AUTOMATED: CPT | Performed by: SURGERY

## 2024-06-10 PROCEDURE — 82746 ASSAY OF FOLIC ACID SERUM: CPT | Performed by: INTERNAL MEDICINE

## 2024-06-10 PROCEDURE — 93306 TTE W/DOPPLER COMPLETE: CPT | Performed by: INTERNAL MEDICINE

## 2024-06-10 PROCEDURE — 25810000003 SODIUM CHLORIDE 0.9 % SOLUTION: Performed by: SURGERY

## 2024-06-10 PROCEDURE — 93306 TTE W/DOPPLER COMPLETE: CPT

## 2024-06-10 PROCEDURE — 99231 SBSQ HOSP IP/OBS SF/LOW 25: CPT | Performed by: INTERNAL MEDICINE

## 2024-06-10 PROCEDURE — 25010000002 MORPHINE PER 10 MG: Performed by: SURGERY

## 2024-06-10 PROCEDURE — 80048 BASIC METABOLIC PNL TOTAL CA: CPT | Performed by: SURGERY

## 2024-06-10 PROCEDURE — 99231 SBSQ HOSP IP/OBS SF/LOW 25: CPT | Performed by: SURGERY

## 2024-06-10 RX ADMIN — MORPHINE SULFATE 2 MG: 2 INJECTION, SOLUTION INTRAMUSCULAR; INTRAVENOUS at 11:26

## 2024-06-10 RX ADMIN — MORPHINE SULFATE 2 MG: 2 INJECTION, SOLUTION INTRAMUSCULAR; INTRAVENOUS at 14:28

## 2024-06-10 RX ADMIN — MORPHINE SULFATE 2 MG: 2 INJECTION, SOLUTION INTRAMUSCULAR; INTRAVENOUS at 00:19

## 2024-06-10 RX ADMIN — MORPHINE SULFATE 2 MG: 2 INJECTION, SOLUTION INTRAMUSCULAR; INTRAVENOUS at 18:16

## 2024-06-10 RX ADMIN — MORPHINE SULFATE 2 MG: 2 INJECTION, SOLUTION INTRAMUSCULAR; INTRAVENOUS at 03:46

## 2024-06-10 RX ADMIN — SODIUM CHLORIDE 75 ML/HR: 9 INJECTION, SOLUTION INTRAVENOUS at 22:56

## 2024-06-10 RX ADMIN — MORPHINE SULFATE 2 MG: 2 INJECTION, SOLUTION INTRAMUSCULAR; INTRAVENOUS at 21:01

## 2024-06-10 RX ADMIN — MORPHINE SULFATE 2 MG: 2 INJECTION, SOLUTION INTRAMUSCULAR; INTRAVENOUS at 06:28

## 2024-06-10 RX ADMIN — PANTOPRAZOLE SODIUM 40 MG: 40 INJECTION, POWDER, FOR SOLUTION INTRAVENOUS at 05:34

## 2024-06-10 NOTE — PROGRESS NOTES
Chief complaint: Abdominal pain    No acute events overnight.  Patient abdominal pain much improved.  Abdomen is soft and nontender on examination.  No flatus or bowel movements reported.  NG tube output 700 over 24 hours bilious    No acute distress, alert and orient x 3  Clear to auscultation bilaterally  Abdomen soft, mild tenderness to palpation without rebound or guarding, nondistended    89-year-old male presenting with abdominal pain and findings concerning for adhesive related bowel obstruction.  -Continue NG tube and n.p.o. status  -If failure to progress over the next 24 hours patient will either undergo exploration or Gastrografin challenge to determine if obstruction present  -Okay to ambulate from bed to chair today.  -Continue to hold anticoagulation  -Appreciate hospitalists assistance with this patient

## 2024-06-10 NOTE — NURSING NOTE
Patient with small wound to his right lower leg.  Family at bedside states it began as a blister that has since ruptured.  Base red and dry at this time. Will leave open to air.      Dionicio score 19.

## 2024-06-10 NOTE — PROGRESS NOTES
[facility] HOSPITALIST PROGRESS NOTE     Patient Identification:  Name:  Alberto Guzman  Age:  89 y.o.  Sex:  male  :  3/29/1931  MRN:  [id number]  Visit Number:  [id number]  ROOM: [address] [place] [address]     Primary Care Provider:  Lianet Dia APRN    Length of stay in inpatient status:  1    Subjective     Chief Compliant:    Chief Complaint   Patient presents with    Abdominal Pain       History of Presenting Illness:    Patient notes he has not passed gas or stool but abdominal discomfort improved. Abdomen no longer tender. He is still draining bile looking material from NG tube.     ROS:  Otherwise 10 point ROS negative other than documented above in HPI.     Objective     Current Hospital Meds:pantoprazole, 40 mg, Intravenous, Q AM    sodium chloride, 75 mL/hr, Last Rate: 75 mL/hr (24 1801)        Current Antimicrobial Therapy:  Anti-Infectives (From admission, onward)      None          Current Diuretic Therapy:  Diuretics (From admission, onward)      None          ----------------------------------------------------------------------------------------------------------------------  Vital Signs:  Temp:  [98.2 °F (36.8 °C)-98.8 °F (37.1 °C)] 98.8 °F (37.1 °C)  Heart Rate:  [63-70] 70  Resp:  [17-18] 18  BP: (135-171)/(58-77) 135/60  SpO2:  [95 %-98 %] 95 %  on   ;   Device (Oxygen Therapy): room air  Body mass index is 26.3 kg/m².    Wt Readings from Last 3 Encounters:   24 98 kg (216 lb 1 oz)   24 102 kg (224 lb)   24 101 kg (223 lb 6.4 oz)     Intake & Output (last 3 days)          07 07 07 07 0701  06/10 0700 06/10 07 0700    P.O.   0 0    I.V. (mL/kg)   1550 (15.8)     Total Intake(mL/kg)   1550 (15.8) 0 (0)    Urine (mL/kg/hr)   300 (0.1)     Emesis/NG output   1350     Total Output   1650     Net   -100 0                  NPO Diet NPO Type: Strict  NPO  ----------------------------------------------------------------------------------------------------------------------  Physical exam:  Constitutional:  Well-developed and well-nourished.  No respiratory distress.      HENT:  Head:  Normocephalic and atraumatic.  Mouth:  Moist mucous membranes.    Eyes:  Conjunctivae and EOM are normal. No scleral icterus.    Neck:  Neck supple.  No JVD present.    Cardiovascular:  Normal rate, regular rhythm and normal heart sounds with no murmur.  Pulmonary/Chest:  No respiratory distress, no wheezes, no crackles, with normal breath sounds and good air movement.  Abdominal:  Soft.  Bowel sounds are normal.  No distension and no tenderness.   Musculoskeletal:  No edema, no tenderness, and no deformity.  No red or swollen joints anywhere.    Neurological:  Alert and oriented to person, place, and time.  No cranial nerve deficit.  No tongue deviation.  No facial droop.  No slurred speech.   Skin:  Skin is warm and dry. No rash noted. No pallor.   Peripheral vascular:  Pulses in all 4 extremities with no clubbing, no cyanosis, no edema.  ----------------------------------------------------------------------------------------------------------------------  Tele:    ----------------------------------------------------------------------------------------------------------------------  Results from last 7 days   Lab Units 06/10/24  0140 06/09/24  0229   CRP mg/dL  --  0.31   LACTATE mmol/L  --  1.6   WBC 10*3/mm3 7.22 5.80   HEMOGLOBIN g/dL 13.4 13.0   HEMATOCRIT % 42.2 38.8   MCV fL 108.2* 102.9*   MCHC g/dL 31.8 33.5   PLATELETS 10*3/mm3 176 167   INR   --  1.54*         Results from last 7 days   Lab Units 06/10/24  0140 06/09/24  0229   SODIUM mmol/L 137 140   POTASSIUM mmol/L 4.0 4.6   MAGNESIUM mg/dL 2.0  --    CHLORIDE mmol/L 103 101   CO2 mmol/L 23.8 26.6   BUN mg/dL 14 11   CREATININE mg/dL 1.12 1.14   CALCIUM mg/dL 8.8 9.3   GLUCOSE mg/dL 104* 107*   ALBUMIN g/dL  --  3.8  "  BILIRUBIN mg/dL  --  1.2   ALK PHOS U/L  --  91   AST (SGOT) U/L  --  18   ALT (SGPT) U/L  --  9   Estimated Creatinine Clearance: 57.1 mL/min (by C-G formula based on SCr of 1.12 mg/dL).  No results found for: \"AMMONIA\"  Results from last 7 days   Lab Units 06/09/24  0429 06/09/24 0229   HSTROP T ng/L 38* 38*     Results from last 7 days   Lab Units 06/09/24 0229   PROBNP pg/mL 986.4         No results found for: \"HGBA1C\", \"POCGLU\"  Lab Results   Component Value Date    TSH 1.250 06/10/2024     No results found for: \"PREGTESTUR\", \"PREGSERUM\", \"HCG\", \"HCGQUANT\"  Pain Management Panel           No data to display              Brief Urine Lab Results  (Last result in the past 365 days)        Color   Clarity   Blood   Leuk Est   Nitrite   Protein   CREAT   Urine HCG        06/09/24 0953 Yellow   Clear   Negative   Negative   Negative   Negative                 No results found for: \"BLOODCX\"  No results found for: \"URINECX\"  No results found for: \"WOUNDCX\"  No results found for: \"STOOLCX\"  No results found for: \"RESPCX\"  No results found for: \"AFBCX\"  Results from last 7 days   Lab Units 06/09/24 0229   LACTATE mmol/L 1.6   SED RATE mm/hr 12   CRP mg/dL 0.31       I have personally looked at the labs and they are summarized above.  ----------------------------------------------------------------------------------------------------------------------  Detailed radiology reports for the last 24 hours:    Imaging Results (Last 24 Hours)       ** No results found for the last 24 hours. **          Assessment & Plan    #Acute early SBO  #Troponin elevation likely secondary to demand ischemia, no delta and no ischemia on ecg  #pAF on Warfarin w/PM  #Mild macrocytosis w/o anemia  #hx of colon and bladder ca in sustained remission     Plan:  -Hold warfarin for now pending any surgical procedure, no mechanical valve hx thus do not have to bridge with heparin   -Vit B12, folate, TSH all wnl.   -Agree with NG to LWS for now, " further management per primary team. Gastograffin enema being considered.   -Patient high surgical risk, no evidence of HF currently and no ischemia on ecg thus no further risk stratification required as per surgery. Routine TTE ordered.     Medicine will continue to follow. Please call with any questions.     VTE Prophylaxis:   Mechanical Order History:        Ordered        06/09/24 0935  Place Sequential Compression Device  Once            06/09/24 0935  Maintain Sequential Compression Device  Continuous                          Pharmalogical Order History:       None            Joseph Barnett MD  [facility]  06/10/24  17:26 EDT

## 2024-06-10 NOTE — CASE MANAGEMENT/SOCIAL WORK
Discharge Planning Assessment  [facility]     Patient Name: Alberto Guzman  MRN: [id number]  Today's Date: 6/10/2024    Admit Date: 6/9/2024    Plan: SS received a consult for D/C planning per protocol r/t age. SS spoke with pt and daughter Krystin at bedside on this date. Pt lives alone. PCP is Lianet Garcia. Pt does not utilize home health services at this time. Pt has straight cane used PRN via unknown provider. Pt has POA and living will (not on file). Pt plans to return home at discharge with daughter Krystin to provide transportation. SS to follow and assist with discharge planning.   Discharge Needs Assessment       Row Name 06/10/24 1721       Living Environment    People in Home alone    Current Living Arrangements home    Potentially Unsafe Housing Conditions none    Primary Care Provided by self    Provides Primary Care For no one    Family Caregiver if Needed child(ruma), adult    Quality of Family Relationships helpful;involved;supportive    Able to Return to Prior Arrangements yes       Resource/Environmental Concerns    Resource/Environmental Concerns none    Transportation Concerns none       Transition Planning    Patient/Family Anticipates Transition to home with family    Patient/Family Anticipated Services at Transition none    Transportation Anticipated family or friend will provide       Discharge Needs Assessment    Equipment Currently Used at Home cane, straight    Anticipated Changes Related to Illness none    Equipment Needed After Discharge none           Discharge Plan       Row Name 06/10/24 0298       Plan    Plan SS received a consult for D/C planning per protocol r/t age. SS spoke with pt and daughter Krystin at bedside on this date. Pt lives alone. PCP is Lianet Garcia. Pt does not utilize home health services at this time. Pt has straight cane used PRN via unknown provider. Pt has POA and living will (not on file). Pt plans to return home at discharge with daughter Krystin to provide  transportation. SS to follow and assist with discharge planning.    Patient/Family in Agreement with Plan yes             Expected Discharge Date and Time       Expected Discharge Date Expected Discharge Time    Jun 12, 2024            Demographic Summary       Row Name 06/10/24 1745       General Information    Admission Type inpatient    Referral Source nursing    Reason for Consult discharge planning  SS received a consult for D/C planning per protocol r/t age.                 ANATOLY OrlandoW

## 2024-06-10 NOTE — PLAN OF CARE
Atarax 10 mg 1 or 2 qhs prn sleep was sent to [facility] Goal Outcome Evaluation:  Plan of Care Reviewed With: patient        Progress: no change  Outcome Evaluation: Patient resting in bed. Patient is alert and oriented. NG tube at 65, to low wall suction. VSS on RA. Patient c/o of pain, PRN pain meds per orders. No acute changes. Will continue with plan of care.

## 2024-06-10 NOTE — PLAN OF CARE
Goal Outcome Evaluation:  Plan of Care Reviewed With: patient        Progress: no change  Outcome Evaluation: Pt resting in bed at this time. NG tube at 65 connected to low wall suction. Pt complains of pain to which PRN medication was given. Michael catheter pulled this shift. Pt was bathed, wound care completed, and ambulated to chair this shift. No acute changes at this time. Will continue with plan of care.

## 2024-06-11 PROCEDURE — 25810000003 SODIUM CHLORIDE 0.9 % SOLUTION: Performed by: SURGERY

## 2024-06-11 PROCEDURE — 99238 HOSP IP/OBS DSCHRG MGMT 30/<: CPT | Performed by: SURGERY

## 2024-06-11 PROCEDURE — 25010000002 MORPHINE PER 10 MG: Performed by: SURGERY

## 2024-06-11 PROCEDURE — 99232 SBSQ HOSP IP/OBS MODERATE 35: CPT | Performed by: INTERNAL MEDICINE

## 2024-06-11 RX ORDER — ASCORBIC ACID 500 MG
500 TABLET ORAL DAILY
Status: DISCONTINUED | OUTPATIENT
Start: 2024-06-11 | End: 2024-06-14

## 2024-06-11 RX ORDER — PANTOPRAZOLE SODIUM 40 MG/1
40 TABLET, DELAYED RELEASE ORAL DAILY
Status: DISCONTINUED | OUTPATIENT
Start: 2024-06-11 | End: 2024-06-14

## 2024-06-11 RX ORDER — METOPROLOL SUCCINATE 50 MG/1
50 TABLET, EXTENDED RELEASE ORAL DAILY
Status: DISCONTINUED | OUTPATIENT
Start: 2024-06-11 | End: 2024-06-14

## 2024-06-11 RX ORDER — ASPIRIN 81 MG/1
81 TABLET ORAL DAILY
Status: DISCONTINUED | OUTPATIENT
Start: 2024-06-11 | End: 2024-06-14

## 2024-06-11 RX ORDER — HYDROCODONE BITARTRATE AND ACETAMINOPHEN 10; 325 MG/1; MG/1
1 TABLET ORAL EVERY 6 HOURS PRN
Status: DISCONTINUED | OUTPATIENT
Start: 2024-06-11 | End: 2024-06-13

## 2024-06-11 RX ORDER — BUMETANIDE 1 MG/1
2 TABLET ORAL DAILY
Status: DISCONTINUED | OUTPATIENT
Start: 2024-06-11 | End: 2024-06-14

## 2024-06-11 RX ORDER — GABAPENTIN 300 MG/1
600 CAPSULE ORAL DAILY
Status: DISCONTINUED | OUTPATIENT
Start: 2024-06-11 | End: 2024-06-14

## 2024-06-11 RX ORDER — ATORVASTATIN CALCIUM 20 MG/1
20 TABLET, FILM COATED ORAL DAILY
Status: DISCONTINUED | OUTPATIENT
Start: 2024-06-11 | End: 2024-06-14

## 2024-06-11 RX ADMIN — MORPHINE SULFATE 2 MG: 2 INJECTION, SOLUTION INTRAMUSCULAR; INTRAVENOUS at 22:16

## 2024-06-11 RX ADMIN — METOPROLOL SUCCINATE 50 MG: 50 TABLET, EXTENDED RELEASE ORAL at 14:50

## 2024-06-11 RX ADMIN — MORPHINE SULFATE 2 MG: 2 INJECTION, SOLUTION INTRAMUSCULAR; INTRAVENOUS at 07:27

## 2024-06-11 RX ADMIN — PANTOPRAZOLE SODIUM 40 MG: 40 INJECTION, POWDER, FOR SOLUTION INTRAVENOUS at 05:57

## 2024-06-11 RX ADMIN — HYDROCODONE BITARTRATE AND ACETAMINOPHEN 1 TABLET: 10; 325 TABLET ORAL at 15:17

## 2024-06-11 RX ADMIN — PANTOPRAZOLE SODIUM 40 MG: 40 TABLET, DELAYED RELEASE ORAL at 14:50

## 2024-06-11 RX ADMIN — ASPIRIN 81 MG: 81 TABLET, COATED ORAL at 14:50

## 2024-06-11 RX ADMIN — ATORVASTATIN CALCIUM 20 MG: 20 TABLET, FILM COATED ORAL at 14:50

## 2024-06-11 RX ADMIN — OXYCODONE HYDROCHLORIDE AND ACETAMINOPHEN 500 MG: 500 TABLET ORAL at 14:50

## 2024-06-11 RX ADMIN — SODIUM CHLORIDE 75 ML/HR: 9 INJECTION, SOLUTION INTRAVENOUS at 15:19

## 2024-06-11 RX ADMIN — HYDROCODONE BITARTRATE AND ACETAMINOPHEN 1 TABLET: 10; 325 TABLET ORAL at 21:19

## 2024-06-11 RX ADMIN — MORPHINE SULFATE 2 MG: 2 INJECTION, SOLUTION INTRAMUSCULAR; INTRAVENOUS at 10:19

## 2024-06-11 RX ADMIN — MORPHINE SULFATE 2 MG: 2 INJECTION, SOLUTION INTRAMUSCULAR; INTRAVENOUS at 16:28

## 2024-06-11 RX ADMIN — MORPHINE SULFATE 2 MG: 2 INJECTION, SOLUTION INTRAMUSCULAR; INTRAVENOUS at 04:40

## 2024-06-11 RX ADMIN — MORPHINE SULFATE 2 MG: 2 INJECTION, SOLUTION INTRAMUSCULAR; INTRAVENOUS at 00:28

## 2024-06-11 RX ADMIN — GABAPENTIN 600 MG: 300 CAPSULE ORAL at 14:50

## 2024-06-11 RX ADMIN — BUMETANIDE 2 MG: 1 TABLET ORAL at 14:50

## 2024-06-11 NOTE — PLAN OF CARE
Goal Outcome Evaluation:  Plan of Care Reviewed With: patient        Progress: no change  Outcome Evaluation: Patient resting in bed. NG tube remains at 65 connected to low wall suction. Patient c/o of pain, PRN pain meds given per orders. Patient has not voided since having alfredo removed this morning. Have monitored throughout the shift. Bladder scans q 4hrs. PA notified. Will continue with plan of care.       Patient stated that he did pass some flatus tonight.

## 2024-06-11 NOTE — PROGRESS NOTES
[facility] HOSPITALIST PROGRESS NOTE     Patient Identification:  Name:  Alberto Guzman  Age:  89 y.o.  Sex:  male  :  3/29/1931  MRN:  [id number]  Visit Number:  [id number]  ROOM: [address] [place] [address]     Primary Care Provider:  Lianet Dia APRN    Length of stay in inpatient status:  2    Subjective     Chief Compliant:    Chief Complaint   Patient presents with    Abdominal Pain       History of Presenting Illness:    Patient notes feeling better. He has tolerated popcicle and water without difficulty. Family supportive bedside. He is passing gas now but no stool reported.     ROS:  Otherwise 10 point ROS negative other than documented above in HPI.     Objective     Current Hospital Meds:ascorbic acid, 500 mg, Oral, Daily  aspirin, 81 mg, Oral, Daily  atorvastatin, 20 mg, Oral, Daily  bumetanide, 2 mg, Oral, Daily  gabapentin, 600 mg, Oral, Daily  metoprolol succinate XL, 50 mg, Oral, Daily  pantoprazole, 40 mg, Oral, Daily    sodium chloride, 75 mL/hr, Last Rate: 75 mL/hr (24 1519)        Current Antimicrobial Therapy:  Anti-Infectives (From admission, onward)      None          Current Diuretic Therapy:  Diuretics (From admission, onward)      Ordered     Dose/Rate Route Frequency Start Stop    24 1354  bumetanide (BUMEX) tablet 2 mg        Ordering Provider: Joseph Barnett MD    2 mg Oral Daily 24 1445            ----------------------------------------------------------------------------------------------------------------------  Vital Signs:  Temp:  [98 °F (36.7 °C)-98.3 °F (36.8 °C)] 98.3 °F (36.8 °C)  Heart Rate:  [60-70] 69  Resp:  [16-18] 17  BP: (124-140)/(58-69) 140/69  SpO2:  [94 %-97 %] 97 %  on   ;   Device (Oxygen Therapy): room air  Body mass index is 26.3 kg/m².    Wt Readings from Last 3 Encounters:   24 98 kg (216 lb 1 oz)   24 102 kg (224 lb)   24 101 kg (223 lb 6.4 oz)     Intake & Output (last 3 days)          0701   0700  06/09 0701  06/10 0700 06/10 0701 06/11 0700 06/11 0701 06/12 0700    P.O.  0 0 0    I.V. (mL/kg)  1550 (15.8)      Total Intake(mL/kg)  1550 (15.8) 0 (0) 0 (0)    Urine (mL/kg/hr)  300 (0.1)  500 (0.5)    Emesis/NG output  1350 950     Stool   0     Total Output  1650 950 500    Net  -100 -950 -500            Stool Unmeasured Occurrence   0 x           Diet: Liquid; Clear Liquid; Fluid Consistency: Thin (IDDSI 0)  ----------------------------------------------------------------------------------------------------------------------  Physical exam:  Constitutional:  Well-developed and well-nourished.  No respiratory distress.      HENT:  Head:  Normocephalic and atraumatic.  Mouth:  Moist mucous membranes.    Eyes:  Conjunctivae and EOM are normal. No scleral icterus.    Neck:  Neck supple.  No JVD present.    Cardiovascular:  Normal rate, regular rhythm and normal heart sounds with no murmur.  Pulmonary/Chest:  No respiratory distress, no wheezes, no crackles, with normal breath sounds and good air movement.  Abdominal:  Soft.  Bowel sounds are normal.  No distension and no tenderness.   Musculoskeletal:  No edema, no tenderness, and no deformity.  No red or swollen joints anywhere.    Neurological:  Alert and oriented to person, place, and time.  No cranial nerve deficit.  No tongue deviation.  No facial droop.  No slurred speech.   Skin:  Skin is warm and dry. No rash noted. No pallor.   Peripheral vascular:  Pulses in all 4 extremities with no clubbing, no cyanosis, no edema.  ----------------------------------------------------------------------------------------------------------------------  Tele:    ----------------------------------------------------------------------------------------------------------------------  Results from last 7 days   Lab Units 06/10/24  0140 06/09/24  0229   CRP mg/dL  --  0.31   LACTATE mmol/L  --  1.6   WBC 10*3/mm3 7.22 5.80   HEMOGLOBIN g/dL 13.4 13.0   HEMATOCRIT % 42.2 38.8  "  MCV fL 108.2* 102.9*   MCHC g/dL 31.8 33.5   PLATELETS 10*3/mm3 176 167   INR   --  1.54*         Results from last 7 days   Lab Units 06/10/24  0140 06/09/24 0229   SODIUM mmol/L 137 140   POTASSIUM mmol/L 4.0 4.6   MAGNESIUM mg/dL 2.0  --    CHLORIDE mmol/L 103 101   CO2 mmol/L 23.8 26.6   BUN mg/dL 14 11   CREATININE mg/dL 1.12 1.14   CALCIUM mg/dL 8.8 9.3   GLUCOSE mg/dL 104* 107*   ALBUMIN g/dL  --  3.8   BILIRUBIN mg/dL  --  1.2   ALK PHOS U/L  --  91   AST (SGOT) U/L  --  18   ALT (SGPT) U/L  --  9   Estimated Creatinine Clearance: 57.1 mL/min (by C-G formula based on SCr of 1.12 mg/dL).  No results found for: \"AMMONIA\"  Results from last 7 days   Lab Units 06/09/24  0429 06/09/24 0229   HSTROP T ng/L 38* 38*     Results from last 7 days   Lab Units 06/09/24 0229   PROBNP pg/mL 986.4         No results found for: \"HGBA1C\", \"POCGLU\"  Lab Results   Component Value Date    TSH 1.250 06/10/2024     No results found for: \"PREGTESTUR\", \"PREGSERUM\", \"HCG\", \"HCGQUANT\"  Pain Management Panel           No data to display              Brief Urine Lab Results  (Last result in the past 365 days)        Color   Clarity   Blood   Leuk Est   Nitrite   Protein   CREAT   Urine HCG        06/09/24 0953 Yellow   Clear   Negative   Negative   Negative   Negative                 No results found for: \"BLOODCX\"  No results found for: \"URINECX\"  No results found for: \"WOUNDCX\"  No results found for: \"STOOLCX\"  No results found for: \"RESPCX\"  No results found for: \"AFBCX\"  Results from last 7 days   Lab Units 06/09/24 0229   LACTATE mmol/L 1.6   SED RATE mm/hr 12   CRP mg/dL 0.31       I have personally looked at the labs and they are summarized above.  ----------------------------------------------------------------------------------------------------------------------  Detailed radiology reports for the last 24 hours:    Imaging Results (Last 24 Hours)       ** No results found for the last 24 hours. **          Assessment & " Plan      #Acute early SBO  #Troponin elevation likely secondary to demand ischemia, no delta and no ischemia on ecg  #pAF on Warfarin w/PM  #Mild macrocytosis w/o anemia  #hx of colon and bladder ca in sustained remission     Plan:  -Hold warfarin for now pending any surgical procedure, no mechanical valve hx thus do not have to bridge with heparin. Will restart coumadin prior to discharge.   -Vit B12, folate, TSH all wnl.   -Agree diet advancement as per primary team.   -Patient high surgical risk, no evidence of HF currently and no ischemia on ecg thus no further risk stratification required as per surgery.   - Routine TTE ordered revealing EF has dropped from 46-50% to 36-40% in the last 3 years. Outpatient cardiology f/u.      Medicine will continue to follow. Please call with any questions.        Joseph Barnett MD  [facility]  06/11/24  18:03 EDT

## 2024-06-11 NOTE — PROGRESS NOTES
Chief complaint: Abdominal pain     No acute events overnight.  Patient abdominal pain much improved.  Abdomen is soft and nontender on examination.  Patient with flatus overnight.  Residual with NG minimal after 4 hour clamp time     No acute distress, alert and orient x 3  Clear to auscultation bilaterally  Abdomen soft, mild tenderness to palpation without rebound or guarding, nondistended     89-year-old male presenting with abdominal pain and findings concerning for adhesive related bowel obstruction.  -D/C NG  -clear liquids   4 = No assist / stand by assistance

## 2024-06-11 NOTE — PLAN OF CARE
Goal Outcome Evaluation:  Plan of Care Reviewed With: patient        Progress: no change  Outcome Evaluation: Pt resting in bed at this time. NG removed this shift. Michael placed this morning. Pt complains of pain to which PRN medication was given. Pt has been gotten up to chair this shift. Wound care completed per orders. Refused to bath this shift. Pt educated. Allowed for catheter care to be completed. Will continue with plan of care.

## 2024-06-12 ENCOUNTER — ANESTHESIA EVENT (OUTPATIENT)
Dept: PERIOP | Facility: HOSPITAL | Age: 89
End: 2024-06-12
Payer: MEDICARE

## 2024-06-12 ENCOUNTER — ANESTHESIA (OUTPATIENT)
Dept: PERIOP | Facility: HOSPITAL | Age: 89
End: 2024-06-12
Payer: MEDICARE

## 2024-06-12 LAB
ALBUMIN SERPL-MCNC: 3.2 G/DL (ref 3.5–5.2)
ALBUMIN/GLOB SERPL: 1.1 G/DL
ALP SERPL-CCNC: 73 U/L (ref 39–117)
ALT SERPL W P-5'-P-CCNC: <5 U/L (ref 1–41)
ANION GAP SERPL CALCULATED.3IONS-SCNC: 12.8 MMOL/L (ref 5–15)
AST SERPL-CCNC: 19 U/L (ref 1–40)
BASOPHILS # BLD AUTO: 0.02 10*3/MM3 (ref 0–0.2)
BASOPHILS NFR BLD AUTO: 0.4 % (ref 0–1.5)
BILIRUB SERPL-MCNC: 1.1 MG/DL (ref 0–1.2)
BUN SERPL-MCNC: 21 MG/DL (ref 8–23)
BUN/CREAT SERPL: 21.2 (ref 7–25)
CALCIUM SPEC-SCNC: 8.5 MG/DL (ref 8.2–9.6)
CHLORIDE SERPL-SCNC: 104 MMOL/L (ref 98–107)
CO2 SERPL-SCNC: 22.2 MMOL/L (ref 22–29)
CREAT SERPL-MCNC: 0.99 MG/DL (ref 0.76–1.27)
DEPRECATED RDW RBC AUTO: 47.6 FL (ref 37–54)
EGFRCR SERPLBLD CKD-EPI 2021: 71 ML/MIN/1.73
EOSINOPHIL # BLD AUTO: 0.03 10*3/MM3 (ref 0–0.4)
EOSINOPHIL NFR BLD AUTO: 0.6 % (ref 0.3–6.2)
ERYTHROCYTE [DISTWIDTH] IN BLOOD BY AUTOMATED COUNT: 12.3 % (ref 12.3–15.4)
GLOBULIN UR ELPH-MCNC: 2.8 GM/DL
GLUCOSE SERPL-MCNC: 97 MG/DL (ref 65–99)
HCT VFR BLD AUTO: 40.7 % (ref 37.5–51)
HGB BLD-MCNC: 13.3 G/DL (ref 13–17.7)
IMM GRANULOCYTES # BLD AUTO: 0.02 10*3/MM3 (ref 0–0.05)
IMM GRANULOCYTES NFR BLD AUTO: 0.4 % (ref 0–0.5)
LYMPHOCYTES # BLD AUTO: 1.03 10*3/MM3 (ref 0.7–3.1)
LYMPHOCYTES NFR BLD AUTO: 20.2 % (ref 19.6–45.3)
MCH RBC QN AUTO: 34.1 PG (ref 26.6–33)
MCHC RBC AUTO-ENTMCNC: 32.7 G/DL (ref 31.5–35.7)
MCV RBC AUTO: 104.4 FL (ref 79–97)
MONOCYTES # BLD AUTO: 0.76 10*3/MM3 (ref 0.1–0.9)
MONOCYTES NFR BLD AUTO: 14.9 % (ref 5–12)
NEUTROPHILS NFR BLD AUTO: 3.24 10*3/MM3 (ref 1.7–7)
NEUTROPHILS NFR BLD AUTO: 63.5 % (ref 42.7–76)
NRBC BLD AUTO-RTO: 0 /100 WBC (ref 0–0.2)
PLATELET # BLD AUTO: 182 10*3/MM3 (ref 140–450)
PMV BLD AUTO: 9.9 FL (ref 6–12)
POTASSIUM SERPL-SCNC: 3.8 MMOL/L (ref 3.5–5.2)
PROT SERPL-MCNC: 6 G/DL (ref 6–8.5)
RBC # BLD AUTO: 3.9 10*6/MM3 (ref 4.14–5.8)
SODIUM SERPL-SCNC: 139 MMOL/L (ref 136–145)
WBC NRBC COR # BLD AUTO: 5.1 10*3/MM3 (ref 3.4–10.8)

## 2024-06-12 PROCEDURE — 44050 REDUCE BOWEL OBSTRUCTION: CPT | Performed by: SURGERY

## 2024-06-12 PROCEDURE — 25810000003 LACTATED RINGERS PER 1000 ML: Performed by: SURGERY

## 2024-06-12 PROCEDURE — 25010000002 CEFOXITIN PER 1 G: Performed by: SURGERY

## 2024-06-12 PROCEDURE — 25010000002 MORPHINE PER 10 MG: Performed by: SURGERY

## 2024-06-12 PROCEDURE — 25010000002 DEXAMETHASONE PER 1 MG: Performed by: ANESTHESIOLOGY

## 2024-06-12 PROCEDURE — 99232 SBSQ HOSP IP/OBS MODERATE 35: CPT | Performed by: INTERNAL MEDICINE

## 2024-06-12 PROCEDURE — 25810000003 SODIUM CHLORIDE 0.9 % SOLUTION: Performed by: SURGERY

## 2024-06-12 PROCEDURE — 25010000002 GLYCOPYRROLATE 0.4 MG/2ML SOLUTION: Performed by: NURSE ANESTHETIST, CERTIFIED REGISTERED

## 2024-06-12 PROCEDURE — 25010000002 PROPOFOL 200 MG/20ML EMULSION: Performed by: NURSE ANESTHETIST, CERTIFIED REGISTERED

## 2024-06-12 PROCEDURE — 25010000002 ONDANSETRON PER 1 MG: Performed by: NURSE ANESTHETIST, CERTIFIED REGISTERED

## 2024-06-12 PROCEDURE — 80053 COMPREHEN METABOLIC PANEL: CPT | Performed by: INTERNAL MEDICINE

## 2024-06-12 PROCEDURE — 25010000002 PROCHLORPERAZINE 10 MG/2ML SOLUTION: Performed by: SURGERY

## 2024-06-12 PROCEDURE — 0WPF0JZ REMOVAL OF SYNTHETIC SUBSTITUTE FROM ABDOMINAL WALL, OPEN APPROACH: ICD-10-PCS | Performed by: SURGERY

## 2024-06-12 PROCEDURE — 25010000002 NEOSTIGMINE 10 MG/10ML SOLUTION: Performed by: NURSE ANESTHETIST, CERTIFIED REGISTERED

## 2024-06-12 PROCEDURE — 25010000002 FENTANYL CITRATE (PF) 50 MCG/ML SOLUTION: Performed by: NURSE ANESTHETIST, CERTIFIED REGISTERED

## 2024-06-12 PROCEDURE — 85025 COMPLETE CBC W/AUTO DIFF WBC: CPT | Performed by: INTERNAL MEDICINE

## 2024-06-12 PROCEDURE — 25010000002 ROPIVACAINE PER 1 MG: Performed by: ANESTHESIOLOGY

## 2024-06-12 PROCEDURE — 25010000002 SUCCINYLCHOLINE PER 20 MG: Performed by: NURSE ANESTHETIST, CERTIFIED REGISTERED

## 2024-06-12 PROCEDURE — 25010000002 ONDANSETRON PER 1 MG: Performed by: SURGERY

## 2024-06-12 PROCEDURE — 99232 SBSQ HOSP IP/OBS MODERATE 35: CPT | Performed by: SURGERY

## 2024-06-12 PROCEDURE — 0DQV0ZZ REPAIR MESENTERY, OPEN APPROACH: ICD-10-PCS | Performed by: SURGERY

## 2024-06-12 PROCEDURE — 25010000002 MEPERIDINE PER 100 MG: Performed by: NURSE ANESTHETIST, CERTIFIED REGISTERED

## 2024-06-12 RX ORDER — NEOSTIGMINE METHYLSULFATE 1 MG/ML
INJECTION, SOLUTION INTRAVENOUS AS NEEDED
Status: DISCONTINUED | OUTPATIENT
Start: 2024-06-12 | End: 2024-06-12 | Stop reason: SURG

## 2024-06-12 RX ORDER — FENTANYL CITRATE 50 UG/ML
INJECTION, SOLUTION INTRAMUSCULAR; INTRAVENOUS AS NEEDED
Status: DISCONTINUED | OUTPATIENT
Start: 2024-06-12 | End: 2024-06-12 | Stop reason: SURG

## 2024-06-12 RX ORDER — SODIUM CHLORIDE, SODIUM LACTATE, POTASSIUM CHLORIDE, CALCIUM CHLORIDE 600; 310; 30; 20 MG/100ML; MG/100ML; MG/100ML; MG/100ML
100 INJECTION, SOLUTION INTRAVENOUS CONTINUOUS
Status: DISCONTINUED | OUTPATIENT
Start: 2024-06-12 | End: 2024-06-14

## 2024-06-12 RX ORDER — FENTANYL CITRATE 50 UG/ML
50 INJECTION, SOLUTION INTRAMUSCULAR; INTRAVENOUS
Status: DISCONTINUED | OUTPATIENT
Start: 2024-06-12 | End: 2024-06-12 | Stop reason: HOSPADM

## 2024-06-12 RX ORDER — SODIUM CHLORIDE, SODIUM LACTATE, POTASSIUM CHLORIDE, CALCIUM CHLORIDE 600; 310; 30; 20 MG/100ML; MG/100ML; MG/100ML; MG/100ML
100 INJECTION, SOLUTION INTRAVENOUS ONCE AS NEEDED
Status: DISCONTINUED | OUTPATIENT
Start: 2024-06-12 | End: 2024-06-12 | Stop reason: HOSPADM

## 2024-06-12 RX ORDER — ONDANSETRON 2 MG/ML
4 INJECTION INTRAMUSCULAR; INTRAVENOUS AS NEEDED
Status: DISCONTINUED | OUTPATIENT
Start: 2024-06-12 | End: 2024-06-12 | Stop reason: HOSPADM

## 2024-06-12 RX ORDER — LIDOCAINE HYDROCHLORIDE 20 MG/ML
INJECTION, SOLUTION EPIDURAL; INFILTRATION; INTRACAUDAL; PERINEURAL AS NEEDED
Status: DISCONTINUED | OUTPATIENT
Start: 2024-06-12 | End: 2024-06-12 | Stop reason: SURG

## 2024-06-12 RX ORDER — MORPHINE SULFATE 2 MG/ML
2 INJECTION, SOLUTION INTRAMUSCULAR; INTRAVENOUS
Status: DISPENSED | OUTPATIENT
Start: 2024-06-12 | End: 2024-06-14

## 2024-06-12 RX ORDER — FAMOTIDINE 10 MG/ML
INJECTION, SOLUTION INTRAVENOUS AS NEEDED
Status: DISCONTINUED | OUTPATIENT
Start: 2024-06-12 | End: 2024-06-12 | Stop reason: SURG

## 2024-06-12 RX ORDER — PROPOFOL 10 MG/ML
INJECTION, EMULSION INTRAVENOUS AS NEEDED
Status: DISCONTINUED | OUTPATIENT
Start: 2024-06-12 | End: 2024-06-12 | Stop reason: SURG

## 2024-06-12 RX ORDER — MAGNESIUM HYDROXIDE 1200 MG/15ML
LIQUID ORAL AS NEEDED
Status: DISCONTINUED | OUTPATIENT
Start: 2024-06-12 | End: 2024-06-12 | Stop reason: HOSPADM

## 2024-06-12 RX ORDER — ONDANSETRON 2 MG/ML
INJECTION INTRAMUSCULAR; INTRAVENOUS AS NEEDED
Status: DISCONTINUED | OUTPATIENT
Start: 2024-06-12 | End: 2024-06-12 | Stop reason: SURG

## 2024-06-12 RX ORDER — ONDANSETRON 2 MG/ML
INJECTION INTRAMUSCULAR; INTRAVENOUS AS NEEDED
Status: DISCONTINUED | OUTPATIENT
Start: 2024-06-12 | End: 2024-06-12

## 2024-06-12 RX ORDER — DEXAMETHASONE SODIUM PHOSPHATE 4 MG/ML
INJECTION, SOLUTION INTRA-ARTICULAR; INTRALESIONAL; INTRAMUSCULAR; INTRAVENOUS; SOFT TISSUE
Status: COMPLETED | OUTPATIENT
Start: 2024-06-12 | End: 2024-06-12

## 2024-06-12 RX ORDER — VECURONIUM BROMIDE 1 MG/ML
INJECTION, POWDER, LYOPHILIZED, FOR SOLUTION INTRAVENOUS AS NEEDED
Status: DISCONTINUED | OUTPATIENT
Start: 2024-06-12 | End: 2024-06-12 | Stop reason: SURG

## 2024-06-12 RX ORDER — GLYCOPYRROLATE 0.2 MG/ML
INJECTION INTRAMUSCULAR; INTRAVENOUS AS NEEDED
Status: DISCONTINUED | OUTPATIENT
Start: 2024-06-12 | End: 2024-06-12 | Stop reason: SURG

## 2024-06-12 RX ORDER — ONDANSETRON 2 MG/ML
4 INJECTION INTRAMUSCULAR; INTRAVENOUS EVERY 6 HOURS PRN
Status: DISCONTINUED | OUTPATIENT
Start: 2024-06-12 | End: 2024-06-18

## 2024-06-12 RX ORDER — ACETAMINOPHEN 325 MG/1
650 TABLET ORAL ONCE
Status: COMPLETED | OUTPATIENT
Start: 2024-06-12 | End: 2024-06-12

## 2024-06-12 RX ORDER — PROCHLORPERAZINE EDISYLATE 5 MG/ML
2.5 INJECTION INTRAMUSCULAR; INTRAVENOUS EVERY 8 HOURS PRN
Status: DISCONTINUED | OUTPATIENT
Start: 2024-06-12 | End: 2024-06-19 | Stop reason: HOSPADM

## 2024-06-12 RX ORDER — SUCCINYLCHOLINE CHLORIDE 20 MG/ML
INJECTION INTRAMUSCULAR; INTRAVENOUS AS NEEDED
Status: DISCONTINUED | OUTPATIENT
Start: 2024-06-12 | End: 2024-06-12 | Stop reason: SURG

## 2024-06-12 RX ORDER — MEPERIDINE HYDROCHLORIDE 25 MG/ML
12.5 INJECTION INTRAMUSCULAR; INTRAVENOUS; SUBCUTANEOUS
Status: DISCONTINUED | OUTPATIENT
Start: 2024-06-12 | End: 2024-06-12 | Stop reason: HOSPADM

## 2024-06-12 RX ORDER — ROPIVACAINE HYDROCHLORIDE 5 MG/ML
INJECTION, SOLUTION EPIDURAL; INFILTRATION; PERINEURAL
Status: COMPLETED | OUTPATIENT
Start: 2024-06-12 | End: 2024-06-12

## 2024-06-12 RX ORDER — OXYCODONE HYDROCHLORIDE AND ACETAMINOPHEN 5; 325 MG/1; MG/1
1 TABLET ORAL ONCE AS NEEDED
Status: DISCONTINUED | OUTPATIENT
Start: 2024-06-12 | End: 2024-06-12 | Stop reason: HOSPADM

## 2024-06-12 RX ORDER — IPRATROPIUM BROMIDE AND ALBUTEROL SULFATE 2.5; .5 MG/3ML; MG/3ML
3 SOLUTION RESPIRATORY (INHALATION) ONCE AS NEEDED
Status: DISCONTINUED | OUTPATIENT
Start: 2024-06-12 | End: 2024-06-12 | Stop reason: HOSPADM

## 2024-06-12 RX ADMIN — LIDOCAINE HYDROCHLORIDE 40 MG: 20 INJECTION, SOLUTION EPIDURAL; INFILTRATION; INTRACAUDAL; PERINEURAL at 15:02

## 2024-06-12 RX ADMIN — SUCCINYLCHOLINE CHLORIDE 120 MG: 20 INJECTION, SOLUTION INTRAMUSCULAR; INTRAVENOUS at 15:02

## 2024-06-12 RX ADMIN — SODIUM CHLORIDE 75 ML/HR: 9 INJECTION, SOLUTION INTRAVENOUS at 02:28

## 2024-06-12 RX ADMIN — DEXAMETHASONE SODIUM PHOSPHATE 8 MG: 4 INJECTION, SOLUTION INTRA-ARTICULAR; INTRALESIONAL; INTRAMUSCULAR; INTRAVENOUS; SOFT TISSUE at 15:15

## 2024-06-12 RX ADMIN — VECURONIUM BROMIDE 7 MG: 1 INJECTION, POWDER, LYOPHILIZED, FOR SOLUTION INTRAVENOUS at 15:18

## 2024-06-12 RX ADMIN — GLYCOPYRROLATE 0.4 MG: 0.2 INJECTION INTRAMUSCULAR; INTRAVENOUS at 16:26

## 2024-06-12 RX ADMIN — ACETAMINOPHEN 650 MG: 325 TABLET ORAL at 14:41

## 2024-06-12 RX ADMIN — ONDANSETRON 4 MG: 2 INJECTION INTRAMUSCULAR; INTRAVENOUS at 16:18

## 2024-06-12 RX ADMIN — FENTANYL CITRATE 50 MCG: 50 INJECTION, SOLUTION INTRAMUSCULAR; INTRAVENOUS at 16:55

## 2024-06-12 RX ADMIN — MEPERIDINE HYDROCHLORIDE 12.5 MG: 25 INJECTION INTRAMUSCULAR; INTRAVENOUS; SUBCUTANEOUS at 17:01

## 2024-06-12 RX ADMIN — FAMOTIDINE 20 MG: 10 INJECTION, SOLUTION INTRAVENOUS at 15:02

## 2024-06-12 RX ADMIN — PROCHLORPERAZINE EDISYLATE 2.5 MG: 5 INJECTION INTRAMUSCULAR; INTRAVENOUS at 04:55

## 2024-06-12 RX ADMIN — ROPIVACAINE HYDROCHLORIDE 280 MG: 5 INJECTION, SOLUTION EPIDURAL; INFILTRATION; PERINEURAL at 15:15

## 2024-06-12 RX ADMIN — FENTANYL CITRATE 50 MCG: 50 INJECTION INTRAMUSCULAR; INTRAVENOUS at 16:18

## 2024-06-12 RX ADMIN — CEFOXITIN 2 G: 2 INJECTION, POWDER, FOR SOLUTION INTRAVENOUS at 15:00

## 2024-06-12 RX ADMIN — SODIUM CHLORIDE, POTASSIUM CHLORIDE, SODIUM LACTATE AND CALCIUM CHLORIDE: 600; 310; 30; 20 INJECTION, SOLUTION INTRAVENOUS at 16:07

## 2024-06-12 RX ADMIN — MORPHINE SULFATE 2 MG: 2 INJECTION, SOLUTION INTRAMUSCULAR; INTRAVENOUS at 10:27

## 2024-06-12 RX ADMIN — MEPERIDINE HYDROCHLORIDE 12.5 MG: 25 INJECTION INTRAMUSCULAR; INTRAVENOUS; SUBCUTANEOUS at 17:08

## 2024-06-12 RX ADMIN — PROPOFOL 100 MG: 10 INJECTION, EMULSION INTRAVENOUS at 15:02

## 2024-06-12 RX ADMIN — SODIUM CHLORIDE, POTASSIUM CHLORIDE, SODIUM LACTATE AND CALCIUM CHLORIDE 100 ML/HR: 600; 310; 30; 20 INJECTION, SOLUTION INTRAVENOUS at 14:42

## 2024-06-12 RX ADMIN — ONDANSETRON 4 MG: 2 INJECTION INTRAMUSCULAR; INTRAVENOUS at 08:45

## 2024-06-12 RX ADMIN — MORPHINE SULFATE 2 MG: 2 INJECTION, SOLUTION INTRAMUSCULAR; INTRAVENOUS at 20:59

## 2024-06-12 RX ADMIN — SODIUM CHLORIDE 75 ML/HR: 9 INJECTION, SOLUTION INTRAVENOUS at 23:35

## 2024-06-12 RX ADMIN — ONDANSETRON 4 MG: 2 INJECTION INTRAMUSCULAR; INTRAVENOUS at 01:37

## 2024-06-12 RX ADMIN — ONDANSETRON 4 MG: 2 INJECTION INTRAMUSCULAR; INTRAVENOUS at 16:56

## 2024-06-12 RX ADMIN — FENTANYL CITRATE 50 MCG: 50 INJECTION INTRAMUSCULAR; INTRAVENOUS at 15:30

## 2024-06-12 RX ADMIN — NEOSTIGMINE METHYLSULFATE 2.5 MG: 0.5 INJECTION INTRAVENOUS at 16:26

## 2024-06-12 RX ADMIN — MORPHINE SULFATE 2 MG: 2 INJECTION, SOLUTION INTRAMUSCULAR; INTRAVENOUS at 23:35

## 2024-06-12 NOTE — ANESTHESIA POSTPROCEDURE EVALUATION
Patient: Alberto Guzman    Procedure Summary       Date: 06/12/24 Room / Location:  [facility] OR 03 / [facility] OR    Anesthesia Start: 1456 Anesthesia Stop: 1640    Procedure: LAPAROTOMY EXPLORATORY (Abdomen) Diagnosis:       Small bowel obstruction      (Small bowel obstruction [K56.609])    Surgeons: Deo Lennon MD Provider: Adam Bruno DO    Anesthesia Type: general ASA Status: 3            Anesthesia Type: general    Vitals  Vitals Value Taken Time   /74 06/12/24 1707   Temp 97.4 °F (36.3 °C) 06/12/24 1640   Pulse 70 06/12/24 1708   Resp 18 06/12/24 1650   SpO2 92 % 06/12/24 1708   Vitals shown include unfiled device data.        Post Anesthesia Care and Evaluation    Patient location during evaluation: PHASE II  Patient participation: complete - patient participated  Level of consciousness: awake and alert  Pain score: 1  Pain management: adequate    Airway patency: patent  Anesthetic complications: No anesthetic complications  PONV Status: controlled  Cardiovascular status: acceptable  Respiratory status: acceptable  Hydration status: acceptable

## 2024-06-12 NOTE — ANESTHESIA PREPROCEDURE EVALUATION
Anesthesia Evaluation     NPO Solid Status: > 8 hours  NPO Liquid Status: > 8 hours           Airway   Mallampati: II  TM distance: >3 FB  Neck ROM: full  No difficulty expected  Dental    (+) upper dentures    Pulmonary - normal exam   Cardiovascular - normal exam  Exercise tolerance: poor (<4 METS)    Patient on routine beta blocker    (+) hypertension, past MI  1-7 days, CAD, dysrhythmias, PVD, DVT, hyperlipidemia      Neuro/Psych  (+) CVA, numbness  GI/Hepatic/Renal/Endo      Musculoskeletal     (+) back pain  Abdominal  - normal exam   Substance History      OB/GYN          Other   arthritis,   history of cancer    ROS/Med Hx Other: ·  The study is technically difficult for diagnosis. The quality of the study is limited with poor acoustic windows.  ·  Normal left ventricular cavity size noted.  ·  The following left ventricular wall segments are dyskinetic: apical lateral, apical septal and apex.  ·  Left ventricular ejection fraction appears to be 36 - 40%.  ·  The aortic valve exhibits sclerosis. There is mild calcification of the aortic valve. The aortic valve was poorly visualized but appears trileaflet.  ·  Trace aortic valve regurgitation is present. No hemodynamically significant aortic valve stenosis is present.  ·  There is mild calcification of the mitral valve. Mild mitral valve regurgitation is present. No significant mitral valve stenosis is present.  ·  Mild tricuspid valve regurgitation is present. Estimated right ventricular systolic pressure from tricuspid regurgitation is mildly elevated (35-45 mmHg).  ·  There is no evidence of pericardial effusion. .                  Anesthesia Plan    ASA 3     general     intravenous induction     Anesthetic plan, risks, benefits, and alternatives have been provided, discussed and informed consent has been obtained with: patient.

## 2024-06-12 NOTE — NURSING NOTE
Transitional Care Note    Enrolled in [facility] Transitional Care Note    Enrolled in [facility] Transitional Care Services under theTCM Model to be followed for 6 weeks post discharge.  [facility] will assist with support and education at time of transition home from hospital.  Hospital  will follow throughout the stay at [facility].  Home  will visit within 48 hours of discharge and follow with home vitals and telephone contact for 6 weeks.      Patient admitted to [facility] via Emergency Department. Admitted for further treatment of SBO.    Explained transition to home program and Family is agreeable to home visits.    Home  will be Bhavani Webber LPN

## 2024-06-12 NOTE — ANESTHESIA PROCEDURE NOTES
Airway  Urgency: elective    Date/Time: 6/12/2024 3:03 PM  Airway not difficult    General Information and Staff    Patient location during procedure: OR  CRNA/CAA: Brittney Friedman CRNA    Indications and Patient Condition  Indications for airway management: airway protection    Preoxygenated: yes  MILS maintained throughout  Mask difficulty assessment: 0 - not attempted    Final Airway Details  Final airway type: endotracheal airway      Successful airway: ETT  Cuffed: yes   Successful intubation technique: direct laryngoscopy and RSI  Facilitating devices/methods: cricoid pressure  Endotracheal tube insertion site: oral  Blade: Linda  Blade size: 3  ETT size (mm): 7.5  Cormack-Lehane Classification: grade I - full view of glottis  Placement verified by: chest auscultation   Measured from: lips  ETT/EBT  to lips (cm): 22  Number of attempts at approach: 1  Assessment: lips, teeth, and gum same as pre-op and atraumatic intubation

## 2024-06-12 NOTE — PROGRESS NOTES
Chief complaint: Abdominal pain     No acute events overnight.  Patient abdominal pain much improved.  Abdomen is soft and nontender on examination.  Patient with recurrence of emesis and pain with clear liquid diet.     No acute distress, alert and orient x 3  Clear to auscultation bilaterally  Abdomen distended, mild tenderness to palpation without rebound or guarding   89-year-old male presenting with abdominal pain and findings concerning for adhesive related bowel obstruction.  -failed to advance diet, pain recurred.    -OR for exploratory laparotomy, lysis of adhesions

## 2024-06-12 NOTE — OP NOTE
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  Reduction of internal hernia performed with reduction of mesenteric defect with approximation using silk suture.      Operative Procedure: Patient was taken operating suite and placed upon operating table.  Bilateral sequential compression devices were applied and general anesthesia was administered.  Transversus block was performed by anesthesia.  The abdomen was prepped and draped in usual sterile fashion.  Michael catheter was already in place.  Timeout procedure was performed and preoperative antibiotics were confirmed.  A midline laparotomy incision was made from the xiphoid process to just below the umbilicus.  Dissection was carried down on top of the pre-existing mesh and just below this the peritoneum was visualized and grasped and retracted superiorly and opened with Metzenbaum scissors.  There were no anterior abdominal wall adhesions to the midline and the midline incision was extended along the length of the skin incision.  Scissors were used to transect the mesh.  Exploration began and there was distended loops of small bowel and distal decompression.  The small bowel was run from the terminal ileum to the ligament of Treitz and there was noted to be an internal hernia through which there was an abrupt transition of small bowel caliber.  All bowel was reduced from the defect and it was found to be in the  mesenteric defect from the previous extended left colectomy.  The entirety of the colon had transitioned over to the left abdomen and the herniated small bowel was obstructed.  Following reduction of the small bowel from the hernia of the colon was positioned appropriately in the left abdomen and the mesenteric defect was closed with interrupted silk sutures to approximate the mesocolon to reduce the defect so recurrent internal herniation would be less likely.  A small mesenteric vessel was nicked with the silk suture at the site of closure and an Enseal device was used to achieve hemostasis and a small developed mesenteric hematoma was showing no signs of expansion and the bowel was without ischemia.  This was watched for several minutes with no expansion of the hematoma and was repositioned back appropriately.  With the viscera back in normal anatomic configuration with the exception of the new positioning due to previous left colectomy the abdomen was irrigated and all irrigant was suctioned free.  The bowel was run and there were no evidence of ischemic or serosal injuries.  At this time all sponge lap and needle counts were correct.  The previously placed supine free ureter mesh was explanted and the midline laparotomy incision was closed with running looped PDS suture.  Following fascial approximation the subcutaneous tissues were irrigated and the skin was approximated with skin staples.  Patient was awakened from anesthesia and taken to recovery.    Estimated Blood Loss: 25 mL    Specimens:   Explanted ventral hernia mesh          Drains: None    Grafts/Implants: None    Complications: None           Deo Lennon MD     Date: 6/12/2024  Time: 16:36 EDT

## 2024-06-12 NOTE — PLAN OF CARE
Goal Outcome Evaluation:              Outcome Evaluation: Pt's VSS. Pt had surgery this shift. Pt has NG tube at 70, and on cont low wall suction. 2 L NC. Pt's dressing remains CDI and abd binder in place. No concerns or complaints at this time. Will follow POC.

## 2024-06-12 NOTE — ANESTHESIA PROCEDURE NOTES
"Peripheral Block      Patient reassessed immediately prior to procedure    Patient location during procedure: OR  Start time: 6/12/2024 3:14 PM  Stop time: 6/12/2024 3:15 PM  Reason for block: at surgeon's request and post-op pain management  Performed by  CRNA/CAA: Brittney Friedman CRNA  Preanesthetic Checklist  Completed: patient identified, IV checked, site marked, risks and benefits discussed, surgical consent, monitors and equipment checked, pre-op evaluation and timeout performed  Prep:  Pt Position: supine  Sterile barriers:cap, gloves, sterile barriers and mask  Prep: ChloraPrep  Patient monitoring: blood pressure monitoring, continuous pulse oximetry and EKG  Procedure    Nursing cardiac assessment comments yes: Sedation, GA, Spinal,Epidural   Performed under: general  Guidance:ultrasound guided    ULTRASOUND INTERPRETATION.  Using ultrasound guidance a 20 G (20g 4\" Stimuplex) gauge needle was placed in close proximity to the nerve, at which point, under ultrasound guidance anesthetic was injected in the area of the nerve and spread of the anesthesia was seen on ultrasound in close proximity thereto.  There were no abnormalities seen on ultrasound; a digital image was taken; and the patient tolerated the procedure with no complications. Images:still images obtained, printed/placed on chart    Laterality:Bilateral  Block Type:TAP  Injection Technique:single-shot  Needle Type:short-bevel  Needle Gauge:20 G  Resistance on Injection: none    Medications Used: ropivacaine (NAROPIN) injection 0.5 % - Peripheral Nerve   280 mg - 6/12/2024 3:15:00 PM  dexamethasone (DECADRON) injection - Injection   8 mg - 6/12/2024 3:15:00 PM      Medications  Comment:Block Injection:  Total volume divided equally between Right and Left block        Post Assessment  Injection Assessment: negative aspiration for heme, incremental injection and no paresthesia on injection  Patient Tolerance:comfortable throughout " block  Complications:no  Additional Notes  The pt was in the supine position under general anesthesia.    Under Ultrasound guidance, a BBraun 4inch 360 degree needle was advanced with Normal Saline hydro dissection of tissue.  The Internal Oblique and Transversus Abdominus muscles where visualized.  At or before the aponeurosis of Internal Oblique, local anesthetic spread was visualized in the Transversus Abdominus Plane. Injection was made incrementally with aspiration every 5 mls.  There was no  intravascular injection,  injection pressure was normal, there was no neural injection, and the procedure was completed without difficulty. The same procedure was completed for left and right sided tap blocks. Thank You.    Performed by: Brittney Friedman CRNA

## 2024-06-12 NOTE — PROGRESS NOTES
[facility] HOSPITALIST PROGRESS NOTE     Patient Identification:  Name:  Alberto Guzman  Age:  89 y.o.  Sex:  male  :  3/29/1931  MRN:  [id number]  Visit Number:  [id number]  ROOM: [address] [place] [address]     Primary Care Provider:  Lianet Dia APRN    Length of stay in inpatient status:  3    Subjective     Chief Compliant:    Chief Complaint   Patient presents with    Abdominal Pain       History of Presenting Illness:    Unfortunately patient unable to tolerate fluids overnight and has had persistent N/V. He notes abdominal distention and pain as well. Surgery planning on taking to OR this PM.     ROS:  Otherwise 10 point ROS negative other than documented above in HPI.     Objective     Current Hospital Meds:ascorbic acid, 500 mg, Oral, Daily  aspirin, 81 mg, Oral, Daily  atorvastatin, 20 mg, Oral, Daily  bumetanide, 2 mg, Oral, Daily  gabapentin, 600 mg, Oral, Daily  metoprolol succinate XL, 50 mg, Oral, Daily  pantoprazole, 40 mg, Oral, Daily    lactated ringers, 100 mL/hr, Last Rate: Stopped (24 1640)  sodium chloride, 75 mL/hr, Last Rate: 75 mL/hr (24 0228)        Current Antimicrobial Therapy:  Anti-Infectives (From admission, onward)      Ordered     Dose/Rate Route Frequency Start Stop    24 1424  cefOXItin (MEFOXIN) 2 g in sodium chloride 0.9 % 100 mL IVPB-VTB        Ordering Provider: Deo Lennon MD    2 g  200 mL/hr over 30 Minutes Intravenous Once 24 1426 24 1500          Current Diuretic Therapy:  Diuretics (From admission, onward)      Ordered     Dose/Rate Route Frequency Start Stop    24 1354  bumetanide (BUMEX) tablet 2 mg        Ordering Provider: Deo Lennon MD    2 mg Oral Daily 24 1445            ----------------------------------------------------------------------------------------------------------------------  Vital Signs:  Temp:  [97.4 °F (36.3 °C)-98.4 °F (36.9 °C)] 98 °F (36.7 °C)  Heart Rate:  [60-74]  69  Resp:  [16-20] 16  BP: (129-162)/(59-88) 135/66  SpO2:  [92 %-97 %] 92 %  on  Flow (L/min):  [2] 2;   Device (Oxygen Therapy): nasal cannula  Body mass index is 26.3 kg/m².    Wt Readings from Last 3 Encounters:   06/09/24 98 kg (216 lb 1 oz)   04/09/24 102 kg (224 lb)   01/09/24 101 kg (223 lb 6.4 oz)     Intake & Output (last 3 days)         06/09 0701  06/10 0700 06/10 0701 06/11 0700 06/11 0701 06/12 0700 06/12 0701 06/13 0700    P.O. 0 0 60 0    I.V. (mL/kg) 1550 (15.8)   1200 (12.2)    IV Piggyback    100    Total Intake(mL/kg) 1550 (15.8) 0 (0) 60 (0.6) 1300 (13.3)    Urine (mL/kg/hr) 300 (0.1)  1200 (0.5) 450 (0.4)    Emesis/NG output 1350 950  2300    Stool  0 0     Total Output 6528 829 4385 2750    Net -100 -950 -1140 -1450            Stool Unmeasured Occurrence  0 x 0 x           NPO Diet NPO Type: Ice Chips  ----------------------------------------------------------------------------------------------------------------------  Physical exam:  Constitutional:  Elderly male, chronically ill appearing.   HENT:  Head:  Normocephalic and atraumatic.  Mouth:  Moist mucous membranes.    Eyes:  Conjunctivae and EOM are normal. No scleral icterus.    Neck:  Neck supple.  No JVD present.    Cardiovascular:  Normal rate, regular rhythm and normal heart sounds with no murmur.  Pulmonary/Chest:  No respiratory distress, no wheezes, no crackles, with normal breath sounds and good air movement.  Abdominal:  Soft.  Distended.   Musculoskeletal:  No edema, no tenderness, and no deformity.  No red or swollen joints anywhere.    Neurological:  Alert and oriented to person, place, and time.  No cranial nerve deficit.  No tongue deviation.  No facial droop.  No slurred speech.   Skin:  Skin is warm and dry. No rash noted. No pallor.   Peripheral vascular:  Pulses in all 4 extremities with no clubbing, no cyanosis, no  "edema.  ----------------------------------------------------------------------------------------------------------------------  Tele:    ----------------------------------------------------------------------------------------------------------------------  Results from last 7 days   Lab Units 06/12/24  0808 06/10/24  0140 06/09/24 0229   CRP mg/dL  --   --  0.31   LACTATE mmol/L  --   --  1.6   WBC 10*3/mm3 5.10 7.22 5.80   HEMOGLOBIN g/dL 13.3 13.4 13.0   HEMATOCRIT % 40.7 42.2 38.8   MCV fL 104.4* 108.2* 102.9*   MCHC g/dL 32.7 31.8 33.5   PLATELETS 10*3/mm3 182 176 167   INR   --   --  1.54*         Results from last 7 days   Lab Units 06/12/24  0808 06/10/24  0140 06/09/24 0229   SODIUM mmol/L 139 137 140   POTASSIUM mmol/L 3.8 4.0 4.6   MAGNESIUM mg/dL  --  2.0  --    CHLORIDE mmol/L 104 103 101   CO2 mmol/L 22.2 23.8 26.6   BUN mg/dL 21 14 11   CREATININE mg/dL 0.99 1.12 1.14   CALCIUM mg/dL 8.5 8.8 9.3   GLUCOSE mg/dL 97 104* 107*   ALBUMIN g/dL 3.2*  --  3.8   BILIRUBIN mg/dL 1.1  --  1.2   ALK PHOS U/L 73  --  91   AST (SGOT) U/L 19  --  18   ALT (SGPT) U/L <5  --  9   Estimated Creatinine Clearance: 64.6 mL/min (by C-G formula based on SCr of 0.99 mg/dL).  No results found for: \"AMMONIA\"  Results from last 7 days   Lab Units 06/09/24  0429 06/09/24  0229   HSTROP T ng/L 38* 38*     Results from last 7 days   Lab Units 06/09/24 0229   PROBNP pg/mL 986.4         No results found for: \"HGBA1C\", \"POCGLU\"  Lab Results   Component Value Date    TSH 1.250 06/10/2024     No results found for: \"PREGTESTUR\", \"PREGSERUM\", \"HCG\", \"HCGQUANT\"  Pain Management Panel           No data to display              Brief Urine Lab Results  (Last result in the past 365 days)        Color   Clarity   Blood   Leuk Est   Nitrite   Protein   CREAT   Urine HCG        06/09/24 0953 Yellow   Clear   Negative   Negative   Negative   Negative                 No results found for: \"BLOODCX\"  No results found for: \"URINECX\"  No results " "found for: \"WOUNDCX\"  No results found for: \"STOOLCX\"  No results found for: \"RESPCX\"  No results found for: \"AFBCX\"  Results from last 7 days   Lab Units 06/09/24  0229   LACTATE mmol/L 1.6   SED RATE mm/hr 12   CRP mg/dL 0.31       I have personally looked at the labs and they are summarized above.  ----------------------------------------------------------------------------------------------------------------------  Detailed radiology reports for the last 24 hours:    Imaging Results (Last 24 Hours)       ** No results found for the last 24 hours. **          Assessment & Plan    #Acute early SBO  #Troponin elevation likely secondary to demand ischemia, no delta and no ischemia on ecg  #pAF on Warfarin w/PM  #Mild macrocytosis w/o anemia  #hx of colon and bladder ca in sustained remission     Plan:  -Hold warfarin for now pending any surgical procedure, no mechanical valve hx thus do not have to bridge with heparin. Will restart coumadin prior to discharge.   -Vit B12, folate, TSH all wnl.   -Patient high surgical risk, no evidence of HF currently and no ischemia on ecg thus no further risk stratification required for surgery   - Routine TTE ordered revealing EF has dropped from 46-50% to 36-40% in the last 3 years. Outpatient cardiology f/u. Explained increased surgical risk with family.   - Recommend to restart anticoagulation when cleared by surgery postoperatively. Can consider DOAC if covered and not contraindicated, will discuss with patient.      Medicine will continue to follow. Please call with any questions.     Joseph Barnett MD  [facility] Hospitalist  06/12/24  18:12 EDT  "

## 2024-06-12 NOTE — PLAN OF CARE
Goal Outcome Evaluation:              Outcome Evaluation: Pt resting in bed at this time. No s/s of distress noted. Pt has complained of abdominal pain and n/v this shift, on call surgeon made aware, see orders. PRN medications given per MAR. Pt has been up to chair this shift. Will continue POC.

## 2024-06-12 NOTE — CASE MANAGEMENT/SOCIAL WORK
Discharge Planning Assessment  [facility]     Patient Name: Alberto Guzman  MRN: [id number]  Today's Date: 6/12/2024    Admit Date: 6/9/2024    Plan: Pt was admitted on 6/9/24. Pt lives alone. PCP is Lianet Garcia. Pt does not utilize home health services at this time. Pt has straight cane used PRN via unknown provider. Pt has POA and living will (not on file). Pt plans to return home at discharge with daughter Krystin to provide transportation. SS to follow.       Discharge Plan       Row Name 06/12/24 1452       Plan    Plan Pt was admitted on 6/9/24. Pt lives alone. PCP is Lianet Garcia. Pt does not utilize home health services at this time. Pt has straight cane used PRN via unknown provider. Pt has POA and living will (not on file). Pt plans to return home at discharge with daughter Krystin to provide transportation. SS to follow.          RADHA Orlando

## 2024-06-13 LAB
ANION GAP SERPL CALCULATED.3IONS-SCNC: 16.1 MMOL/L (ref 5–15)
BUN SERPL-MCNC: 23 MG/DL (ref 8–23)
BUN/CREAT SERPL: 22.3 (ref 7–25)
CALCIUM SPEC-SCNC: 8.2 MG/DL (ref 8.2–9.6)
CHLORIDE SERPL-SCNC: 105 MMOL/L (ref 98–107)
CO2 SERPL-SCNC: 20.9 MMOL/L (ref 22–29)
CREAT SERPL-MCNC: 1.03 MG/DL (ref 0.76–1.27)
DEPRECATED RDW RBC AUTO: 48.4 FL (ref 37–54)
EGFRCR SERPLBLD CKD-EPI 2021: 67.7 ML/MIN/1.73
ERYTHROCYTE [DISTWIDTH] IN BLOOD BY AUTOMATED COUNT: 12.4 % (ref 12.3–15.4)
GLUCOSE SERPL-MCNC: 141 MG/DL (ref 65–99)
HCT VFR BLD AUTO: 41.4 % (ref 37.5–51)
HGB BLD-MCNC: 13.1 G/DL (ref 13–17.7)
MCH RBC QN AUTO: 33.5 PG (ref 26.6–33)
MCHC RBC AUTO-ENTMCNC: 31.6 G/DL (ref 31.5–35.7)
MCV RBC AUTO: 105.9 FL (ref 79–97)
PLATELET # BLD AUTO: 174 10*3/MM3 (ref 140–450)
PMV BLD AUTO: 10 FL (ref 6–12)
POTASSIUM SERPL-SCNC: 4.6 MMOL/L (ref 3.5–5.2)
RBC # BLD AUTO: 3.91 10*6/MM3 (ref 4.14–5.8)
SODIUM SERPL-SCNC: 142 MMOL/L (ref 136–145)
WBC NRBC COR # BLD AUTO: 6.03 10*3/MM3 (ref 3.4–10.8)

## 2024-06-13 PROCEDURE — 85027 COMPLETE CBC AUTOMATED: CPT | Performed by: SURGERY

## 2024-06-13 PROCEDURE — 80048 BASIC METABOLIC PNL TOTAL CA: CPT | Performed by: SURGERY

## 2024-06-13 PROCEDURE — 99024 POSTOP FOLLOW-UP VISIT: CPT | Performed by: SURGERY

## 2024-06-13 PROCEDURE — 97166 OT EVAL MOD COMPLEX 45 MIN: CPT

## 2024-06-13 PROCEDURE — 25010000002 MORPHINE PER 10 MG: Performed by: SURGERY

## 2024-06-13 PROCEDURE — 99232 SBSQ HOSP IP/OBS MODERATE 35: CPT | Performed by: INTERNAL MEDICINE

## 2024-06-13 PROCEDURE — 97161 PT EVAL LOW COMPLEX 20 MIN: CPT

## 2024-06-13 PROCEDURE — 25810000003 SODIUM CHLORIDE 0.9 % SOLUTION: Performed by: SURGERY

## 2024-06-13 RX ORDER — HYDROCODONE BITARTRATE AND ACETAMINOPHEN 10; 325 MG/1; MG/1
0.5 TABLET ORAL EVERY 6 HOURS PRN
Status: DISCONTINUED | OUTPATIENT
Start: 2024-06-13 | End: 2024-06-14

## 2024-06-13 RX ADMIN — SODIUM CHLORIDE 75 ML/HR: 9 INJECTION, SOLUTION INTRAVENOUS at 15:13

## 2024-06-13 RX ADMIN — HYDROCODONE BITARTRATE AND ACETAMINOPHEN 0.5 TABLET: 10; 325 TABLET ORAL at 16:16

## 2024-06-13 RX ADMIN — MORPHINE SULFATE 2 MG: 2 INJECTION, SOLUTION INTRAMUSCULAR; INTRAVENOUS at 18:41

## 2024-06-13 RX ADMIN — METOPROLOL SUCCINATE 50 MG: 50 TABLET, EXTENDED RELEASE ORAL at 09:11

## 2024-06-13 RX ADMIN — MORPHINE SULFATE 2 MG: 2 INJECTION, SOLUTION INTRAMUSCULAR; INTRAVENOUS at 03:31

## 2024-06-13 RX ADMIN — MORPHINE SULFATE 2 MG: 2 INJECTION, SOLUTION INTRAMUSCULAR; INTRAVENOUS at 23:45

## 2024-06-13 RX ADMIN — MORPHINE SULFATE 2 MG: 2 INJECTION, SOLUTION INTRAMUSCULAR; INTRAVENOUS at 09:23

## 2024-06-13 RX ADMIN — ATORVASTATIN CALCIUM 20 MG: 20 TABLET, FILM COATED ORAL at 09:15

## 2024-06-13 NOTE — PROGRESS NOTES
Chief complaint: Bowel obstruction    Postoperative day 1: Exploratory laparotomy with mesh explantation, reduction of internal hernia and closure of mesenteric defect of the colon    No acute events overnight.  Patient sitting up in bed this morning feeling much better.  NG with minimal output overnight.  No flatus reported.    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  -Awaiting return of bowel function  -If patient continues to do well will resume anticoagulation tomorrow  -Continue NG tube, ice chips okay  -Ambulate  -Continue Michael catheter for now given the patient's voiding difficulty

## 2024-06-13 NOTE — PLAN OF CARE
Goal Outcome Evaluation:              Outcome Evaluation: Pt seen for evaluation this date s/p abdominal surgical intervention. Pt demonstrates good functional mobility although hindered by pain. With rehabilitation, pt may benefit from staff ambulation and daughter assist as he does not require much physical assist at this time. Therefore, pt being D/C'd from therapy caseload.      Anticipated Discharge Disposition (PT): home with assist, home with supervision

## 2024-06-13 NOTE — THERAPY DISCHARGE NOTE
Acute Care - Physical Therapy Initial Evaluation/Discharge  [facility]     Patient Name: Alberto Guzman  : 3/29/1931  MRN: [id number]  Today's Date: 2024   Onset of Illness/Injury or Date of Surgery: 24 (admission date)            Admit Date: 2024    Visit Dx:    ICD-10-CM ICD-9-CM   1. Small bowel obstruction  K56.609 560.9     Patient Active Problem List   Diagnosis    Urinary retention    Bladder neck contracture    Bladder tumor    Aftercare following surgery of the genitourinary system    Atrial flutter , ventricular paced rhythm    Presence of cardiac pacemaker 6/3/2025    Hyperlipidemia LDL goal <70    Essential hypertension    Chronic anticoagulation with Coumadin    Ventricular tachycardia (paroxysmal)    Asymptomatic PVD (peripheral vascular disease)    Coronary artery disease, anterior wall myocardial infarction with directional atherectomy of LAD in  and bare-metal stent to the LAD in , nonobstructive disease     Ischemic cardiomyopathy, LV ejection fraction around 45 to 50%    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, 46 to 50%)    Bilateral lower extremity edema    Stroke (cerebrum)    Small bowel obstruction     Past Medical History:   Diagnosis Date    Arthritis     Atrial flutter     Back pain     Benign prostatic hyperplasia     Bladder tumor     Cancer     [id number]    Chronic systolic heart failure 2021    Colon cancer     Coronary artery disease     DVT (deep venous thrombosis)     r leg    Elevated cholesterol     Heart attack     Hypertension     Numbness     feet/hands    Osteoporosis     PVD (peripheral vascular disease)     Rheumatoid arthritis     Stroke     Ventricular tachycardia      Past Surgical History:   Procedure Laterality Date    CATARACT EXTRACTION Bilateral     CHOLECYSTECTOMY      COLON RESECTION      COLONOSCOPY      HEMORROIDECTOMY      HERNIA REPAIR      left inguinal/umbilical    HIP ARTHROPLASTY Right      INSERT / REPLACE / REMOVE PACEMAKER      JOINT REPLACEMENT      PACEMAKER IMPLANTATION      PROSTATE SURGERY      TRANSURETHRAL RESECTION OF BLADDER TUMOR N/A 04/18/2018    Procedure: CYSTOSCOPY TRANSURETHRAL RESECTION OF SMALL BLADDER TUMOR;  Surgeon: Alfonso Collins MD;  Location: [facility];  Service: Urology    TRANSURETHRAL RESECTION OF BLADDER TUMOR N/A 08/29/2018    Procedure: CYSTOSCOPY TRANSURETHRAL RESECTION OF BLADDER TUMOR;  Surgeon: Alfonso Collins MD;  Location: [facility];  Service: Urology       PT Assessment (Last 12 Hours)       PT Evaluation and Treatment       Row Name 06/13/24 1110          Physical Therapy Time and Intention    Document Type evaluation;discharge evaluation/summary  -     Patient Effort good  -     Comment Pt seen for evaluation this date, pleasant and cooperative with therapy. Pt presents s/p abdominal surgery. Pt lived alone, daughter present and attentive to pt. Pt utilized SPC with PLOF, reports no falls.  -       Row Name 06/13/24 1110          General Information    Patient Profile Reviewed yes  -     Onset of Illness/Injury or Date of Surgery 06/09/24  admission date  -     Patient Observations alert;cooperative;agree to therapy  -     General Observations of Patient Pt seen supine in hospital bed, IV adn NG tube in place with RN able to disconnect for ambulation.  -     Equipment Currently Used at Home cane, straight  -     Existing Precautions/Restrictions fall  -     Equipment Issued to Patient gait belt  already in room and utilized during eval  -       Row Name 06/13/24 1110          Living Environment    Current Living Arrangements home  -     People in Home alone  -       Row Name 06/13/24 1110          Cognition    Personal Safety Interventions nonskid shoes/slippers when out of bed;supervised activity;gait belt  -       Row Name 06/13/24 1110          Range of Motion Comprehensive    Comment, General Range of Motion Grossly  functional, R knee flex may be less than L  -KH       [facility] Name 06/13/24 1110          Strength Comprehensive (MMT)    Comment, General Manual Muscle Testing (MMT) Assessment Grossly 5/5 MMT  -[facility] Name 06/13/24 1110          Bed Mobility    Bed Mobility supine-sit  -KH     Supine-Sit [place] (Bed Mobility) contact guard;minimum assist (75% patient effort)  pain affects pt mobility, grossly CGA/[place] with bed rail assist  -[facility] Name 06/13/24 1110          Transfers    Transfers sit-stand transfer;stand-sit transfer  -KH       Row Name 06/13/24 1110          Sit-Stand Transfer    Sit-Stand [place] (Transfers) contact guard  -     Assistive Device (Sit-Stand Transfers) walker, front-wheeled  can do it without however for safest mobility utilize RW  -[facility] Name 06/13/24 1110          Stand-Sit Transfer    Stand-Sit [place] (Transfers) contact guard  -KH       Row Name 06/13/24 1110          Gait/Stairs (Locomotion)    Gait/Stairs Locomotion gait/ambulation assistive device  -     [place] Level (Gait) contact guard  -     Assistive Device (Gait) walker, front-wheeled  -     Patient was able to Ambulate yes  -     Distance in Feet (Gait) 50  -KH       Row Name             Wound 06/09/24 1039 Right distal leg Blisters    Wound - Properties Group Placement Date: 06/09/24  -RG Placement Time: 1039  -RG Side: Right  -RG Orientation: distal  -RG Location: leg  -RG Primary Wound Type: Blisters  -TW, Ruptured     Retired Wound - Properties Group Placement Date: 06/09/24  -RG Placement Time: 1039  -RG Side: Right  -RG Orientation: distal  -RG Location: leg  -RG Primary Wound Type: Blisters  -TW, Ruptured     Retired Wound - Properties Group Date first assessed: 06/09/24  -RG Time first assessed: 1039  -RG Side: Right  -RG Location: leg  -RG Primary Wound Type: Blisters  -TW, Ruptured       Row Name             Wound 06/12/24 1528 midline abdomen Incision    Wound - Properties  Group Placement Date: 06/12/24  -CE Placement Time: 1528  -CE Present on Original Admission: N  -CE Orientation: midline  -CE Location: abdomen  -CE Primary Wound Type: Incision  -CE    Retired Wound - Properties Group Placement Date: 06/12/24  -CE Placement Time: 1528  -CE Present on Original Admission: N  -CE Orientation: midline  -CE Location: abdomen  -CE Primary Wound Type: Incision  -CE    Retired Wound - Properties Group Date first assessed: 06/12/24  -CE Time first assessed: 1528  -CE Present on Original Admission: N  -CE Location: abdomen  -CE Primary Wound Type: Incision  -CE      Row Name 06/13/24 1110          Plan of Care Review    Outcome Evaluation Pt seen for evaluation this date s/p abdominal surgical intervention. Pt demonstrates good functional mobility although hindered by pain. With rehabilitation, pt may benefit from staff ambulation and daughter assist as he does not require much physical assist at this time. Therefore, pt being D/C'd from therapy caseload.  -       Row Name 06/13/24 1110          Positioning and Restraints    Pre-Treatment Position in bed  -     Post Treatment Position bed  -     In Bed sitting EOB;call light within reach;encouraged to call for assist;exit alarm on  -       Row Name 06/13/24 1110          Therapy Assessment/Plan (PT)    Therapy Frequency (PT) evaluation only  -       Row Name 06/13/24 1110          Discharge Summary (PT)    Additional Documentation Discharge Summary (PT) (Group)  -       Row Name 06/13/24 1110          Discharge Summary (PT)    Outcomes Achieved/Progress Made Upon Discharge (PT) evaluation only  -     Transfer to Another Level of Care or Facility (PT) home with assist recommended;home with supervision recommended  -               User Key  (r) = Recorded By, (t) = Taken By, (c) = Cosigned By      Initials Name Provider Type    Estefania Scherer, RN Registered Nurse    Aydin Ivy, RN Registered Nurse    DALLAS Goyal  LUIS ALFREDO [place] Physical Therapist    Linus Rodriguez, RN Registered Nurse                          PT Recommendation and Plan  Anticipated Discharge Disposition (PT): home with assist, home with supervision  Therapy Frequency (PT): evaluation only  Outcome Evaluation: Pt seen for evaluation this date s/p abdominal surgical intervention. Pt demonstrates good functional mobility although hindered by pain. With rehabilitation, pt may benefit from staff ambulation and daughter assist as he does not require much physical assist at this time. Therefore, pt being D/C'd from therapy caseload.         Time Calculation:    PT Charges       Row Name 06/13/24 1308             Time Calculation    Start Time 1110  -KH      PT Received On 06/13/24  -                User Key  (r) = Recorded By, (t) = Taken By, (c) = Cosigned By      Initials Name Provider Type    Shanique Luna PT Physical Therapist                  Therapy Charges for Today       Code Description Service Date Service Provider Modifiers Qty    34271752829 HC PT EVAL LOW COMPLEXITY 4 6/13/2024 Shanique Goyal, PT GP 1            PT G-Codes  AM-PAC 6 Clicks Score (PT): 18    PT Discharge Summary  Anticipated Discharge Disposition (PT): home with assist, home with supervision    Shanique Goyal PT  6/13/2024

## 2024-06-13 NOTE — PLAN OF CARE
Goal Outcome Evaluation:              Outcome Evaluation: Pt's VSS. Pt c/o of pain, PRN medication given. Pt NG is at 70cm in the L nare on cont low wall suction. 200 mL from NG this shift. Pt's dressing is CDI and adm binder remains in place. Pt walked with x1 assist and a walker this shift, tolerated well. No concerns or complaints at this time. Will follow POC.

## 2024-06-13 NOTE — PROGRESS NOTES
[facility] HOSPITALIST PROGRESS NOTE     Patient Identification:  Name:  Alberto Guzman  Age:  89 y.o.  Sex:  male  :  3/29/1931  MRN:  [id number]  Visit Number:  [id number]  ROOM: [address] [place] [address]     Primary Care Provider:  Lianet Dia APRN    Length of stay in inpatient status:  4    Subjective     Chief Compliant:    Chief Complaint   Patient presents with    Abdominal Pain       History of Presenting Illness:    Patient denies any new complaints. Abdominal pain and distention improved. Has tolerated small amount of ice chips. Still having output per NG. Not passing gas or BM. Patient requesting home pain regimen for LE pain.     ROS:  Otherwise 10 point ROS negative other than documented above in HPI.     Objective     Current Hospital Meds:ascorbic acid, 500 mg, Oral, Daily  aspirin, 81 mg, Oral, Daily  atorvastatin, 20 mg, Oral, Daily  bumetanide, 2 mg, Oral, Daily  gabapentin, 600 mg, Oral, Daily  metoprolol succinate XL, 50 mg, Oral, Daily  pantoprazole, 40 mg, Oral, Daily    lactated ringers, 100 mL/hr, Last Rate: Stopped (24 1640)  sodium chloride, 75 mL/hr, Last Rate: 75 mL/hr (24 1513)        Current Antimicrobial Therapy:  Anti-Infectives (From admission, onward)      Ordered     Dose/Rate Route Frequency Start Stop    24 1424  cefOXItin (MEFOXIN) 2 g in sodium chloride 0.9 % 100 mL IVPB-VTB        Ordering Provider: Deo Lennon MD    2 g  200 mL/hr over 30 Minutes Intravenous Once 24 1426 24 1500          Current Diuretic Therapy:  Diuretics (From admission, onward)      Ordered     Dose/Rate Route Frequency Start Stop    24 1354  bumetanide (BUMEX) tablet 2 mg        Ordering Provider: Deo Lennon MD    2 mg Oral Daily 24 1445            ----------------------------------------------------------------------------------------------------------------------  Vital Signs:  Temp:  [97.1 °F (36.2 °C)-98.2 °F (36.8 °C)]  97.9 °F (36.6 °C)  Heart Rate:  [64-74] 71  Resp:  [16-18] 16  BP: (133-169)/(66-84) 136/68  SpO2:  [95 %-97 %] 97 %  on   ;   Device (Oxygen Therapy): room air  Body mass index is 26.3 kg/m².    Wt Readings from Last 3 Encounters:   06/09/24 98 kg (216 lb 1 oz)   04/09/24 102 kg (224 lb)   01/09/24 101 kg (223 lb 6.4 oz)     Intake & Output (last 3 days)         06/10 0701 06/11 0700 06/11 0701 06/12 0700 06/12 0701 06/13 0700 06/13 0701 06/14 0700    P.O. 0 60 0 0    I.V. (mL/kg)   1200 (12.2)     IV Piggyback   100     Total Intake(mL/kg) 0 (0) 60 (0.6) 1300 (13.3) 0 (0)    Urine (mL/kg/hr)  1200 (0.5) 1250 (0.5) 300 (0.3)    Emesis/NG output 950  2500 200    Stool 0 0 0     Total Output 950 1200 3750 500    Net -950 -1140 -2450 -500            Stool Unmeasured Occurrence 0 x 0 x 0 x           NPO Diet NPO Type: Ice Chips  ----------------------------------------------------------------------------------------------------------------------  Physical exam:  Constitutional:  Elderly appearing male.   HENT:  Head:  Normocephalic and atraumatic.  Mouth:  Moist mucous membranes.    Eyes:  Conjunctivae and EOM are normal. No scleral icterus.    Neck:  Neck supple.  No JVD present.    Cardiovascular:  Normal rate, regular rhythm and normal heart sounds with no murmur.  Pulmonary/Chest:  No respiratory distress, no wheezes, no crackles, with normal breath sounds and good air movement.  Abdominal:  Abdominal binder in place. Tenderness but no rebound or guarding.   Musculoskeletal:  No edema, no tenderness, and no deformity.  No red or swollen joints anywhere.    Neurological:  Alert and oriented to person, place, and time.  No cranial nerve deficit.  No tongue deviation.  No facial droop.  No slurred speech.   Skin:  Skin is warm and dry. No rash noted. No pallor.   Peripheral vascular:  Pulses in all 4 extremities with no clubbing, no cyanosis, no  "edema.  ----------------------------------------------------------------------------------------------------------------------  Tele:    ----------------------------------------------------------------------------------------------------------------------  Results from last 7 days   Lab Units 06/13/24  0116 06/12/24  0808 06/10/24  0140 06/09/24  0229   CRP mg/dL  --   --   --  0.31   LACTATE mmol/L  --   --   --  1.6   WBC 10*3/mm3 6.03 5.10 7.22 5.80   HEMOGLOBIN g/dL 13.1 13.3 13.4 13.0   HEMATOCRIT % 41.4 40.7 42.2 38.8   MCV fL 105.9* 104.4* 108.2* 102.9*   MCHC g/dL 31.6 32.7 31.8 33.5   PLATELETS 10*3/mm3 174 182 176 167   INR   --   --   --  1.54*         Results from last 7 days   Lab Units 06/13/24  0116 06/12/24  0808 06/10/24  0140 06/09/24  0229   SODIUM mmol/L 142 139 137 140   POTASSIUM mmol/L 4.6 3.8 4.0 4.6   MAGNESIUM mg/dL  --   --  2.0  --    CHLORIDE mmol/L 105 104 103 101   CO2 mmol/L 20.9* 22.2 23.8 26.6   BUN mg/dL 23 21 14 11   CREATININE mg/dL 1.03 0.99 1.12 1.14   CALCIUM mg/dL 8.2 8.5 8.8 9.3   GLUCOSE mg/dL 141* 97 104* 107*   ALBUMIN g/dL  --  3.2*  --  3.8   BILIRUBIN mg/dL  --  1.1  --  1.2   ALK PHOS U/L  --  73  --  91   AST (SGOT) U/L  --  19  --  18   ALT (SGPT) U/L  --  <5  --  9   Estimated Creatinine Clearance: 62.1 mL/min (by C-G formula based on SCr of 1.03 mg/dL).  No results found for: \"AMMONIA\"  Results from last 7 days   Lab Units 06/09/24  0429 06/09/24  0229   HSTROP T ng/L 38* 38*     Results from last 7 days   Lab Units 06/09/24 0229   PROBNP pg/mL 986.4         No results found for: \"HGBA1C\", \"POCGLU\"  Lab Results   Component Value Date    TSH 1.250 06/10/2024     No results found for: \"PREGTESTUR\", \"PREGSERUM\", \"HCG\", \"HCGQUANT\"  Pain Management Panel           No data to display              Brief Urine Lab Results  (Last result in the past 365 days)        Color   Clarity   Blood   Leuk Est   Nitrite   Protein   CREAT   Urine HCG        06/09/24 0953 Yellow   " "Clear   Negative   Negative   Negative   Negative                 No results found for: \"BLOODCX\"  No results found for: \"URINECX\"  No results found for: \"WOUNDCX\"  No results found for: \"STOOLCX\"  No results found for: \"RESPCX\"  No results found for: \"AFBCX\"  Results from last 7 days   Lab Units 06/09/24  0229   LACTATE mmol/L 1.6   SED RATE mm/hr 12   CRP mg/dL 0.31       I have personally looked at the labs and they are summarized above.  ----------------------------------------------------------------------------------------------------------------------  Detailed radiology reports for the last 24 hours:    Imaging Results (Last 24 Hours)       ** No results found for the last 24 hours. **          Assessment & Plan    #Acute SBO POD 1 exploratory laparotomy w/ mesh explantation and reduction of internal hernia and closure of mesenteric defect of colon   #Troponin elevation likely secondary to demand ischemia, no delta and no ischemia on ecg  #pAF on Warfarin w/PM  #Mild macrocytosis w/o anemia  #hx of colon and bladder ca in sustained remission     Plan:  -Vit B12, folate, TSH all wnl.   -Patient high surgical risk, no evidence of HF currently and no ischemia on ecg thus no further risk stratification required for surgery   - Routine TTE ordered revealing EF has dropped from 46-50% to 36-40% in the last 3 years. Outpatient cardiology f/u. Explained increased surgical risk with family. Appears compensated.   - Recommend to restart anticoagulation when cleared by surgery postoperatively. Can consider DOAC if covered and not contraindicated, will discuss long term anticoagulation with patient.   - Post surgical plan per primary team. Awaiting bowel recovery after surgery.     PT/OT consulted      Medicine will continue to follow. Please call with any questions.        Joseph Barnett MD  [facility]  06/13/24  18:20 EDT  "

## 2024-06-13 NOTE — THERAPY EVALUATION
Patient Name: Alberto Guzman  : 3/29/1931    MRN: [id number]                              Today's Date: 2024       Admit Date: 2024    Visit Dx:     ICD-10-CM ICD-9-CM   1. Small bowel obstruction  K56.609 560.9     Patient Active Problem List   Diagnosis    Urinary retention    Bladder neck contracture    Bladder tumor    Aftercare following surgery of the genitourinary system    Atrial flutter , ventricular paced rhythm    Presence of cardiac pacemaker 6/3/2025    Hyperlipidemia LDL goal <70    Essential hypertension    Chronic anticoagulation with Coumadin    Ventricular tachycardia (paroxysmal)    Asymptomatic PVD (peripheral vascular disease)    Coronary artery disease, anterior wall myocardial infarction with directional atherectomy of LAD in  and bare-metal stent to the LAD in , nonobstructive disease     Ischemic cardiomyopathy, LV ejection fraction around 45 to 50%    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, 46 to 50%)    Bilateral lower extremity edema    Stroke (cerebrum)    Small bowel obstruction     Past Medical History:   Diagnosis Date    Arthritis     Atrial flutter     Back pain     Benign prostatic hyperplasia     Bladder tumor     Cancer     [id number]/melanoma    Chronic systolic heart failure 2021    Colon cancer     Coronary artery disease     DVT (deep venous thrombosis)     r leg    Elevated cholesterol     Heart attack     Hypertension     Numbness     feet/hands    Osteoporosis     PVD (peripheral vascular disease)     Rheumatoid arthritis     Stroke     Ventricular tachycardia      Past Surgical History:   Procedure Laterality Date    CATARACT EXTRACTION Bilateral     CHOLECYSTECTOMY      COLON RESECTION      COLONOSCOPY      HEMORROIDECTOMY      HERNIA REPAIR      left inguinal/umbilical    HIP ARTHROPLASTY Right     INSERT / REPLACE / REMOVE PACEMAKER      JOINT REPLACEMENT      PACEMAKER IMPLANTATION      PROSTATE SURGERY       TRANSURETHRAL RESECTION OF BLADDER TUMOR N/A 04/18/2018    Procedure: CYSTOSCOPY TRANSURETHRAL RESECTION OF SMALL BLADDER TUMOR;  Surgeon: Alfonso Collins MD;  Location: [facility] OR;  Service: Urology    TRANSURETHRAL RESECTION OF BLADDER TUMOR N/A 08/29/2018    Procedure: CYSTOSCOPY TRANSURETHRAL RESECTION OF BLADDER TUMOR;  Surgeon: Alfonso Collins MD;  Location: [facility] OR;  Service: Urology      General Information       Row Name 06/13/24 1502          OT Time and Intention    Document Type evaluation  -LA     Mode of Treatment occupational therapy  -LA       Row Name 06/13/24 1502          General Information    Patient Profile Reviewed yes  -LA     Prior Level of Function independent:;ADL's  -LA     Existing Precautions/Restrictions fall  -LA     Barriers to Rehab none identified  -LA       Row Name 06/13/24 1502          Occupational Profile    Reason for Services/Referral (Occupational Profile) OT to assess for changes in level of independence/safety with ADLs  -LA     Patient Goals (Occupational Profile) Return home  -LA       Row Name 06/13/24 1502          Living Environment    People in Home alone  -LA       Row Name 06/13/24 1502          Cognition    Orientation Status (Cognition) oriented x 4  -LA       Row Name 06/13/24 1502          Safety Issues, Functional Mobility    Impairments Affecting Function (Mobility) endurance/activity tolerance;balance  -LA               User Key  (r) = Recorded By, (t) = Taken By, (c) = Cosigned By      Initials Name Provider Type    Marlene Crum OT Occupational Therapist                     Mobility/ADL's       Row Name 06/13/24 1503          Bed Mobility    Bed Mobility bed mobility (all) activities  -LA     All Activities, [place] (Bed Mobility) contact guard  -LA     Supine-Sit [place] (Bed Mobility) contact guard  -LA       Row Name 06/13/24 1503          Transfers    Transfers bed-chair transfer;sit-stand transfer;toilet transfer  -LA        Row Name 06/13/24 1503          Bed-Chair Transfer    Bed-Chair [place] (Transfers) contact guard  -LA       Row Name 06/13/24 1503          Sit-Stand Transfer    Sit-Stand [place] (Transfers) contact guard  -LA       Row Name 06/13/24 1503          Stand-Sit Transfer    Stand-Sit [place] (Transfers) contact guard  -LA       Row Name 06/13/24 1503          Toilet Transfer    Type (Toilet Transfer) stand pivot/stand step  -LA     [place] Level (Toilet Transfer) contact guard;minimum assist (75% patient effort)  -LA       Row Name 06/13/24 1503          Activities of Daily Living    BADL Assessment/Intervention bathing;upper body dressing;lower body dressing;grooming;feeding;toileting  -LA       Row Name 06/13/24 1503          Bathing Assessment/Intervention    [place] Level (Bathing) minimum assist (75% patient effort)  -LA       Row Name 06/13/24 1503          Upper Body Dressing Assessment/Training    [place] Level (Upper Body Dressing) set up  -LA       Row Name 06/13/24 1503          Lower Body Dressing Assessment/Training    [place] Level (Lower Body Dressing) contact guard assist  -LA       Row Name 06/13/24 1503          Grooming Assessment/Training    [place] Level (Grooming) set up  -LA       Row Name 06/13/24 1503          Self-Feeding Assessment/Training    [place] Level (Feeding) independent  -LA       Row Name 06/13/24 1503          Toileting Assessment/Training    [place] Level (Toileting) contact guard assist;minimum assist (75% patient effort)  -LA               User Key  (r) = Recorded By, (t) = Taken By, (c) = Cosigned By      Initials Name Provider Type    Marlene Crum OT Occupational Therapist                   Obj/Interventions       Row Name 06/13/24 1507          Sensory Assessment (Somatosensory)    Sensory Assessment (Somatosensory) bilateral UE  -LA     Bilateral UE Sensory Assessment impaired  -LA     Sensory Subjective Reports  numbness  Patient reports that hands have been numb for a long time  -SONALI Palomares Name 06/13/24 1507          Vision Assessment/Intervention    Visual Impairment/Limitations WFL  -SONALI Palomares Name 06/13/24 1507          Range of Motion Comprehensive    General Range of Motion no range of motion deficits identified  -LA     Comment, General Range of Motion BUE ROM grossly WFL  -SONALI Palomares Name 06/13/24 1507          Strength Comprehensive (MMT)    General Manual Muscle Testing (MMT) Assessment no strength deficits identified  -LA     Comment, General Manual Muscle Testing (MMT) Assessment BUE strength grossly 5/5  -LA       Marielle Name 06/13/24 1507          Motor Skills    Motor Skills coordination;functional endurance  -LA     Coordination fine motor deficit;upper extremity;bilateral  -LA     Functional Endurance fair+  -LA       Row Name 06/13/24 1507          Balance    Balance Assessment sitting static balance;sitting dynamic balance  -LA     Static Sitting Balance independent  -LA     Dynamic Sitting Balance independent  -LA               User Key  (r) = Recorded By, (t) = Taken By, (c) = Cosigned By      Initials Name Provider Type    Marlene Crum OT Occupational Therapist                   Goals/Plan    No documentation.                  Clinical Impression       Row Name 06/13/24 1508          Plan of Care Review    Plan of Care Reviewed With patient  -LA     Outcome Evaluation OT evaluation completed this date. Patient with good UE ROM/strength. Patient does currently have some nausea and stomach pain that limits ability however patient expected to return to baseline. No further OT indicated at this time.  -SONALI Palomares Name 06/13/24 1508          Therapy Assessment/Plan (OT)    Therapy Frequency (OT) evaluation only  -LA       Row Name 06/13/24 1508          Therapy Plan Review/Discharge Plan (OT)    Anticipated Discharge Disposition (OT) home;home with home health  -SONALI Palomares Name 06/13/24 1502           Positioning and Restraints    Pre-Treatment Position in bed  -LA     Post Treatment Position bed  -LA     In Bed supine;call light within reach;encouraged to call for assist;exit alarm on  -LA               User Key  (r) = Recorded By, (t) = Taken By, (c) = Cosigned By      Initials Name Provider Type    Marlene Crum OT Occupational Therapist                   Outcome Measures       Row Name 06/13/24 0915          How much help from another person do you currently need...    Turning from your back to your side while in flat bed without using bedrails? 3  -HH     Moving from lying on back to sitting on the side of a flat bed without bedrails? 3  -HH     Moving to and from a bed to a chair (including a wheelchair)? 3  -HH     Standing up from a chair using your arms (e.g., wheelchair, bedside chair)? 3  -HH     Climbing 3-5 steps with a railing? 3  -HH     To walk in hospital room? 3  -HH     AM-PAC 6 Clicks Score (PT) 18  -HH     Highest Level of Mobility Goal 6 --> Walk 10 steps or more  -               User Key  (r) = Recorded By, (t) = Taken By, (c) = Cosigned By      Initials Name Provider Type    Emma De León, RN Registered Nurse                      OT Recommendation and Plan  Therapy Frequency (OT): evaluation only  Plan of Care Review  Plan of Care Reviewed With: patient  Outcome Evaluation: OT evaluation completed this date. Patient with good UE ROM/strength. Patient does currently have some nausea and stomach pain that limits ability however patient expected to return to baseline. No further OT indicated at this time.     Time Calculation:          Therapy Charges for Today       Code Description Service Date Service Provider Modifiers Qty    [id number]  OT EVAL MOD COMPLEXITY 4 6/13/2024 Marlene Alan OT GO 1                 Marlene Alan OT  6/13/2024

## 2024-06-13 NOTE — PROGRESS NOTES
Nutrition Services    Patient Name:  Alberto Guzman  YOB: 1931  MRN: [id number]  Admit Date:  6/9/2024    Today is day 4 for patient to be NPO, may need to consider alternative nutrition support if diet cannot be advance in next 48-72 hours to help prevent the risk of malnutrition. Thank you.    Electronically signed by:  Twila Callejas RD  06/13/24 13:14 EDT

## 2024-06-13 NOTE — PLAN OF CARE
Goal Outcome Evaluation:              Outcome Evaluation: Pt resting in bed at this time. No s/s of distress noted. NG tube at 70cm in L nare to low wall suction. 200ml output this shift. Pt dressing remains CDI with adbominal binder in place. Pt has been up to chair this shift. Will continue POC.

## 2024-06-13 NOTE — ANESTHESIA POSTPROCEDURE EVALUATION
Patient: Alberto Guzman    Procedure Summary       Date: 06/12/24 Room / Location:  [facility] OR 03 / [facility] OR    Anesthesia Start: 1456 Anesthesia Stop: 1640    Procedure: LAPAROTOMY EXPLORATORY (Abdomen) Diagnosis:       Small bowel obstruction      (Small bowel obstruction [K56.609])    Surgeons: Deo Lennon MD Provider: Adam Bruno DO    Anesthesia Type: general ASA Status: 3            Anesthesia Type: general    Vitals  Vitals Value Taken Time   /74 06/12/24 1710   Temp 97.6 °F (36.4 °C) 06/12/24 1710   Pulse 70 06/12/24 1710   Resp 16 06/12/24 1710   SpO2 96 % 06/12/24 1710           Post Anesthesia Care and Evaluation    Patient location during evaluation: PACU  Patient participation: complete - patient participated  Level of consciousness: awake and alert  Pain score: 1  Pain management: adequate    Airway patency: patent  Anesthetic complications: No anesthetic complications  PONV Status: controlled  Cardiovascular status: acceptable  Respiratory status: acceptable  Hydration status: acceptable

## 2024-06-14 ENCOUNTER — APPOINTMENT (OUTPATIENT)
Dept: GENERAL RADIOLOGY | Facility: HOSPITAL | Age: 89
End: 2024-06-14
Payer: MEDICARE

## 2024-06-14 LAB
ALBUMIN SERPL-MCNC: 2.8 G/DL (ref 3.5–5.2)
ALBUMIN/GLOB SERPL: 0.9 G/DL
ALP SERPL-CCNC: 62 U/L (ref 39–117)
ALT SERPL W P-5'-P-CCNC: 8 U/L (ref 1–41)
ANION GAP SERPL CALCULATED.3IONS-SCNC: 12 MMOL/L (ref 5–15)
AST SERPL-CCNC: 20 U/L (ref 1–40)
BASOPHILS # BLD AUTO: 0.01 10*3/MM3 (ref 0–0.2)
BASOPHILS NFR BLD AUTO: 0.1 % (ref 0–1.5)
BILIRUB SERPL-MCNC: 0.7 MG/DL (ref 0–1.2)
BUN SERPL-MCNC: 29 MG/DL (ref 8–23)
BUN/CREAT SERPL: 29.9 (ref 7–25)
CALCIUM SPEC-SCNC: 8.5 MG/DL (ref 8.2–9.6)
CHLORIDE SERPL-SCNC: 108 MMOL/L (ref 98–107)
CO2 SERPL-SCNC: 21 MMOL/L (ref 22–29)
CREAT SERPL-MCNC: 0.97 MG/DL (ref 0.76–1.27)
DEPRECATED RDW RBC AUTO: 49.9 FL (ref 37–54)
EGFRCR SERPLBLD CKD-EPI 2021: 72.8 ML/MIN/1.73
EOSINOPHIL # BLD AUTO: 0.09 10*3/MM3 (ref 0–0.4)
EOSINOPHIL NFR BLD AUTO: 1.3 % (ref 0.3–6.2)
ERYTHROCYTE [DISTWIDTH] IN BLOOD BY AUTOMATED COUNT: 12.5 % (ref 12.3–15.4)
GLOBULIN UR ELPH-MCNC: 3.2 GM/DL
GLUCOSE SERPL-MCNC: 122 MG/DL (ref 65–99)
HCT VFR BLD AUTO: 42.1 % (ref 37.5–51)
HGB BLD-MCNC: 13.3 G/DL (ref 13–17.7)
IMM GRANULOCYTES # BLD AUTO: 0.04 10*3/MM3 (ref 0–0.05)
IMM GRANULOCYTES NFR BLD AUTO: 0.6 % (ref 0–0.5)
LYMPHOCYTES # BLD AUTO: 0.94 10*3/MM3 (ref 0.7–3.1)
LYMPHOCYTES NFR BLD AUTO: 13.5 % (ref 19.6–45.3)
MCH RBC QN AUTO: 33.8 PG (ref 26.6–33)
MCHC RBC AUTO-ENTMCNC: 31.6 G/DL (ref 31.5–35.7)
MCV RBC AUTO: 107.1 FL (ref 79–97)
MONOCYTES # BLD AUTO: 0.79 10*3/MM3 (ref 0.1–0.9)
MONOCYTES NFR BLD AUTO: 11.4 % (ref 5–12)
NEUTROPHILS NFR BLD AUTO: 5.07 10*3/MM3 (ref 1.7–7)
NEUTROPHILS NFR BLD AUTO: 73.1 % (ref 42.7–76)
NRBC BLD AUTO-RTO: 0 /100 WBC (ref 0–0.2)
PLATELET # BLD AUTO: 171 10*3/MM3 (ref 140–450)
PMV BLD AUTO: 9.9 FL (ref 6–12)
POTASSIUM SERPL-SCNC: 3.6 MMOL/L (ref 3.5–5.2)
PROT SERPL-MCNC: 6 G/DL (ref 6–8.5)
RBC # BLD AUTO: 3.93 10*6/MM3 (ref 4.14–5.8)
SODIUM SERPL-SCNC: 141 MMOL/L (ref 136–145)
WBC NRBC COR # BLD AUTO: 6.94 10*3/MM3 (ref 3.4–10.8)

## 2024-06-14 PROCEDURE — 25010000002 HEPARIN (PORCINE) PER 1000 UNITS: Performed by: INTERNAL MEDICINE

## 2024-06-14 PROCEDURE — 25010000002 MORPHINE PER 10 MG

## 2024-06-14 PROCEDURE — 85025 COMPLETE CBC W/AUTO DIFF WBC: CPT | Performed by: INTERNAL MEDICINE

## 2024-06-14 PROCEDURE — 71045 X-RAY EXAM CHEST 1 VIEW: CPT | Performed by: RADIOLOGY

## 2024-06-14 PROCEDURE — 71045 X-RAY EXAM CHEST 1 VIEW: CPT

## 2024-06-14 PROCEDURE — 99024 POSTOP FOLLOW-UP VISIT: CPT | Performed by: SURGERY

## 2024-06-14 PROCEDURE — 25810000003 SODIUM CHLORIDE 0.9 % SOLUTION: Performed by: SURGERY

## 2024-06-14 PROCEDURE — 99232 SBSQ HOSP IP/OBS MODERATE 35: CPT | Performed by: INTERNAL MEDICINE

## 2024-06-14 PROCEDURE — 25010000002 MORPHINE PER 10 MG: Performed by: SURGERY

## 2024-06-14 PROCEDURE — 80053 COMPREHEN METABOLIC PANEL: CPT | Performed by: INTERNAL MEDICINE

## 2024-06-14 RX ORDER — BUMETANIDE 1 MG/1
2 TABLET ORAL DAILY
Status: DISCONTINUED | OUTPATIENT
Start: 2024-06-15 | End: 2024-06-19 | Stop reason: HOSPADM

## 2024-06-14 RX ORDER — BUMETANIDE 1 MG/1
2 TABLET ORAL DAILY
Status: DISCONTINUED | OUTPATIENT
Start: 2024-06-14 | End: 2024-06-14

## 2024-06-14 RX ORDER — ATORVASTATIN CALCIUM 20 MG/1
20 TABLET, FILM COATED ORAL DAILY
Status: DISCONTINUED | OUTPATIENT
Start: 2024-06-15 | End: 2024-06-19 | Stop reason: HOSPADM

## 2024-06-14 RX ORDER — ASPIRIN 81 MG/1
81 TABLET, CHEWABLE ORAL DAILY
Status: DISCONTINUED | OUTPATIENT
Start: 2024-06-15 | End: 2024-06-19 | Stop reason: HOSPADM

## 2024-06-14 RX ORDER — HYDROCODONE BITARTRATE AND ACETAMINOPHEN 10; 325 MG/1; MG/1
0.5 TABLET ORAL EVERY 6 HOURS PRN
Status: DISCONTINUED | OUTPATIENT
Start: 2024-06-14 | End: 2024-06-19 | Stop reason: HOSPADM

## 2024-06-14 RX ORDER — GABAPENTIN 300 MG/1
600 CAPSULE ORAL DAILY
Status: DISCONTINUED | OUTPATIENT
Start: 2024-06-14 | End: 2024-06-14

## 2024-06-14 RX ORDER — LANSOPRAZOLE 30 MG/1
30 TABLET, ORALLY DISINTEGRATING, DELAYED RELEASE ORAL
Status: DISCONTINUED | OUTPATIENT
Start: 2024-06-14 | End: 2024-06-14

## 2024-06-14 RX ORDER — ASPIRIN 81 MG/1
81 TABLET, CHEWABLE ORAL DAILY
Status: DISCONTINUED | OUTPATIENT
Start: 2024-06-14 | End: 2024-06-14

## 2024-06-14 RX ORDER — MORPHINE SULFATE 2 MG/ML
2 INJECTION, SOLUTION INTRAMUSCULAR; INTRAVENOUS
Status: DISPENSED | OUTPATIENT
Start: 2024-06-14 | End: 2024-06-16

## 2024-06-14 RX ORDER — LANSOPRAZOLE 30 MG/1
30 TABLET, ORALLY DISINTEGRATING, DELAYED RELEASE ORAL
Status: DISCONTINUED | OUTPATIENT
Start: 2024-06-15 | End: 2024-06-19 | Stop reason: HOSPADM

## 2024-06-14 RX ORDER — GABAPENTIN 300 MG/1
600 CAPSULE ORAL DAILY
Status: DISCONTINUED | OUTPATIENT
Start: 2024-06-15 | End: 2024-06-17

## 2024-06-14 RX ORDER — ASCORBIC ACID 500 MG
500 TABLET ORAL DAILY
Status: DISCONTINUED | OUTPATIENT
Start: 2024-06-14 | End: 2024-06-14

## 2024-06-14 RX ORDER — HYDROCODONE BITARTRATE AND ACETAMINOPHEN 10; 325 MG/1; MG/1
0.5 TABLET ORAL EVERY 6 HOURS PRN
Status: DISCONTINUED | OUTPATIENT
Start: 2024-06-14 | End: 2024-06-14

## 2024-06-14 RX ORDER — HEPARIN SODIUM 5000 [USP'U]/ML
5000 INJECTION, SOLUTION INTRAVENOUS; SUBCUTANEOUS EVERY 8 HOURS SCHEDULED
Status: DISCONTINUED | OUTPATIENT
Start: 2024-06-14 | End: 2024-06-18

## 2024-06-14 RX ORDER — ATORVASTATIN CALCIUM 20 MG/1
20 TABLET, FILM COATED ORAL DAILY
Status: DISCONTINUED | OUTPATIENT
Start: 2024-06-14 | End: 2024-06-14

## 2024-06-14 RX ORDER — ASCORBIC ACID 500 MG
500 TABLET ORAL DAILY
Status: DISCONTINUED | OUTPATIENT
Start: 2024-06-15 | End: 2024-06-19 | Stop reason: HOSPADM

## 2024-06-14 RX ADMIN — METOPROLOL TARTRATE 25 MG: 25 TABLET, FILM COATED ORAL at 21:03

## 2024-06-14 RX ADMIN — LANSOPRAZOLE 30 MG: 30 TABLET, ORALLY DISINTEGRATING ORAL at 09:15

## 2024-06-14 RX ADMIN — MORPHINE SULFATE 2 MG: 2 INJECTION, SOLUTION INTRAMUSCULAR; INTRAVENOUS at 02:26

## 2024-06-14 RX ADMIN — BUMETANIDE 2 MG: 1 TABLET ORAL at 09:12

## 2024-06-14 RX ADMIN — ATORVASTATIN CALCIUM 20 MG: 20 TABLET, FILM COATED ORAL at 09:12

## 2024-06-14 RX ADMIN — MORPHINE SULFATE 2 MG: 2 INJECTION, SOLUTION INTRAMUSCULAR; INTRAVENOUS at 23:28

## 2024-06-14 RX ADMIN — MORPHINE SULFATE 2 MG: 2 INJECTION, SOLUTION INTRAMUSCULAR; INTRAVENOUS at 13:45

## 2024-06-14 RX ADMIN — HYDROCODONE BITARTRATE AND ACETAMINOPHEN 0.5 TABLET: 10; 325 TABLET ORAL at 17:16

## 2024-06-14 RX ADMIN — HYDROCODONE BITARTRATE AND ACETAMINOPHEN 0.5 TABLET: 10; 325 TABLET ORAL at 09:11

## 2024-06-14 RX ADMIN — ASPIRIN 81 MG: 81 TABLET, CHEWABLE ORAL at 09:12

## 2024-06-14 RX ADMIN — OXYCODONE HYDROCHLORIDE AND ACETAMINOPHEN 500 MG: 500 TABLET ORAL at 09:12

## 2024-06-14 RX ADMIN — SODIUM CHLORIDE 75 ML/HR: 9 INJECTION, SOLUTION INTRAVENOUS at 09:10

## 2024-06-14 RX ADMIN — HEPARIN SODIUM 5000 UNITS: 5000 INJECTION INTRAVENOUS; SUBCUTANEOUS at 21:03

## 2024-06-14 RX ADMIN — METOPROLOL TARTRATE 25 MG: 25 TABLET, FILM COATED ORAL at 09:12

## 2024-06-14 NOTE — PROGRESS NOTES
[facility] HOSPITALIST PROGRESS NOTE     Patient Identification:  Name:  Alberto Guzman  Age:  89 y.o.  Sex:  male  :  3/29/1931  MRN:  [id number]  Visit Number:  [id number]  ROOM: [address] [place] [address]     Primary Care Provider:  Liante Dia APRN    Length of stay in inpatient status:  5    Subjective     Chief Compliant:    Chief Complaint   Patient presents with    Abdominal Pain       History of Presenting Illness:    Patient notes feeling better. Still not passing gas but tolerating liquid diet with improved abdominal discomfort. NG tube has been taken out.     ROS:  Otherwise 10 point ROS negative other than documented above in HPI.     Objective     Current Hospital Meds:[START ON 6/15/2024] ascorbic acid, 500 mg, Oral, Daily  [START ON 6/15/2024] aspirin, 81 mg, Oral, Daily  [START ON 6/15/2024] atorvastatin, 20 mg, Oral, Daily  [START ON 6/15/2024] bumetanide, 2 mg, Oral, Daily  [START ON 6/15/2024] gabapentin, 600 mg, Oral, Daily  [START ON 6/15/2024] lansoprazole, 30 mg, Oral, Q AM  metoprolol tartrate, 25 mg, Oral, Q12H         Current Antimicrobial Therapy:  Anti-Infectives (From admission, onward)      Ordered     Dose/Rate Route Frequency Start Stop    24 1424  cefOXItin (MEFOXIN) 2 g in sodium chloride 0.9 % 100 mL IVPB-VTB        Ordering Provider: Deo Lennon MD    2 g  200 mL/hr over 30 Minutes Intravenous Once 24 1426 24 1500          Current Diuretic Therapy:  Diuretics (From admission, onward)      Ordered     Dose/Rate Route Frequency Start Stop    24 1338  bumetanide (BUMEX) tablet 2 mg        Ordering Provider: Deo Lennon MD    2 mg Oral Daily 06/15/24 0900            ----------------------------------------------------------------------------------------------------------------------  Vital Signs:  Temp:  [97.7 °F (36.5 °C)-98.3 °F (36.8 °C)] 98 °F (36.7 °C)  Heart Rate:  [62-71] 70  Resp:  [16-18] 16  BP: (117-144)/(61-74)  117/61  SpO2:  [95 %-97 %] 95 %  on   ;   Device (Oxygen Therapy): room air  Body mass index is 26.3 kg/m².    Wt Readings from Last 3 Encounters:   06/09/24 98 kg (216 lb 1 oz)   04/09/24 102 kg (224 lb)   01/09/24 101 kg (223 lb 6.4 oz)     Intake & Output (last 3 days)         06/11 0701 06/12 0700 06/12 0701 06/13 0700 06/13 0701 06/14 0700 06/14 0701  06/15 0700    P.O. 60 0 80 480    I.V. (mL/kg)  1200 (12.2) 6200 (63.3)     IV Piggyback  100      Total Intake(mL/kg) 60 (0.6) 1300 (13.3) 6280 (64.1) 480 (4.9)    Urine (mL/kg/hr) 1200 (0.5) 1250 (0.5) 800 (0.3) 2000 (1.7)    Emesis/NG output  2500 450 25    Stool 0 0      Total Output 1200 3750 1250 2025    Net -1140 -2450 +5030 -1545            Stool Unmeasured Occurrence 0 x 0 x            Diet: Liquid; Clear Liquid; Fluid Consistency: Thin (IDDSI 0)  ----------------------------------------------------------------------------------------------------------------------  Physical exam:  Constitutional:  Well-developed and well-nourished.  No respiratory distress.      HENT:  Head:  Normocephalic and atraumatic.  Mouth:  Moist mucous membranes.    Eyes:  Conjunctivae and EOM are normal. No scleral icterus.    Neck:  Neck supple.  No JVD present.    Cardiovascular:  Normal rate, regular rhythm and normal heart sounds with no murmur.  Pulmonary/Chest:  No respiratory distress, no wheezes, no crackles, with normal breath sounds and good air movement.  Abdominal:  Soft.  Bowel sounds are normal.  No distension and no tenderness.   Musculoskeletal:  No edema, no tenderness, and no deformity.  No red or swollen joints anywhere.    Neurological:  Alert and oriented to person, place, and time.  No cranial nerve deficit.  No tongue deviation.  No facial droop.  No slurred speech.   Skin:  Skin is warm and dry. No rash noted. No pallor.   Peripheral vascular:  Pulses in all 4 extremities with no clubbing, no cyanosis, no  "edema.  ----------------------------------------------------------------------------------------------------------------------  Tele:    ----------------------------------------------------------------------------------------------------------------------  Results from last 7 days   Lab Units 06/14/24  1406 06/13/24  0116 06/12/24  0808 06/10/24  0140 06/09/24  0229   CRP mg/dL  --   --   --   --  0.31   LACTATE mmol/L  --   --   --   --  1.6   WBC 10*3/mm3 6.94 6.03 5.10   < > 5.80   HEMOGLOBIN g/dL 13.3 13.1 13.3   < > 13.0   HEMATOCRIT % 42.1 41.4 40.7   < > 38.8   MCV fL 107.1* 105.9* 104.4*   < > 102.9*   MCHC g/dL 31.6 31.6 32.7   < > 33.5   PLATELETS 10*3/mm3 171 174 182   < > 167   INR   --   --   --   --  1.54*    < > = values in this interval not displayed.         Results from last 7 days   Lab Units 06/14/24  1406 06/13/24  0116 06/12/24  0808 06/10/24  0140 06/09/24 0229   SODIUM mmol/L 141 142 139 137 140   POTASSIUM mmol/L 3.6 4.6 3.8 4.0 4.6   MAGNESIUM mg/dL  --   --   --  2.0  --    CHLORIDE mmol/L 108* 105 104 103 101   CO2 mmol/L 21.0* 20.9* 22.2 23.8 26.6   BUN mg/dL 29* 23 21 14 11   CREATININE mg/dL 0.97 1.03 0.99 1.12 1.14   CALCIUM mg/dL 8.5 8.2 8.5 8.8 9.3   GLUCOSE mg/dL 122* 141* 97 104* 107*   ALBUMIN g/dL 2.8*  --  3.2*  --  3.8   BILIRUBIN mg/dL 0.7  --  1.1  --  1.2   ALK PHOS U/L 62  --  73  --  91   AST (SGOT) U/L 20  --  19  --  18   ALT (SGPT) U/L 8  --  <5  --  9   Estimated Creatinine Clearance: 66 mL/min (by C-G formula based on SCr of 0.97 mg/dL).  No results found for: \"AMMONIA\"  Results from last 7 days   Lab Units 06/09/24  0429 06/09/24 0229   HSTROP T ng/L 38* 38*     Results from last 7 days   Lab Units 06/09/24 0229   PROBNP pg/mL 986.4         No results found for: \"HGBA1C\", \"POCGLU\"  Lab Results   Component Value Date    TSH 1.250 06/10/2024     No results found for: \"PREGTESTUR\", \"PREGSERUM\", \"HCG\", \"HCGQUANT\"  Pain Management Panel           No data to display    " "          Brief Urine Lab Results  (Last result in the past 365 days)        Color   Clarity   Blood   Leuk Est   Nitrite   Protein   CREAT   Urine HCG        06/09/24 0953 Yellow   Clear   Negative   Negative   Negative   Negative                 No results found for: \"BLOODCX\"  No results found for: \"URINECX\"  No results found for: \"WOUNDCX\"  No results found for: \"STOOLCX\"  No results found for: \"RESPCX\"  No results found for: \"AFBCX\"  Results from last 7 days   Lab Units 06/09/24  0229   LACTATE mmol/L 1.6   SED RATE mm/hr 12   CRP mg/dL 0.31       I have personally looked at the labs and they are summarized above.  ----------------------------------------------------------------------------------------------------------------------  Detailed radiology reports for the last 24 hours:    Imaging Results (Last 24 Hours)       Procedure Component Value Units Date/Time    XR Chest 1 View [[id number]] Collected: 06/14/24 0854     Updated: 06/14/24 0856    Narrative:      EXAM:    XR Chest, 1 View     EXAM DATE:    6/14/2024 8:30 AM     CLINICAL HISTORY:    NG tube placement; K56.609-Unspecified intestinal obstruction,  unspecified as to partial versus complete obstruction     TECHNIQUE:    Frontal view of the chest.     COMPARISON:    No relevant prior studies available.     FINDINGS:    LUNGS AND PLEURAL SPACES:  Vague bibasilar airspace disease or  atelectasis.  Coarsened interstitial markings noted throughout the  lungs.  Tiny bilateral pleural effusions.  Atelectasis in the lung  bases.  No pneumothorax.    HEART:  Mild cardiomegaly.    MEDIASTINUM:  Unremarkable as visualized.  Normal mediastinal contour.    BONES/JOINTS:  Unremarkable as visualized.  No acute fracture.    TUBES, LINES AND DEVICES:  Nasogastric tube tip in the stomach.       Impression:      1.  Vague bibasilar airspace disease or atelectasis.  2.  Mild cardiomegaly.  3.  Tiny bilateral pleural effusions.  4.  Atelectasis in the lung bases.      "   This report was finalized on 6/14/2024 8:54 AM by Dr. Bobby Arango MD.             Assessment & Plan    #Acute SBO POD 2 exploratory laparotomy w/ mesh explantation and reduction of internal hernia and closure of mesenteric defect of colon   #Troponin elevation likely secondary to demand ischemia, no delta and no ischemia on ecg  #pAF on Warfarin w/PM  #Mild macrocytosis w/o anemia  #hx of colon and bladder ca in sustained remission     Plan:  -Vit B12, folate, TSH all wnl.   -Patient high surgical risk, no evidence of HF currently and no ischemia on ecg thus no further risk stratification required for surgery   - Routine TTE ordered revealing EF has dropped from 46-50% to 36-40% in the last 3 years. Outpatient cardiology f/u. Explained increased surgical risk with family. Appears compensated.   - Recommend to restart anticoagulation when cleared by surgery postoperatively. Can consider DOAC if covered and not contraindicated, will discuss long term anticoagulation with patient. Will start SubQ heparin until surgery clears anticoagulation. Will pend INR for AM just in case family opts to continue warfarin.   - Post surgical plan per primary team. Awaiting bowel recovery after surgery.      PT/OT consulted      Medicine will continue to follow. Please call with any questions.        Joseph Barnett MD  [facility] Hospitalist  06/14/24  18:46 EDT

## 2024-06-14 NOTE — PLAN OF CARE
Goal Outcome Evaluation:              Outcome Evaluation: Patient resting in bed at this time. VSS. Patient c/o pain, PRN medications given per MAR. NG to low wall suction. Patient has no other complaints or acute changes at this time. Will continue plan of care.

## 2024-06-14 NOTE — CASE MANAGEMENT/SOCIAL WORK
Continued Stay Note  [facility]     Patient Name: Alberto Guzman  MRN: [id number]  Today's Date: 6/14/2024    Admit Date: 6/9/2024    Plan: Pt was admitted on 6/9/24. Pt lives alone. PCP is Lianet Garcia. Pt does not utilize home health services at this time. Pt has straight cane used PRN via unknown provider. Pt has POA and living will (not on file). Pt plans to return home at discharge with daughter Krystin to provide transportation. SS to follow.   Discharge Plan       Row Name 06/14/24 1658       Plan    Plan Pt was admitted on 6/9/24. Pt lives alone. PCP is Lianet Garcia. Pt does not utilize home health services at this time. Pt has straight cane used PRN via unknown provider. Pt has POA and living will (not on file). Pt plans to return home at discharge with daughter Krystin to provide transportation. SS to follow.             RADHA Orlando

## 2024-06-14 NOTE — PLAN OF CARE
Goal Outcome Evaluation:           Progress: no change  Outcome Evaluation: pt resting in bed, prn pain meds given per order, NG tube DCd per order, pt tolerated oral intake at lunch with clear liquid diet, no comlaints from pt, will continue plan of care.

## 2024-06-14 NOTE — PROGRESS NOTES
Chief complaint: Bowel obstruction     Postoperative day 2: Exploratory laparotomy with mesh explantation, reduction of internal hernia and closure of mesenteric defect of the colon     No acute events overnight.  Patient sitting up in bed this morning feeling much better.  NG with minimal output overnight.  No flatus reported.     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  -Awaiting return of bowel function  -DC NG tube  -Clear liquid diet without carbonation  -Continue to ambulate

## 2024-06-15 ENCOUNTER — APPOINTMENT (OUTPATIENT)
Dept: GENERAL RADIOLOGY | Facility: HOSPITAL | Age: 89
End: 2024-06-15
Payer: MEDICARE

## 2024-06-15 LAB
ANION GAP SERPL CALCULATED.3IONS-SCNC: 12.4 MMOL/L (ref 5–15)
BUN SERPL-MCNC: 27 MG/DL (ref 8–23)
BUN/CREAT SERPL: 31 (ref 7–25)
CALCIUM SPEC-SCNC: 8.5 MG/DL (ref 8.2–9.6)
CHLORIDE SERPL-SCNC: 106 MMOL/L (ref 98–107)
CO2 SERPL-SCNC: 25.6 MMOL/L (ref 22–29)
CREAT SERPL-MCNC: 0.87 MG/DL (ref 0.76–1.27)
DEPRECATED RDW RBC AUTO: 48.2 FL (ref 37–54)
EGFRCR SERPLBLD CKD-EPI 2021: 80.5 ML/MIN/1.73
ERYTHROCYTE [DISTWIDTH] IN BLOOD BY AUTOMATED COUNT: 12.3 % (ref 12.3–15.4)
GLUCOSE SERPL-MCNC: 115 MG/DL (ref 65–99)
HCT VFR BLD AUTO: 40.8 % (ref 37.5–51)
HGB BLD-MCNC: 13.1 G/DL (ref 13–17.7)
INR PPP: 1.4 (ref 0.9–1.1)
MAGNESIUM SERPL-MCNC: 2 MG/DL (ref 1.7–2.3)
MCH RBC QN AUTO: 34.1 PG (ref 26.6–33)
MCHC RBC AUTO-ENTMCNC: 32.1 G/DL (ref 31.5–35.7)
MCV RBC AUTO: 106.3 FL (ref 79–97)
PLATELET # BLD AUTO: 193 10*3/MM3 (ref 140–450)
PMV BLD AUTO: 9.8 FL (ref 6–12)
POTASSIUM SERPL-SCNC: 3.5 MMOL/L (ref 3.5–5.2)
PROTHROMBIN TIME: 17.2 SECONDS (ref 12.1–14.7)
RBC # BLD AUTO: 3.84 10*6/MM3 (ref 4.14–5.8)
SODIUM SERPL-SCNC: 144 MMOL/L (ref 136–145)
WBC NRBC COR # BLD AUTO: 6.72 10*3/MM3 (ref 3.4–10.8)

## 2024-06-15 PROCEDURE — 71045 X-RAY EXAM CHEST 1 VIEW: CPT

## 2024-06-15 PROCEDURE — 83735 ASSAY OF MAGNESIUM: CPT | Performed by: INTERNAL MEDICINE

## 2024-06-15 PROCEDURE — 99232 SBSQ HOSP IP/OBS MODERATE 35: CPT | Performed by: INTERNAL MEDICINE

## 2024-06-15 PROCEDURE — 85027 COMPLETE CBC AUTOMATED: CPT | Performed by: SURGERY

## 2024-06-15 PROCEDURE — 80048 BASIC METABOLIC PNL TOTAL CA: CPT | Performed by: SURGERY

## 2024-06-15 PROCEDURE — 74019 RADEX ABDOMEN 2 VIEWS: CPT

## 2024-06-15 PROCEDURE — 25810000003 LACTATED RINGERS PER 1000 ML: Performed by: SURGERY

## 2024-06-15 PROCEDURE — 85610 PROTHROMBIN TIME: CPT | Performed by: INTERNAL MEDICINE

## 2024-06-15 PROCEDURE — 25010000002 ONDANSETRON PER 1 MG: Performed by: SURGERY

## 2024-06-15 PROCEDURE — 25010000002 MORPHINE PER 10 MG

## 2024-06-15 PROCEDURE — 74022 RADEX COMPL AQT ABD SERIES: CPT | Performed by: RADIOLOGY

## 2024-06-15 PROCEDURE — 93005 ELECTROCARDIOGRAM TRACING: CPT | Performed by: INTERNAL MEDICINE

## 2024-06-15 PROCEDURE — 93010 ELECTROCARDIOGRAM REPORT: CPT | Performed by: INTERNAL MEDICINE

## 2024-06-15 PROCEDURE — 99024 POSTOP FOLLOW-UP VISIT: CPT | Performed by: SURGERY

## 2024-06-15 PROCEDURE — 25010000002 HEPARIN (PORCINE) PER 1000 UNITS: Performed by: INTERNAL MEDICINE

## 2024-06-15 RX ORDER — WARFARIN SODIUM 5 MG/1
5 TABLET ORAL
Status: COMPLETED | OUTPATIENT
Start: 2024-06-15 | End: 2024-06-15

## 2024-06-15 RX ORDER — METOPROLOL SUCCINATE 25 MG/1
25 TABLET, EXTENDED RELEASE ORAL
Status: DISCONTINUED | OUTPATIENT
Start: 2024-06-15 | End: 2024-06-15

## 2024-06-15 RX ORDER — SODIUM CHLORIDE, SODIUM LACTATE, POTASSIUM CHLORIDE, CALCIUM CHLORIDE 600; 310; 30; 20 MG/100ML; MG/100ML; MG/100ML; MG/100ML
50 INJECTION, SOLUTION INTRAVENOUS CONTINUOUS
Status: DISCONTINUED | OUTPATIENT
Start: 2024-06-15 | End: 2024-06-17

## 2024-06-15 RX ORDER — METOPROLOL SUCCINATE 25 MG/1
25 TABLET, EXTENDED RELEASE ORAL
Status: DISCONTINUED | OUTPATIENT
Start: 2024-06-16 | End: 2024-06-19 | Stop reason: HOSPADM

## 2024-06-15 RX ADMIN — ASPIRIN 81 MG: 81 TABLET, CHEWABLE ORAL at 08:50

## 2024-06-15 RX ADMIN — LANSOPRAZOLE 30 MG: 30 TABLET, ORALLY DISINTEGRATING ORAL at 05:40

## 2024-06-15 RX ADMIN — ONDANSETRON 4 MG: 2 INJECTION INTRAMUSCULAR; INTRAVENOUS at 10:28

## 2024-06-15 RX ADMIN — SODIUM CHLORIDE, POTASSIUM CHLORIDE, SODIUM LACTATE AND CALCIUM CHLORIDE 50 ML/HR: 600; 310; 30; 20 INJECTION, SOLUTION INTRAVENOUS at 14:05

## 2024-06-15 RX ADMIN — HEPARIN SODIUM 5000 UNITS: 5000 INJECTION INTRAVENOUS; SUBCUTANEOUS at 05:40

## 2024-06-15 RX ADMIN — HEPARIN SODIUM 5000 UNITS: 5000 INJECTION INTRAVENOUS; SUBCUTANEOUS at 13:24

## 2024-06-15 RX ADMIN — METOPROLOL TARTRATE 25 MG: 25 TABLET, FILM COATED ORAL at 08:50

## 2024-06-15 RX ADMIN — BUMETANIDE 2 MG: 1 TABLET ORAL at 08:50

## 2024-06-15 RX ADMIN — HEPARIN SODIUM 5000 UNITS: 5000 INJECTION INTRAVENOUS; SUBCUTANEOUS at 21:20

## 2024-06-15 RX ADMIN — MORPHINE SULFATE 2 MG: 2 INJECTION, SOLUTION INTRAMUSCULAR; INTRAVENOUS at 08:58

## 2024-06-15 RX ADMIN — EMPAGLIFLOZIN 10 MG: 10 TABLET, FILM COATED ORAL at 17:43

## 2024-06-15 RX ADMIN — SACUBITRIL AND VALSARTAN 1 TABLET: 24; 26 TABLET, FILM COATED ORAL at 21:20

## 2024-06-15 RX ADMIN — MORPHINE SULFATE 2 MG: 2 INJECTION, SOLUTION INTRAMUSCULAR; INTRAVENOUS at 22:45

## 2024-06-15 RX ADMIN — ATORVASTATIN CALCIUM 20 MG: 20 TABLET, FILM COATED ORAL at 08:50

## 2024-06-15 RX ADMIN — WARFARIN SODIUM 5 MG: 5 TABLET ORAL at 17:44

## 2024-06-15 RX ADMIN — GABAPENTIN 600 MG: 300 CAPSULE ORAL at 21:26

## 2024-06-15 NOTE — PROGRESS NOTES
[facility] HOSPITALIST PROGRESS NOTE     Patient Identification:  Name:  Alberto Guzman  Age:  89 y.o.  Sex:  male  :  3/29/1931  MRN:  [id number]  Visit Number:  [id number]  ROOM: [address] [place] [address]     Primary Care Provider:  Lianet Dia APRN    Length of stay in inpatient status:  6    Subjective     Chief Compliant:    Chief Complaint   Patient presents with    Abdominal Pain       History of Presenting Illness:    Patient passed gas earlier in day. Still not tolerating much PO intake. Pain still present in abdomen but improving. He was able to ambulate with nursing staff in the call today.     ROS:  Otherwise 10 point ROS negative other than documented above in HPI.     Objective     Current Hospital Meds:ascorbic acid, 500 mg, Oral, Daily  aspirin, 81 mg, Oral, Daily  atorvastatin, 20 mg, Oral, Daily  bumetanide, 2 mg, Oral, Daily  empagliflozin, 10 mg, Oral, Daily  gabapentin, 600 mg, Oral, Daily  heparin (porcine), 5,000 Units, Subcutaneous, Q8H  lansoprazole, 30 mg, Oral, Q AM  [START ON 2024] metoprolol succinate XL, 25 mg, Oral, Q24H  sacubitril-valsartan, 1 tablet, Oral, Q12H  warfarin, 5 mg, Oral, Once    lactated ringers, 50 mL/hr, Last Rate: 50 mL/hr (06/15/24 1405)  Pharmacy to dose warfarin,         Current Antimicrobial Therapy:  Anti-Infectives (From admission, onward)      Ordered     Dose/Rate Route Frequency Start Stop    24 1424  cefOXItin (MEFOXIN) 2 g in sodium chloride 0.9 % 100 mL IVPB-VTB        Ordering Provider: Deo Lennon MD    2 g  200 mL/hr over 30 Minutes Intravenous Once 24 1426 24 1500          Current Diuretic Therapy:  Diuretics (From admission, onward)      Ordered     Dose/Rate Route Frequency Start Stop    24 1338  bumetanide (BUMEX) tablet 2 mg        Ordering Provider: Deo Lennon MD    2 mg Oral Daily 06/15/24 0900             ----------------------------------------------------------------------------------------------------------------------  Vital Signs:  Temp:  [97.7 °F (36.5 °C)-98 °F (36.7 °C)] 97.7 °F (36.5 °C)  Heart Rate:  [60-75] 75  Resp:  [16-18] 18  BP: (135-179)/(60-82) 142/82  SpO2:  [96 %-98 %] 97 %  on   ;   Device (Oxygen Therapy): room air  Body mass index is 26.3 kg/m².    Wt Readings from Last 3 Encounters:   06/09/24 98 kg (216 lb 1 oz)   04/09/24 102 kg (224 lb)   01/09/24 101 kg (223 lb 6.4 oz)     Intake & Output (last 3 days)         06/12 0701  06/13 0700 06/13 0701 06/14 0700 06/14 0701  06/15 0700 06/15 0701  06/16 0700    P.O. 0 80 720 360    I.V. (mL/kg) 1200 (12.2) 6200 (63.3)      IV Piggyback 100       Total Intake(mL/kg) 1300 (13.3) 6280 (64.1) 720 (7.3) 360 (3.7)    Urine (mL/kg/hr) 1250 (0.5) 800 (0.3) 2800 (1.2) 300 (0.3)    Emesis/NG output 2500 450 25     Stool 0       Total Output 3750 1250 2825 300    Net -2450 +5030 -2105 +60            Stool Unmeasured Occurrence 0 x             Diet: Liquid; Full Liquid; Fluid Consistency: Thin (IDDSI 0)  ----------------------------------------------------------------------------------------------------------------------  Physical exam:  Constitutional:  Well-developed and well-nourished.  No respiratory distress.      HENT:  Head:  Normocephalic and atraumatic.  Mouth:  Moist mucous membranes.    Eyes:  Conjunctivae and EOM are normal. No scleral icterus.    Neck:  Neck supple.  No JVD present.    Cardiovascular:  Normal rate, regular rhythm and normal heart sounds with no murmur.  Pulmonary/Chest:  No respiratory distress, no wheezes, no crackles, with normal breath sounds and good air movement.  Abdominal:  Soft.  Vertical midline incision approximated, no redness or warmth. Hardness on superior side and inferior side, possibly hernia or seroma.   Musculoskeletal:  No edema, no tenderness, and no deformity.  No red or swollen joints anywhere.   "  Neurological:  Alert and oriented to person, place, and time.  No cranial nerve deficit.  No tongue deviation.  No facial droop.  No slurred speech.   Skin:  Skin is warm and dry. No rash noted. No pallor.   Peripheral vascular:  Pulses in all 4 extremities with no clubbing, no cyanosis, no edema.  ----------------------------------------------------------------------------------------------------------------------  Tele:    ----------------------------------------------------------------------------------------------------------------------  Results from last 7 days   Lab Units 06/15/24  0058 06/14/24  1406 06/13/24  0116 06/10/24  0140 06/09/24  0229   CRP mg/dL  --   --   --   --  0.31   LACTATE mmol/L  --   --   --   --  1.6   WBC 10*3/mm3 6.72 6.94 6.03   < > 5.80   HEMOGLOBIN g/dL 13.1 13.3 13.1   < > 13.0   HEMATOCRIT % 40.8 42.1 41.4   < > 38.8   MCV fL 106.3* 107.1* 105.9*   < > 102.9*   MCHC g/dL 32.1 31.6 31.6   < > 33.5   PLATELETS 10*3/mm3 193 171 174   < > 167   INR  1.40*  --   --   --  1.54*    < > = values in this interval not displayed.         Results from last 7 days   Lab Units 06/15/24  0058 06/14/24  1406 06/13/24  0116 06/12/24  0808 06/10/24  0140 06/09/24  0229   SODIUM mmol/L 144 141 142 139 137 140   POTASSIUM mmol/L 3.5 3.6 4.6 3.8 4.0 4.6   MAGNESIUM mg/dL  --   --   --   --  2.0  --    CHLORIDE mmol/L 106 108* 105 104 103 101   CO2 mmol/L 25.6 21.0* 20.9* 22.2 23.8 26.6   BUN mg/dL 27* 29* 23 21 14 11   CREATININE mg/dL 0.87 0.97 1.03 0.99 1.12 1.14   CALCIUM mg/dL 8.5 8.5 8.2 8.5 8.8 9.3   GLUCOSE mg/dL 115* 122* 141* 97 104* 107*   ALBUMIN g/dL  --  2.8*  --  3.2*  --  3.8   BILIRUBIN mg/dL  --  0.7  --  1.1  --  1.2   ALK PHOS U/L  --  62  --  73  --  91   AST (SGOT) U/L  --  20  --  19  --  18   ALT (SGPT) U/L  --  8  --  <5  --  9   Estimated Creatinine Clearance: 73.5 mL/min (by C-G formula based on SCr of 0.87 mg/dL).  No results found for: \"AMMONIA\"  Results from last 7 days " "  Lab Units 06/09/24  0429 06/09/24 0229   HSTROP T ng/L 38* 38*     Results from last 7 days   Lab Units 06/09/24 0229   PROBNP pg/mL 986.4         No results found for: \"HGBA1C\", \"POCGLU\"  Lab Results   Component Value Date    TSH 1.250 06/10/2024     No results found for: \"PREGTESTUR\", \"PREGSERUM\", \"HCG\", \"HCGQUANT\"  Pain Management Panel           No data to display              Brief Urine Lab Results  (Last result in the past 365 days)        Color   Clarity   Blood   Leuk Est   Nitrite   Protein   CREAT   Urine HCG        06/09/24 0953 Yellow   Clear   Negative   Negative   Negative   Negative                 No results found for: \"BLOODCX\"  No results found for: \"URINECX\"  No results found for: \"WOUNDCX\"  No results found for: \"STOOLCX\"  No results found for: \"RESPCX\"  No results found for: \"AFBCX\"  Results from last 7 days   Lab Units 06/09/24 0229   LACTATE mmol/L 1.6   SED RATE mm/hr 12   CRP mg/dL 0.31       I have personally looked at the labs and they are summarized above.  ----------------------------------------------------------------------------------------------------------------------  Detailed radiology reports for the last 24 hours:    Imaging Results (Last 24 Hours)       Procedure Component Value Units Date/Time    XR Abdomen Flat & Upright [[id number]] Collected: 06/15/24 1455     Updated: 06/15/24 1500    Narrative:      PROCEDURE: X-ray examination of the abdomen performed on Bere 15, 2024.  Portable technique. 3 films.     HISTORY: Cough. Postoperative ileus.     COMPARISON: None.     FINDINGS:     Normal heart size  Left-sided pacemaker with leads to the right atrium and right ventricle.  Free air in the right upper quadrant consistent with postoperative  change.  Vertical midline incision with multiple skin staples identified in the  abdomen and pelvis.  Surgical clips in the right upper quadrant.  Mild distended air-filled small bowel segments in the abdomen most  consistent with " generalized ileus.  Multilevel degenerative disc disease throughout the lumbar spine with  dextroconvex curvature at the lower lumbar spine and levoconvex  curvature at the thoracolumbar junction consistent with scoliosis.  Right hip arthroplasty.  No pneumatosis.  No acute foreign body.       Impression:         1.  Multilevel degenerative disc disease throughout the lumbar spine and  thoracolumbar junction with scoliosis.  2.  Right hip arthroplasty.  3.  Vertical midline incision with skin staples in the anterior  abdominal and pelvic wall.  4.  Free air in the right upper quadrant consistent with postoperative  change.  5.  Mild distended air-filled small bowel loops in the abdomen  consistent with underlying generalized ileus.  6.  No pneumatosis or portal venous gas.  7.  Surgical clips in the right upper quadrant.     This report was finalized on 6/15/2024 2:58 PM by Cuba Gomez MD.       XR Chest 1 View [[id number]] Collected: 06/15/24 1453     Updated: 06/15/24 1457    Narrative:      PROCEDURE: Portable chest x-ray examination performed on Bere 15, 2024.  Single view. Upright position.     HISTORY: Cough. Ileus.     COMPARISON: None.     FINDINGS:     Normal heart size.  Left side pacemaker with leads to the right atrium and right ventricle.  Minimal atelectasis at the lung bases.  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  Mild hypertrophic changes at the AC joint.  No bronchial inflammation.  No conclusive features of pneumonia.       Impression:         1.  Normal heart size.  2.  No lobar consolidation or edema.  3.  Minimal atelectasis versus scarring in the lung bases.  4.  No pleural effusion or pneumothorax.  5.  No bronchial inflammation.        This report was finalized on 6/15/2024 2:55 PM by Cuba Gomez MD.             Assessment & Plan    #Acute SBO POD 2 exploratory laparotomy w/ mesh explantation and reduction of internal hernia  and closure of mesenteric defect of colon   -Patient high surgical risk, no clinically evident HF and no ischemia on ecg thus no further risk stratification required for surgery  - Post surgical plan per primary team. Awaiting bowel recovery after surgery.     #Troponin elevation likely secondary to demand ischemia, no delta and no ischemia on ecg  #pAF on Warfarin w/PM  #Compensated systolic heart failure   - Routine TTE ordered revealing EF has dropped from 46-50% to 36-40% in the last 3 years.   - Will start GDMT. Switch metoprolol tartrate to succinate. Will start juardiance. Will start entresto.   - Outpatient f/u with cardiology to consider outpatient ischemic evaluation  - Continue ASA/statin.     Chronic medical problems   #Mild macrocytosis w/o anemia  #hx of colon and bladder ca in sustained remission  -Vit B12, folate, TSH all wnl.       PT/OT consulted      Medicine will continue to follow. Please call with any questions.        Joseph Barnett MD  [facility] Hospitalist  06/15/24  15:52 EDT

## 2024-06-15 NOTE — PROGRESS NOTES
LOS: 6 days   Patient Care Team:  Lianet Dia APRN as PCP - General (Nurse Practitioner)  Selam Alcaraz MD as Consulting Physician (Cardiology)    Post op day # 3, status post exploratory laparotomy for bowel obstruction    Subjective     Interval History:  Patient states he is not doing too well.  Daughter at bedside.  Patient states he is nauseous but he is able to tolerate water.  Does not like clear liquid diet.  States he has nausea and vomiting but daughter reports that this is really spitting up phlegm.  No flatus or bowel movement.  Patient still has Michael catheter in.  He has ambulated.  Reports pain with cough and deep inspiration    History taken from patient.      Objective     Vital Signs  Temp:  [97.8 °F (36.6 °C)-98 °F (36.7 °C)] 97.8 °F (36.6 °C)  Heart Rate:  [60-72] 66  Resp:  [16-18] 16  BP: (117-179)/(60-74) 135/60  This is a well-developed well-nourished elderly male in no acute distress  HEENT examination: Sclera are anicteric  Lungs: Clear but diminished bilaterally  Abdomen: Minimal to no bowel sounds.  Expected incisional tenderness  Skin/incisions: Incision sites were inspected and demonstrate no drainage or erythema       Results Review:    Results from last 7 days   Lab Units 06/09/24  0429 06/09/24 0229   HSTROP T ng/L 38* 38*     Results from last 7 days   Lab Units 06/15/24  0058 06/14/24  1406 06/13/24 0116 06/10/24  0140 06/09/24 0229   CRP mg/dL  --   --   --   --  0.31   LACTATE mmol/L  --   --   --   --  1.6   WBC 10*3/mm3 6.72 6.94 6.03   < > 5.80   HEMOGLOBIN g/dL 13.1 13.3 13.1   < > 13.0   HEMATOCRIT % 40.8 42.1 41.4   < > 38.8   PLATELETS 10*3/mm3 193 171 174   < > 167   INR  1.40*  --   --   --  1.54*    < > = values in this interval not displayed.         Results from last 7 days   Lab Units 06/15/24  0058 06/14/24  1406 06/13/24  0116 06/12/24  0808 06/10/24  0140 06/09/24  0229   SODIUM mmol/L 144 141 142 139 137 140   POTASSIUM mmol/L 3.5  "3.6 4.6 3.8 4.0 4.6   MAGNESIUM mg/dL  --   --   --   --  2.0  --    CHLORIDE mmol/L 106 108* 105 104 103 101   CO2 mmol/L 25.6 21.0* 20.9* 22.2 23.8 26.6   BUN mg/dL 27* 29* 23 21 14 11   CREATININE mg/dL 0.87 0.97 1.03 0.99 1.12 1.14   CALCIUM mg/dL 8.5 8.5 8.2 8.5 8.8 9.3   GLUCOSE mg/dL 115* 122* 141* 97 104* 107*   ALBUMIN g/dL  --  2.8*  --  3.2*  --  3.8   BILIRUBIN mg/dL  --  0.7  --  1.1  --  1.2   ALK PHOS U/L  --  62  --  73  --  91   AST (SGOT) U/L  --  20  --  19  --  18   ALT (SGPT) U/L  --  8  --  <5  --  9   Estimated Creatinine Clearance: 73.5 mL/min (by C-G formula based on SCr of 0.87 mg/dL).  No results found for: \"AMMONIA\"      No results found for: \"BLOODCX\"  No results found for: \"URINECX\"  No results found for: \"WOUNDCX\"  No results found for: \"STOOLCX\"    Imaging:  Imaging Results (Last 24 Hours)       ** No results found for the last 24 hours. **             Impression:  Status post laparotomy for small bowel obstruction.    Plan:  Abdominal x-rays today  Am concerned that patient is not taking much p.o. and will therefore start IV fluids at low rate.  Full liquid diet as it is not clear whether patient not taking in liquids because he does not care for now or because of ileus.  Follow-up lab in a.m.  Remove Michael in a.m.  Increase activity    Latia Arrieta MD  06/15/24  13:30 EDT      Please note that portions of this note were completed with a voice recognition program.    "

## 2024-06-15 NOTE — PLAN OF CARE
Goal Outcome Evaluation:              Outcome Evaluation: Pt resting in bed at this time. No s/s of distress noted. Pt has tolerated diet this shift. Complained of pain, PRN medication given per MAR. Midline incision remains CDI. Pt has been up to chair this shift. Will continue POC.

## 2024-06-15 NOTE — PLAN OF CARE
Goal Outcome Evaluation:           Progress: no change  Outcome Evaluation: pt resting in bed, catheter removed per order, pt has sat in bedside chair and ambulated in call, prn meds given at request will continue plan of care.

## 2024-06-16 ENCOUNTER — APPOINTMENT (OUTPATIENT)
Dept: GENERAL RADIOLOGY | Facility: HOSPITAL | Age: 89
End: 2024-06-16
Payer: MEDICARE

## 2024-06-16 LAB
ALBUMIN SERPL-MCNC: 2.9 G/DL (ref 3.5–5.2)
ALBUMIN/GLOB SERPL: 1.1 G/DL
ALP SERPL-CCNC: 67 U/L (ref 39–117)
ALT SERPL W P-5'-P-CCNC: 22 U/L (ref 1–41)
ANION GAP SERPL CALCULATED.3IONS-SCNC: 12.8 MMOL/L (ref 5–15)
AST SERPL-CCNC: 40 U/L (ref 1–40)
BILIRUB SERPL-MCNC: 0.8 MG/DL (ref 0–1.2)
BUN SERPL-MCNC: 24 MG/DL (ref 8–23)
BUN/CREAT SERPL: 25.8 (ref 7–25)
CALCIUM SPEC-SCNC: 8.3 MG/DL (ref 8.2–9.6)
CHLORIDE SERPL-SCNC: 104 MMOL/L (ref 98–107)
CO2 SERPL-SCNC: 26.2 MMOL/L (ref 22–29)
CREAT SERPL-MCNC: 0.93 MG/DL (ref 0.76–1.27)
DEPRECATED RDW RBC AUTO: 48.3 FL (ref 37–54)
EGFRCR SERPLBLD CKD-EPI 2021: 76.6 ML/MIN/1.73
ERYTHROCYTE [DISTWIDTH] IN BLOOD BY AUTOMATED COUNT: 12.3 % (ref 12.3–15.4)
GLOBULIN UR ELPH-MCNC: 2.6 GM/DL
GLUCOSE SERPL-MCNC: 104 MG/DL (ref 65–99)
HCT VFR BLD AUTO: 40 % (ref 37.5–51)
HGB BLD-MCNC: 12.9 G/DL (ref 13–17.7)
INR PPP: 1.31 (ref 0.9–1.1)
MCH RBC QN AUTO: 34.4 PG (ref 26.6–33)
MCHC RBC AUTO-ENTMCNC: 32.3 G/DL (ref 31.5–35.7)
MCV RBC AUTO: 106.7 FL (ref 79–97)
PHOSPHATE SERPL-MCNC: 3 MG/DL (ref 2.5–4.5)
PLATELET # BLD AUTO: 212 10*3/MM3 (ref 140–450)
PMV BLD AUTO: 10 FL (ref 6–12)
POTASSIUM SERPL-SCNC: 3.7 MMOL/L (ref 3.5–5.2)
PROT SERPL-MCNC: 5.5 G/DL (ref 6–8.5)
PROTHROMBIN TIME: 16.4 SECONDS (ref 12.1–14.7)
QT INTERVAL: 484 MS
QTC INTERVAL: 529 MS
RBC # BLD AUTO: 3.75 10*6/MM3 (ref 4.14–5.8)
SODIUM SERPL-SCNC: 143 MMOL/L (ref 136–145)
WBC NRBC COR # BLD AUTO: 6.58 10*3/MM3 (ref 3.4–10.8)

## 2024-06-16 PROCEDURE — 25010000002 HEPARIN (PORCINE) PER 1000 UNITS: Performed by: INTERNAL MEDICINE

## 2024-06-16 PROCEDURE — 85610 PROTHROMBIN TIME: CPT | Performed by: INTERNAL MEDICINE

## 2024-06-16 PROCEDURE — 25010000002 ONDANSETRON PER 1 MG: Performed by: SURGERY

## 2024-06-16 PROCEDURE — 25010000002 MORPHINE PER 10 MG

## 2024-06-16 PROCEDURE — 84100 ASSAY OF PHOSPHORUS: CPT | Performed by: SURGERY

## 2024-06-16 PROCEDURE — 25810000003 LACTATED RINGERS PER 1000 ML: Performed by: SURGERY

## 2024-06-16 PROCEDURE — 25010000002 PROCHLORPERAZINE 10 MG/2ML SOLUTION: Performed by: SURGERY

## 2024-06-16 PROCEDURE — 99024 POSTOP FOLLOW-UP VISIT: CPT | Performed by: SURGERY

## 2024-06-16 PROCEDURE — 85027 COMPLETE CBC AUTOMATED: CPT | Performed by: SURGERY

## 2024-06-16 PROCEDURE — 80053 COMPREHEN METABOLIC PANEL: CPT | Performed by: INTERNAL MEDICINE

## 2024-06-16 PROCEDURE — 74019 RADEX ABDOMEN 2 VIEWS: CPT | Performed by: RADIOLOGY

## 2024-06-16 PROCEDURE — 74019 RADEX ABDOMEN 2 VIEWS: CPT

## 2024-06-16 PROCEDURE — 99232 SBSQ HOSP IP/OBS MODERATE 35: CPT | Performed by: INTERNAL MEDICINE

## 2024-06-16 RX ORDER — ALUMINA, MAGNESIA, AND SIMETHICONE 2400; 2400; 240 MG/30ML; MG/30ML; MG/30ML
15 SUSPENSION ORAL ONCE
Status: COMPLETED | OUTPATIENT
Start: 2024-06-16 | End: 2024-06-16

## 2024-06-16 RX ORDER — PHENOBARBITAL, HYOSCYAMINE SULFATE, ATROPINE SULFATE AND SCOPOLAMINE HYDROBROMIDE .0194; .1037; 16.2; .0065 MG/1; MG/1; MG/1; MG/1
2 TABLET ORAL ONCE
Status: COMPLETED | OUTPATIENT
Start: 2024-06-16 | End: 2024-06-16

## 2024-06-16 RX ORDER — TAMSULOSIN HYDROCHLORIDE 0.4 MG/1
0.4 CAPSULE ORAL 2 TIMES DAILY
Status: DISCONTINUED | OUTPATIENT
Start: 2024-06-16 | End: 2024-06-19 | Stop reason: HOSPADM

## 2024-06-16 RX ORDER — TAMSULOSIN HYDROCHLORIDE 0.4 MG/1
0.4 CAPSULE ORAL DAILY
Status: DISCONTINUED | OUTPATIENT
Start: 2024-06-16 | End: 2024-06-16

## 2024-06-16 RX ORDER — LIDOCAINE HYDROCHLORIDE 20 MG/ML
15 SOLUTION OROPHARYNGEAL ONCE
Status: COMPLETED | OUTPATIENT
Start: 2024-06-16 | End: 2024-06-16

## 2024-06-16 RX ORDER — WARFARIN SODIUM 3 MG/1
6 TABLET ORAL
Status: DISCONTINUED | OUTPATIENT
Start: 2024-06-16 | End: 2024-06-16

## 2024-06-16 RX ORDER — WARFARIN SODIUM 3 MG/1
6 TABLET ORAL
Status: COMPLETED | OUTPATIENT
Start: 2024-06-16 | End: 2024-06-16

## 2024-06-16 RX ADMIN — HEPARIN SODIUM 5000 UNITS: 5000 INJECTION INTRAVENOUS; SUBCUTANEOUS at 05:31

## 2024-06-16 RX ADMIN — ATORVASTATIN CALCIUM 20 MG: 20 TABLET, FILM COATED ORAL at 12:34

## 2024-06-16 RX ADMIN — MORPHINE SULFATE 2 MG: 2 INJECTION, SOLUTION INTRAMUSCULAR; INTRAVENOUS at 20:48

## 2024-06-16 RX ADMIN — SACUBITRIL AND VALSARTAN 1 TABLET: 24; 26 TABLET, FILM COATED ORAL at 12:34

## 2024-06-16 RX ADMIN — MORPHINE SULFATE 2 MG: 2 INJECTION, SOLUTION INTRAMUSCULAR; INTRAVENOUS at 09:04

## 2024-06-16 RX ADMIN — SACUBITRIL AND VALSARTAN 1 TABLET: 24; 26 TABLET, FILM COATED ORAL at 20:35

## 2024-06-16 RX ADMIN — PHENOBARBITAL, HYOSCYAMINE SULFATE, ATROPINE SULFATE, SCOPOLAMINE HYDROBROMIDE 32.4 MG: 16.2; .1037; .0194; .0065 TABLET ORAL at 17:36

## 2024-06-16 RX ADMIN — HEPARIN SODIUM 5000 UNITS: 5000 INJECTION INTRAVENOUS; SUBCUTANEOUS at 13:58

## 2024-06-16 RX ADMIN — ONDANSETRON 4 MG: 2 INJECTION INTRAMUSCULAR; INTRAVENOUS at 06:34

## 2024-06-16 RX ADMIN — ALUMINUM HYDROXIDE, MAGNESIUM HYDROXIDE, DIMETHICONE 15 ML: 400; 400; 40 SUSPENSION ORAL at 17:36

## 2024-06-16 RX ADMIN — LIDOCAINE HYDROCHLORIDE 15 ML: 20 SOLUTION ORAL at 17:36

## 2024-06-16 RX ADMIN — BUMETANIDE 2 MG: 1 TABLET ORAL at 12:34

## 2024-06-16 RX ADMIN — PROCHLORPERAZINE EDISYLATE 2.5 MG: 5 INJECTION INTRAMUSCULAR; INTRAVENOUS at 09:04

## 2024-06-16 RX ADMIN — TAMSULOSIN HYDROCHLORIDE 0.4 MG: 0.4 CAPSULE ORAL at 12:33

## 2024-06-16 RX ADMIN — HYDROCODONE BITARTRATE AND ACETAMINOPHEN 0.5 TABLET: 10; 325 TABLET ORAL at 05:29

## 2024-06-16 RX ADMIN — HEPARIN SODIUM 5000 UNITS: 5000 INJECTION INTRAVENOUS; SUBCUTANEOUS at 20:35

## 2024-06-16 RX ADMIN — EMPAGLIFLOZIN 10 MG: 10 TABLET, FILM COATED ORAL at 12:34

## 2024-06-16 RX ADMIN — ASPIRIN 81 MG: 81 TABLET, CHEWABLE ORAL at 12:34

## 2024-06-16 RX ADMIN — METOPROLOL SUCCINATE 25 MG: 25 TABLET, EXTENDED RELEASE ORAL at 12:34

## 2024-06-16 RX ADMIN — TAMSULOSIN HYDROCHLORIDE 0.4 MG: 0.4 CAPSULE ORAL at 20:35

## 2024-06-16 RX ADMIN — SODIUM CHLORIDE, POTASSIUM CHLORIDE, SODIUM LACTATE AND CALCIUM CHLORIDE 50 ML/HR: 600; 310; 30; 20 INJECTION, SOLUTION INTRAVENOUS at 09:06

## 2024-06-16 RX ADMIN — WARFARIN SODIUM 6 MG: 3 TABLET ORAL at 17:41

## 2024-06-16 NOTE — PROGRESS NOTES
LOS: 7 days   Patient Care Team:  Lianet Dia APRN as PCP - General (Nurse Practitioner)  Selam Alcaraz MD as Consulting Physician (Cardiology)    Post op day # 4, status post exploratory laparotomy for bowel obstruction    Subjective     Interval History:  According to daughter patient had a good day yesterday and actually ambulated.  However today he is nauseous which the daughter attributes to his getting a pill in the middle of the night which choked him.  He did have a bowel movement yesterday.  He is tolerating the full liquids much better than the clear liquids.  Abdominal x-rays yesterday showed mild ileus pattern  For unclear reasons the Michael catheter that was ordered to be removed at 6 AM this morning was removed yesterday afternoon.  Patient was unable to void.  According to the nurse a bladder scan at 1 AM showed over 400 cc but the patient was not straight cathed at that time.  At 545 a repeat bladder scan was done which showed approximately 600 cc in the bladder and again catheter was not placed.  At 630 this morning scan was repeated and straight cath was done for 1100 cc.    Objective     Vital Signs  Temp:  [97.7 °F (36.5 °C)-98.1 °F (36.7 °C)] 98.1 °F (36.7 °C)  Heart Rate:  [61-77] 61  Resp:  [18] 18  BP: (139-144)/(61-82) 144/61  This is a well-developed well-nourished elderly male in no acute distress  HEENT examination: Sclera are anicteric  Lungs: Clear but diminished bilaterally  Abdomen: Hypoactive bowel sounds are present expected incisional tenderness  Skin/incisions: Incision sites were inspected and demonstrate no drainage or erythema       Results Review:          Results from last 7 days   Lab Units 06/16/24  0843 06/16/24  0013 06/15/24  0058 06/14/24  1406   WBC 10*3/mm3  --  6.58 6.72 6.94   HEMOGLOBIN g/dL  --  12.9* 13.1 13.3   HEMATOCRIT %  --  40.0 40.8 42.1   PLATELETS 10*3/mm3  --  212 193 171   INR  1.31*  --  1.40*  --          Results from  "last 7 days   Lab Units 06/16/24  0013 06/15/24  1627 06/15/24  0058 06/14/24  1406 06/13/24  0116 06/12/24  0808 06/10/24  0140   SODIUM mmol/L 143  --  144 141   < > 139 137   POTASSIUM mmol/L 3.7  --  3.5 3.6   < > 3.8 4.0   MAGNESIUM mg/dL  --  2.0  --   --   --   --  2.0   CHLORIDE mmol/L 104  --  106 108*   < > 104 103   CO2 mmol/L 26.2  --  25.6 21.0*   < > 22.2 23.8   BUN mg/dL 24*  --  27* 29*   < > 21 14   CREATININE mg/dL 0.93  --  0.87 0.97   < > 0.99 1.12   CALCIUM mg/dL 8.3  --  8.5 8.5   < > 8.5 8.8   GLUCOSE mg/dL 104*  --  115* 122*   < > 97 104*   ALBUMIN g/dL 2.9*  --   --  2.8*  --  3.2*  --    BILIRUBIN mg/dL 0.8  --   --  0.7  --  1.1  --    ALK PHOS U/L 67  --   --  62  --  73  --    AST (SGOT) U/L 40  --   --  20  --  19  --    ALT (SGPT) U/L 22  --   --  8  --  <5  --     < > = values in this interval not displayed.   Estimated Creatinine Clearance: 68.8 mL/min (by C-G formula based on SCr of 0.93 mg/dL).  No results found for: \"AMMONIA\"      No results found for: \"BLOODCX\"  No results found for: \"URINECX\"  No results found for: \"WOUNDCX\"  No results found for: \"STOOLCX\"    Imaging:  Imaging Results (Last 24 Hours)       Procedure Component Value Units Date/Time    XR Abdomen Flat & Upright [[id number]] Collected: 06/15/24 1455     Updated: 06/15/24 1500    Narrative:      PROCEDURE: X-ray examination of the abdomen performed on Bere 15, 2024.  Portable technique. 3 films.     HISTORY: Cough. Postoperative ileus.     COMPARISON: None.     FINDINGS:     Normal heart size  Left-sided pacemaker with leads to the right atrium and right ventricle.  Free air in the right upper quadrant consistent with postoperative  change.  Vertical midline incision with multiple skin staples identified in the  abdomen and pelvis.  Surgical clips in the right upper quadrant.  Mild distended air-filled small bowel segments in the abdomen most  consistent with generalized ileus.  Multilevel degenerative disc disease " throughout the lumbar spine with  dextroconvex curvature at the lower lumbar spine and levoconvex  curvature at the thoracolumbar junction consistent with scoliosis.  Right hip arthroplasty.  No pneumatosis.  No acute foreign body.       Impression:         1.  Multilevel degenerative disc disease throughout the lumbar spine and  thoracolumbar junction with scoliosis.  2.  Right hip arthroplasty.  3.  Vertical midline incision with skin staples in the anterior  abdominal and pelvic wall.  4.  Free air in the right upper quadrant consistent with postoperative  change.  5.  Mild distended air-filled small bowel loops in the abdomen  consistent with underlying generalized ileus.  6.  No pneumatosis or portal venous gas.  7.  Surgical clips in the right upper quadrant.     This report was finalized on 6/15/2024 2:58 PM by Cuba Gomez MD.       XR Chest 1 View [[id number]] Collected: 06/15/24 1453     Updated: 06/15/24 1457    Narrative:      PROCEDURE: Portable chest x-ray examination performed on Bere 15, 2024.  Single view. Upright position.     HISTORY: Cough. Ileus.     COMPARISON: None.     FINDINGS:     Normal heart size.  Left side pacemaker with leads to the right atrium and right ventricle.  Minimal atelectasis at the lung bases.  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  Mild hypertrophic changes at the AC joint.  No bronchial inflammation.  No conclusive features of pneumonia.       Impression:         1.  Normal heart size.  2.  No lobar consolidation or edema.  3.  Minimal atelectasis versus scarring in the lung bases.  4.  No pleural effusion or pneumothorax.  5.  No bronchial inflammation.        This report was finalized on 6/15/2024 2:55 PM by Cuba Gomez MD.                Impression:  Status post laparotomy for small bowel obstruction.  Urinary retention with history of BPH    Plan:  Repeat abdominal x-rays today  Continue full liquid diet    With over distention injury to bladder will replace Michael and start Flomax rather than continue to straight cath   increase activity    Latia Arrieta MD  06/16/24  10:51 EDT      Please note that portions of this note were completed with a voice recognition program.

## 2024-06-16 NOTE — PROGRESS NOTES
[facility] HOSPITALIST PROGRESS NOTE     Patient Identification:  Name:  Alberto Guzman  Age:  89 y.o.  Sex:  male  :  3/29/1931  MRN:  [id number]  Visit Number:  [id number]  ROOM: [address] [place] [address]     Primary Care Provider:  Lianet Dia APRN    Length of stay in inpatient status:  7    Subjective     Chief Compliant:    Chief Complaint   Patient presents with    Abdominal Pain       History of Presenting Illness:    Patient was in bedside chair with daughter supportive bedside. Patient still nauseated at times but overall improved. He has started to pass stool.     ROS:  Otherwise 10 point ROS negative other than documented above in HPI.     Objective     Current Hospital Meds:ascorbic acid, 500 mg, Oral, Daily  aspirin, 81 mg, Oral, Daily  atorvastatin, 20 mg, Oral, Daily  bumetanide, 2 mg, Oral, Daily  empagliflozin, 10 mg, Oral, Daily  [Held by provider] gabapentin, 600 mg, Oral, Daily  heparin (porcine), 5,000 Units, Subcutaneous, Q8H  lansoprazole, 30 mg, Oral, Q AM  metoprolol succinate XL, 25 mg, Oral, Q24H  sacubitril-valsartan, 1 tablet, Oral, Q12H  tamsulosin, 0.4 mg, Oral, BID  warfarin, 6 mg, Oral, Once    lactated ringers, 50 mL/hr, Last Rate: 50 mL/hr (24 0906)  Pharmacy to dose warfarin,         Current Antimicrobial Therapy:  Anti-Infectives (From admission, onward)      Ordered     Dose/Rate Route Frequency Start Stop    24 1424  cefOXItin (MEFOXIN) 2 g in sodium chloride 0.9 % 100 mL IVPB-VTB        Ordering Provider: Deo Lennon MD    2 g  200 mL/hr over 30 Minutes Intravenous Once 24 1426 24 1500          Current Diuretic Therapy:  Diuretics (From admission, onward)      Ordered     Dose/Rate Route Frequency Start Stop    24 1338  bumetanide (BUMEX) tablet 2 mg        Ordering Provider: Deo Lennon MD    2 mg Oral Daily 06/15/24 0900             ----------------------------------------------------------------------------------------------------------------------  Vital Signs:  Temp:  [97.7 °F (36.5 °C)-98.1 °F (36.7 °C)] 98.1 °F (36.7 °C)  Heart Rate:  [61-77] 71  Resp:  [18] 18  BP: (139-148)/(61-82) 148/80  SpO2:  [92 %] 92 %  on   ;   Device (Oxygen Therapy): room air  Body mass index is 26.3 kg/m².    Wt Readings from Last 3 Encounters:   06/09/24 98 kg (216 lb 1 oz)   04/09/24 102 kg (224 lb)   01/09/24 101 kg (223 lb 6.4 oz)     Intake & Output (last 3 days)         06/13 0701  06/14 0700 06/14 0701  06/15 0700 06/15 0701  06/16 0700 06/16 0701  06/17 0700    P.O. 80 720 480     I.V. (mL/kg) 6200 (63.3)       IV Piggyback        Total Intake(mL/kg) 6280 (64.1) 720 (7.3) 480 (4.9)     Urine (mL/kg/hr) 800 (0.3) 2800 (1.2) 1400 (0.6)     Emesis/NG output 450 25      Stool   0     Total Output 1250 2825 1400     Net +5030 -2105 -920             Stool Unmeasured Occurrence   1 x           Diet: Liquid; Full Liquid; Fluid Consistency: Thin (IDDSI 0)  ----------------------------------------------------------------------------------------------------------------------  Physical exam:  Constitutional:  Well-developed and well-nourished.  No respiratory distress.      HENT:  Head:  Normocephalic and atraumatic.  Mouth:  Moist mucous membranes.    Eyes:  Conjunctivae and EOM are normal. No scleral icterus.    Neck:  Neck supple.  No JVD present.    Cardiovascular:  Normal rate, regular rhythm and normal heart sounds with no murmur.  Pulmonary/Chest:  No respiratory distress, no wheezes, no crackles, with normal breath sounds and good air movement.  Abdominal:  Bowel sounds now present.   Musculoskeletal:  No edema, no tenderness, and no deformity.  No red or swollen joints anywhere.    Neurological:  Alert and oriented to person, place, and time.  No cranial nerve deficit.  No tongue deviation.  No facial droop.  No slurred speech.   Skin:  Skin is warm  "and dry. No rash noted. No pallor.   Peripheral vascular:  Pulses in all 4 extremities with no clubbing, no cyanosis, no edema.  ----------------------------------------------------------------------------------------------------------------------  Tele:    ----------------------------------------------------------------------------------------------------------------------  Results from last 7 days   Lab Units 06/16/24  0843 06/16/24  0013 06/15/24  0058 06/14/24  1406   WBC 10*3/mm3  --  6.58 6.72 6.94   HEMOGLOBIN g/dL  --  12.9* 13.1 13.3   HEMATOCRIT %  --  40.0 40.8 42.1   MCV fL  --  106.7* 106.3* 107.1*   MCHC g/dL  --  32.3 32.1 31.6   PLATELETS 10*3/mm3  --  212 193 171   INR  1.31*  --  1.40*  --          Results from last 7 days   Lab Units 06/16/24  0013 06/15/24  1627 06/15/24  0058 06/14/24  1406 06/13/24  0116 06/12/24  0808 06/10/24  0140   SODIUM mmol/L 143  --  144 141   < > 139 137   POTASSIUM mmol/L 3.7  --  3.5 3.6   < > 3.8 4.0   MAGNESIUM mg/dL  --  2.0  --   --   --   --  2.0   CHLORIDE mmol/L 104  --  106 108*   < > 104 103   CO2 mmol/L 26.2  --  25.6 21.0*   < > 22.2 23.8   BUN mg/dL 24*  --  27* 29*   < > 21 14   CREATININE mg/dL 0.93  --  0.87 0.97   < > 0.99 1.12   CALCIUM mg/dL 8.3  --  8.5 8.5   < > 8.5 8.8   PHOSPHORUS mg/dL 3.0  --   --   --   --   --   --    GLUCOSE mg/dL 104*  --  115* 122*   < > 97 104*   ALBUMIN g/dL 2.9*  --   --  2.8*  --  3.2*  --    BILIRUBIN mg/dL 0.8  --   --  0.7  --  1.1  --    ALK PHOS U/L 67  --   --  62  --  73  --    AST (SGOT) U/L 40  --   --  20  --  19  --    ALT (SGPT) U/L 22  --   --  8  --  <5  --     < > = values in this interval not displayed.   Estimated Creatinine Clearance: 68.8 mL/min (by C-G formula based on SCr of 0.93 mg/dL).  No results found for: \"AMMONIA\"              No results found for: \"HGBA1C\", \"POCGLU\"  Lab Results   Component Value Date    TSH 1.250 06/10/2024     No results found for: \"PREGTESTUR\", \"PREGSERUM\", \"HCG\", " "\"HCGQUANT\"  Pain Management Panel           No data to display              Brief Urine Lab Results  (Last result in the past 365 days)        Color   Clarity   Blood   Leuk Est   Nitrite   Protein   CREAT   Urine HCG        06/09/24 0953 Yellow   Clear   Negative   Negative   Negative   Negative                 No results found for: \"BLOODCX\"  No results found for: \"URINECX\"  No results found for: \"WOUNDCX\"  No results found for: \"STOOLCX\"  No results found for: \"RESPCX\"  No results found for: \"AFBCX\"        I have personally looked at the labs and they are summarized above.  ----------------------------------------------------------------------------------------------------------------------  Detailed radiology reports for the last 24 hours:    Imaging Results (Last 24 Hours)       Procedure Component Value Units Date/Time    XR Abdomen Flat & Upright [[id number]] Collected: 06/15/24 1455     Updated: 06/15/24 1500    Narrative:      PROCEDURE: X-ray examination of the abdomen performed on Bere 15, 2024.  Portable technique. 3 films.     HISTORY: Cough. Postoperative ileus.     COMPARISON: None.     FINDINGS:     Normal heart size  Left-sided pacemaker with leads to the right atrium and right ventricle.  Free air in the right upper quadrant consistent with postoperative  change.  Vertical midline incision with multiple skin staples identified in the  abdomen and pelvis.  Surgical clips in the right upper quadrant.  Mild distended air-filled small bowel segments in the abdomen most  consistent with generalized ileus.  Multilevel degenerative disc disease throughout the lumbar spine with  dextroconvex curvature at the lower lumbar spine and levoconvex  curvature at the thoracolumbar junction consistent with scoliosis.  Right hip arthroplasty.  No pneumatosis.  No acute foreign body.       Impression:         1.  Multilevel degenerative disc disease throughout the lumbar spine and  thoracolumbar junction with " scoliosis.  2.  Right hip arthroplasty.  3.  Vertical midline incision with skin staples in the anterior  abdominal and pelvic wall.  4.  Free air in the right upper quadrant consistent with postoperative  change.  5.  Mild distended air-filled small bowel loops in the abdomen  consistent with underlying generalized ileus.  6.  No pneumatosis or portal venous gas.  7.  Surgical clips in the right upper quadrant.     This report was finalized on 6/15/2024 2:58 PM by Cuba Gomez MD.       XR Chest 1 View [[id number]] Collected: 06/15/24 1453     Updated: 06/15/24 1457    Narrative:      PROCEDURE: Portable chest x-ray examination performed on Bere 15, 2024.  Single view. Upright position.     HISTORY: Cough. Ileus.     COMPARISON: None.     FINDINGS:     Normal heart size.  Left side pacemaker with leads to the right atrium and right ventricle.  Minimal atelectasis at the lung bases.  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  Mild hypertrophic changes at the AC joint.  No bronchial inflammation.  No conclusive features of pneumonia.       Impression:         1.  Normal heart size.  2.  No lobar consolidation or edema.  3.  Minimal atelectasis versus scarring in the lung bases.  4.  No pleural effusion or pneumothorax.  5.  No bronchial inflammation.        This report was finalized on 6/15/2024 2:55 PM by Cuba Gomez MD.             Assessment & Plan    #Acute SBO POD 2 exploratory laparotomy w/ mesh explantation and reduction of internal hernia and closure of mesenteric defect of colon   -Patient high surgical risk, no clinically evident HF and no ischemia on ecg thus no further risk stratification required for surgery  - Post surgical plan per primary team. Patient slowly having bowel recovery after surgery.      #Troponin elevation likely secondary to demand ischemia, no delta and no ischemia on ecg  #pAF on Warfarin w/PM  #Compensated systolic heart  failure   - Routine TTE ordered revealing EF has dropped from 46-50% to 36-40% in the last 3 years.   - Will start GDMT. Switch metoprolol tartrate to succinate. Will start juardiance. Will start entresto.   - Outpatient f/u with cardiology to consider outpatient ischemic evaluation  - Continue ASA/statin.   - Pharmacy to dose warfarin. Reports he does not qualify for coverage of DOACs.      Chronic medical problems   #Mild macrocytosis w/o anemia  #hx of colon and bladder ca in sustained remission  -Vit B12, folate, TSH all wnl.         PT/OT consulted      Medicine will continue to follow. Please call with any questions.        Joseph Barnett MD  [facility] Hospitalist  06/16/24  12:35 EDT

## 2024-06-16 NOTE — PLAN OF CARE
Goal Outcome Evaluation:  Plan of Care Reviewed With: patient        Progress: no change  Outcome Evaluation: Pt VSS this shift. PRN pain medication given per orders. Pt ambulated to chair and sat up in chair this shift. Pt experienced urinary retention and intermittent straight cath performed with output of 1100 mL. Pt expressed no futher concerns at this time, Will continue POC.

## 2024-06-16 NOTE — PLAN OF CARE
Goal Outcome Evaluation:           Progress: no change  Outcome Evaluation: pt resting in bed, daughter at bedside, prn antiemetics given per MAR, pt has got up to bedside chair throughout shift, will continue plan of care.

## 2024-06-16 NOTE — PHARMACY PATIENT ASSISTANCE
Pharmacy checked on the cost of Entresto and Jardiance for this patient. Per patient's insurance, both are covered, but with a $650.58 copay for Entresto and a $577.86 copay for Jardiance. Will follow up with pharmacy patient care coordinator on Monday to confirm if patient is eligible for the Heart Failure Andrea. Confirmed patient is eligible for a one-time-use 30 day Entresto free trial card and a one-time-use 14 day Jardiance free trial card as well, which are profiled in [facility] and will be called in to patient's discharging pharmacy. Will update note when more information is available about the Heart Failure Andrea.    Thank you,  Maddie Fisher, PharmD        6/17/24 Update:    Per patient care coordinator, patient is approved for Heart Failure Andrea. Andrea has been added to patient's profile in [facility] for use at discharge.    Thank you,  Dottie Conti RPH  PGY-1 [facility] Resident

## 2024-06-17 LAB
INR PPP: 1.22 (ref 0.9–1.1)
PROTHROMBIN TIME: 15.5 SECONDS (ref 12.1–14.7)
REF LAB TEST METHOD: NORMAL

## 2024-06-17 PROCEDURE — 99024 POSTOP FOLLOW-UP VISIT: CPT

## 2024-06-17 PROCEDURE — 25010000002 MORPHINE PER 10 MG

## 2024-06-17 PROCEDURE — 25010000002 PROCHLORPERAZINE 10 MG/2ML SOLUTION: Performed by: SURGERY

## 2024-06-17 PROCEDURE — 25010000002 HEPARIN (PORCINE) PER 1000 UNITS: Performed by: INTERNAL MEDICINE

## 2024-06-17 PROCEDURE — 25810000003 LACTATED RINGERS PER 1000 ML: Performed by: SURGERY

## 2024-06-17 PROCEDURE — 85610 PROTHROMBIN TIME: CPT | Performed by: INTERNAL MEDICINE

## 2024-06-17 PROCEDURE — 99232 SBSQ HOSP IP/OBS MODERATE 35: CPT | Performed by: STUDENT IN AN ORGANIZED HEALTH CARE EDUCATION/TRAINING PROGRAM

## 2024-06-17 RX ORDER — MORPHINE SULFATE 2 MG/ML
2 INJECTION, SOLUTION INTRAMUSCULAR; INTRAVENOUS
Status: DISCONTINUED | OUTPATIENT
Start: 2024-06-17 | End: 2024-06-19 | Stop reason: HOSPADM

## 2024-06-17 RX ORDER — WARFARIN SODIUM 7.5 MG/1
7.5 TABLET ORAL
Status: COMPLETED | OUTPATIENT
Start: 2024-06-17 | End: 2024-06-17

## 2024-06-17 RX ADMIN — OXYCODONE HYDROCHLORIDE AND ACETAMINOPHEN 500 MG: 500 TABLET ORAL at 08:40

## 2024-06-17 RX ADMIN — METOPROLOL SUCCINATE 25 MG: 25 TABLET, EXTENDED RELEASE ORAL at 08:39

## 2024-06-17 RX ADMIN — ATORVASTATIN CALCIUM 20 MG: 20 TABLET, FILM COATED ORAL at 08:39

## 2024-06-17 RX ADMIN — SACUBITRIL AND VALSARTAN 1 TABLET: 24; 26 TABLET, FILM COATED ORAL at 23:37

## 2024-06-17 RX ADMIN — HEPARIN SODIUM 5000 UNITS: 5000 INJECTION INTRAVENOUS; SUBCUTANEOUS at 13:54

## 2024-06-17 RX ADMIN — PROCHLORPERAZINE EDISYLATE 2.5 MG: 5 INJECTION INTRAMUSCULAR; INTRAVENOUS at 04:12

## 2024-06-17 RX ADMIN — MORPHINE SULFATE 2 MG: 2 INJECTION, SOLUTION INTRAMUSCULAR; INTRAVENOUS at 21:28

## 2024-06-17 RX ADMIN — EMPAGLIFLOZIN 10 MG: 10 TABLET, FILM COATED ORAL at 08:39

## 2024-06-17 RX ADMIN — HEPARIN SODIUM 5000 UNITS: 5000 INJECTION INTRAVENOUS; SUBCUTANEOUS at 05:15

## 2024-06-17 RX ADMIN — HEPARIN SODIUM 5000 UNITS: 5000 INJECTION INTRAVENOUS; SUBCUTANEOUS at 21:24

## 2024-06-17 RX ADMIN — ASPIRIN 81 MG: 81 TABLET, CHEWABLE ORAL at 08:40

## 2024-06-17 RX ADMIN — WARFARIN SODIUM 7.5 MG: 7.5 TABLET ORAL at 13:54

## 2024-06-17 RX ADMIN — SODIUM CHLORIDE, POTASSIUM CHLORIDE, SODIUM LACTATE AND CALCIUM CHLORIDE 50 ML/HR: 600; 310; 30; 20 INJECTION, SOLUTION INTRAVENOUS at 04:40

## 2024-06-17 RX ADMIN — BUMETANIDE 2 MG: 1 TABLET ORAL at 08:39

## 2024-06-17 RX ADMIN — SACUBITRIL AND VALSARTAN 1 TABLET: 24; 26 TABLET, FILM COATED ORAL at 08:39

## 2024-06-17 RX ADMIN — TAMSULOSIN HYDROCHLORIDE 0.4 MG: 0.4 CAPSULE ORAL at 23:37

## 2024-06-17 RX ADMIN — TAMSULOSIN HYDROCHLORIDE 0.4 MG: 0.4 CAPSULE ORAL at 08:39

## 2024-06-17 RX ADMIN — MORPHINE SULFATE 2 MG: 2 INJECTION, SOLUTION INTRAMUSCULAR; INTRAVENOUS at 04:39

## 2024-06-17 NOTE — PLAN OF CARE
Goal Outcome Evaluation:  Plan of Care Reviewed With: patient        Progress: no change  Outcome Evaluation: Patient resting in bed at this time. Patient daughter at bedside. Patient ambulated to chair x2 this shift with assistance. Catheter care complete per order.  No acute changes at this time, will continue POC.

## 2024-06-17 NOTE — CASE MANAGEMENT/SOCIAL WORK
Continued Stay Note  [facility]     Patient Name: Alberto Guzman  MRN: [id number]  Today's Date: 6/17/2024    Admit Date: 6/9/2024    Plan: Pt was admitted on 6/9/24. Pt lives alone. PCP is Lianet Garcia. Pt does not utilize home health services at this time. Pt has straight cane used PRN via unknown provider. Pt has POA and living will (not on file). Pt plans to return home at discharge with daughter Krystin to provide transportation. SS to follow.   Discharge Plan       Row Name 06/17/24 1359       Plan    Plan Pt was admitted on 6/9/24. Pt lives alone. PCP is Lianet Garcia. Pt does not utilize home health services at this time. Pt has straight cane used PRN via unknown provider. Pt has POA and living will (not on file). Pt plans to return home at discharge with daughter Krystin to provide transportation. SS to follow.            RADHA Orlando

## 2024-06-17 NOTE — PROGRESS NOTES
Chief complaint: Bowel obstruction.      Patient is postop day 5 status post ex lap for bowel obstruction.    No acute events overnight.  Patient was sitting up in the chair eating breakfast.    He is in no apparent distress, alert and oriented x 3.  Abdomen soft, remains appropriately tender.  Staples remain intact as well as clean and dry.    He reports that he is tolerating diet well.  Does report some green sputum.  States that he has had a bowel movement today as well as some flatus.  Michael catheter remains in place.  Reports warfarin has already been restarted.      Plan:  Advance to regular diet today  Keep Michael in place

## 2024-06-17 NOTE — PLAN OF CARE
Goal Outcome Evaluation:   Pt resting in bed this shift. Earlier in shift pt ambulated inside room and sat in the chair for 2 hours. Pt c/o abdominal pain during shift, see MAR. No further complaints voiced at this time. No visible acute s/s of distress noted. Plan of care ongoing.

## 2024-06-17 NOTE — PROGRESS NOTES
[facility]     Patient Identification:  Name:  Alberto Guzman  Age:  89 y.o.  Sex:  male  :  3/29/1931  MRN:  [id number]  Visit Number:  [id number]  ROOM: [address] [place] [address]     Primary Care Provider:  Lianet Dia APRN     Date of Admission: 2024    Length of stay in inpatient status:  8    Subjective     Chief Compliant:    Chief Complaint   Patient presents with    Abdominal Pain       Patient resting in bed reporting he tolerated breakfast this am w/o difficulty. No abd pain reported        Objective       Vital Signs:  Temp:  [97.7 °F (36.5 °C)-98.5 °F (36.9 °C)] 97.7 °F (36.5 °C)  Heart Rate:  [61-74] 65  Resp:  [18-20] 20  BP: (112-136)/(57-62) 136/62     on   ;   Device (Oxygen Therapy): room air  Body mass index is 26.3 kg/m².      ----------------------------------------------------------------------------------------------------------------------  Physical Exam  Vitals and nursing note reviewed.   Constitutional:       General: He is not in acute distress.  Eyes:      General: No scleral icterus.     Extraocular Movements: Extraocular movements intact.   Cardiovascular:      Rate and Rhythm: Normal rate.      Pulses: Normal pulses.   Pulmonary:      Effort: Pulmonary effort is normal. No respiratory distress.   Abdominal:      General: Bowel sounds are normal. There is no distension.      Palpations: Abdomen is soft.      Tenderness: There is no abdominal tenderness. There is no rebound.   Musculoskeletal:      Left lower leg: No edema.   Skin:     General: Skin is warm.      Coloration: Skin is pale.   Neurological:      General: No focal deficit present.      Mental Status: He is alert.      Cranial Nerves: No cranial nerve deficit.      Motor: Weakness present.       ----------------------------------------------------------------------------------------------------------------------          Assessment & Plan      #Acute SBO POD 2 exploratory  laparotomy w/ mesh explantation and reduction of internal hernia and closure of mesenteric defect of colon   -Tolerating PO intake, back on warfarin but INR remains subtherapeutic, monitoring daily. Will f/u with prior monitoring protocol after discharge to track to return to goal range     #Troponin elevation likely secondary to demand ischemia, no delta and no ischemia on ecg  #pAF on Warfarin w/PM  #Compensated systolic heart failure   - Routine TTE ordered revealing EF has dropped from 46-50% to 36-40% in the last 3 years.   - Tolerating GDMT initiation. Cont bumex, IVF stopped given PO intake and euvolemia in setting of resumption of maintenance diuretic   - Outpatient f/u with cardiology to consider outpatient ischemic evaluation  - Continue ASA/statin  - Pharmacy to dose warfarin. Reports he does not qualify for coverage of DOACs.      Chronic medical problems   #Mild macrocytosis w/o anemia  #hx of colon and bladder ca in sustained remission  -Vit B12, folate, TSH all wnl.         PT/OT consulted and rec home w/assist      Code Status and Medical Interventions:   Ordered at: 06/09/24 0852     Level Of Support Discussed With:    Patient     Code Status (Patient has no pulse and is not breathing):    CPR (Attempt to Resuscitate)     Medical Interventions (Patient has pulse or is breathing):    Full Support         Disposition: HM service will cont to follow    I have reviewed any copied/forwarded text or data, verified its accuracy, and updated as necessary above.    Joselito Durbin MD  [facility] Hospitalist  06/17/24  13:11 EDT

## 2024-06-18 LAB
INR PPP: 1.26 (ref 0.9–1.1)
PROTHROMBIN TIME: 15.9 SECONDS (ref 12.1–14.7)

## 2024-06-18 PROCEDURE — 85610 PROTHROMBIN TIME: CPT | Performed by: INTERNAL MEDICINE

## 2024-06-18 PROCEDURE — 99232 SBSQ HOSP IP/OBS MODERATE 35: CPT | Performed by: STUDENT IN AN ORGANIZED HEALTH CARE EDUCATION/TRAINING PROGRAM

## 2024-06-18 PROCEDURE — 25010000002 ENOXAPARIN PER 10 MG: Performed by: STUDENT IN AN ORGANIZED HEALTH CARE EDUCATION/TRAINING PROGRAM

## 2024-06-18 PROCEDURE — 99024 POSTOP FOLLOW-UP VISIT: CPT | Performed by: SURGERY

## 2024-06-18 PROCEDURE — 25010000002 PROCHLORPERAZINE 10 MG/2ML SOLUTION: Performed by: SURGERY

## 2024-06-18 PROCEDURE — 25010000002 ONDANSETRON PER 1 MG: Performed by: SURGERY

## 2024-06-18 PROCEDURE — 25010000002 HEPARIN (PORCINE) PER 1000 UNITS: Performed by: INTERNAL MEDICINE

## 2024-06-18 RX ORDER — ENOXAPARIN SODIUM 100 MG/ML
1 INJECTION SUBCUTANEOUS EVERY 12 HOURS
Status: DISCONTINUED | OUTPATIENT
Start: 2024-06-18 | End: 2024-06-19 | Stop reason: HOSPADM

## 2024-06-18 RX ORDER — WARFARIN SODIUM 5 MG/1
10 TABLET ORAL
Status: COMPLETED | OUTPATIENT
Start: 2024-06-18 | End: 2024-06-18

## 2024-06-18 RX ADMIN — SACUBITRIL AND VALSARTAN 1 TABLET: 24; 26 TABLET, FILM COATED ORAL at 10:06

## 2024-06-18 RX ADMIN — PROCHLORPERAZINE EDISYLATE 2.5 MG: 5 INJECTION INTRAMUSCULAR; INTRAVENOUS at 21:56

## 2024-06-18 RX ADMIN — SACUBITRIL AND VALSARTAN 1 TABLET: 24; 26 TABLET, FILM COATED ORAL at 21:55

## 2024-06-18 RX ADMIN — EMPAGLIFLOZIN 10 MG: 10 TABLET, FILM COATED ORAL at 10:06

## 2024-06-18 RX ADMIN — TAMSULOSIN HYDROCHLORIDE 0.4 MG: 0.4 CAPSULE ORAL at 10:06

## 2024-06-18 RX ADMIN — ATORVASTATIN CALCIUM 20 MG: 20 TABLET, FILM COATED ORAL at 10:06

## 2024-06-18 RX ADMIN — HEPARIN SODIUM 5000 UNITS: 5000 INJECTION INTRAVENOUS; SUBCUTANEOUS at 13:18

## 2024-06-18 RX ADMIN — ENOXAPARIN SODIUM 100 MG: 100 INJECTION SUBCUTANEOUS at 21:55

## 2024-06-18 RX ADMIN — LANSOPRAZOLE 30 MG: 30 TABLET, ORALLY DISINTEGRATING ORAL at 05:13

## 2024-06-18 RX ADMIN — PROCHLORPERAZINE EDISYLATE 2.5 MG: 5 INJECTION INTRAMUSCULAR; INTRAVENOUS at 13:18

## 2024-06-18 RX ADMIN — WARFARIN SODIUM 10 MG: 5 TABLET ORAL at 17:49

## 2024-06-18 RX ADMIN — HEPARIN SODIUM 5000 UNITS: 5000 INJECTION INTRAVENOUS; SUBCUTANEOUS at 05:13

## 2024-06-18 RX ADMIN — ONDANSETRON 4 MG: 2 INJECTION INTRAMUSCULAR; INTRAVENOUS at 08:22

## 2024-06-18 RX ADMIN — ASPIRIN 81 MG: 81 TABLET, CHEWABLE ORAL at 10:06

## 2024-06-18 RX ADMIN — TAMSULOSIN HYDROCHLORIDE 0.4 MG: 0.4 CAPSULE ORAL at 21:55

## 2024-06-18 RX ADMIN — OXYCODONE HYDROCHLORIDE AND ACETAMINOPHEN 500 MG: 500 TABLET ORAL at 10:06

## 2024-06-18 NOTE — PLAN OF CARE
Goal Outcome Evaluation:              Outcome Evaluation: Pt resting in bed. Complained of abd pain around 2000, prn med given. VSS

## 2024-06-18 NOTE — PLAN OF CARE
Goal Outcome Evaluation:           Progress: improving  Outcome Evaluation: Pt resting in bed. VSS on RA. Pt ambulated with x1 assist in room this shift. PRN nausea medication given. Pt voices no further complaints. Will continue with poc.

## 2024-06-18 NOTE — PROGRESS NOTES
[facility]     Patient Identification:  Name:  Alberto Guzman  Age:  89 y.o.  Sex:  male  :  3/29/1931  MRN:  [id number]  Visit Number:  [id number]  ROOM: [address] [place] [address]     Primary Care Provider:  Lianet Dia APRN     Date of Admission: 2024    Length of stay in inpatient status:  9    Subjective     Chief Compliant:    Chief Complaint   Patient presents with    Abdominal Pain       Patient in good spirits with mild abd pain worse with movement but ambulating well with therapy. Tolerating PO diet well w/flatus. Discussed dispo and awaiting repeat PT eval        Objective       Vital Signs:  Temp:  [97.5 °F (36.4 °C)-98 °F (36.7 °C)] 97.5 °F (36.4 °C)  Heart Rate:  [69-74] 69  Resp:  [18-20] 20  BP: (110-116)/(50-58) 110/58  SpO2:  [95 %] 95 %  on   ;   Device (Oxygen Therapy): room air  Body mass index is 26.3 kg/m².      ----------------------------------------------------------------------------------------------------------------------  Physical Exam  Vitals and nursing note reviewed.   Constitutional:       General: He is not in acute distress.  Eyes:      General: No scleral icterus.     Extraocular Movements: Extraocular movements intact.   Cardiovascular:      Rate and Rhythm: Normal rate.      Pulses: Normal pulses.   Pulmonary:      Effort: Pulmonary effort is normal. No respiratory distress.   Abdominal:      General: Bowel sounds are normal. There is no distension.      Palpations: Abdomen is soft.      Tenderness: There is no abdominal tenderness. There is no rebound.   Musculoskeletal:      Left lower leg: No edema.   Skin:     General: Skin is warm.      Coloration: Skin is pale.   Neurological:      General: No focal deficit present.      Mental Status: He is alert.      Cranial Nerves: No cranial nerve deficit.      Motor: Weakness present.        ----------------------------------------------------------------------------------------------------------------------          Assessment & Plan      #Acute SBO POD 2 exploratory laparotomy w/ mesh explantation and reduction of internal hernia and closure of mesenteric defect of colon   -Tolerating PO intake, back on warfarin but INR remains subtherapeutic, monitoring daily. Stopped heparin dvt prophylaxis, added lovenox thereapeutic bridge while INR remains below range. Can cont as outpatient until INR back to goal     #Troponin elevation likely secondary to demand ischemia, no delta and no ischemia on ecg  #pAF on Warfarin w/PM  #Compensated systolic heart failure   - Routine TTE ordered revealing EF has dropped from 46-50% to 36-40% in the last 3 years.   - Tolerating GDMT initiation. Cont bumex, jardiance, entresto  - Outpatient f/u with cardiology to consider outpatient ischemic evaluation and f/u repeat labs on entresto  - Continue ASA/statin  - Pharmacy to dose warfarin. Reports he does not qualify for coverage of DOACs.      Chronic medical problems   #Mild macrocytosis w/o anemia  #hx of colon and bladder ca in sustained remission  -Vit B12, folate, TSH all wnl.         PT/OT consulted and rec home w/assist, s/o case on 6/13. Can rx outpatient pt at [facility]. Patient ok to dc from medicine standpoint      Code Status and Medical Interventions:   Ordered at: 06/09/24 0852     Level Of Support Discussed With:    Patient     Code Status (Patient has no pulse and is not breathing):    CPR (Attempt to Resuscitate)     Medical Interventions (Patient has pulse or is breathing):    Full Support         Disposition:  service will cont to follow    I have reviewed any copied/forwarded text or data, verified its accuracy, and updated as necessary above.    Joselito Durbin MD  [facility]  06/18/24  17:12 EDT

## 2024-06-18 NOTE — PROGRESS NOTES
Chief complaint: Bowel obstruction    Postoperative day 6: Exploratory laparotomy with reduction of internal hernia    Patient with bowel function and now tolerating diet.  He is being converted back to his warfarin though he is not therapeutic at this time and continues Lovenox injections as well.  Urinary retention persisted despite attempts to remove Michael catheter and Michael catheter is now in place.    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  -Continue regular diet  -PT evaluation for placement or home needs  -Continue Coumadin with Lovenox bridge until therapeutic, if ready for discharge after PT evaluation will be able to discharge with Lovenox injections.  -Continue Michael catheter and will plan for outpatient urology follow-up with Flomax daily.

## 2024-06-19 ENCOUNTER — READMISSION MANAGEMENT (OUTPATIENT)
Dept: CALL CENTER | Facility: HOSPITAL | Age: 89
End: 2024-06-19
Payer: MEDICARE

## 2024-06-19 VITALS
OXYGEN SATURATION: 97 % | DIASTOLIC BLOOD PRESSURE: 56 MMHG | BODY MASS INDEX: 26.31 KG/M2 | HEIGHT: 76 IN | SYSTOLIC BLOOD PRESSURE: 99 MMHG | WEIGHT: 216.06 LBS | HEART RATE: 75 BPM | TEMPERATURE: 97.7 F | RESPIRATION RATE: 16 BRPM

## 2024-06-19 LAB
INR PPP: 1.33 (ref 0.9–1.1)
PROTHROMBIN TIME: 16.6 SECONDS (ref 12.1–14.7)

## 2024-06-19 PROCEDURE — 99232 SBSQ HOSP IP/OBS MODERATE 35: CPT | Performed by: STUDENT IN AN ORGANIZED HEALTH CARE EDUCATION/TRAINING PROGRAM

## 2024-06-19 PROCEDURE — 99024 POSTOP FOLLOW-UP VISIT: CPT | Performed by: SURGERY

## 2024-06-19 PROCEDURE — 85610 PROTHROMBIN TIME: CPT | Performed by: INTERNAL MEDICINE

## 2024-06-19 PROCEDURE — 25010000002 PROCHLORPERAZINE 10 MG/2ML SOLUTION: Performed by: SURGERY

## 2024-06-19 PROCEDURE — 25010000002 ENOXAPARIN PER 10 MG: Performed by: STUDENT IN AN ORGANIZED HEALTH CARE EDUCATION/TRAINING PROGRAM

## 2024-06-19 PROCEDURE — 97161 PT EVAL LOW COMPLEX 20 MIN: CPT

## 2024-06-19 RX ORDER — TAMSULOSIN HYDROCHLORIDE 0.4 MG/1
0.4 CAPSULE ORAL 2 TIMES DAILY
Qty: 30 CAPSULE | Refills: 1 | Status: SHIPPED | OUTPATIENT
Start: 2024-06-19

## 2024-06-19 RX ORDER — ENOXAPARIN SODIUM 100 MG/ML
1 INJECTION SUBCUTANEOUS EVERY 12 HOURS
Qty: 15 ML | Refills: 0 | Status: SHIPPED | OUTPATIENT
Start: 2024-06-19

## 2024-06-19 RX ORDER — HYDROCODONE BITARTRATE AND ACETAMINOPHEN 10; 325 MG/1; MG/1
1 TABLET ORAL EVERY 8 HOURS PRN
Qty: 12 TABLET | Refills: 0 | Status: SHIPPED | OUTPATIENT
Start: 2024-06-19

## 2024-06-19 RX ORDER — METOPROLOL SUCCINATE 50 MG/1
25 TABLET, EXTENDED RELEASE ORAL DAILY
Start: 2024-06-19

## 2024-06-19 RX ORDER — WARFARIN SODIUM 4 MG/1
12 TABLET ORAL
Status: DISCONTINUED | OUTPATIENT
Start: 2024-06-19 | End: 2024-06-19 | Stop reason: HOSPADM

## 2024-06-19 RX ADMIN — ASPIRIN 81 MG: 81 TABLET, CHEWABLE ORAL at 09:40

## 2024-06-19 RX ADMIN — LANSOPRAZOLE 30 MG: 30 TABLET, ORALLY DISINTEGRATING ORAL at 05:23

## 2024-06-19 RX ADMIN — PROCHLORPERAZINE EDISYLATE 2.5 MG: 5 INJECTION INTRAMUSCULAR; INTRAVENOUS at 08:29

## 2024-06-19 RX ADMIN — OXYCODONE HYDROCHLORIDE AND ACETAMINOPHEN 500 MG: 500 TABLET ORAL at 09:40

## 2024-06-19 RX ADMIN — ATORVASTATIN CALCIUM 20 MG: 20 TABLET, FILM COATED ORAL at 09:40

## 2024-06-19 RX ADMIN — HYDROCODONE BITARTRATE AND ACETAMINOPHEN 0.5 TABLET: 10; 325 TABLET ORAL at 13:56

## 2024-06-19 RX ADMIN — EMPAGLIFLOZIN 10 MG: 10 TABLET, FILM COATED ORAL at 09:40

## 2024-06-19 RX ADMIN — SACUBITRIL AND VALSARTAN 1 TABLET: 24; 26 TABLET, FILM COATED ORAL at 09:40

## 2024-06-19 RX ADMIN — TAMSULOSIN HYDROCHLORIDE 0.4 MG: 0.4 CAPSULE ORAL at 09:40

## 2024-06-19 RX ADMIN — ENOXAPARIN SODIUM 100 MG: 100 INJECTION SUBCUTANEOUS at 09:40

## 2024-06-19 NOTE — CASE MANAGEMENT/SOCIAL WORK
Continued Stay Note  EFRAÍN Smith     Patient Name: Alberto Guzman  MRN: [id number]  Today's Date: 6/19/2024    Admit Date: 6/9/2024    Plan: Pt was admitted on 6/9/24. Pt lives alone. PCP is Lianet Garcia. Pt does not utilize home health services at this time. Pt has straight cane used PRN via unknown provider. Pt has POA and living will (not on file). Pt plans to return home at discharge with daughter Krystin to provide transportation. SS to follow.   Discharge Plan       Row Name 06/19/24 1247       Plan    Final Discharge Disposition Code 06 - home with home health care    Final Note Pt is being discharged home on this date with physician order for home health. SS spoke with pt's daughter, Krystin on this date who states preference of [facility]. SS faxed referral to [facility] [phone]. SS discussed pt with [facility] per Maryse. Pt's daughter at bedside on this date to provide transportation. No other needs identified at this time.        ANATOLY OrlandoW

## 2024-06-19 NOTE — PROGRESS NOTES
[facility]     Patient Identification:  Name:  Alberto Guzman  Age:  89 y.o.  Sex:  male  :  3/29/1931  MRN:  [id number]  Visit Number:  [id number]  ROOM: [address] [place] [address]     Primary Care Provider:  Lianet Dia APRN     Date of Admission: 2024    Length of stay in inpatient status:  10    Subjective     Chief Compliant:    Chief Complaint   Patient presents with    Abdominal Pain       Patient UT in good spirits this am. Discussed pt options and prefers dc home with HH pt/ot        Objective       Vital Signs:  Temp:  [97.3 °F (36.3 °C)-97.7 °F (36.5 °C)] 97.7 °F (36.5 °C)  Heart Rate:  [71-84] 75  Resp:  [16-18] 16  BP: ()/(56-58) 99/56  SpO2:  [97 %] 97 %  on  Flow (L/min):  [2] 2;   Device (Oxygen Therapy): room air  Body mass index is 26.3 kg/m².      ----------------------------------------------------------------------------------------------------------------------  Physical Exam  Vitals and nursing note reviewed.   Constitutional:       General: He is not in acute distress.  Eyes:      General: No scleral icterus.     Extraocular Movements: Extraocular movements intact.   Cardiovascular:      Rate and Rhythm: Normal rate.      Pulses: Normal pulses.   Pulmonary:      Effort: Pulmonary effort is normal. No respiratory distress.   Abdominal:      General: Bowel sounds are normal. There is no distension.      Palpations: Abdomen is soft.      Tenderness: There is no abdominal tenderness. There is no rebound.   Musculoskeletal:      Left lower leg: No edema.   Skin:     General: Skin is warm.      Coloration: Skin is pale.   Neurological:      General: No focal deficit present.      Mental Status: He is alert.      Cranial Nerves: No cranial nerve deficit.      Motor: Weakness present.       ----------------------------------------------------------------------------------------------------------------------          Assessment & Plan      #Acute  SBO POD 2 exploratory laparotomy w/ mesh explantation and reduction of internal hernia and closure of mesenteric defect of colon   #Troponin elevation likely secondary to demand ischemia, no delta and no ischemia on ecg  #pAF on Warfarin w/PM  #Compensated systolic heart failure   #Mild macrocytosis w/o anemia  #hx of colon and bladder ca in sustained remission        RECS:    -Discharge with jardiance, entresto, continued bumex, current metoprolol dose. DC lisinopril  -Cont warfarin with lovenox bridge until outpatient f/u with anticoagulation monitoring. Given INR would increase daily dose above 5mg prior but suspect low INR due to GI surgery, likely able to eventually return to stable 5mg daily dosing  -Outpatient cardiology f/u for possible ischemic eval risk/benefit discussion  - for pt/ot      Code Status and Medical Interventions:   Ordered at: 06/09/24 0852     Level Of Support Discussed With:    Patient     Code Status (Patient has no pulse and is not breathing):    CPR (Attempt to Resuscitate)     Medical Interventions (Patient has pulse or is breathing):    Full Support         Disposition: ok to dc from medical perspective    I have reviewed any copied/forwarded text or data, verified its accuracy, and updated as necessary above.    Joselito Durbin MD  [facility] Hospitalist  06/19/24  14:20 EDT

## 2024-06-19 NOTE — OUTREACH NOTE
Prep Survey      Flowsheet Row Responses   [facility] facility patient discharged from? [facility]   Is LACE score < 7 ? No   Eligibility Readm Mgmt   Discharge diagnosis LAPAROTOMY EXPLORATORY   Does the patient have one of the following disease processes/diagnoses(primary or secondary)? General Surgery   Prep survey completed? Yes            Anamika MARTINEZ - Registered Nurse

## 2024-06-19 NOTE — PLAN OF CARE
Goal Outcome Evaluation:              Outcome Evaluation: Pt rested in bed this shift. VSS on RA. PRN nausea medication given. Pt has no other complaints or requests at this time. Will continue to follow POC.

## 2024-06-19 NOTE — DISCHARGE PLACEMENT REQUEST
[address]  [place] [address]  Phone:  [phone]  Fax:  [phone] Date: 2024      Ambulatory Referral to Home Health (Hospital)     Patient:  Alberto Guzman MRN:  [id number]   [address] [place] [address] :  3/29/1931  SSN:    Phone: [phone] Sex:  M      INSURANCE PAYOR PLAN GROUP # SUBSCRIBER ID   Primary:  Secondary:    MEDICARE [employer] [place] [id number]  [id number]      [id number]  [id number]      Referring Provider Information:  DEO GARCIA Phone: [phone] Fax: [phone]       Referral Information:   # Visits:  999 Referral Type: Home Health [42]   Urgency:  Routine Referral Reason: Specialty Services Required   Start Date: 2024 End Date:  To be determined by Insurer   Diagnosis: Small bowel obstruction (K56.609 [ICD-10-CM] 560.9 [ICD-9-CM])      Refer to Dept:   Refer to Provider:   Refer to Provider Phone:   Refer to Facility:       Face to Face Visit Date: 2024  Follow-up provider for Plan of Care? I will be treating the patient on an ongoing basis.  Please send me the Plan of Care for signature.  Follow-up provider: DEO GARCIA [[id number]]  Reason/Clinical Findings: deconditioning, need for PT  Describe mobility limitations that make leaving home difficult: ambulation, transport  Nursing/Therapeutic Services Requested: Physical Therapy  PT orders: Strengthening  Frequency: 1 Week 1     This document serves as a request of services and does not constitute Insurance authorization or approval of services.  To determine eligibility, please contact the members Insurance carrier to verify and review coverage.     If you have medical questions regarding this request for services. Please contact [address] at [phone] during normal business hours.        Authorizing Provider:Deo Garcia MD  Authorizing Provider's NPI: [id number]  Order Entered By: Deo Garcia MD  "6/19/2024 10:52 AM     Electronically signed by: Deo Lennon MD 6/19/2024 10:52 AM       Alberto Bates (89 y.o. Male)       Date of Birth   03/29/1931    Social Security Number       Address   [address] [place] [address]    Home Phone   [phone]    MRN   [id number]       Buddhist   Judaism    Marital Status                               Admission Date   6/9/24    Admission Type   Emergency    Admitting Provider   Deo Lennon MD    Attending Provider   Deo Lennon MD    Department, Room/Bed   [address], [facility]/       Discharge Date       Discharge Disposition   Home or Self Care    Discharge Destination                                 Attending Provider: Deo Lennon MD    Allergies: No Known Allergies    Isolation: None   Infection: None   Code Status: CPR    Ht: 193 cm (76\")   Wt: 98 kg (216 lb 1 oz)    Admission Cmt: None   Principal Problem: Small bowel obstruction [K56.609]                   Active Insurance as of 6/9/2024       Primary Coverage       Payor Plan Insurance Group Employer/Plan Group    MEDICARE MEDICARE A & B        Payor Plan Address Payor Plan Phone Number Payor Plan Fax Number Effective Dates    [address] [phone]  6/1/1995 - None Entered    [facility] [id number]         Subscriber Name Subscriber Birth Date Member ID       ALBERTO BATES 3/29/1931 [id number]               Secondary Coverage       Payor Plan Insurance Group Employer/Plan Group    [employer] [place] [employer] [place]        Payor Plan Address Payor Plan Phone Number Payor Plan Fax Number Effective Dates    [address] [place] [address] [phone]  11/1/1999 - None Entered    [place] [address]         Subscriber Name Subscriber Birth Date Member ID       ALBERTO BATES 3/29/1931 [id number]                     Emergency Contacts        (Rel.) Home Phone Work Phone Mobile Phone    Krystin Han (Daughter) -- -- [phone]    " Waqas Guzman (Son) [phone] -- [phone]    Bessy Guzman (Daughter) -- -- [phone]              Insurance Information                  MEDICARE/MEDICARE A & B Phone: [phone]    Subscriber: Alberto Guzman Subscriber#: [id number]    Group#: -- Precert#: --        [employer] [place] [place] Phone: [phone]    Subscriber: Alberto Guzman Subscriber#: [id number]    Group#: -- Precert#: --             History & Physical        Hitchins, Deo Kilgore MD at 24 0932          [facility]   HISTORY AND PHYSICAL    Patient Name: Alberto Guzman  : 3/29/1931  MRN: [id number]  Primary Care Physician:  Lianet Dia APRN  Date of admission: 2024    Subjective  Subjective     Chief Complaint: Abdominal pain    History of Present Illness  With acute onset of periumbilical abdominal pain over the last 24 to 48 hours.  No peritoneal signs.  Passed flatus on the way to the facility for presentation to the ER.  He has a history of colon cancer status post left colectomy, he has a history of melanoma, coronary artery disease, as well as peripheral vascular disease, history of stroke and atrial fibrillation.  He is on chronic anticoagulation with Coumadin.  INR is 1.5.  Patient on imaging has signs concerning for possible partial small bowel obstruction.  Last bowel movement was yesterday evening.  Patient is not massively distended and has not had any significant vomiting.  Patient does have a distended bladder and states he has not voided since presentation.  Patient has history of ventral hernia repair and the mesh is palpable in the left abdomen and this has been the case since the surgery per the patient.  There is a small recurrence of fat on imaging but no signs of incarceration or ischemia.  He has a history of BPH as well.  Abdominal Pain  Pertinent negatives include no constipation, diarrhea, dysuria, fever, headaches, nausea or vomiting.       Review of Systems    Constitutional:  Negative for activity change, appetite change, chills and fever.   HENT:  Negative for sore throat and trouble swallowing.    Eyes:  Negative for visual disturbance.   Respiratory:  Negative for cough and shortness of breath.    Cardiovascular:  Negative for chest pain and palpitations.   Gastrointestinal:  Positive for abdominal pain. Negative for abdominal distention, blood in stool, constipation, diarrhea, nausea and vomiting.   Endocrine: Negative for cold intolerance and heat intolerance.   Genitourinary:  Negative for dysuria.   Musculoskeletal:  Negative for joint swelling.   Skin:  Negative for color change, rash and wound.   Allergic/Immunologic: Negative for immunocompromised state.   Neurological:  Negative for dizziness, seizures, weakness and headaches.   Hematological:  Negative for adenopathy. Does not bruise/bleed easily.   Psychiatric/Behavioral:  Negative for agitation and confusion.         Personal History     Past Medical History:   Diagnosis Date    Arthritis     Atrial flutter     Back pain     Benign prostatic hyperplasia     Bladder tumor     Cancer     [id number]/[id number]    Chronic systolic heart failure 9/14/2021    Colon cancer     Coronary artery disease     DVT (deep venous thrombosis)     r leg    Elevated cholesterol     Heart attack     Hypertension     Numbness     feet/hands    Osteoporosis     PVD (peripheral vascular disease)     Rheumatoid arthritis     Stroke     Ventricular tachycardia        Past Surgical History:   Procedure Laterality Date    CATARACT EXTRACTION Left     CHOLECYSTECTOMY      COLON RESECTION      COLONOSCOPY      HEMORROIDECTOMY      HERNIA REPAIR      left inguinal/umbilical    HIP ARTHROPLASTY Right     INSERT / REPLACE / REMOVE PACEMAKER      JOINT REPLACEMENT      PACEMAKER IMPLANTATION      PROSTATE SURGERY      TRANSURETHRAL RESECTION OF BLADDER TUMOR N/A 4/18/2018    Procedure: CYSTOSCOPY TRANSURETHRAL RESECTION OF SMALL BLADDER  TUMOR;  Surgeon: Alfonso Collins MD;  Location: [facility] OR;  Service: Urology    TRANSURETHRAL RESECTION OF BLADDER TUMOR N/A 8/29/2018    Procedure: CYSTOSCOPY TRANSURETHRAL RESECTION OF BLADDER TUMOR;  Surgeon: Alfonso Collins MD;  Location: [facility];  Service: Urology       Family History: family history includes Heart disease in his brother; No Known Problems in his father and mother. Otherwise pertinent FHx was reviewed and not pertinent to current issue.    Social History:  reports that he has quit smoking. His smoking use included cigarettes. He has been exposed to tobacco smoke. He has never used smokeless tobacco. He reports current alcohol use. He reports that he does not use drugs.    Home Medications:  HYDROcodone-acetaminophen, aspirin, atorvastatin, bumetanide, lisinopril, metoprolol succinate XL, nitroglycerin, and warfarin    Allergies:  No Known Allergies    Objective   Objective     Vitals:   Temp:  [97.6 °F (36.4 °C)] 97.6 °F (36.4 °C)  Heart Rate:  [60-67] 62  Resp:  [18] 18  BP: (113-144)/(61-85) 144/72    Physical Exam  Constitutional:       Appearance: He is well-developed.   HENT:      Head: Normocephalic and atraumatic.   Eyes:      Conjunctiva/sclera: Conjunctivae normal.      Pupils: Pupils are equal, round, and reactive to light.   Neck:      Thyroid: No thyromegaly.      Vascular: No JVD.      Trachea: No tracheal deviation.   Cardiovascular:      Rate and Rhythm: Normal rate and regular rhythm.      Heart sounds: No murmur heard.     No friction rub. No gallop.   Pulmonary:      Effort: Pulmonary effort is normal.      Breath sounds: Normal breath sounds.   Abdominal:      General: There is no distension.      Palpations: Abdomen is soft. There is no hepatomegaly or splenomegaly.      Tenderness: There is no abdominal tenderness.      Hernia: No hernia is present.          Comments: Palpable mesh from hernia repair, tender to palpation throughout the periumbilical region  without rebound or guarding   Musculoskeletal:         General: No deformity. Normal range of motion.      Cervical back: Neck supple.   Skin:     General: Skin is warm and dry.   Neurological:      Mental Status: He is alert and oriented to person, place, and time.         Result Review   Result Review:  I have personally reviewed the results from the time of this admission to 6/9/2024 09:32 EDT and agree with these findings:  [x]  Laboratory list / accordion  []  Microbiology  [x]  Radiology  []  EKG/Telemetry   []  Cardiology/Vascular   []  Pathology  []  Old records  []  Other:      Assessment & Plan  Assessment / Plan     Brief Patient Summary:  Alberto Guzman is a 89 y.o. male who presents with abdominal pain of uncertain etiology though bowel obstruction secondary to adhesions cannot be excluded though he had flatus on the way to the emergency department.  Patient may have evolving small bowel obstruction versus enteritis.    Active Hospital Problems:  Active Hospital Problems    Diagnosis     **Small bowel obstruction      Plan:   NG tube  Bowel rest with n.p.o. status  IV fluids  Consult hospitalist for medical management given his multiple medical comorbidities  Hold anticoagulation  GI DVT prophylaxis with Protonix and bilateral sequential compression devices for now    DVT prophylaxis:  No DVT prophylaxis order currently exists.        CODE STATUS:    Level Of Support Discussed With: Patient  Code Status (Patient has no pulse and is not breathing): CPR (Attempt to Resuscitate)  Medical Interventions (Patient has pulse or is breathing): Full Support    Admission Status:  I believe this patient meets inpatient status.    Deo Lennon MD    Electronically signed by Deo Lennon MD at 06/09/24 5034       Current Facility-Administered Medications   Medication Dose Route Frequency Provider Last Rate Last Admin    ascorbic acid (VITAMIN C) tablet 500 mg  500 mg Oral Daily Deo Lennon,  MD   500 mg at 06/19/24 0940    aspirin chewable tablet 81 mg  81 mg Oral Daily Deo Lennon MD   81 mg at 06/19/24 0940    atorvastatin (LIPITOR) tablet 20 mg  20 mg Oral Daily Deo Lennon MD   20 mg at 06/19/24 0940    bumetanide (BUMEX) tablet 2 mg  2 mg Oral Daily Deo Lennon MD   2 mg at 06/17/24 0839    empagliflozin (JARDIANCE) tablet 10 mg  10 mg Oral Daily Joseph Barnett MD   10 mg at 06/19/24 0940    Enoxaparin Sodium (LOVENOX) syringe 100 mg  1 mg/kg Subcutaneous Q12H Joselito Durbin MD   100 mg at 06/19/24 0940    HYDROcodone-acetaminophen (NORCO)  MG per tablet 0.5 tablet  0.5 tablet Oral Q6H PRN Deo Lennon MD   0.5 tablet at 06/16/24 0529    lansoprazole (PREVACID SOLUTAB) disintegrating tablet Tablet Delayed Release Dispersible 30 mg  30 mg Oral Q AM Deo Lennon MD   30 mg at 06/19/24 0523    metoprolol succinate XL (TOPROL-XL) 24 hr tablet 25 mg  25 mg Oral Q24H Joseph Barnett MD   25 mg at 06/17/24 0839    morphine injection 2 mg  2 mg Intravenous Q2H PRN Jose D Rodas APRN   2 mg at 06/17/24 2128    Pharmacy Consult - Pharmacy to dose   Does not apply Continuous PRN oJselito Durbin MD        Pharmacy to dose warfarin   Does not apply Continuous PRN Joseph Barnett MD        prochlorperazine (COMPAZINE) injection 2.5 mg  2.5 mg Intravenous Q8H PRN Deo Lennon MD   2.5 mg at 06/19/24 0829    sacubitril-valsartan (ENTRESTO) 24-26 MG tablet 1 tablet  1 tablet Oral Q12H Joseph Barnett MD   1 tablet at 06/19/24 0940    sodium chloride 0.9 % flush 10 mL  10 mL Intravenous PRN Deo Lennon MD   10 mL at 06/09/24 2130    tamsulosin (FLOMAX) 24 hr capsule 0.4 mg  0.4 mg Oral BID Latia Arrieta MD   0.4 mg at 06/19/24 0940    warfarin (COUMADIN) tablet 12 mg  12 mg Oral Once Deo Lennon MD            Physician Progress Notes (most recent note)        Joselito Durbin MD at 06/18/24 1712              [facility]  [place] HOSPITALIST PROGRESS NOTE     Patient Identification:  Name:  Alberto Guzman  Age:  89 y.o.  Sex:  male  :  3/29/1931  MRN:  [id number]  Visit Number:  [id number]  ROOM: [address] [place] [address]     Primary Care Provider:  Lianet Dia APRN     Date of Admission: 2024    Length of stay in inpatient status:  9    Subjective     Chief Compliant:    Chief Complaint   Patient presents with    Abdominal Pain       Patient in good spirits with mild abd pain worse with movement but ambulating well with therapy. Tolerating PO diet well w/flatus. Discussed dispo and awaiting repeat PT eval        Objective       Vital Signs:  Temp:  [97.5 °F (36.4 °C)-98 °F (36.7 °C)] 97.5 °F (36.4 °C)  Heart Rate:  [69-74] 69  Resp:  [18-20] 20  BP: (110-116)/(50-58) 110/58  SpO2:  [95 %] 95 %  on   ;   Device (Oxygen Therapy): room air  Body mass index is 26.3 kg/m².      ----------------------------------------------------------------------------------------------------------------------  Physical Exam  Vitals and nursing note reviewed.   Constitutional:       General: He is not in acute distress.  Eyes:      General: No scleral icterus.     Extraocular Movements: Extraocular movements intact.   Cardiovascular:      Rate and Rhythm: Normal rate.      Pulses: Normal pulses.   Pulmonary:      Effort: Pulmonary effort is normal. No respiratory distress.   Abdominal:      General: Bowel sounds are normal. There is no distension.      Palpations: Abdomen is soft.      Tenderness: There is no abdominal tenderness. There is no rebound.   Musculoskeletal:      Left lower leg: No edema.   Skin:     General: Skin is warm.      Coloration: Skin is pale.   Neurological:      General: No focal deficit present.      Mental Status: He is alert.      Cranial Nerves: No cranial nerve deficit.      Motor: Weakness present.        ----------------------------------------------------------------------------------------------------------------------          Assessment & Plan      #Acute SBO POD 2 exploratory laparotomy w/ mesh explantation and reduction of internal hernia and closure of mesenteric defect of colon   -Tolerating PO intake, back on warfarin but INR remains subtherapeutic, monitoring daily. Stopped heparin dvt prophylaxis, added lovenox thereapeutic bridge while INR remains below range. Can cont as outpatient until INR back to goal     #Troponin elevation likely secondary to demand ischemia, no delta and no ischemia on ecg  #pAF on Warfarin w/PM  #Compensated systolic heart failure   - Routine TTE ordered revealing EF has dropped from 46-50% to 36-40% in the last 3 years.   - Tolerating GDMT initiation. Cont bumex, jardiance, entresto  - Outpatient f/u with cardiology to consider outpatient ischemic evaluation and f/u repeat labs on entresto  - Continue ASA/statin  - Pharmacy to dose warfarin. Reports he does not qualify for coverage of DOACs.      Chronic medical problems   #Mild macrocytosis w/o anemia  #hx of colon and bladder ca in sustained remission  -Vit B12, folate, TSH all wnl.         PT/OT consulted and rec home w/assist, s/o case on 6/13. Can rx outpatient pt at [place]. Patient ok to dc from medicine standpoint      Code Status and Medical Interventions:   Ordered at: 06/09/24 0852     Level Of Support Discussed With:    Patient     Code Status (Patient has no pulse and is not breathing):    CPR (Attempt to Resuscitate)     Medical Interventions (Patient has pulse or is breathing):    Full Support         Disposition:  service will cont to follow    I have reviewed any copied/forwarded text or data, verified its accuracy, and updated as necessary above.    Joselito Durbin MD  [facility]  06/18/24  17:12 EDT      Electronically signed by Joselito Durbin MD at 06/18/24 1731          Physical  Therapy Notes (most recent note)        Shanique Goyal, PT at 24 1308  Version 1 of 1         Goal Outcome Evaluation:              Outcome Evaluation: Pt seen for evaluation this date s/p abdominal surgical intervention. Pt demonstrates good functional mobility although hindered by pain. With rehabilitation, pt may benefit from staff ambulation and daughter assist as he does not require much physical assist at this time. Therefore, pt being D/C'd from therapy caseload.      Anticipated Discharge Disposition (PT): home with assist, home with supervision                          Electronically signed by Shanique Goyal PT at 24 1308          Occupational Therapy Notes (most recent note)        Marlene Alan, OT at 24 1511          Patient Name: Alberto Guzman  : 3/29/1931    MRN: [id number]                              Today's Date: 2024       Admit Date: 2024    Visit Dx:     ICD-10-CM ICD-9-CM   1. Small bowel obstruction  K56.609 560.9     Patient Active Problem List   Diagnosis    Urinary retention    Bladder neck contracture    Bladder tumor    Aftercare following surgery of the genitourinary system    Atrial flutter , ventricular paced rhythm    Presence of cardiac pacemaker 6/3/2025    Hyperlipidemia LDL goal <70    Essential hypertension    Chronic anticoagulation with Coumadin    Ventricular tachycardia (paroxysmal)    Asymptomatic PVD (peripheral vascular disease)    Coronary artery disease, anterior wall myocardial infarction with directional atherectomy of LAD in  and bare-metal stent to the LAD in , nonobstructive disease     Ischemic cardiomyopathy, LV ejection fraction around 45 to 50%    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, 46 to 50%)    Bilateral lower extremity edema    Stroke (cerebrum)    Small bowel obstruction     Past Medical History:   Diagnosis Date    Arthritis     Atrial flutter     Back pain     Benign prostatic  hyperplasia     Bladder tumor     Cancer     [id number]/melanoma    Chronic systolic heart failure 09/14/2021    Colon cancer     Coronary artery disease     DVT (deep venous thrombosis)     r leg    Elevated cholesterol     Heart attack     Hypertension     Numbness     feet/hands    Osteoporosis     PVD (peripheral vascular disease)     Rheumatoid arthritis     Stroke     Ventricular tachycardia      Past Surgical History:   Procedure Laterality Date    CATARACT EXTRACTION Bilateral     CHOLECYSTECTOMY      COLON RESECTION      COLONOSCOPY      HEMORROIDECTOMY      HERNIA REPAIR      left inguinal/umbilical    HIP ARTHROPLASTY Right     INSERT / REPLACE / REMOVE PACEMAKER      JOINT REPLACEMENT      PACEMAKER IMPLANTATION      PROSTATE SURGERY      TRANSURETHRAL RESECTION OF BLADDER TUMOR N/A 04/18/2018    Procedure: CYSTOSCOPY TRANSURETHRAL RESECTION OF SMALL BLADDER TUMOR;  Surgeon: Alfonso Collins MD;  Location: [facility];  Service: Urology    TRANSURETHRAL RESECTION OF BLADDER TUMOR N/A 08/29/2018    Procedure: CYSTOSCOPY TRANSURETHRAL RESECTION OF BLADDER TUMOR;  Surgeon: Alfonso Collins MD;  Location: [facility];  Service: Urology      General Information       Row Name 06/13/24 1502          OT Time and Intention    Document Type evaluation  -LA     Mode of Treatment occupational therapy  -LA       Row Name 06/13/24 1502          General Information    Patient Profile Reviewed yes  -LA     Prior Level of Function independent:;ADL's  -LA     Existing Precautions/Restrictions fall  -LA     Barriers to Rehab none identified  -LA       Row Name 06/13/24 1502          Occupational Profile    Reason for Services/Referral (Occupational Profile) OT to assess for changes in level of independence/safety with ADLs  -LA     Patient Goals (Occupational Profile) Return home  -LA       Row Name 06/13/24 1502          Living Environment    People in Home alone  -LA       Row Name 06/13/24 1501           Cognition    Orientation Status (Cognition) oriented x 4  -[facility] Name 06/13/24 1502          Safety Issues, Functional Mobility    Impairments Affecting Function (Mobility) endurance/activity tolerance;balance  -LA               User Key  (r) = Recorded By, (t) = Taken By, (c) = Cosigned By      Initials Name Provider Type    Marlene Crum OT Occupational Therapist                     Mobility/ADL's       Row Name 06/13/24 1503          Bed Mobility    Bed Mobility bed mobility (all) activities  -LA     All Activities, [place] (Bed Mobility) contact guard  -LA     Supine-Sit [place] (Bed Mobility) contact guard  -LA       Row Name 06/13/24 1503          Transfers    Transfers bed-chair transfer;sit-stand transfer;toilet transfer  -LA       Row Name 06/13/24 1503          Bed-Chair Transfer    Bed-Chair [place] (Transfers) contact guard  -LA       Row Name 06/13/24 1503          Sit-Stand Transfer    Sit-Stand [place] (Transfers) contact guard  -LA       Row Name 06/13/24 1503          Stand-Sit Transfer    Stand-Sit [place] (Transfers) contact guard  -LA       Row Name 06/13/24 1503          Toilet Transfer    Type (Toilet Transfer) stand pivot/stand step  -LA     [place] Level (Toilet Transfer) contact guard;minimum assist (75% patient effort)  -LA       Row Name 06/13/24 1503          Activities of Daily Living    BADL Assessment/Intervention bathing;upper body dressing;lower body dressing;grooming;feeding;toileting  -LA       Row Name 06/13/24 1503          Bathing Assessment/Intervention    [place] Level (Bathing) minimum assist (75% patient effort)  -LA       Row Name 06/13/24 1503          Upper Body Dressing Assessment/Training    [place] Level (Upper Body Dressing) set up  -LA       Row Name 06/13/24 1503          Lower Body Dressing Assessment/Training    [place] Level (Lower Body Dressing) contact guard assist  -LA       Row Name 06/13/24 1503           Grooming Assessment/Training    [place] Level (Grooming) set up  -LA       Row Name 06/13/24 1503          Self-Feeding Assessment/Training    [place] Level (Feeding) independent  -LA       Row Name 06/13/24 1503          Toileting Assessment/Training    [place] Level (Toileting) contact guard assist;minimum assist (75% patient effort)  -LA               User Key  (r) = Recorded By, (t) = Taken By, (c) = Cosigned By      Initials Name Provider Type    Marlene Crum OT Occupational Therapist                   Obj/Interventions       Row Name 06/13/24 1507          Sensory Assessment (Somatosensory)    Sensory Assessment (Somatosensory) bilateral UE  -LA     Bilateral UE Sensory Assessment impaired  -LA     Sensory Subjective Reports numbness  Patient reports that hands have been numb for a long time  -[facility] Name 06/13/24 1507          Vision Assessment/Intervention    Visual Impairment/Limitations WFL  -[facility] Name 06/13/24 1507          Range of Motion Comprehensive    General Range of Motion no range of motion deficits identified  -LA     Comment, General Range of Motion BUE ROM grossly WFL  -[facility] Name 06/13/24 1507          Strength Comprehensive (MMT)    General Manual Muscle Testing (MMT) Assessment no strength deficits identified  -LA     Comment, General Manual Muscle Testing (MMT) Assessment BUE strength grossly 5/5  -[facility] Name 06/13/24 1507          Motor Skills    Motor Skills coordination;functional endurance  -LA     Coordination fine motor deficit;upper extremity;bilateral  -LA     Functional Endurance fair+  -[facility] Name 06/13/24 1507          Balance    Balance Assessment sitting static balance;sitting dynamic balance  -LA     Static Sitting Balance independent  -LA     Dynamic Sitting Balance independent  -LA               User Key  (r) = Recorded By, (t) = Taken By, (c) = Cosigned By      Initials Name Provider Type    Marlene Crum, OT  Occupational Therapist                   Goals/Plan    No documentation.                  Clinical Impression       Row Name 06/13/24 1508          Plan of Care Review    Plan of Care Reviewed With patient  -LA     Outcome Evaluation OT evaluation completed this date. Patient with good UE ROM/strength. Patient does currently have some nausea and stomach pain that limits ability however patient expected to return to baseline. No further OT indicated at this time.  -LA       Row Name 06/13/24 1508          Therapy Assessment/Plan (OT)    Therapy Frequency (OT) evaluation only  -[facility] Name 06/13/24 1508          Therapy Plan Review/Discharge Plan (OT)    Anticipated Discharge Disposition (OT) home;home with home health  -LA       Row Name 06/13/24 1508          Positioning and Restraints    Pre-Treatment Position in bed  -LA     Post Treatment Position bed  -LA     In Bed supine;call light within reach;encouraged to call for assist;exit alarm on  -LA               User Key  (r) = Recorded By, (t) = Taken By, (c) = Cosigned By      Initials Name Provider Type    Marlene Crum OT Occupational Therapist                   Outcome Measures       Row Name 06/13/24 0915          How much help from another person do you currently need...    Turning from your back to your side while in flat bed without using bedrails? 3  -HH     Moving from lying on back to sitting on the side of a flat bed without bedrails? 3  -HH     Moving to and from a bed to a chair (including a wheelchair)? 3  -HH     Standing up from a chair using your arms (e.g., wheelchair, bedside chair)? 3  -HH     Climbing 3-5 steps with a railing? 3  -HH     To walk in hospital room? 3  -HH     AM-PAC 6 Clicks Score (PT) 18  -HH     Highest Level of Mobility Goal 6 --> Walk 10 steps or more  -               User Key  (r) = Recorded By, (t) = Taken By, (c) = Cosigned By      Initials Name Provider Type    Emma De León RN Registered Nurse                       OT Recommendation and Plan  Therapy Frequency (OT): evaluation only  Plan of Care Review  Plan of Care Reviewed With: patient  Outcome Evaluation: OT evaluation completed this date. Patient with good UE ROM/strength. Patient does currently have some nausea and stomach pain that limits ability however patient expected to return to baseline. No further OT indicated at this time.     Time Calculation:          Therapy Charges for Today       Code Description Service Date Service Provider Modifiers Qty    [id number] HC OT EVAL MOD COMPLEXITY 4 6/13/2024 Marlene Alan OT GO 1                 Marlene Alan OT  6/13/2024    Electronically signed by Marlene Alan OT at 06/13/24 1512       Speech Language Pathology Notes (most recent note)    No notes exist for this encounter.       ADL Documentation (most recent)      Flowsheet Row Most Recent Value   Transferring 2 - assistive person   Toileting 2 - assistive person   Bathing 2 - assistive person   Dressing 2 - assistive person   Eating 0 - independent   Communication 0 - understands/communicates without difficulty   Swallowing 0 - swallows foods/liquids without difficulty   Equipment Currently Used at Home cane, straight               Discharge Summary        Deo Lennon MD at 06/19/24 1208            Date of Discharge:  6/19/2024    Discharge Diagnosis: Small bowel obstruction secondary to internal hernia    Presenting Problem/History of Present Illness  Active Hospital Problems    Diagnosis  POA    **Small bowel obstruction [K56.609]  Yes      Resolved Hospital Problems   No resolved problems to display.          Hospital Course  Patient is a 89 y.o. male presented with abdominal pain and findings concerning for obstruction.  He was admitted and observed and initially improved with passage of flatus but developed recurrent pain and distention and was taken to the operating room for exploratory laparotomy.  He was found to have an internal  hernia through previous colon resection mesenteric defect.  The hernia was reduced and the mesenteric defect was reduced with suture approximation and postoperatively the patient did well.  He developed urinary retention and Michael catheter was placed.  An attempt to remove the Michael was unsuccessful and it was reanchored and he will have outpatient urology follow-up.  He was held off his anticoagulation until the appropriate time postoperatively at which time he was resumed on his warfarin with Lovenox bridge.  Physical therapy reassessed the patient and felt he would benefit from home health PT and he was ready for discharge home with Lovenox bridge and Coumadin.  He will follow-up with his primary care physician for management of his INR and cessation of Lovenox when he becomes therapeutic.  No lifting over 10 pounds for 6 weeks from the date of surgery and he will follow-up in my office in 2 weeks for staple removal.  Outpatient urology follow-up for his known BPH and urinary retention.    Procedures Performed    Procedure(s):  LAPAROTOMY EXPLORATORY  -------------------       Consults:   Consults       Date and Time Order Name Status Description    6/9/2024  9:14 AM Inpatient Hospitalist Consult Completed     6/9/2024  5:56 AM Surgery (on-call MD unless specified)                Vital Signs  Temp:  [97.3 °F (36.3 °C)-97.7 °F (36.5 °C)] 97.7 °F (36.5 °C)  Heart Rate:  [71-84] 75  Resp:  [16-18] 16  BP: ()/(56-58) 99/56      Discharge Disposition  Home or Self Care    Discharge Medications     Discharge Medications        New Medications        Instructions Start Date   empagliflozin 10 MG tablet tablet  Commonly known as: JARDIANCE   10 mg, Oral, Daily   Start Date: June 20, 2024     Enoxaparin Sodium 100 MG/ML solution prefilled syringe syringe  Commonly known as: LOVENOX   1 mg/kg (100 mg), Subcutaneous, Every 12 Hours      sacubitril-valsartan 24-26 MG tablet  Commonly known as: ENTRESTO   1 tablet,  Oral, Every 12 Hours Scheduled      tamsulosin 0.4 MG capsule 24 hr capsule  Commonly known as: FLOMAX   0.4 mg, Oral, 2 Times Daily             Changes to Medications        Instructions Start Date   HYDROcodone-acetaminophen  MG per tablet  Commonly known as: REBEKAH  What changed: Another medication with the same name was added. Make sure you understand how and when to take each.   1 tablet, Oral, Every 6 Hours PRN      HYDROcodone-acetaminophen  MG per tablet  Commonly known as: REBEKAH  What changed: You were already taking a medication with the same name, and this prescription was added. Make sure you understand how and when to take each.   1 tablet, Oral, Every 8 Hours PRN             Continue These Medications        Instructions Start Date   ascorbic acid 500 MG tablet  Commonly known as: VITAMIN C   500 mg, Oral, Daily, OTC Supplement      aspirin 81 MG tablet   81 mg, Oral, Daily      atorvastatin 20 MG tablet  Commonly known as: LIPITOR   20 mg, Oral, Daily      bumetanide 1 MG tablet  Commonly known as: BUMEX   2 mg, Oral, Daily      COLLAGEN PO   1 capsule, Oral, Daily, OTC supplement      gabapentin 800 MG tablet  Commonly known as: NEURONTIN   800 mg, Oral, Nightly      lisinopril 5 MG tablet  Commonly known as: PRINIVIL,ZESTRIL   5 mg, Oral, Daily      magnesium (as) gluconate 500 (27 Mg) MG tablet  Commonly known as: MAGONATE   500 mg, Oral, Daily, OTC Supplement      metoprolol succinate XL 50 MG 24 hr tablet  Commonly known as: TOPROL-XL   50 mg, Oral, Daily      nitroglycerin 0.4 MG SL tablet  Commonly known as: NITROSTAT   0.4 mg, Sublingual, Every 5 Minutes PRN, Take no more than 3 doses in 15 minutes.      pantoprazole 40 MG EC tablet  Commonly known as: PROTONIX   40 mg, Oral, Daily      Potassium 99 MG tablet   1 tablet, Oral, Daily, OTC Supplement      warfarin 5 MG tablet  Commonly known as: COUMADIN   5 mg, Oral, Daily Warfarin               Discharge Diet:   Diet Instructions        Diet: Regular/House Diet; Thin (IDDSI 0)      Discharge Diet: Regular/House Diet    Fluid Consistency: Thin (IDDSI 0)            Activity at Discharge:   Activity Instructions       Discharge activity      No lifting over 10 lbs for 4 weeks  No driving while on narcotics              Follow-up Appointments  Future Appointments   Date Time Provider Department Center   7/9/2024  1:45 PM Selam Alcaraz MD [facility]     Additional Instructions for the Follow-ups that You Need to Schedule       Ambulatory Referral to Home Health (Hospital)   As directed      Face to Face Visit Date: 6/19/2024   Follow-up provider for Plan of Care?: I will be treating the patient on an ongoing basis.  Please send me the Plan of Care for signature.   Follow-up provider: DEO GARCIA [[id number]]   Reason/Clinical Findings: deconditioning, need for PT   Describe mobility limitations that make leaving home difficult: ambulation, transport   Nursing/Therapeutic Services Requested: Physical Therapy   PT orders: Strengthening   Frequency: 1 Week 1        Discharge Follow-up with Specialty: 2 Weeks   As directed      Follow Up: 2 Weeks        Discharge Follow-up with Specified Provider: UrologyDr. Collins   As directed      To: UrologyDr. Collins   Follow Up Details: 1 to 2 weeks                Test Results Pending at Discharge       Deo Garcia MD  06/19/24  12:08 EDT    Time: Discharge 20 min            Electronically signed by Deo Garcia MD at 06/19/24 1210       Discharge Order (From admission, onward)       Start     Ordered    06/19/24 1204  Discharge patient  Once        Comments: Discharge home with Michael catheter and home health PT   Expected Discharge Date: 06/19/24   Discharge Disposition: Home or Self Care   Physician of Record for Attribution - Please select from Treatment Team: DEO GARCIA [[id number]]   Review needed by CMO to determine Physician of Record: No      Question Answer  Comment   Physician of Record for Attribution - Please select from Treatment Team DENNYS GARCIA    Review needed by CMO to determine Physician of Record No        06/19/24 9553

## 2024-06-19 NOTE — PLAN OF CARE
Goal Outcome Evaluation:              Outcome Evaluation: Pt being discharged home with HH.

## 2024-06-19 NOTE — DISCHARGE INSTR - APPOINTMENTS
You have a follow-up appointment scheduled with FLOWER Iglesias on 6-26-24 at 11:00. Office can be reached at [phone]

## 2024-06-19 NOTE — PLAN OF CARE
Goal Outcome Evaluation:  Plan of Care Reviewed With: patient, family        Progress: improving  Outcome Evaluation: Pt resting in bed. VSS on RA. Pt ambualted in hallway with walker and standby assist. PRN nausea medication given. Pt voices no other complaints or concerns. Will continue with POC.

## 2024-06-19 NOTE — THERAPY DISCHARGE NOTE
Acute Care - Physical Therapy Initial Evaluation   Luis     Patient Name: Alberto Guzman  : 3/29/1931  MRN: [id number]  Today's Date: 2024   Onset of Illness/Injury or Date of Surgery: 24 (admission date)  Visit Dx:     ICD-10-CM ICD-9-CM   1. Small bowel obstruction  K56.609 560.9     Patient Active Problem List   Diagnosis    Urinary retention    Bladder neck contracture    Bladder tumor    Aftercare following surgery of the genitourinary system    Atrial flutter , ventricular paced rhythm    Presence of cardiac pacemaker 6/3/2025    Hyperlipidemia LDL goal <70    Essential hypertension    Chronic anticoagulation with Coumadin    Ventricular tachycardia (paroxysmal)    Asymptomatic PVD (peripheral vascular disease)    Coronary artery disease, anterior wall myocardial infarction with directional atherectomy of LAD in  and bare-metal stent to the LAD in , nonobstructive disease     Ischemic cardiomyopathy, LV ejection fraction around 45 to 50%    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, 46 to 50%)    Bilateral lower extremity edema    Stroke (cerebrum)    Small bowel obstruction     Past Medical History:   Diagnosis Date    Arthritis     Atrial flutter     Back pain     Benign prostatic hyperplasia     Bladder tumor     Cancer     [id number]/melanoma    Chronic systolic heart failure 2021    Colon cancer     Coronary artery disease     DVT (deep venous thrombosis)     r leg    Elevated cholesterol     Heart attack     Hypertension     Numbness     feet/hands    Osteoporosis     PVD (peripheral vascular disease)     Rheumatoid arthritis     Stroke     Ventricular tachycardia      Past Surgical History:   Procedure Laterality Date    CATARACT EXTRACTION Bilateral     CHOLECYSTECTOMY      COLON RESECTION      COLONOSCOPY      EXPLORATORY LAPAROTOMY N/A 2024    Procedure: LAPAROTOMY EXPLORATORY;  Surgeon: Deo Lennon MD;  Location: [facility] OR;   Service: General;  Laterality: N/A;    HEMORROIDECTOMY      HERNIA REPAIR      left inguinal/umbilical    HIP ARTHROPLASTY Right     INSERT / REPLACE / REMOVE PACEMAKER      JOINT REPLACEMENT      PACEMAKER IMPLANTATION      PROSTATE SURGERY      TRANSURETHRAL RESECTION OF BLADDER TUMOR N/A 04/18/2018    Procedure: CYSTOSCOPY TRANSURETHRAL RESECTION OF SMALL BLADDER TUMOR;  Surgeon: Alfonso Collins MD;  Location: [facility];  Service: Urology    TRANSURETHRAL RESECTION OF BLADDER TUMOR N/A 08/29/2018    Procedure: CYSTOSCOPY TRANSURETHRAL RESECTION OF BLADDER TUMOR;  Surgeon: Alfonso Collins MD;  Location: [facility] OR;  Service: Urology     PT Assessment (Last 12 Hours)       PT Evaluation and Treatment       Row Name 06/19/24 1028          Physical Therapy Time and Intention    Document Type evaluation;therapy note (daily note)  -     Mode of Treatment physical therapy  -     Patient Effort good  -     Comment Pt seen for second evaluation this date as pt has experienced some decrease in mobility status. Pt states daughter plans to live with him so he would not be by himself. PLOF was utilizing cane most of the time and walking (suspected RW however not confirmed) some times.  -       Row Name 06/19/24 1028          General Information    Patient Profile Reviewed yes  -     Onset of Illness/Injury or Date of Surgery 06/09/24  admission date  -     Patient Observations alert;cooperative;agree to therapy  -     General Observations of Patient Pt was up and in bathroom with staff upon PT entry into room, pleasant and cooperative with therapy  -     Equipment Currently Used at Home cane, straight  -     Existing Precautions/Restrictions fall  -       Row Name 06/19/24 1028          Cognition    Personal Safety Interventions gait belt;nonskid shoes/slippers when out of bed;supervised activity  supervised activity given without gait belt while pt in bathroom, then gait belt placed on pt  prior to ambulation in hallway; no falls occured  -[facility] Name 06/19/24 1028          Range of Motion Comprehensive    Comment, General Range of Motion Grossly functional  -[facility] Name 06/19/24 1028          Strength Comprehensive (MMT)    Comment, General Manual Muscle Testing (MMT) Assessment Grossly 5/5 MMT with somewhat weaker PF/DF (grossly 4 to 5/5)  -[facility] Name 06/19/24 1028          Bed Mobility    Comment, (Bed Mobility) Not observed as pt was seen in bathroom with staff  -KH       Row Name 06/19/24 1028          Transfers    Transfers stand-sit transfer  -KH       Row Name 06/19/24 1028          Sit-Stand Transfer    Comment, (Sit-Stand Transfer) From CNA, pt was grossly CGA/Ann-Marie, requiring some leverage as toilet was low  -KH       Row Name 06/19/24 1028          Stand-Sit Transfer    Stand-Sit Baca (Transfers) contact guard;supervision;standby assist  -     Assistive Device (Stand-Sit Transfers) walker, front-wheeled  -[facility] Name 06/19/24 1028          Gait/Stairs (Locomotion)    Gait/Stairs Locomotion gait/ambulation assistive device  -     Baca Level (Gait) contact guard  -     Assistive Device (Gait) walker, front-wheeled  -     Patient was able to Ambulate yes  -     Distance in Feet (Gait) 50  grossly, ambulated from 327 to 330  -KH     Comment, (Gait/Stairs) shortness of breath/labored breathing appreciated, O2 monitored while ambulating without sxs of distress (suspected incorrect telemetry reading)  -       Row Name             Wound 06/09/24 1039 Right distal leg Blisters    Wound - Properties Group Placement Date: 06/09/24  -RG Placement Time: 1039  -RG Side: Right  -RG Orientation: distal  -RG Location: leg  -RG Primary Wound Type: Blisters  -TW, Ruptured     Retired Wound - Properties Group Placement Date: 06/09/24  -RG Placement Time: 1039  -RG Side: Right  -RG Orientation: distal  -RG Location: leg  -RG Primary Wound Type: Blisters  -TW,  Ruptured     Retired Wound - Properties Group Date first assessed: 06/09/24  -RG Time first assessed: 1039  -RG Side: Right  -RG Location: leg  -RG Primary Wound Type: Blisters  -TW, Ruptured       Row Name             Wound 06/12/24 1528 midline abdomen Incision    Wound - Properties Group Placement Date: 06/12/24  -CE Placement Time: 1528  -CE Present on Original Admission: N  -CE Orientation: midline  -CE Location: abdomen  -CE Primary Wound Type: Incision  -CE    Retired Wound - Properties Group Placement Date: 06/12/24  -CE Placement Time: 1528  -CE Present on Original Admission: N  -CE Orientation: midline  -CE Location: abdomen  -CE Primary Wound Type: Incision  -CE    Retired Wound - Properties Group Date first assessed: 06/12/24  -CE Time first assessed: 1528  -CE Present on Original Admission: N  -CE Location: abdomen  -CE Primary Wound Type: Incision  -CE      Row Name 06/19/24 1028          Plan of Care Review    Outcome Evaluation Pt left sitting in chair, encouraged to get up and ambulate with assist. Pt does demonstrate he may benefit from therapeutic intervention d/t debility during hospital stay/s/p surgical intervention  -       Row Name 06/19/24 1028          Positioning and Restraints    Pre-Treatment Position bathroom  -     Post Treatment Position chair  -     In Chair call light within reach;encouraged to call for assist;exit alarm on  nsg aware pt is in chair  -       Row Name 06/19/24 1028          Therapy Assessment/Plan (PT)    Therapy Frequency (PT) evaluation only  -       Row Name 06/19/24 1028          Discharge Summary (PT)    Reason for Discharge (PT) patient discharged from this facility  -     Outcomes Achieved/Progress Made Upon Discharge (PT) evaluation only;discharge from facility occurred on same date as evaluation  -     Transfer to Another Level of Care or Facility (PT) home with assist recommended;home with home health recommended;home with supervision  recommended  -[place]               User Key  (r) = Recorded By, (t) = Taken By, (c) = Cosigned By      Initials Name Provider Type    TW Estefania Sepulveda, RN Registered Nurse    Aydin Ivy, RN Registered Nurse    Shanique Luna, PT Physical Therapist    Linus Rodriguez, RN Registered Nurse                      PT Recommendation and Plan  Anticipated Discharge Disposition (PT): home with assist, home with supervision, home with home health  Therapy Frequency (PT): evaluation only  Outcome Evaluation: Pt left sitting in chair, encouraged to get up and ambulate with assist. Pt does demonstrate he may benefit from therapeutic intervention d/t debility during hospital stay/s/p surgical intervention       Time Calculation:     Therapy Charges for Today       Code Description Service Date Service Provider Modifiers Qty    [id number] HC PT EVAL LOW COMPLEXITY 4 6/19/2024 Shanique Goyal, PT GP 1            PT G-Codes  AM-PAC 6 Clicks Score (PT): 17    Shanique Goyal, PT  6/19/2024

## 2024-06-19 NOTE — DISCHARGE SUMMARY
Date of Discharge:  6/19/2024    Discharge Diagnosis: Small bowel obstruction secondary to internal hernia    Presenting Problem/History of Present Illness  Active Hospital Problems    Diagnosis  POA    **Small bowel obstruction [K56.609]  Yes      Resolved Hospital Problems   No resolved problems to display.          Hospital Course  Patient is a 89 y.o. male presented with abdominal pain and findings concerning for obstruction.  He was admitted and observed and initially improved with passage of flatus but developed recurrent pain and distention and was taken to the operating room for exploratory laparotomy.  He was found to have an internal hernia through previous colon resection mesenteric defect.  The hernia was reduced and the mesenteric defect was reduced with suture approximation and postoperatively the patient did well.  He developed urinary retention and Michael catheter was placed.  An attempt to remove the Michael was unsuccessful and it was reanchored and he will have outpatient urology follow-up.  He was held off his anticoagulation until the appropriate time postoperatively at which time he was resumed on his warfarin with Lovenox bridge.  Physical therapy reassessed the patient and felt he would benefit from home health PT and he was ready for discharge home with Lovenox bridge and Coumadin.  He will follow-up with his primary care physician for management of his INR and cessation of Lovenox when he becomes therapeutic.  No lifting over 10 pounds for 6 weeks from the date of surgery and he will follow-up in my office in 2 weeks for staple removal.  Outpatient urology follow-up for his known BPH and urinary retention.    Procedures Performed    Procedure(s):  LAPAROTOMY EXPLORATORY  -------------------       Consults:   Consults       Date and Time Order Name Status Description    6/9/2024  9:14 AM Inpatient Hospitalist Consult Completed     6/9/2024  5:56 AM Surgery (on-call MD unless specified)                 Vital Signs  Temp:  [97.3 °F (36.3 °C)-97.7 °F (36.5 °C)] 97.7 °F (36.5 °C)  Heart Rate:  [71-84] 75  Resp:  [16-18] 16  BP: ()/(56-58) 99/56      Discharge Disposition  Home or Self Care    Discharge Medications     Discharge Medications        New Medications        Instructions Start Date   empagliflozin 10 MG tablet tablet  Commonly known as: JARDIANCE   10 mg, Oral, Daily   Start Date: June 20, 2024     Enoxaparin Sodium 100 MG/ML solution prefilled syringe syringe  Commonly known as: LOVENOX   1 mg/kg (100 mg), Subcutaneous, Every 12 Hours      sacubitril-valsartan 24-26 MG tablet  Commonly known as: ENTRESTO   1 tablet, Oral, Every 12 Hours Scheduled      tamsulosin 0.4 MG capsule 24 hr capsule  Commonly known as: FLOMAX   0.4 mg, Oral, 2 Times Daily             Changes to Medications        Instructions Start Date   HYDROcodone-acetaminophen  MG per tablet  Commonly known as: NORCO  What changed: Another medication with the same name was added. Make sure you understand how and when to take each.   1 tablet, Oral, Every 6 Hours PRN      HYDROcodone-acetaminophen  MG per tablet  Commonly known as: REBEKAH  What changed: You were already taking a medication with the same name, and this prescription was added. Make sure you understand how and when to take each.   1 tablet, Oral, Every 8 Hours PRN             Continue These Medications        Instructions Start Date   ascorbic acid 500 MG tablet  Commonly known as: VITAMIN C   500 mg, Oral, Daily, OTC Supplement      aspirin 81 MG tablet   81 mg, Oral, Daily      atorvastatin 20 MG tablet  Commonly known as: LIPITOR   20 mg, Oral, Daily      bumetanide 1 MG tablet  Commonly known as: BUMEX   2 mg, Oral, Daily      COLLAGEN PO   1 capsule, Oral, Daily, OTC supplement      gabapentin 800 MG tablet  Commonly known as: NEURONTIN   800 mg, Oral, Nightly      lisinopril 5 MG tablet  Commonly known as: PRINIVIL,ZESTRIL   5 mg, Oral, Daily       magnesium (as) gluconate 500 (27 Mg) MG tablet  Commonly known as: MAGONATE   500 mg, Oral, Daily, OTC Supplement      metoprolol succinate XL 50 MG 24 hr tablet  Commonly known as: TOPROL-XL   50 mg, Oral, Daily      nitroglycerin 0.4 MG SL tablet  Commonly known as: NITROSTAT   0.4 mg, Sublingual, Every 5 Minutes PRN, Take no more than 3 doses in 15 minutes.      pantoprazole 40 MG EC tablet  Commonly known as: PROTONIX   40 mg, Oral, Daily      Potassium 99 MG tablet   1 tablet, Oral, Daily, OTC Supplement      warfarin 5 MG tablet  Commonly known as: COUMADIN   5 mg, Oral, Daily Warfarin               Discharge Diet:   Diet Instructions       Diet: Regular/House Diet; Thin (IDDSI 0)      Discharge Diet: Regular/House Diet    Fluid Consistency: Thin (IDDSI 0)            Activity at Discharge:   Activity Instructions       Discharge activity      No lifting over 10 lbs for 4 weeks  No driving while on narcotics              Follow-up Appointments  Future Appointments   Date Time Provider Department Center   7/9/2024  1:45 PM Selam Alcaraz MD [facility]     Additional Instructions for the Follow-ups that You Need to Schedule       Ambulatory Referral to Home Health ([facility])   As directed      Face to Face Visit Date: 6/19/2024   Follow-up provider for Plan of Care?: I will be treating the patient on an ongoing basis.  Please send me the Plan of Care for signature.   Follow-up provider: DENNYS GARCIA [[id number]]   Reason/Clinical Findings: deconditioning, need for PT   Describe mobility limitations that make leaving home difficult: ambulation, transport   Nursing/Therapeutic Services Requested: Physical Therapy   PT orders: Strengthening   Frequency: 1 Week 1        Discharge Follow-up with Specialty: 2 Weeks   As directed      Follow Up: 2 Weeks        Discharge Follow-up with Specified Provider: UrologyDr. Collins   As directed      To: UrologyDr. Collins   Follow Up Details: 1 to 2 weeks                 Test Results Pending at Discharge       Deo Lennon MD  06/19/24  12:08 EDT    Time: Discharge 20 min

## 2024-06-19 NOTE — PLAN OF CARE
Goal Outcome Evaluation:              Outcome Evaluation: Pt left sitting in chair, encouraged to get up and ambulate with assist. Pt does demonstrate he may benefit from therapeutic intervention d/t debility during hospital stay/s/p surgical intervention      Anticipated Discharge Disposition (PT): home with assist, home with supervision, home with home health

## 2024-06-20 NOTE — PROGRESS NOTES
"Enter Query Response Below      Query Response: Non-ischemic myocardial injury              If applicable, please update the problem list.   Patient: Alberto Guzman        : 3/29/1931  Account: [id number]           Admit Date:         How to Respond to this query:       a. Click New Note     b. Answer query within the yellow box.                c. Update the Problem List, if applicable.      If you have any questions about this query contact me at: [email or web address]     :    Patient presented to ED on  with periumbilical abdominal pain. S/p exploratory laparotomy with reduction of internal hernia, closure of mesocolonic defect on .  HS Troponin T 38, 38, Troponin T Delta 0.  \"Troponin elevation likely secondary to demand ischemia, no delta and no ischemia on ecg.\" is documented on the H&P and in the Hospitalists daily progress notes  thru .    After study, can the patient's condition be further specified as;    Non-ischemic myocardial injury  Elevated troponins only  Other- specify ________  Unable to determine    By submitting this query, we are merely seeking further clarification of documentation to accurately reflect all conditions that you are monitoring, evaluating, treating or that extend the hospitalization or utilize additional resources of care. Please utilize your independent clinical judgment when addressing the question(s) above.     This query and your response, once completed, will be entered into the legal medical record.    Sincerely,  Luary Simmons RN  Clinical Documentation Integrity Program     "

## 2024-06-26 ENCOUNTER — HOSPITAL ENCOUNTER (EMERGENCY)
Facility: HOSPITAL | Age: 89
Discharge: HOME | End: 2024-06-26
Attending: EMERGENCY MEDICINE
Payer: MEDICARE

## 2024-06-26 ENCOUNTER — APPOINTMENT (OUTPATIENT)
Dept: CT IMAGING | Facility: HOSPITAL | Age: 89
End: 2024-06-26
Payer: MEDICARE

## 2024-06-26 VITALS
BODY MASS INDEX: 26.3 KG/M2 | DIASTOLIC BLOOD PRESSURE: 95 MMHG | RESPIRATION RATE: 14 BRPM | OXYGEN SATURATION: 98 % | WEIGHT: 216 LBS | TEMPERATURE: 97.8 F | HEIGHT: 76 IN | SYSTOLIC BLOOD PRESSURE: 114 MMHG | HEART RATE: 70 BPM

## 2024-06-26 DIAGNOSIS — N39.0 URINARY TRACT INFECTION ASSOCIATED WITH INDWELLING URETHRAL CATHETER, INITIAL ENCOUNTER: ICD-10-CM

## 2024-06-26 DIAGNOSIS — T83.511A URINARY TRACT INFECTION ASSOCIATED WITH INDWELLING URETHRAL CATHETER, INITIAL ENCOUNTER: ICD-10-CM

## 2024-06-26 DIAGNOSIS — E87.6 HYPOKALEMIA: Primary | ICD-10-CM

## 2024-06-26 DIAGNOSIS — R79.89 ELEVATED TROPONIN: ICD-10-CM

## 2024-06-26 LAB
ALBUMIN SERPL-MCNC: 3.4 G/DL (ref 3.5–5.2)
ALBUMIN/GLOB SERPL: 1.1 G/DL
ALP SERPL-CCNC: 133 U/L (ref 39–117)
ALT SERPL W P-5'-P-CCNC: 39 U/L (ref 1–41)
ANION GAP SERPL CALCULATED.3IONS-SCNC: 14.9 MMOL/L (ref 5–15)
APTT PPP: 92.9 SECONDS (ref 26.5–34.5)
AST SERPL-CCNC: 40 U/L (ref 1–40)
BACTERIA UR QL AUTO: ABNORMAL /HPF
BASOPHILS # BLD AUTO: 0.03 10*3/MM3 (ref 0–0.2)
BASOPHILS NFR BLD AUTO: 0.3 % (ref 0–1.5)
BILIRUB SERPL-MCNC: 0.5 MG/DL (ref 0–1.2)
BILIRUB UR QL STRIP: NEGATIVE
BUN SERPL-MCNC: 14 MG/DL (ref 8–23)
BUN/CREAT SERPL: 10.6 (ref 7–25)
CALCIUM SPEC-SCNC: 8.8 MG/DL (ref 8.2–9.6)
CHLORIDE SERPL-SCNC: 98 MMOL/L (ref 98–107)
CLARITY UR: ABNORMAL
CO2 SERPL-SCNC: 25.1 MMOL/L (ref 22–29)
COLOR UR: YELLOW
CREAT SERPL-MCNC: 1.32 MG/DL (ref 0.76–1.27)
D-LACTATE SERPL-SCNC: 1.9 MMOL/L (ref 0.5–2)
DEPRECATED RDW RBC AUTO: 47.5 FL (ref 37–54)
EGFRCR SERPLBLD CKD-EPI 2021: 50.3 ML/MIN/1.73
EOSINOPHIL # BLD AUTO: 0.52 10*3/MM3 (ref 0–0.4)
EOSINOPHIL NFR BLD AUTO: 4.5 % (ref 0.3–6.2)
ERYTHROCYTE [DISTWIDTH] IN BLOOD BY AUTOMATED COUNT: 12.9 % (ref 12.3–15.4)
GEN 5 2HR TROPONIN T REFLEX: 44 NG/L
GLOBULIN UR ELPH-MCNC: 3 GM/DL
GLUCOSE SERPL-MCNC: 114 MG/DL (ref 65–99)
GLUCOSE UR STRIP-MCNC: ABNORMAL MG/DL
HCT VFR BLD AUTO: 43.4 % (ref 37.5–51)
HGB BLD-MCNC: 14.6 G/DL (ref 13–17.7)
HGB UR QL STRIP.AUTO: ABNORMAL
HYALINE CASTS UR QL AUTO: ABNORMAL /LPF
IMM GRANULOCYTES # BLD AUTO: 0.06 10*3/MM3 (ref 0–0.05)
IMM GRANULOCYTES NFR BLD AUTO: 0.5 % (ref 0–0.5)
INR PPP: 2.47 (ref 0.9–1.1)
KETONES UR QL STRIP: NEGATIVE
LEUKOCYTE ESTERASE UR QL STRIP.AUTO: ABNORMAL
LIPASE SERPL-CCNC: 29 U/L (ref 13–60)
LYMPHOCYTES # BLD AUTO: 1.43 10*3/MM3 (ref 0.7–3.1)
LYMPHOCYTES NFR BLD AUTO: 12.5 % (ref 19.6–45.3)
MAGNESIUM SERPL-MCNC: 1.6 MG/DL (ref 1.7–2.3)
MCH RBC QN AUTO: 33.6 PG (ref 26.6–33)
MCHC RBC AUTO-ENTMCNC: 33.6 G/DL (ref 31.5–35.7)
MCV RBC AUTO: 99.8 FL (ref 79–97)
MONOCYTES # BLD AUTO: 0.97 10*3/MM3 (ref 0.1–0.9)
MONOCYTES NFR BLD AUTO: 8.5 % (ref 5–12)
NEUTROPHILS NFR BLD AUTO: 73.7 % (ref 42.7–76)
NEUTROPHILS NFR BLD AUTO: 8.46 10*3/MM3 (ref 1.7–7)
NITRITE UR QL STRIP: NEGATIVE
NRBC BLD AUTO-RTO: 0 /100 WBC (ref 0–0.2)
PH UR STRIP.AUTO: 5.5 [PH] (ref 5–8)
PLATELET # BLD AUTO: 271 10*3/MM3 (ref 140–450)
PMV BLD AUTO: 10.4 FL (ref 6–12)
POTASSIUM SERPL-SCNC: 3.3 MMOL/L (ref 3.5–5.2)
PROCALCITONIN SERPL-MCNC: 0.13 NG/ML (ref 0–0.25)
PROT SERPL-MCNC: 6.4 G/DL (ref 6–8.5)
PROT UR QL STRIP: ABNORMAL
PROTHROMBIN TIME: 26.8 SECONDS (ref 12.1–14.7)
QT INTERVAL: 370 MS
QTC INTERVAL: 437 MS
RBC # BLD AUTO: 4.35 10*6/MM3 (ref 4.14–5.8)
RBC # UR STRIP: ABNORMAL /HPF
REF LAB TEST METHOD: ABNORMAL
SODIUM SERPL-SCNC: 138 MMOL/L (ref 136–145)
SP GR UR STRIP: 1.01 (ref 1–1.03)
SQUAMOUS #/AREA URNS HPF: ABNORMAL /HPF
TROPONIN T DELTA: -8 NG/L
TROPONIN T SERPL HS-MCNC: 52 NG/L
UROBILINOGEN UR QL STRIP: ABNORMAL
WBC # UR STRIP: ABNORMAL /HPF
WBC NRBC COR # BLD AUTO: 11.47 10*3/MM3 (ref 3.4–10.8)

## 2024-06-26 PROCEDURE — 80053 COMPREHEN METABOLIC PANEL: CPT | Performed by: EMERGENCY MEDICINE

## 2024-06-26 PROCEDURE — 84145 PROCALCITONIN (PCT): CPT | Performed by: EMERGENCY MEDICINE

## 2024-06-26 PROCEDURE — 74177 CT ABD & PELVIS W/CONTRAST: CPT

## 2024-06-26 PROCEDURE — 51702 INSERT TEMP BLADDER CATH: CPT

## 2024-06-26 PROCEDURE — 83605 ASSAY OF LACTIC ACID: CPT | Performed by: EMERGENCY MEDICINE

## 2024-06-26 PROCEDURE — 93010 ELECTROCARDIOGRAM REPORT: CPT | Performed by: INTERNAL MEDICINE

## 2024-06-26 PROCEDURE — 36415 COLL VENOUS BLD VENIPUNCTURE: CPT

## 2024-06-26 PROCEDURE — 87186 SC STD MICRODIL/AGAR DIL: CPT | Performed by: EMERGENCY MEDICINE

## 2024-06-26 PROCEDURE — 99285 EMERGENCY DEPT VISIT HI MDM: CPT

## 2024-06-26 PROCEDURE — 87088 URINE BACTERIA CULTURE: CPT | Performed by: EMERGENCY MEDICINE

## 2024-06-26 PROCEDURE — 87086 URINE CULTURE/COLONY COUNT: CPT | Performed by: EMERGENCY MEDICINE

## 2024-06-26 PROCEDURE — 74177 CT ABD & PELVIS W/CONTRAST: CPT | Performed by: RADIOLOGY

## 2024-06-26 PROCEDURE — 85610 PROTHROMBIN TIME: CPT | Performed by: EMERGENCY MEDICINE

## 2024-06-26 PROCEDURE — 87077 CULTURE AEROBIC IDENTIFY: CPT | Performed by: EMERGENCY MEDICINE

## 2024-06-26 PROCEDURE — 93005 ELECTROCARDIOGRAM TRACING: CPT | Performed by: EMERGENCY MEDICINE

## 2024-06-26 PROCEDURE — 25810000003 SODIUM CHLORIDE 0.9 % SOLUTION: Performed by: EMERGENCY MEDICINE

## 2024-06-26 PROCEDURE — 85025 COMPLETE CBC W/AUTO DIFF WBC: CPT | Performed by: EMERGENCY MEDICINE

## 2024-06-26 PROCEDURE — 83690 ASSAY OF LIPASE: CPT | Performed by: EMERGENCY MEDICINE

## 2024-06-26 PROCEDURE — 83735 ASSAY OF MAGNESIUM: CPT | Performed by: EMERGENCY MEDICINE

## 2024-06-26 PROCEDURE — 81001 URINALYSIS AUTO W/SCOPE: CPT | Performed by: EMERGENCY MEDICINE

## 2024-06-26 PROCEDURE — 85730 THROMBOPLASTIN TIME PARTIAL: CPT | Performed by: EMERGENCY MEDICINE

## 2024-06-26 PROCEDURE — 84484 ASSAY OF TROPONIN QUANT: CPT | Performed by: EMERGENCY MEDICINE

## 2024-06-26 PROCEDURE — 25510000001 IOPAMIDOL 61 % SOLUTION: Performed by: EMERGENCY MEDICINE

## 2024-06-26 RX ORDER — CEPHALEXIN 250 MG/1
500 CAPSULE ORAL ONCE
Status: COMPLETED | OUTPATIENT
Start: 2024-06-26 | End: 2024-06-26

## 2024-06-26 RX ORDER — CEPHALEXIN 500 MG/1
500 CAPSULE ORAL 3 TIMES DAILY
Qty: 15 CAPSULE | Refills: 0 | Status: SHIPPED | OUTPATIENT
Start: 2024-06-26 | End: 2024-07-01

## 2024-06-26 RX ORDER — POTASSIUM CHLORIDE 1.5 G/1.58G
40 POWDER, FOR SOLUTION ORAL ONCE
Status: COMPLETED | OUTPATIENT
Start: 2024-06-26 | End: 2024-06-26

## 2024-06-26 RX ADMIN — IOPAMIDOL 100 ML: 612 INJECTION, SOLUTION INTRAVENOUS at 13:51

## 2024-06-26 RX ADMIN — SODIUM CHLORIDE 1000 ML: 9 INJECTION, SOLUTION INTRAVENOUS at 12:32

## 2024-06-26 RX ADMIN — CEPHALEXIN 500 MG: 250 CAPSULE ORAL at 16:41

## 2024-06-26 RX ADMIN — POTASSIUM CHLORIDE 40 MEQ: 1.5 POWDER, FOR SOLUTION ORAL at 16:41

## 2024-06-26 NOTE — ED PROVIDER NOTES
Subjective   History of Present Illness  Alberto is a 89-year-old male who presents to the ED from a doctor's office for hypotension.  Patient was 80s over 60s which is not normal for him.  He was fatigued and pale.  Patient did have 3 bowel movements yesterday and those were his first bowel movement status post his procedure for an SBO.        Review of Systems   All other systems reviewed and are negative.      Past Medical History:   Diagnosis Date    Arthritis     Atrial flutter     Back pain     Benign prostatic hyperplasia     Bladder tumor     Cancer     [id number]/melanoma    Chronic systolic heart failure 09/14/2021    Colon cancer     Coronary artery disease     DVT (deep venous thrombosis)     r leg    Elevated cholesterol     Heart attack     Hypertension     Numbness     feet/hands    Osteoporosis     PVD (peripheral vascular disease)     Rheumatoid arthritis     Stroke     Ventricular tachycardia        No Known Allergies    Past Surgical History:   Procedure Laterality Date    CATARACT EXTRACTION Bilateral     CHOLECYSTECTOMY      COLON RESECTION      COLONOSCOPY      EXPLORATORY LAPAROTOMY N/A 6/12/2024    Procedure: LAPAROTOMY EXPLORATORY;  Surgeon: Deo Lennon MD;  Location: [facility];  Service: General;  Laterality: N/A;    HEMORROIDECTOMY      HERNIA REPAIR      left inguinal/umbilical    HIP ARTHROPLASTY Right     INSERT / REPLACE / REMOVE PACEMAKER      JOINT REPLACEMENT      PACEMAKER IMPLANTATION      PROSTATE SURGERY      TRANSURETHRAL RESECTION OF BLADDER TUMOR N/A 04/18/2018    Procedure: CYSTOSCOPY TRANSURETHRAL RESECTION OF SMALL BLADDER TUMOR;  Surgeon: Alfonso Colilns MD;  Location: [facility];  Service: Urology    TRANSURETHRAL RESECTION OF BLADDER TUMOR N/A 08/29/2018    Procedure: CYSTOSCOPY TRANSURETHRAL RESECTION OF BLADDER TUMOR;  Surgeon: Alfonso Collins MD;  Location: [facility];  Service: Urology       Family History   Problem Relation Age of Onset    No  Known Problems Mother     No Known Problems Father     Heart disease Brother        Social History     Socioeconomic History    Marital status:    Tobacco Use    Smoking status: Former     Types: Cigarettes     Passive exposure: Past    Smokeless tobacco: Never   Vaping Use    Vaping status: Never Used   Substance and Sexual Activity    Alcohol use: Yes     Comment: Occasional few times a year    Drug use: No    Sexual activity: Defer           Objective   Physical Exam  Vitals reviewed.   Constitutional:       Appearance: Normal appearance. He is normal weight.   HENT:      Head: Normocephalic and atraumatic.      Right Ear: External ear normal.      Left Ear: External ear normal.      Nose: Nose normal.      Mouth/Throat:      Mouth: Mucous membranes are moist. Mucous membranes are dry.      Pharynx: Oropharynx is clear.   Eyes:      Extraocular Movements: Extraocular movements intact.      Conjunctiva/sclera: Conjunctivae normal.      Pupils: Pupils are equal, round, and reactive to light.   Cardiovascular:      Rate and Rhythm: Normal rate and regular rhythm.      Pulses: Normal pulses.      Heart sounds: Normal heart sounds.   Pulmonary:      Effort: Pulmonary effort is normal.      Breath sounds: Normal breath sounds.   Abdominal:      General: Bowel sounds are normal.      Palpations: Abdomen is soft.      Comments: Midline incision closed with staples clean dry and intact   Musculoskeletal:         General: Normal range of motion.      Cervical back: Normal range of motion and neck supple. No rigidity.   Skin:     General: Skin is warm and dry.      Capillary Refill: Capillary refill takes less than 2 seconds.   Neurological:      General: No focal deficit present.      Mental Status: He is alert and oriented to person, place, and time.   Psychiatric:         Mood and Affect: Mood normal.         Procedures           ED Course  ED Course as of 06/26/24 1620   Wed Jun 26, 2024   1338 Creatinine(!):  1.32 [AM]      ED Course User Index  [AM] Herminio Isaacs MD                                             Medical Decision Making  Alberto is a 89-year-old male presents to the ED from the doctor's office for hypotension.  Patient have labs and imaging and a CT of the abdomen pelvis to evaluate for hypotension.    Labs returned with evidence of UTI elevated troponin above his baseline from 2 weeks ago and hypokalemia  The potassium is replaced p.o. in the department    He was given Keflex    Secondary to his elevated troponin we will get a second 1 to trend.    Trending his troponin revealed a delta of -8 patient is not having chest pain his EKG is appropriate  He will have his case discussed with the cardiologist based on their recommendations will offer the patient.  He stated that the patient's not having a pain he would expect expect the patient to have.   ---    Patient CT was read as no acute process  Residual fluid and air likely secondary to surgery as he is postop 1 week.    Was offered mission him and his daughter politely declined.  They will be discharged stable condition follow-up with her regular doctor.    Problems Addressed:  Elevated troponin: complicated acute illness or injury  Hypokalemia: complicated acute illness or injury  Urinary tract infection associated with indwelling urethral catheter, initial encounter: complicated acute illness or injury    Amount and/or Complexity of Data Reviewed  Labs: ordered. Decision-making details documented in ED Course.  Radiology: ordered.  ECG/medicine tests: ordered.    Risk  Prescription drug management.        Final diagnoses:   Hypokalemia   Elevated troponin   Urinary tract infection associated with indwelling urethral catheter, initial encounter       ED Disposition  ED Disposition       ED Disposition   Discharge    Condition   Stable    Comment   --               Lianet Dia, APRN  [address] [facility]  [address] [phone]               Medication List        New Prescriptions      cephalexin 500 MG capsule  Commonly known as: KEFLEX  Take 1 capsule by mouth 3 (Three) Times a Day for 5 days.               Where to Get Your Medications        These medications were sent to [facility] - [place], [place] - [address] - [phone]  - [phone] FX  [address], [place] [address]      Phone: [phone]   cephalexin 500 MG capsule            Herminio Isaacs MD  06/26/24 2755

## 2024-06-27 ENCOUNTER — READMISSION MANAGEMENT (OUTPATIENT)
Dept: CALL CENTER | Facility: HOSPITAL | Age: 89
End: 2024-06-27
Payer: MEDICARE

## 2024-06-27 NOTE — OUTREACH NOTE
General Surgery Week 1 Survey      Flowsheet Row Responses   [facility] patient discharged from? [facility]   Does the patient have one of the following disease processes/diagnoses(primary or secondary)? General Surgery  [er x 1]   Week 1 attempt successful? Yes   Call start time 1143   Call end time 1149   Discharge diagnosis LAPAROTOMY EXPLORATORY   Person spoke with today (if not patient) and relationship Isabella   Meds reviewed with patient/caregiver? Yes   Is the patient having any side effects they believe may be caused by any medication additions or changes? No   Does the patient have all medications related to this admission filled (includes all antibiotics, pain medications, etc.) Yes   Is the patient taking all medications as directed (includes completed medication regime)? Yes   Medication comments ABT ordered from ER yesterday for UTI   Does the patient have a follow up appointment scheduled with their surgeon? Yes   Has home health visited the patient within 72 hours of discharge? Yes   Psychosocial issues? No   Did the patient receive a copy of their discharge instructions? Yes   Nursing interventions Reviewed instructions with patient   What is the patient's perception of their health status since discharge? Improving   Nursing interventions Nurse provided patient education   Is the patient/caregiver able to teach back signs and symptoms of incisional infection? Increased redness, swelling or pain at the incisonal site, Increased drainage or bleeding, Incisional warmth, Pus or odor from incision, Fever   Is the patient/caregiver able to teach back steps to recovery at home? Set small, achievable goals for return to baseline health, Eat a well-balance diet   If the patient is a current smoker, are they able to teach back resources for cessation? Not a smoker   Is the patient/caregiver able to teach back the hierarchy of who to call/visit for symptoms/problems? PCP, Specialist, Home health nurse, Urgent  Care, ED, 911 Yes   Week 1 call completed? Yes   Wrap up additional comments Pt was seen in ER for UTI and received ABT. Daughter reports site looks fine no issues. Lovenox finished.   Call end time 1149            MABLE CLAYTON - Registered Nurse

## 2024-06-28 LAB — BACTERIA SPEC AEROBE CULT: ABNORMAL

## 2024-07-02 ENCOUNTER — OFFICE VISIT (OUTPATIENT)
Dept: UROLOGY | Facility: CLINIC | Age: 89
End: 2024-07-02
Payer: MEDICARE

## 2024-07-02 VITALS
BODY MASS INDEX: 26.29 KG/M2 | HEART RATE: 80 BPM | HEIGHT: 76 IN | DIASTOLIC BLOOD PRESSURE: 54 MMHG | SYSTOLIC BLOOD PRESSURE: 96 MMHG

## 2024-07-02 DIAGNOSIS — N32.0 BLADDER NECK CONTRACTURE: ICD-10-CM

## 2024-07-02 DIAGNOSIS — R33.9 URINARY RETENTION: Primary | ICD-10-CM

## 2024-07-02 NOTE — PROGRESS NOTES
Chief Complaint:      Chief Complaint   Patient presents with    Urinary Retention     Er follow up        HPI:   89 y.o. male who is extremely well-known to me he has a history of a tiny bladder tumor status post a bladder neck incision apparently had a bowel obstruction saw Dr. Lennon had a CT he was placed on Keflex and had a catheter placed.  He is here for follow-up I removed his catheter I am very confident he will urinate.  He is status post a prior TURP and he never really had voiding dysfunction during this hospitalization.    Past Medical History:     Past Medical History:   Diagnosis Date    Arthritis     Atrial flutter     Back pain     Benign prostatic hyperplasia     Bladder tumor     Cancer     [id number]    Chronic systolic heart failure 09/14/2021    Colon cancer     Coronary artery disease     DVT (deep venous thrombosis)     r leg    Elevated cholesterol     Heart attack     Hypertension     Numbness     feet/hands    Osteoporosis     PVD (peripheral vascular disease)     Rheumatoid arthritis     Stroke     Ventricular tachycardia        Current Meds:     Current Outpatient Medications   Medication Sig Dispense Refill    ascorbic acid (VITAMIN C) 500 MG tablet Take 1 tablet by mouth Daily. OTC Supplement      aspirin 81 MG tablet Take 1 tablet by mouth Daily. 30 tablet 11    atorvastatin (LIPITOR) 20 MG tablet Take 1 tablet by mouth Daily.      bumetanide (BUMEX) 1 MG tablet Take 2 tablets by mouth Daily.      COLLAGEN PO Take 1 capsule by mouth Daily. OTC supplement      empagliflozin (JARDIANCE) 10 MG tablet tablet Take 1 tablet by mouth Daily. 30 tablet 1    Enoxaparin Sodium (LOVENOX) 100 MG/ML solution prefilled syringe syringe Inject 1 mL under the skin into the appropriate area as directed Every 12 (Twelve) Hours. Indications: Other - full anticoagulation 15 mL 0    gabapentin (NEURONTIN) 800 MG tablet Take 1 tablet by mouth Every Night.      HYDROcodone-acetaminophen (NORCO)   MG per tablet Take 1 tablet by mouth Every 6 (Six) Hours As Needed for Moderate Pain.      HYDROcodone-acetaminophen (NORCO)  MG per tablet Take 1 tablet by mouth Every 8 (Eight) Hours As Needed for Moderate Pain. 12 tablet 0    magnesium, as, gluconate (MAGONATE) 500 (27 Mg) MG tablet Take 1 tablet by mouth Daily. OTC Supplement      metoprolol succinate XL (TOPROL-XL) 50 MG 24 hr tablet Take 0.5 tablets by mouth Daily.      nitroglycerin (NITROSTAT) 0.4 MG SL tablet Place 1 tablet under the tongue Every 5 (Five) Minutes As Needed for Chest Pain. Take no more than 3 doses in 15 minutes.      pantoprazole (PROTONIX) 40 MG EC tablet Take 1 tablet by mouth Daily.      Potassium 99 MG tablet Take 1 tablet by mouth Daily. OTC Supplement      sacubitril-valsartan (ENTRESTO) 24-26 MG tablet Take 1 tablet by mouth Every 12 (Twelve) Hours. 60 tablet 1    tamsulosin (FLOMAX) 0.4 MG capsule 24 hr capsule Take 1 capsule by mouth 2 (Two) Times a Day. 30 capsule 1    warfarin (COUMADIN) 5 MG tablet Take 1 tablet by mouth Daily.       No current facility-administered medications for this visit.        Allergies:      No Known Allergies     Past Surgical History:     Past Surgical History:   Procedure Laterality Date    CATARACT EXTRACTION Bilateral     CHOLECYSTECTOMY      COLON RESECTION      COLONOSCOPY      EXPLORATORY LAPAROTOMY N/A 6/12/2024    Procedure: LAPAROTOMY EXPLORATORY;  Surgeon: Deo Lennon MD;  Location: [facility];  Service: General;  Laterality: N/A;    HEMORROIDECTOMY      HERNIA REPAIR      left inguinal/umbilical    HIP ARTHROPLASTY Right     INSERT / REPLACE / REMOVE PACEMAKER      JOINT REPLACEMENT      PACEMAKER IMPLANTATION      PROSTATE SURGERY      TRANSURETHRAL RESECTION OF BLADDER TUMOR N/A 04/18/2018    Procedure: CYSTOSCOPY TRANSURETHRAL RESECTION OF SMALL BLADDER TUMOR;  Surgeon: Alfonso Collins MD;  Location: [facility] OR;  Service: Urology    TRANSURETHRAL RESECTION OF BLADDER  TUMOR N/A 08/29/2018    Procedure: CYSTOSCOPY TRANSURETHRAL RESECTION OF BLADDER TUMOR;  Surgeon: Alfonso Collins MD;  Location: [facility];  Service: Urology       Social History:     Social History     Socioeconomic History    Marital status:    Tobacco Use    Smoking status: Former     Types: Cigarettes     Passive exposure: Past    Smokeless tobacco: Never   Vaping Use    Vaping status: Never Used   Substance and Sexual Activity    Alcohol use: Yes     Comment: Occasional few times a year    Drug use: No    Sexual activity: Defer       Family History:     Family History   Problem Relation Age of Onset    No Known Problems Mother     No Known Problems Father     Heart disease Brother        Review of Systems:     Review of Systems   Constitutional: Negative.    HENT: Negative.     Eyes: Negative.    Respiratory: Negative.     Cardiovascular: Negative.    Gastrointestinal: Negative.    Endocrine: Negative.    Musculoskeletal: Negative.    Allergic/Immunologic: Negative.    Neurological: Negative.    Hematological: Negative.    Psychiatric/Behavioral: Negative.         Physical Exam:     Physical Exam  Vitals and nursing note reviewed.   Constitutional:       Appearance: He is well-developed.   HENT:      Head: Normocephalic and atraumatic.   Eyes:      Conjunctiva/sclera: Conjunctivae normal.      Pupils: Pupils are equal, round, and reactive to light.   Cardiovascular:      Rate and Rhythm: Normal rate and regular rhythm.      Heart sounds: Normal heart sounds.   Pulmonary:      Effort: Pulmonary effort is normal.      Breath sounds: Normal breath sounds.   Abdominal:      General: Bowel sounds are normal.      Palpations: Abdomen is soft.   Musculoskeletal:         General: Normal range of motion.      Cervical back: Normal range of motion.   Skin:     General: Skin is warm and dry.   Neurological:      Mental Status: He is alert and oriented to person, place, and time.      Deep Tendon Reflexes:  Reflexes are normal and symmetric.   Psychiatric:         Behavior: Behavior normal.         Thought Content: Thought content normal.         Judgment: Judgment normal.         I have reviewed the following portions of the patient's history: Allergies, current medications, past family history, past medical history, past social history, past surgical history, problem list, and ROS and confirm it is accurate.    Recent Image (CT and/or KUB):      CT Abdomen and Pelvis: No results found for this or any previous visit.       CT Stone Protocol: Results for orders placed during the hospital encounter of 04/09/18    CT Abdomen Pelvis Stone Protocol    Narrative  CT ABDOMEN AND PELVIS STONE PROTOCOL-    REASON FOR EXAM: Abdominal pain, unspecified; N40.1-Benign prostatic  hyperplasia with lower urinary tract symptoms; N13.8-Other obstructive  and reflux uropathy.    FINDINGS: Spiral scans were obtained through the kidneys, ureterS and  bladder. The kidneys showed no evidence of hydronephrosis. There were no  stones in the intrarenal collecting systems or in the intrarenal  parenchyma. Ureters were not dilated as they coursed through the abdomen  and pelvis. No stones were noted along the course of the ureters.  Urinary bladder was fairly well-distended with urine and was normal in  contour. No focal areas of bladder wall thickening were demonstrated.    Impression  No evidence of stone or obstruction was seen involving the  collecting system of either kidney. The CT does demonstrate marked  scoliosis and degenerative changes in the lumbar spine.      The radiation dose reduction device was utilized for each scan per the  ALARA (as low as reasonably achievable) protocol.    This report was finalized on 4/9/2018 2:16 PM by Dr. Jairo Maya II, MD.       KUB: Results for orders placed during the hospital encounter of 03/11/21    XR Abdomen KUB    Narrative  XR ABDOMEN KUB-    REASON FOR EXAM:  R10.30; R10.30-Lower abdominal  pain, unspecified    COMPARISON: None.    FINDINGS: The bowel gas pattern of the abdomen shows no evidence of  obstruction. No soft tissue masses or pathological calcifications other  than vascular ones are demonstrated. There are advanced degenerative  disc changes in the lumbar spine with scoliosis. A prosthesis is noted  in the right hip. 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    Impression  A source of patient's lower abdominal pain is not  identified.    This report was finalized on 3/11/2021 1:18 PM by Dr. Jairo Maya II, MD.       Labs (past 3 months):      Admission on 06/26/2024, Discharged on 06/26/2024   Component Date Value Ref Range Status    Glucose 06/26/2024 114 (H)  65 - 99 mg/dL Final    BUN 06/26/2024 14  8 - 23 mg/dL Final    Creatinine 06/26/2024 1.32 (H)  0.76 - 1.27 mg/dL Final    Sodium 06/26/2024 138  136 - 145 mmol/L Final    Potassium 06/26/2024 3.3 (L)  3.5 - 5.2 mmol/L Final    Slight hemolysis detected by analyzer. Result may be falsely elevated.    Chloride 06/26/2024 98  98 - 107 mmol/L Final    CO2 06/26/2024 25.1  22.0 - 29.0 mmol/L Final    Calcium 06/26/2024 8.8  8.2 - 9.6 mg/dL Final    Total Protein 06/26/2024 6.4  6.0 - 8.5 g/dL Final    Albumin 06/26/2024 3.4 (L)  3.5 - 5.2 g/dL Final    ALT (SGPT) 06/26/2024 39  1 - 41 U/L Final    AST (SGOT) 06/26/2024 40  1 - 40 U/L Final    Alkaline Phosphatase 06/26/2024 133 (H)  39 - 117 U/L Final    Total Bilirubin 06/26/2024 0.5  0.0 - 1.2 mg/dL Final    Globulin 06/26/2024 3.0  gm/dL Final    A/G Ratio 06/26/2024 1.1  g/dL Final    BUN/Creatinine Ratio 06/26/2024 10.6  7.0 - 25.0 Final    Anion Gap 06/26/2024 14.9  5.0 - 15.0 mmol/L Final    eGFR 06/26/2024 50.3 (L)  >60.0 mL/min/1.73 Final    Protime 06/26/2024 26.8 (H)  12.1 - 14.7 Seconds Final    INR 06/26/2024 2.47 (H)  0.90 - 1.10 Final    PTT 06/26/2024 92.9 (H)  26.5 - 34.5 seconds Final    Lipase 06/26/2024 29  13 - 60 U/L Final    Color, UA 06/26/2024 Yellow   Yellow, Straw Final    Appearance, UA 06/26/2024 Turbid (A)  Clear Final    pH, UA 06/26/2024 5.5  5.0 - 8.0 Final    Specific Gravity, UA 06/26/2024 1.013  1.005 - 1.030 Final    Glucose, UA 06/26/2024 500 mg/dL (2+) (A)  Negative Final    Ketones, UA 06/26/2024 Negative  Negative Final    Bilirubin, UA 06/26/2024 Negative  Negative Final    Blood, UA 06/26/2024 Large (3+) (A)  Negative Final    Protein, UA 06/26/2024 30 mg/dL (1+) (A)  Negative Final    Leuk Esterase, UA 06/26/2024 Large (3+) (A)  Negative Final    Nitrite, UA 06/26/2024 Negative  Negative Final    Urobilinogen, UA 06/26/2024 0.2 E.U./dL  0.2 - 1.0 E.U./dL Final    Magnesium 06/26/2024 1.6 (L)  1.7 - 2.3 mg/dL Final    Procalcitonin 06/26/2024 0.13  0.00 - 0.25 ng/mL Final    QT Interval 06/26/2024 370  ms Final    QTC Interval 06/26/2024 437  ms Final    WBC 06/26/2024 11.47 (H)  3.40 - 10.80 10*3/mm3 Final    RBC 06/26/2024 4.35  4.14 - 5.80 10*6/mm3 Final    Hemoglobin 06/26/2024 14.6  13.0 - 17.7 g/dL Final    Hematocrit 06/26/2024 43.4  37.5 - 51.0 % Final    MCV 06/26/2024 99.8 (H)  79.0 - 97.0 fL Final    MCH 06/26/2024 33.6 (H)  26.6 - 33.0 pg Final    MCHC 06/26/2024 33.6  31.5 - 35.7 g/dL Final    RDW 06/26/2024 12.9  12.3 - 15.4 % Final    RDW-SD 06/26/2024 47.5  37.0 - 54.0 fl Final    MPV 06/26/2024 10.4  6.0 - 12.0 fL Final    Platelets 06/26/2024 271  140 - 450 10*3/mm3 Final    Neutrophil % 06/26/2024 73.7  42.7 - 76.0 % Final    Lymphocyte % 06/26/2024 12.5 (L)  19.6 - 45.3 % Final    Monocyte % 06/26/2024 8.5  5.0 - 12.0 % Final    Eosinophil % 06/26/2024 4.5  0.3 - 6.2 % Final    Basophil % 06/26/2024 0.3  0.0 - 1.5 % Final    Immature Grans % 06/26/2024 0.5  0.0 - 0.5 % Final    Neutrophils, Absolute 06/26/2024 8.46 (H)  1.70 - 7.00 10*3/mm3 Final    Lymphocytes, Absolute 06/26/2024 1.43  0.70 - 3.10 10*3/mm3 Final    Monocytes, Absolute 06/26/2024 0.97 (H)  0.10 - 0.90 10*3/mm3 Final    Eosinophils, Absolute 06/26/2024 0.52  (H)  0.00 - 0.40 10*3/mm3 Final    Basophils, Absolute 06/26/2024 0.03  0.00 - 0.20 10*3/mm3 Final    Immature Grans, Absolute 06/26/2024 0.06 (H)  0.00 - 0.05 10*3/mm3 Final    nRBC 06/26/2024 0.0  0.0 - 0.2 /100 WBC Final    HS Troponin T 06/26/2024 52 (H)  <22 ng/L Final    Lactate 06/26/2024 1.9  0.5 - 2.0 mmol/L Final    RBC, UA 06/26/2024 Too Numerous to Count (A)  None Seen, 0-2 /HPF Final    WBC, UA 06/26/2024 Too Numerous to Count (A)  None Seen, 0-2 /HPF Final    Bacteria, UA 06/26/2024 4+ (A)  None Seen /HPF Final    Squamous Epithelial Cells, UA 06/26/2024 7-12 (A)  None Seen, 0-2 /HPF Final    Hyaline Casts, UA 06/26/2024 None Seen  None Seen /LPF Final    Methodology 06/26/2024 Automated Microscopy   Final    HS Troponin T 06/26/2024 44 (H)  <22 ng/L Final    Troponin T Delta 06/26/2024 -8 (L)  >=-4 - <+4 ng/L Final    Urine Culture 06/26/2024 >100,000 CFU/mL Klebsiella oxytoca (A)   Final   No results displayed because visit has over 200 results.           Procedure:       Assessment/Plan:   BPH: Discussed the pathophysiology of BPH and obstruction.  We discussed the static and dynamic effects of BPH as well as using 5 alpha reductase inhibitors versus alpha blockade.  We discussed the indications for transurethral surgery as well and/ or other therapeutic options available including all of the newer techniques.  Status post a prior bladder neck incision doing well catheter was removed I have no question about his voiding function.              This document has been electronically signed by TINY PENG MD July 2, 2024 10:42 EDT    Dictated Utilizing Dragon Dictation: Part of this note may be an electronic transcription/translation of spoken language to printed text using the Dragon Dictation System.

## 2024-07-03 ENCOUNTER — OFFICE VISIT (OUTPATIENT)
Dept: SURGERY | Facility: CLINIC | Age: 89
End: 2024-07-03
Payer: MEDICARE

## 2024-07-03 VITALS — BODY MASS INDEX: 26.3 KG/M2 | WEIGHT: 216 LBS | HEIGHT: 76 IN

## 2024-07-03 DIAGNOSIS — K56.609 SMALL BOWEL OBSTRUCTION: Primary | ICD-10-CM

## 2024-07-03 PROCEDURE — 1160F RVW MEDS BY RX/DR IN RCRD: CPT | Performed by: SURGERY

## 2024-07-03 PROCEDURE — 99024 POSTOP FOLLOW-UP VISIT: CPT | Performed by: SURGERY

## 2024-07-03 PROCEDURE — 1159F MED LIST DOCD IN RCRD: CPT | Performed by: SURGERY

## 2024-07-03 NOTE — PROGRESS NOTES
Subjective   Alberto Guzman is a 89 y.o. male  is here today for follow-up.         Alberto Guzman is a 89 y.o. 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  Patient is doing well and his midline incision is healing.   staples have been removed at bedside.  Patient has no abdominal pain and is tolerating regular diet and having bowel function.      Physical Exam  Midline incision well-healed, staples removed.             Assessment     Diagnoses and all orders for this visit:    1. Small bowel obstruction (Primary)      Alberto Guzman is a 89 y.o. male doing well after exploratory laparotomy for small bowel obstruction secondary to internal hernia.  Staples removed in the office today.  Follow-up as needed.             This document has been electronically signed by Deo Lennon MD   July 3, 2024 15:19 EDT

## 2024-07-09 ENCOUNTER — OFFICE VISIT (OUTPATIENT)
Dept: CARDIOLOGY | Facility: CLINIC | Age: 89
End: 2024-07-09
Payer: MEDICARE

## 2024-07-09 ENCOUNTER — TELEPHONE (OUTPATIENT)
Dept: GASTROENTEROLOGY | Facility: CLINIC | Age: 89
End: 2024-07-09
Payer: MEDICARE

## 2024-07-09 VITALS
HEIGHT: 76 IN | SYSTOLIC BLOOD PRESSURE: 86 MMHG | WEIGHT: 191 LBS | BODY MASS INDEX: 23.26 KG/M2 | DIASTOLIC BLOOD PRESSURE: 46 MMHG | HEART RATE: 73 BPM | OXYGEN SATURATION: 98 %

## 2024-07-09 DIAGNOSIS — I25.5 ISCHEMIC CARDIOMYOPATHY: ICD-10-CM

## 2024-07-09 DIAGNOSIS — E87.6 HYPOKALEMIA: ICD-10-CM

## 2024-07-09 DIAGNOSIS — I25.10 CORONARY ARTERY DISEASE INVOLVING NATIVE CORONARY ARTERY OF NATIVE HEART WITHOUT ANGINA PECTORIS: ICD-10-CM

## 2024-07-09 DIAGNOSIS — Z79.01 CHRONIC ANTICOAGULATION: ICD-10-CM

## 2024-07-09 DIAGNOSIS — I50.22 CHRONIC HFREF (HEART FAILURE WITH REDUCED EJECTION FRACTION): Primary | ICD-10-CM

## 2024-07-09 DIAGNOSIS — E83.42 HYPOMAGNESEMIA: ICD-10-CM

## 2024-07-09 DIAGNOSIS — I48.92 ATRIAL FLUTTER WITH CONTROLLED RESPONSE: ICD-10-CM

## 2024-07-09 PROCEDURE — 93000 ELECTROCARDIOGRAM COMPLETE: CPT | Performed by: INTERNAL MEDICINE

## 2024-07-09 PROCEDURE — 99214 OFFICE O/P EST MOD 30 MIN: CPT | Performed by: INTERNAL MEDICINE

## 2024-07-09 NOTE — TELEPHONE ENCOUNTER
Routing to Karina- I called the pt's daughter back  and left a vm that Karina wants them to drop off a urine sample so that we can sent it off for culture and to restart the flomasx as well.

## 2024-07-09 NOTE — LETTER
July 9, 2024     FLOWER Pickard  [address]  [place] [address]    Patient: Alberto Guzman   YOB: 1931   Date of Visit: 7/9/2024       Dear FLOWER Pickard    Alberto Guzman was in my office today. Below is a copy of my note.    If you have questions, please do not hesitate to call me. I look forward to following Alberto along with you.         Sincerely,        Selam Alcaraz MD        CC: No Recipients    subjective     Chief Complaint   Patient presents with   • Coronary Artery Disease     Follow up   • Hypotension       Problems Addressed This Visit  No diagnosis found.    History of Present Illness          Past Surgical History:   Procedure Laterality Date   • CATARACT EXTRACTION Bilateral    • CHOLECYSTECTOMY     • COLON RESECTION     • COLONOSCOPY     • EXPLORATORY LAPAROTOMY N/A 6/12/2024    Procedure: LAPAROTOMY EXPLORATORY;  Surgeon: Deo Lennon MD;  Location: [facility];  Service: General;  Laterality: N/A;   • HEMORROIDECTOMY     • HERNIA REPAIR      left inguinal/umbilical   • HIP ARTHROPLASTY Right    • INSERT / REPLACE / REMOVE PACEMAKER     • JOINT REPLACEMENT     • PACEMAKER IMPLANTATION     • PROSTATE SURGERY     • TRANSURETHRAL RESECTION OF BLADDER TUMOR N/A 04/18/2018    Procedure: CYSTOSCOPY TRANSURETHRAL RESECTION OF SMALL BLADDER TUMOR;  Surgeon: Alfonso Collins MD;  Location: [facility];  Service: Urology   • TRANSURETHRAL RESECTION OF BLADDER TUMOR N/A 08/29/2018    Procedure: CYSTOSCOPY TRANSURETHRAL RESECTION OF BLADDER TUMOR;  Surgeon: Alfonso Collins MD;  Location: [facility];  Service: Urology     Family History   Problem Relation Age of Onset   • No Known Problems Mother    • No Known Problems Father    • Heart disease Brother      Past Medical History:   Diagnosis Date   • Arthritis    • Atrial flutter    • Back pain    • Benign prostatic hyperplasia    • Bladder tumor    • Cancer     [id number]   •  Chronic systolic heart failure 09/14/2021   • Colon cancer    • Coronary artery disease    • DVT (deep venous thrombosis)     r leg   • Elevated cholesterol    • Heart attack    • Hypertension    • Numbness     feet/hands   • Osteoporosis    • PVD (peripheral vascular disease)    • Rheumatoid arthritis    • Stroke    • Ventricular tachycardia      Patient Active Problem List   Diagnosis   • Urinary retention   • Bladder neck contracture   • Bladder tumor   • Aftercare following surgery of the genitourinary system   • Atrial flutter , ventricular paced rhythm   • Presence of cardiac pacemaker 6/3/2025   • Hyperlipidemia LDL goal <70   • Essential hypertension   • Chronic anticoagulation with Coumadin   • Ventricular tachycardia (paroxysmal)   • Asymptomatic PVD (peripheral vascular disease)   • Coronary artery disease, anterior wall myocardial infarction with directional atherectomy of LAD in 1983 and bare-metal stent to the LAD in 1995, nonobstructive disease 2015   • Ischemic cardiomyopathy, LV ejection fraction around 45 to 50%   • 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, 46 to 50%)   • Bilateral lower extremity edema   • Stroke (cerebrum)   • Small bowel obstruction       Social History     Tobacco Use   • Smoking status: Former     Types: Cigarettes     Passive exposure: Past   • Smokeless tobacco: Never   Vaping Use   • Vaping status: Never Used   Substance Use Topics   • Alcohol use: Yes     Comment: Occasional few times a year   • Drug use: No       No Known Allergies    Current Outpatient Medications on File Prior to Visit   Medication Sig   • ascorbic acid (VITAMIN C) 500 MG tablet Take 1 tablet by mouth Daily. OTC Supplement   • aspirin 81 MG tablet Take 1 tablet by mouth Daily.   • atorvastatin (LIPITOR) 20 MG tablet Take 1 tablet by mouth Daily.   • bumetanide (BUMEX) 1 MG tablet Take 2 tablets by mouth Daily.   • COLLAGEN PO Take 1 capsule by mouth Daily. OTC supplement   •  empagliflozin (JARDIANCE) 10 MG tablet tablet Take 1 tablet by mouth Daily.   • HYDROcodone-acetaminophen (NORCO)  MG per tablet Take 1 tablet by mouth Every 8 (Eight) Hours As Needed for Moderate Pain.   • magnesium, as, gluconate (MAGONATE) 500 (27 Mg) MG tablet Take 1 tablet by mouth Daily. OTC Supplement   • pantoprazole (PROTONIX) 40 MG EC tablet Take 1 tablet by mouth Daily.   • sacubitril-valsartan (ENTRESTO) 24-26 MG tablet Take 1 tablet by mouth Every 12 (Twelve) Hours.   • warfarin (COUMADIN) 5 MG tablet Take 1 tablet by mouth Daily.   • Enoxaparin Sodium (LOVENOX) 100 MG/ML solution prefilled syringe syringe Inject 1 mL under the skin into the appropriate area as directed Every 12 (Twelve) Hours. Indications: Other - full anticoagulation (Patient not taking: Reported on 7/9/2024)   • gabapentin (NEURONTIN) 800 MG tablet Take 1 tablet by mouth Every Night. (Patient not taking: Reported on 7/9/2024)   • HYDROcodone-acetaminophen (NORCO)  MG per tablet Take 1 tablet by mouth Every 6 (Six) Hours As Needed for Moderate Pain. (Patient not taking: Reported on 7/9/2024)   • metoprolol succinate XL (TOPROL-XL) 50 MG 24 hr tablet Take 0.5 tablets by mouth Daily. (Patient not taking: Reported on 7/9/2024)   • nitroglycerin (NITROSTAT) 0.4 MG SL tablet Place 1 tablet under the tongue Every 5 (Five) Minutes As Needed for Chest Pain. Take no more than 3 doses in 15 minutes. (Patient not taking: Reported on 7/9/2024)   • Potassium 99 MG tablet Take 1 tablet by mouth Daily. OTC Supplement (Patient not taking: Reported on 7/9/2024)   • tamsulosin (FLOMAX) 0.4 MG capsule 24 hr capsule Take 1 capsule by mouth 2 (Two) Times a Day. (Patient not taking: Reported on 7/9/2024)     No current facility-administered medications on file prior to visit.     (Not in a hospital admission)      Results for orders placed during the hospital encounter of 06/09/24    Adult Transthoracic Echo Complete w/ Color, Spectral and  "Contrast if Necessary Per Protocol    Interpretation Summary  •  The study is technically difficult for diagnosis. The quality of the study is limited with poor acoustic windows.  •  Normal left ventricular cavity size noted.  •  The following left ventricular wall segments are dyskinetic: apical lateral, apical septal and apex.  •  Left ventricular ejection fraction appears to be 36 - 40%.  •  The aortic valve exhibits sclerosis. There is mild calcification of the aortic valve. The aortic valve was poorly visualized but appears trileaflet.  •  Trace aortic valve regurgitation is present. No hemodynamically significant aortic valve stenosis is present.  •  There is mild calcification of the mitral valve. Mild mitral valve regurgitation is present. No significant mitral valve stenosis is present.  •  Mild tricuspid valve regurgitation is present. Estimated right ventricular systolic pressure from tricuspid regurgitation is mildly elevated (35-45 mmHg).  •  There is no evidence of pericardial effusion. .            The following portions of the patient's history were reviewed and updated as appropriate: allergies, current medications, past family history, past medical history, past social history, past surgical history and problem list.    ROS       Objective:     BP (!) 86/46 (BP Location: Left arm, Patient Position: Sitting, Cuff Size: Adult)   Pulse 73   Ht 193 cm (76\")   Wt 86.6 kg (191 lb)   SpO2 98%   BMI 23.25 kg/m²   Physical Exam      Lab Review  Lab Results   Component Value Date     06/26/2024    K 3.3 (L) 06/26/2024    CL 98 06/26/2024    BUN 14 06/26/2024    CREATININE 1.32 (H) 06/26/2024    GLUCOSE 114 (H) 06/26/2024    CALCIUM 8.8 06/26/2024    ALT 39 06/26/2024    ALKPHOS 133 (H) 06/26/2024     No results found for: \"CKTOTAL\"  Lab Results   Component Value Date    WBC 11.47 (H) 06/26/2024    HGB 14.6 06/26/2024    HCT 43.4 06/26/2024     06/26/2024     Lab Results   Component Value " "Date    INR 2.47 (H) 06/26/2024     Lab Results   Component Value Date    MG 1.6 (L) 06/26/2024     Lab Results   Component Value Date    TSH 1.250 06/10/2024     No results found for: \"BNP\"  No results found for: \"CHLPL\", \"CHOL\", \"TRIG\", \"HDL\", \"VLDL\", \"LDL\"  No results found for: \"LDL\"  Lab Results   Component Value Date    PROBNP 986.4 06/09/2024               Procedures       I personally viewed and interpreted the patient's LAB data         Assessment:   No diagnosis found.      Plan:              No follow-ups on file.    "

## 2024-07-09 NOTE — TELEPHONE ENCOUNTER
Patient's daughter called in. Patient had a cath taken out last week. He is voiding but has to sit to do so and patient is still having urgency after. I did advise that she could drop off a urine sample if they thought he still had a UTI but they were mostly wanting to know if the urgency would correct with time after having the cath removed or if he needed flomax which was discontinued by another doctor

## 2024-07-09 NOTE — PROGRESS NOTES
subjective     Chief Complaint   Patient presents with    Coronary Artery Disease     Follow up    Hypotension       Problems Addressed This Visit  1. 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 Atrial flutter , ventricular paced rhythm    3. Hypomagnesemia    4. Hypokalemia    5. Chronic anticoagulation with Coumadin    6. Coronary artery disease, anterior wall myocardial infarction with directional atherectomy of LAD in 1983 and bare-metal stent to the LAD in 1995, nonobstructive disease 2015    7. Ischemic cardiomyopathy, LV ejection fraction around 45 to 50%        History of Present Illness  Patient is 89 years old gentleman who recently was found to have small bowel obstruction requiring exploratory laparotomy on 6/12/2024.    Patient was found to have hypokalemia and hypotension and had to go to the emergency room.    His blood pressure is still running quite low around 80-90 systolic.  He is taking Entresto 24/26 twice daily for HFrEF however they have to hold the dose few times because of low blood pressure.    He is taking Jardiance and complains of urinary tract infection.  Patient wishes to know if he could hold off on Jardiance for a little while.    He denies any chest pain or palpitations.  He complains of being very weak tired and exhausted.    Patient has marked bilateral lower extremity edema and is taking Bumex 2 mg daily    Past Surgical History:   Procedure Laterality Date    CATARACT EXTRACTION Bilateral     CHOLECYSTECTOMY      COLON RESECTION      COLONOSCOPY      EXPLORATORY LAPAROTOMY N/A 6/12/2024    Procedure: LAPAROTOMY EXPLORATORY;  Surgeon: Deo Lennon MD;  Location: [facility];  Service: General;  Laterality: N/A;    HEMORROIDECTOMY      HERNIA REPAIR      left inguinal/umbilical    HIP ARTHROPLASTY Right     INSERT / REPLACE / REMOVE PACEMAKER      JOINT REPLACEMENT      PACEMAKER IMPLANTATION      PROSTATE SURGERY      TRANSURETHRAL  RESECTION OF BLADDER TUMOR N/A 04/18/2018    Procedure: CYSTOSCOPY TRANSURETHRAL RESECTION OF SMALL BLADDER TUMOR;  Surgeon: Alfonso Collins MD;  Location: [facility];  Service: Urology    TRANSURETHRAL RESECTION OF BLADDER TUMOR N/A 08/29/2018    Procedure: CYSTOSCOPY TRANSURETHRAL RESECTION OF BLADDER TUMOR;  Surgeon: Alfonso Collins MD;  Location: [facility];  Service: Urology     Family History   Problem Relation Age of Onset    No Known Problems Mother     No Known Problems Father     Heart disease Brother      Past Medical History:   Diagnosis Date    Arthritis     Atrial flutter     Back pain     Benign prostatic hyperplasia     Bladder tumor     Cancer     [id number]    Chronic systolic heart failure 09/14/2021    Colon cancer     Coronary artery disease     DVT (deep venous thrombosis)     r leg    Elevated cholesterol     Heart attack     Hypertension     Numbness     feet/hands    Osteoporosis     PVD (peripheral vascular disease)     Rheumatoid arthritis     Stroke     Ventricular tachycardia      Patient Active Problem List   Diagnosis    Urinary retention    Bladder neck contracture    Bladder tumor    Aftercare following surgery of the genitourinary system    Atrial flutter , ventricular paced rhythm    Presence of cardiac pacemaker 6/3/2025    Hyperlipidemia LDL goal <70    Essential hypertension    Chronic anticoagulation with Coumadin    Ventricular tachycardia (paroxysmal)    Asymptomatic PVD (peripheral vascular disease)    Coronary artery disease, anterior wall myocardial infarction with directional atherectomy of LAD in 1983 and bare-metal stent to the LAD in 1995, nonobstructive disease 2015    Ischemic cardiomyopathy, LV ejection fraction around 45 to 50%    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, 46 to 50%)    Bilateral lower extremity edema    Stroke (cerebrum)    Small bowel obstruction    Hypokalemia    Hypomagnesemia       Social History      Tobacco Use    Smoking status: Former     Types: Cigarettes     Passive exposure: Past    Smokeless tobacco: Never   Vaping Use    Vaping status: Never Used   Substance Use Topics    Alcohol use: Yes     Comment: Occasional few times a year    Drug use: No       No Known Allergies    Current Outpatient Medications on File Prior to Visit   Medication Sig    ascorbic acid (VITAMIN C) 500 MG tablet Take 1 tablet by mouth Daily. OTC Supplement    aspirin 81 MG tablet Take 1 tablet by mouth Daily.    atorvastatin (LIPITOR) 20 MG tablet Take 1 tablet by mouth Daily.    bumetanide (BUMEX) 1 MG tablet Take 2 tablets by mouth Daily.    COLLAGEN PO Take 1 capsule by mouth Daily. OTC supplement    empagliflozin (JARDIANCE) 10 MG tablet tablet Take 1 tablet by mouth Daily.    HYDROcodone-acetaminophen (NORCO)  MG per tablet Take 1 tablet by mouth Every 8 (Eight) Hours As Needed for Moderate Pain.    magnesium, as, gluconate (MAGONATE) 500 (27 Mg) MG tablet Take 1 tablet by mouth Daily. OTC Supplement    pantoprazole (PROTONIX) 40 MG EC tablet Take 1 tablet by mouth Daily.    sacubitril-valsartan (ENTRESTO) 24-26 MG tablet Take 1 tablet by mouth Every 12 (Twelve) Hours.    warfarin (COUMADIN) 5 MG tablet Take 1 tablet by mouth Daily.    metoprolol succinate XL (TOPROL-XL) 50 MG 24 hr tablet Take 0.5 tablets by mouth Daily. (Patient not taking: Reported on 7/9/2024)    nitroglycerin (NITROSTAT) 0.4 MG SL tablet Place 1 tablet under the tongue Every 5 (Five) Minutes As Needed for Chest Pain. Take no more than 3 doses in 15 minutes. (Patient not taking: Reported on 7/9/2024)    Potassium 99 MG tablet Take 1 tablet by mouth Daily. OTC Supplement (Patient not taking: Reported on 7/9/2024)    tamsulosin (FLOMAX) 0.4 MG capsule 24 hr capsule Take 1 capsule by mouth 2 (Two) Times a Day. (Patient not taking: Reported on 7/9/2024)    [DISCONTINUED] Enoxaparin Sodium (LOVENOX) 100 MG/ML solution prefilled syringe syringe Inject 1  mL under the skin into the appropriate area as directed Every 12 (Twelve) Hours. Indications: Other - full anticoagulation (Patient not taking: Reported on 7/9/2024)    [DISCONTINUED] gabapentin (NEURONTIN) 800 MG tablet Take 1 tablet by mouth Every Night. (Patient not taking: Reported on 7/9/2024)    [DISCONTINUED] HYDROcodone-acetaminophen (NORCO)  MG per tablet Take 1 tablet by mouth Every 6 (Six) Hours As Needed for Moderate Pain. (Patient not taking: Reported on 7/9/2024)     No current facility-administered medications on file prior to visit.     (Not in a hospital admission)      Results for orders placed during the hospital encounter of 06/09/24    Adult Transthoracic Echo Complete w/ Color, Spectral and Contrast if Necessary Per Protocol    Interpretation Summary    The study is technically difficult for diagnosis. The quality of the study is limited with poor acoustic windows.    Normal left ventricular cavity size noted.    The following left ventricular wall segments are dyskinetic: apical lateral, apical septal and apex.    Left ventricular ejection fraction appears to be 36 - 40%.    The aortic valve exhibits sclerosis. There is mild calcification of the aortic valve. The aortic valve was poorly visualized but appears trileaflet.    Trace aortic valve regurgitation is present. No hemodynamically significant aortic valve stenosis is present.    There is mild calcification of the mitral valve. Mild mitral valve regurgitation is present. No significant mitral valve stenosis is present.    Mild tricuspid valve regurgitation is present. Estimated right ventricular systolic pressure from tricuspid regurgitation is mildly elevated (35-45 mmHg).    There is no evidence of pericardial effusion. .            The following portions of the patient's history were reviewed and updated as appropriate: allergies, current medications, past family history, past medical history, past social history, past surgical  "history and problem list.    Review of Systems   Constitutional: Positive for malaise/fatigue.   HENT: Negative.  Negative for congestion.    Eyes: Negative.    Cardiovascular:  Positive for leg swelling. Negative for chest pain, cyanosis, dyspnea on exertion, irregular heartbeat, near-syncope, orthopnea, palpitations, paroxysmal nocturnal dyspnea and syncope.   Respiratory:  Positive for shortness of breath.    Hematologic/Lymphatic: Negative.    Musculoskeletal: Negative.    Gastrointestinal: Negative.    Neurological:  Positive for excessive daytime sleepiness. Negative for headaches.          Objective:     BP (!) 86/46 (BP Location: Left arm, Patient Position: Sitting, Cuff Size: Adult)   Pulse 73   Ht 193 cm (76\")   Wt 86.6 kg (191 lb)   SpO2 98%   BMI 23.25 kg/m²   Cardiovascular:      PMI at left midclavicular line. Normal rate. Regular rhythm. Normal S1. Normal S2.       Murmurs: There is no murmur.      No gallop.  No click. No rub.   Pulses:     Intact distal pulses.   Edema:     Peripheral edema present.     Pretibial: bilateral 3+ edema of the pretibial area.     Ankle: bilateral 3+ edema of the ankle.     Feet: bilateral 2+ edema of the feet.          Lab Review  Lab Results   Component Value Date     06/26/2024    K 3.3 (L) 06/26/2024    CL 98 06/26/2024    BUN 14 06/26/2024    CREATININE 1.32 (H) 06/26/2024    GLUCOSE 114 (H) 06/26/2024    CALCIUM 8.8 06/26/2024    ALT 39 06/26/2024    ALKPHOS 133 (H) 06/26/2024     No results found for: \"CKTOTAL\"  Lab Results   Component Value Date    WBC 11.47 (H) 06/26/2024    HGB 14.6 06/26/2024    HCT 43.4 06/26/2024     06/26/2024     Lab Results   Component Value Date    INR 2.47 (H) 06/26/2024     Lab Results   Component Value Date    MG 1.6 (L) 06/26/2024     Lab Results   Component Value Date    TSH 1.250 06/10/2024     No results found for: \"BNP\"  No results found for: \"CHLPL\", \"CHOL\", \"TRIG\", \"HDL\", \"VLDL\", \"LDL\"  No results found for: " "\"LDL\"  Lab Results   Component Value Date    PROBNP 986.4 06/09/2024                 ECG 12 Lead    Date/Time: 7/9/2024 4:23 PM  Performed by: Selam Alcaraz MD    Authorized by: Selam Alcaraz MD  Comparison: compared with previous ECG from 6/26/2024  Similar to previous ECG  Rhythm: atrial flutter and paced  Rate: normal  BPM: 73  QRS axis: left  Other findings: non-specific ST-T wave changes  Pacing: ventricular paced rhythm and 100% capture  Clinical impression: abnormal EKG             I personally viewed and interpreted the patient's LAB data         Assessment:     1. 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 Atrial flutter , ventricular paced rhythm    3. Hypomagnesemia    4. Hypokalemia    5. Chronic anticoagulation with Coumadin    6. Coronary artery disease, anterior wall myocardial infarction with directional atherectomy of LAD in 1983 and bare-metal stent to the LAD in 1995, nonobstructive disease 2015    7. Ischemic cardiomyopathy, LV ejection fraction around 45 to 50%          Plan:     Chronic HFrEF  Patient has been taking Entresto Bumex, Jardiance, Toprol however recently with low blood pressure patient has stopped metoprolol and is cutting down on Entresto.  Blood pressure is 86/46.  Patient was advised to discontinue Entresto, once blood pressure is more than 100 we will start Entresto 24/26 1/2 tablet once a day if he can tolerate that.    Magnesium is low patient is currently on magnesium replacement which was continued.    Potassium was low however patient has stopped his potassium patient was advised to resume potassium.  Will check lab work next week.    Anticoagulated with Coumadin was continued.  Patient is not having any bleeding.    Coronary artery disease status post stent asymptomatic  Follow-up scheduled        No follow-ups on file.  "

## 2024-07-11 ENCOUNTER — LAB (OUTPATIENT)
Dept: LAB | Facility: HOSPITAL | Age: 89
End: 2024-07-11
Payer: MEDICARE

## 2024-07-11 DIAGNOSIS — E87.6 HYPOKALEMIA: ICD-10-CM

## 2024-07-11 DIAGNOSIS — E83.42 HYPOMAGNESEMIA: ICD-10-CM

## 2024-07-11 LAB
ANION GAP SERPL CALCULATED.3IONS-SCNC: 13.3 MMOL/L (ref 5–15)
BUN SERPL-MCNC: 13 MG/DL (ref 8–23)
BUN/CREAT SERPL: 10.5 (ref 7–25)
CALCIUM SPEC-SCNC: 8.8 MG/DL (ref 8.2–9.6)
CHLORIDE SERPL-SCNC: 98 MMOL/L (ref 98–107)
CO2 SERPL-SCNC: 23.7 MMOL/L (ref 22–29)
CREAT SERPL-MCNC: 1.24 MG/DL (ref 0.76–1.27)
EGFRCR SERPLBLD CKD-EPI 2021: 54.2 ML/MIN/1.73
GLUCOSE SERPL-MCNC: 88 MG/DL (ref 65–99)
MAGNESIUM SERPL-MCNC: 2.3 MG/DL (ref 1.7–2.3)
POTASSIUM SERPL-SCNC: 4.7 MMOL/L (ref 3.5–5.2)
SODIUM SERPL-SCNC: 135 MMOL/L (ref 136–145)

## 2024-07-11 PROCEDURE — 36415 COLL VENOUS BLD VENIPUNCTURE: CPT

## 2024-07-11 PROCEDURE — 83735 ASSAY OF MAGNESIUM: CPT

## 2024-07-11 PROCEDURE — 85025 COMPLETE CBC W/AUTO DIFF WBC: CPT | Performed by: INTERNAL MEDICINE

## 2024-07-11 PROCEDURE — 80048 BASIC METABOLIC PNL TOTAL CA: CPT

## 2024-07-12 ENCOUNTER — TELEPHONE (OUTPATIENT)
Dept: CARDIOLOGY | Facility: CLINIC | Age: 89
End: 2024-07-12
Payer: MEDICARE

## 2024-07-12 NOTE — TELEPHONE ENCOUNTER
Spoke with pts daughter, adv of  message regarding lab work      ----- Message from Selam Alcaraz sent at 7/12/2024  9:26 AM EDT -----  Magnesium and potassium are both good.  So he does not need to take potassium or magnesium.  As it was stopped in the hospital

## 2024-07-16 ENCOUNTER — OFFICE VISIT (OUTPATIENT)
Dept: UROLOGY | Facility: CLINIC | Age: 89
End: 2024-07-16
Payer: MEDICARE

## 2024-07-16 VITALS
HEIGHT: 76 IN | DIASTOLIC BLOOD PRESSURE: 52 MMHG | SYSTOLIC BLOOD PRESSURE: 98 MMHG | BODY MASS INDEX: 23.25 KG/M2 | WEIGHT: 190.92 LBS

## 2024-07-16 DIAGNOSIS — R33.9 URINARY RETENTION: Primary | ICD-10-CM

## 2024-07-16 PROCEDURE — 1160F RVW MEDS BY RX/DR IN RCRD: CPT | Performed by: UROLOGY

## 2024-07-16 PROCEDURE — 1159F MED LIST DOCD IN RCRD: CPT | Performed by: UROLOGY

## 2024-07-16 PROCEDURE — 51798 US URINE CAPACITY MEASURE: CPT | Performed by: UROLOGY

## 2024-07-16 PROCEDURE — 99213 OFFICE O/P EST LOW 20 MIN: CPT | Performed by: UROLOGY

## 2024-07-16 NOTE — PROGRESS NOTES
Chief Complaint:      Chief Complaint   Patient presents with    Benign Prostatic Hypertrophy    Urinary Retention       HPI:   89 y.o. male turns today post void 375 but he did not give us a full void he is doing better I decrease his alpha blockade to daily I know he does not have any blockage he is doing well.    Past Medical History:     Past Medical History:   Diagnosis Date    Arthritis     Atrial flutter     Back pain     Benign prostatic hyperplasia     Bladder tumor     Cancer     [id number]/[id number]    Chronic systolic heart failure 09/14/2021    Colon cancer     Coronary artery disease     DVT (deep venous thrombosis)     r leg    Elevated cholesterol     Heart attack     Hypertension     Numbness     feet/hands    Osteoporosis     PVD (peripheral vascular disease)     Rheumatoid arthritis     Stroke     Ventricular tachycardia        Current Meds:     Current Outpatient Medications   Medication Sig Dispense Refill    ascorbic acid (VITAMIN C) 500 MG tablet Take 1 tablet by mouth Daily. OTC Supplement      aspirin 81 MG tablet Take 1 tablet by mouth Daily. 30 tablet 11    atorvastatin (LIPITOR) 20 MG tablet Take 1 tablet by mouth Daily.      bumetanide (BUMEX) 1 MG tablet Take 2 tablets by mouth Daily.      COLLAGEN PO Take 1 capsule by mouth Daily. OTC supplement      empagliflozin (JARDIANCE) 10 MG tablet tablet Take 1 tablet by mouth Daily. 30 tablet 1    HYDROcodone-acetaminophen (NORCO)  MG per tablet Take 1 tablet by mouth Every 8 (Eight) Hours As Needed for Moderate Pain. 12 tablet 0    magnesium, as, gluconate (MAGONATE) 500 (27 Mg) MG tablet Take 1 tablet by mouth Daily. OTC Supplement      metoprolol succinate XL (TOPROL-XL) 50 MG 24 hr tablet Take 0.5 tablets by mouth Daily. (Patient not taking: Reported on 7/9/2024)      nitroglycerin (NITROSTAT) 0.4 MG SL tablet Place 1 tablet under the tongue Every 5 (Five) Minutes As Needed for Chest Pain. Take no more than 3 doses in 15 minutes.  (Patient not taking: Reported on 7/9/2024)      pantoprazole (PROTONIX) 40 MG EC tablet Take 1 tablet by mouth Daily.      Potassium 99 MG tablet Take 1 tablet by mouth Daily. OTC Supplement (Patient not taking: Reported on 7/9/2024)      sacubitril-valsartan (ENTRESTO) 24-26 MG tablet Take 1 tablet by mouth Every 12 (Twelve) Hours. 60 tablet 1    tamsulosin (FLOMAX) 0.4 MG capsule 24 hr capsule Take 1 capsule by mouth 2 (Two) Times a Day. (Patient not taking: Reported on 7/9/2024) 30 capsule 1    warfarin (COUMADIN) 5 MG tablet Take 1 tablet by mouth Daily.       No current facility-administered medications for this visit.        Allergies:      No Known Allergies     Past Surgical History:     Past Surgical History:   Procedure Laterality Date    CATARACT EXTRACTION Bilateral     CHOLECYSTECTOMY      COLON RESECTION      COLONOSCOPY      EXPLORATORY LAPAROTOMY N/A 6/12/2024    Procedure: LAPAROTOMY EXPLORATORY;  Surgeon: Deo Lennon MD;  Location: [facility];  Service: General;  Laterality: N/A;    HEMORROIDECTOMY      HERNIA REPAIR      left inguinal/umbilical    HIP ARTHROPLASTY Right     INSERT / REPLACE / REMOVE PACEMAKER      JOINT REPLACEMENT      PACEMAKER IMPLANTATION      PROSTATE SURGERY      TRANSURETHRAL RESECTION OF BLADDER TUMOR N/A 04/18/2018    Procedure: CYSTOSCOPY TRANSURETHRAL RESECTION OF SMALL BLADDER TUMOR;  Surgeon: Alfonso Collins MD;  Location: [facility] OR;  Service: Urology    TRANSURETHRAL RESECTION OF BLADDER TUMOR N/A 08/29/2018    Procedure: CYSTOSCOPY TRANSURETHRAL RESECTION OF BLADDER TUMOR;  Surgeon: Alfonso Collins MD;  Location: [facility];  Service: Urology       Social History:     Social History     Socioeconomic History    Marital status:    Tobacco Use    Smoking status: Former     Types: Cigarettes     Passive exposure: Past    Smokeless tobacco: Never   Vaping Use    Vaping status: Never Used   Substance and Sexual Activity    Alcohol use: Yes      Comment: Occasional few times a year    Drug use: No    Sexual activity: Defer       Family History:     Family History   Problem Relation Age of Onset    No Known Problems Mother     No Known Problems Father     Heart disease Brother        Review of Systems:     Review of Systems   Constitutional: Negative.    HENT: Negative.     Eyes: Negative.    Respiratory: Negative.     Cardiovascular: Negative.    Gastrointestinal: Negative.    Endocrine: Negative.    Musculoskeletal: Negative.    Allergic/Immunologic: Negative.    Neurological: Negative.    Hematological: Negative.    Psychiatric/Behavioral: Negative.         Physical Exam:     Physical Exam  Vitals and nursing note reviewed.   Constitutional:       Appearance: He is well-developed.   HENT:      Head: Normocephalic and atraumatic.   Eyes:      Conjunctiva/sclera: Conjunctivae normal.      Pupils: Pupils are equal, round, and reactive to light.   Cardiovascular:      Rate and Rhythm: Normal rate and regular rhythm.      Heart sounds: Normal heart sounds.   Pulmonary:      Effort: Pulmonary effort is normal.      Breath sounds: Normal breath sounds.   Abdominal:      General: Bowel sounds are normal.      Palpations: Abdomen is soft.   Musculoskeletal:         General: Normal range of motion.      Cervical back: Normal range of motion.   Skin:     General: Skin is warm and dry.   Neurological:      Mental Status: He is alert and oriented to person, place, and time.      Deep Tendon Reflexes: Reflexes are normal and symmetric.   Psychiatric:         Behavior: Behavior normal.         Thought Content: Thought content normal.         Judgment: Judgment normal.         I have reviewed the following portions of the patient's history: Allergies, current medications, past family history, past medical history, past social history, past surgical history, problem list, and ROS and confirm it is accurate.    Recent Image (CT and/or KUB):      CT Abdomen and Pelvis:  No results found for this or any previous visit.       CT Stone Protocol: Results for orders placed during the hospital encounter of 04/09/18    CT Abdomen Pelvis Stone Protocol    Narrative  CT ABDOMEN AND PELVIS STONE PROTOCOL-    REASON FOR EXAM: Abdominal pain, unspecified; N40.1-Benign prostatic  hyperplasia with lower urinary tract symptoms; N13.8-Other obstructive  and reflux uropathy.    FINDINGS: Spiral scans were obtained through the kidneys, ureterS and  bladder. The kidneys showed no evidence of hydronephrosis. There were no  stones in the intrarenal collecting systems or in the intrarenal  parenchyma. Ureters were not dilated as they coursed through the abdomen  and pelvis. No stones were noted along the course of the ureters.  Urinary bladder was fairly well-distended with urine and was normal in  contour. No focal areas of bladder wall thickening were demonstrated.    Impression  No evidence of stone or obstruction was seen involving the  collecting system of either kidney. The CT does demonstrate marked  scoliosis and degenerative changes in the lumbar spine.      The radiation dose reduction device was utilized for each scan per the  ALARA (as low as reasonably achievable) protocol.    This report was finalized on 4/9/2018 2:16 PM by Dr. Jairo Maya II, MD.       KUB: Results for orders placed during the hospital encounter of 03/11/21    XR Abdomen KUB    Narrative  XR ABDOMEN KUB-    REASON FOR EXAM:  R10.30; R10.30-Lower abdominal pain, unspecified    COMPARISON: None.    FINDINGS: The bowel gas pattern of the abdomen shows no evidence of  obstruction. No soft tissue masses or pathological calcifications other  than vascular ones are demonstrated. There are advanced degenerative  disc changes in the lumbar spine with scoliosis. A prosthesis is noted  in the right hip. 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    Impression  A source of patient's lower abdominal pain is  not  identified.    This report was finalized on 3/11/2021 1:18 PM by Dr. Jairo Maya II, MD.       Labs (past 3 months):      Lab on 07/11/2024   Component Date Value Ref Range Status    Glucose 07/11/2024 88  65 - 99 mg/dL Final    BUN 07/11/2024 13  8 - 23 mg/dL Final    Creatinine 07/11/2024 1.24  0.76 - 1.27 mg/dL Final    Sodium 07/11/2024 135 (L)  136 - 145 mmol/L Final    Potassium 07/11/2024 4.7  3.5 - 5.2 mmol/L Final    Chloride 07/11/2024 98  98 - 107 mmol/L Final    CO2 07/11/2024 23.7  22.0 - 29.0 mmol/L Final    Calcium 07/11/2024 8.8  8.2 - 9.6 mg/dL Final    BUN/Creatinine Ratio 07/11/2024 10.5  7.0 - 25.0 Final    Anion Gap 07/11/2024 13.3  5.0 - 15.0 mmol/L Final    Unable to calculate Anion Gap.    eGFR 07/11/2024 54.2 (L)  >60.0 mL/min/1.73 Final    Magnesium 07/11/2024 2.3  1.7 - 2.3 mg/dL Final   Admission on 06/26/2024, Discharged on 06/26/2024   Component Date Value Ref Range Status    Glucose 06/26/2024 114 (H)  65 - 99 mg/dL Final    BUN 06/26/2024 14  8 - 23 mg/dL Final    Creatinine 06/26/2024 1.32 (H)  0.76 - 1.27 mg/dL Final    Sodium 06/26/2024 138  136 - 145 mmol/L Final    Potassium 06/26/2024 3.3 (L)  3.5 - 5.2 mmol/L Final    Slight hemolysis detected by analyzer. Result may be falsely elevated.    Chloride 06/26/2024 98  98 - 107 mmol/L Final    CO2 06/26/2024 25.1  22.0 - 29.0 mmol/L Final    Calcium 06/26/2024 8.8  8.2 - 9.6 mg/dL Final    Total Protein 06/26/2024 6.4  6.0 - 8.5 g/dL Final    Albumin 06/26/2024 3.4 (L)  3.5 - 5.2 g/dL Final    ALT (SGPT) 06/26/2024 39  1 - 41 U/L Final    AST (SGOT) 06/26/2024 40  1 - 40 U/L Final    Alkaline Phosphatase 06/26/2024 133 (H)  39 - 117 U/L Final    Total Bilirubin 06/26/2024 0.5  0.0 - 1.2 mg/dL Final    Globulin 06/26/2024 3.0  gm/dL Final    A/G Ratio 06/26/2024 1.1  g/dL Final    BUN/Creatinine Ratio 06/26/2024 10.6  7.0 - 25.0 Final    Anion Gap 06/26/2024 14.9  5.0 - 15.0 mmol/L Final    eGFR 06/26/2024 50.3 (L)  >60.0  mL/min/1.73 Final    Protime 06/26/2024 26.8 (H)  12.1 - 14.7 Seconds Final    INR 06/26/2024 2.47 (H)  0.90 - 1.10 Final    PTT 06/26/2024 92.9 (H)  26.5 - 34.5 seconds Final    Lipase 06/26/2024 29  13 - 60 U/L Final    Color, UA 06/26/2024 Yellow  Yellow, Straw Final    Appearance, UA 06/26/2024 Turbid (A)  Clear Final    pH, UA 06/26/2024 5.5  5.0 - 8.0 Final    Specific Gravity, UA 06/26/2024 1.013  1.005 - 1.030 Final    Glucose, UA 06/26/2024 500 mg/dL (2+) (A)  Negative Final    Ketones, UA 06/26/2024 Negative  Negative Final    Bilirubin, UA 06/26/2024 Negative  Negative Final    Blood, UA 06/26/2024 Large (3+) (A)  Negative Final    Protein, UA 06/26/2024 30 mg/dL (1+) (A)  Negative Final    Leuk Esterase, UA 06/26/2024 Large (3+) (A)  Negative Final    Nitrite, UA 06/26/2024 Negative  Negative Final    Urobilinogen, UA 06/26/2024 0.2 E.U./dL  0.2 - 1.0 E.U./dL Final    Magnesium 06/26/2024 1.6 (L)  1.7 - 2.3 mg/dL Final    Procalcitonin 06/26/2024 0.13  0.00 - 0.25 ng/mL Final    QT Interval 06/26/2024 370  ms Final    QTC Interval 06/26/2024 437  ms Final    WBC 06/26/2024 11.47 (H)  3.40 - 10.80 10*3/mm3 Final    RBC 06/26/2024 4.35  4.14 - 5.80 10*6/mm3 Final    Hemoglobin 06/26/2024 14.6  13.0 - 17.7 g/dL Final    Hematocrit 06/26/2024 43.4  37.5 - 51.0 % Final    MCV 06/26/2024 99.8 (H)  79.0 - 97.0 fL Final    MCH 06/26/2024 33.6 (H)  26.6 - 33.0 pg Final    MCHC 06/26/2024 33.6  31.5 - 35.7 g/dL Final    RDW 06/26/2024 12.9  12.3 - 15.4 % Final    RDW-SD 06/26/2024 47.5  37.0 - 54.0 fl Final    MPV 06/26/2024 10.4  6.0 - 12.0 fL Final    Platelets 06/26/2024 271  140 - 450 10*3/mm3 Final    Neutrophil % 06/26/2024 73.7  42.7 - 76.0 % Final    Lymphocyte % 06/26/2024 12.5 (L)  19.6 - 45.3 % Final    Monocyte % 06/26/2024 8.5  5.0 - 12.0 % Final    Eosinophil % 06/26/2024 4.5  0.3 - 6.2 % Final    Basophil % 06/26/2024 0.3  0.0 - 1.5 % Final    Immature Grans % 06/26/2024 0.5  0.0 - 0.5 % Final     Neutrophils, Absolute 06/26/2024 8.46 (H)  1.70 - 7.00 10*3/mm3 Final    Lymphocytes, Absolute 06/26/2024 1.43  0.70 - 3.10 10*3/mm3 Final    Monocytes, Absolute 06/26/2024 0.97 (H)  0.10 - 0.90 10*3/mm3 Final    Eosinophils, Absolute 06/26/2024 0.52 (H)  0.00 - 0.40 10*3/mm3 Final    Basophils, Absolute 06/26/2024 0.03  0.00 - 0.20 10*3/mm3 Final    Immature Grans, Absolute 06/26/2024 0.06 (H)  0.00 - 0.05 10*3/mm3 Final    nRBC 06/26/2024 0.0  0.0 - 0.2 /100 WBC Final    HS Troponin T 06/26/2024 52 (H)  <22 ng/L Final    Lactate 06/26/2024 1.9  0.5 - 2.0 mmol/L Final    RBC, UA 06/26/2024 Too Numerous to Count (A)  None Seen, 0-2 /HPF Final    WBC, UA 06/26/2024 Too Numerous to Count (A)  None Seen, 0-2 /HPF Final    Bacteria, UA 06/26/2024 4+ (A)  None Seen /HPF Final    Squamous Epithelial Cells, UA 06/26/2024 7-12 (A)  None Seen, 0-2 /HPF Final    Hyaline Casts, UA 06/26/2024 None Seen  None Seen /LPF Final    Methodology 06/26/2024 Automated Microscopy   Final    HS Troponin T 06/26/2024 44 (H)  <22 ng/L Final    Troponin T Delta 06/26/2024 -8 (L)  >=-4 - <+4 ng/L Final    Urine Culture 06/26/2024 >100,000 CFU/mL Klebsiella oxytoca (A)   Final   No results displayed because visit has over 200 results.           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  I told him to drop his alpha blockade back to half instead of twice daily because of concerns of hypotension        This document has been electronically signed by TINY PENG MD July 16, 2024 13:07 EDT    Dictated Utilizing Dragon Dictation: Part of this note may be an electronic transcription/translation of spoken language to printed text using the Dragon Dictation System.

## 2024-08-09 ENCOUNTER — HOSPITAL ENCOUNTER (OUTPATIENT)
Dept: CARDIOLOGY | Facility: HOSPITAL | Age: 89
Discharge: HOME | End: 2024-08-09
Payer: MEDICARE

## 2024-08-09 VITALS
BODY MASS INDEX: 23.82 KG/M2 | HEART RATE: 67 BPM | WEIGHT: 191.6 LBS | OXYGEN SATURATION: 97 % | DIASTOLIC BLOOD PRESSURE: 67 MMHG | SYSTOLIC BLOOD PRESSURE: 116 MMHG | HEIGHT: 75 IN

## 2024-08-09 DIAGNOSIS — I10 ESSENTIAL HYPERTENSION: ICD-10-CM

## 2024-08-09 DIAGNOSIS — I25.5 ISCHEMIC CARDIOMYOPATHY: ICD-10-CM

## 2024-08-09 DIAGNOSIS — I50.22 CHRONIC HFREF (HEART FAILURE WITH REDUCED EJECTION FRACTION): Primary | ICD-10-CM

## 2024-08-09 LAB
ABSOLUTE LUNG FLUID CONTENT: 22 % (ref 20–35)
ANION GAP SERPL CALCULATED.3IONS-SCNC: 11.7 MMOL/L (ref 5–15)
BUN SERPL-MCNC: 10 MG/DL (ref 8–23)
BUN/CREAT SERPL: 9.4 (ref 7–25)
CALCIUM SPEC-SCNC: 8.8 MG/DL (ref 8.2–9.6)
CHLORIDE SERPL-SCNC: 104 MMOL/L (ref 98–107)
CO2 SERPL-SCNC: 27.3 MMOL/L (ref 22–29)
CREAT SERPL-MCNC: 1.06 MG/DL (ref 0.76–1.27)
EGFRCR SERPLBLD CKD-EPI 2021: 65.4 ML/MIN/1.73
GLUCOSE SERPL-MCNC: 87 MG/DL (ref 65–99)
MAGNESIUM SERPL-MCNC: 1.7 MG/DL (ref 1.7–2.3)
NT-PROBNP SERPL-MCNC: 1279 PG/ML (ref 0–1800)
POTASSIUM SERPL-SCNC: 3.7 MMOL/L (ref 3.5–5.2)
SODIUM SERPL-SCNC: 143 MMOL/L (ref 136–145)

## 2024-08-09 PROCEDURE — 83735 ASSAY OF MAGNESIUM: CPT | Performed by: PHYSICIAN ASSISTANT

## 2024-08-09 PROCEDURE — 83880 ASSAY OF NATRIURETIC PEPTIDE: CPT | Performed by: PHYSICIAN ASSISTANT

## 2024-08-09 PROCEDURE — 80048 BASIC METABOLIC PNL TOTAL CA: CPT | Performed by: PHYSICIAN ASSISTANT

## 2024-08-09 PROCEDURE — 99215 OFFICE O/P EST HI 40 MIN: CPT | Performed by: PHYSICIAN ASSISTANT

## 2024-08-09 PROCEDURE — 36415 COLL VENOUS BLD VENIPUNCTURE: CPT | Performed by: PHYSICIAN ASSISTANT

## 2024-08-09 PROCEDURE — 94726 PLETHYSMOGRAPHY LUNG VOLUMES: CPT | Performed by: PHYSICIAN ASSISTANT

## 2024-08-09 PROCEDURE — G2211 COMPLEX E/M VISIT ADD ON: HCPCS | Performed by: PHYSICIAN ASSISTANT

## 2024-08-09 RX ORDER — GABAPENTIN 600 MG/1
600 TABLET ORAL 2 TIMES DAILY
COMMUNITY
Start: 2024-07-09

## 2024-08-09 RX ORDER — POLYETHYLENE GLYCOL 3350 17 G/17G
17 POWDER, FOR SOLUTION ORAL EVERY OTHER DAY
COMMUNITY
Start: 2024-07-01 | End: 2024-08-30

## 2024-08-09 NOTE — PROGRESS NOTES
[facility]  Rosa Gavin PA-C       Referring, [place] [address]    Thank you for asking me to see Alberto Guzman for congestive heart failure.    HPI:     This is a 89 y.o. male with known past medical history of:    Chronic HFrEF  Ischemic cardiomyopathy  TTE from June 2024 with EF 36-40%; mild TVR.  Mild mitral valve calcification.    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  The patient is typically seen by Lianet Dia APRN.  Patient's primary cardiologist is Dr. Selam Alcaraz.     Last known EF 36-40%.   Last known hospitalization and/or ED visit: Patient was hospitalized in June 2024 secondary to small bowel obstruction secondary to internal hernia he later had ED visit after being sent from doctor's office for hypotension.  Accompanied by: Daughter          08/12/24 visit data/details regarding:   Dyspnea: Dyspnea with prolonged exertion  Lower extremity swelling: Lower extremity edema  Abdominal swelling: Denies  Home weight: Weight monitoring booklet provided during initial visit; has scale  Home BP: BP monitoring booklet provided during initial visit; has blood pressure cuff  Home heart rate: HR monitoring booklet provided during initial visit;  Daily activities of living: Performing on his own with some assistance from daughter  Pillows/lying flat: Pillow  HF zone: Green  Patient is chest pain-free and doing well at present he is on Entresto but apparently misses many evening doses.  His daughter reports she holds it when his top number of his blood pressure is less than around 120 after review of hold parameters that she is using  at home.  Please see assessment and plan regarding new hold parameters.  Patient is very pleasant during evaluation            Review of Systems - Review of Systems   Constitutional: Negative for decreased appetite, diaphoresis and fever.   HENT:  Negative for congestion and ear pain.    Eyes:  Negative for blurred vision.   Cardiovascular:  Positive for dyspnea on exertion. Negative for chest pain.   Respiratory:  Positive for shortness of breath. Negative for cough and hemoptysis.    Endocrine: Negative for cold intolerance and polydipsia.   Hematologic/Lymphatic: Negative for adenopathy and bleeding problem.   Skin:  Negative for dry skin and nail changes.   Musculoskeletal:  Negative for arthritis and falls.   Gastrointestinal:  Negative for bloating and anorexia.   Genitourinary:  Negative for bladder incontinence and dysuria.   Neurological:  Negative for aphonia and difficulty with concentration.   Psychiatric/Behavioral:  Negative for altered mental status and hallucinations.    Allergic/Immunologic: Negative for environmental allergies and HIV exposure.         All other systems were reviewed and were negative.    Patient Active Problem List   Diagnosis    Urinary retention    Bladder neck contracture    Bladder tumor    Aftercare following surgery of the genitourinary system    Atrial flutter , ventricular paced rhythm    Presence of cardiac pacemaker 6/3/2025    Hyperlipidemia LDL goal <70    Essential hypertension    Chronic anticoagulation with Coumadin    Ventricular tachycardia (paroxysmal)    Asymptomatic PVD (peripheral vascular disease)    Coronary artery disease, anterior wall myocardial infarction with directional atherectomy of LAD in 1983 and bare-metal stent to the LAD in 1995, nonobstructive disease 2015    Ischemic cardiomyopathy, LV ejection fraction around 45 to 50%    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, 46 to 50%)    Bilateral lower extremity edema    Stroke  (cerebrum)    Small bowel obstruction    Hypokalemia    Hypomagnesemia       family history includes Heart disease in his brother; No Known Problems in his father and mother.     reports that he has quit smoking. His smoking use included cigarettes. He has been exposed to tobacco smoke. He has never used smokeless tobacco. He reports current alcohol use. He reports that he does not use drugs.    No Known Allergies      Current Outpatient Medications:     ascorbic acid (VITAMIN C) 500 MG tablet, Take 1 tablet by mouth Daily. OTC Supplement, Disp: , Rfl:     aspirin 81 MG tablet, Take 1 tablet by mouth Daily., Disp: 30 tablet, Rfl: 11    atorvastatin (LIPITOR) 20 MG tablet, Take 1 tablet by mouth Daily., Disp: , Rfl:     bumetanide (BUMEX) 1 MG tablet, Take 2 tablets by mouth Daily., Disp: , Rfl:     COLLAGEN PO, Take 1 capsule by mouth Daily. OTC supplement, Disp: , Rfl:     gabapentin (NEURONTIN) 600 MG tablet, Take 1 tablet by mouth 2 (Two) Times a Day. Only takes at night, Disp: , Rfl:     HYDROcodone-acetaminophen (NORCO)  MG per tablet, Take 1 tablet by mouth Every 8 (Eight) Hours As Needed for Moderate Pain., Disp: 12 tablet, Rfl: 0    pantoprazole (PROTONIX) 40 MG EC tablet, Take 1 tablet by mouth Daily., Disp: , Rfl:     polyethylene glycol (MiraLax) 17 GM/SCOOP powder, Take 17 g by mouth Every Other Day., Disp: , Rfl:     sacubitril-valsartan (ENTRESTO) 24-26 MG tablet, Take ½ tablet by mouth Every 12 Hours. Hold medication if top number of blood pressure is less than 100., Disp: 60 tablet, Rfl: 1    Vericiguat 2.5 MG tablet, Take 1 tablet by mouth Daily., Disp: 30 tablet, Rfl: 0    warfarin (COUMADIN) 5 MG tablet, Take 1 tablet by mouth Daily., Disp: , Rfl:     nitroglycerin (NITROSTAT) 0.4 MG SL tablet, Place 1 tablet under the tongue Every 5 (Five) Minutes As Needed for Chest Pain. Take no more than 3 doses in 15 minutes. (Patient not taking: Reported on 7/9/2024), Disp: , Rfl:       Physical  Exam:  I have reviewed the patient's current vital signs as documented in the patient's EMR.   Vitals:    08/09/24 1101   BP: 116/67   Pulse: 67   SpO2: 97%     Body mass index is 23.95 kg/m².       08/09/24  1101   Weight: 86.9 kg (191 lb 9.6 oz)      Physical Exam  Vitals and nursing note reviewed.   Constitutional:       General: He is awake.      Appearance: Normal appearance. He is well-developed and well-groomed.   HENT:      Head: Normocephalic and atraumatic.   Eyes:      General: Lids are normal.      Conjunctiva/sclera:      Right eye: Right conjunctiva is not injected.      Left eye: Left conjunctiva is not injected.   Cardiovascular:      Rate and Rhythm: Normal rate and regular rhythm.      Comments: Faint bilateral edema of the lower extremities  Pulmonary:      Breath sounds: No decreased breath sounds, wheezing, rhonchi or rales.   Abdominal:      General: Bowel sounds are normal.      Palpations: Abdomen is soft.   Musculoskeletal:      Right lower leg: Edema present.      Left lower leg: Edema present.   Neurological:      Mental Status: He is alert.   Psychiatric:         Attention and Perception: Attention normal.         Mood and Affect: Mood normal.         Behavior: Behavior is cooperative.          JVP: Volume/Pulsation: Normal.        DATA REVIEWED:       ---------------------------------------------------  TTE/DEYANIRA:  Results for orders placed during the hospital encounter of 06/09/24    Adult Transthoracic Echo Complete w/ Color, Spectral and Contrast if Necessary Per Protocol    Interpretation Summary    The study is technically difficult for diagnosis. The quality of the study is limited with poor acoustic windows.    Normal left ventricular cavity size noted.    The following left ventricular wall segments are dyskinetic: apical lateral, apical septal and apex.    Left ventricular ejection fraction appears to be 36 - 40%.    The aortic valve exhibits sclerosis. There is mild calcification  "of the aortic valve. The aortic valve was poorly visualized but appears trileaflet.    Trace aortic valve regurgitation is present. No hemodynamically significant aortic valve stenosis is present.    There is mild calcification of the mitral valve. Mild mitral valve regurgitation is present. No significant mitral valve stenosis is present.    Mild tricuspid valve regurgitation is present. Estimated right ventricular systolic pressure from tricuspid regurgitation is mildly elevated (35-45 mmHg).    There is no evidence of pericardial effusion. .        LAST HEART CATH/IF AVAILABLE:     No results found for this or any previous visit.      -----------------------------------------------------  CXR/Imaging:   Imaging Results (Most Recent)       None            I personally reviewed and interpreted the CXR.      -----------------------------------------------------  CT:   No radiology results for the last 30 days.  I personally reviewed the images of the CT scan.  My personal interpretation is below.      ----------------------------------------------------    --------------------------------------------------------------------------------------------------    Laboratory evaluations:    Lab Results   Component Value Date    GLUCOSE 87 08/09/2024    BUN 10 08/09/2024    CREATININE 1.06 08/09/2024    EGFRIFNONA 73 05/14/2021    BCR 9.4 08/09/2024    K 3.7 08/09/2024    CO2 27.3 08/09/2024    CALCIUM 8.8 08/09/2024    ALBUMIN 3.4 (L) 06/26/2024    AST 40 06/26/2024    ALT 39 06/26/2024     Lab Results   Component Value Date    WBC 4.69 07/11/2024    HGB 11.8 (L) 07/11/2024    HCT 34.9 (L) 07/11/2024    MCV 98.0 (H) 07/11/2024     07/11/2024     No results found for: \"CHOL\", \"CHLPL\", \"TRIG\", \"HDL\", \"LDL\", \"LDLDIRECT\"  Lab Results   Component Value Date    TSH 1.250 06/10/2024     No results found for: \"HGBA1C\"  Lab Results   Component Value Date    ALT 39 06/26/2024     No results found for: \"HGBA1C\"  Lab Results " "  Component Value Date    CREATININE 1.06 08/09/2024     No results found for: \"IRON\", \"TIBC\", \"FERRITIN\"  Lab Results   Component Value Date    INR 2.47 (H) 06/26/2024    INR 1.33 (H) 06/19/2024    INR 1.26 (H) 06/18/2024    PROTIME 26.8 (H) 06/26/2024    PROTIME 16.6 (H) 06/19/2024    PROTIME 15.9 (H) 06/18/2024        Lab Results   Component Value Date    ABSOLUTELUNG 22 08/09/2024       PAH RISK ASSESSMENT:      1. 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, 36-40%)    2. Ischemic cardiomyopathy, LV ejection fraction around 45 to 50%    3. Essential hypertension          ORDERS PLACED TODAY:  Orders Placed This Encounter   Procedures    ReDs Vest    Basic Metabolic Panel    Magnesium    proBNP    Basic Metabolic Panel    Magnesium    proBNP        Diagnoses and all orders for this visit:    1. 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, 36-40%) (Primary)  -     Basic Metabolic Panel; Future  -     Magnesium; Future  -     proBNP; Future  -     Basic Metabolic Panel; Standing  -     Basic Metabolic Panel  -     Magnesium; Standing  -     Magnesium  -     proBNP; Standing  -     proBNP  -     ReDs Vest    2. Ischemic cardiomyopathy, LV ejection fraction around 45 to 50%    3. Essential hypertension    Other orders  -     Discontinue: Vericiguat 2.5 MG tablet; Take 1 tablet by mouth Daily.  Dispense: 30 tablet; Refill: 0  -     Discontinue: sacubitril-valsartan (ENTRESTO) 24-26 MG tablet; Take 0.5 tablets by mouth Every 12 (Twelve) Hours. Hold medication if top number of blood pressure is less than 100.  Dispense: 60 tablet; Refill: 1  -     Vericiguat 2.5 MG tablet; Take 1 tablet by mouth Daily.  Dispense: 30 tablet; Refill: 0  -     sacubitril-valsartan (ENTRESTO) 24-26 MG tablet; Take ½ tablet by mouth Every 12 Hours. Hold medication if top number of blood pressure is less than 100.  Dispense: 60 tablet; Refill: 1             MEDS ORDERED TODAY:    New Medications Ordered This " Visit   Medications    Vericiguat 2.5 MG tablet     Sig: Take 1 tablet by mouth Daily.     Dispense:  30 tablet     Refill:  0    sacubitril-valsartan (ENTRESTO) 24-26 MG tablet     Sig: Take ½ tablet by mouth Every 12 Hours. Hold medication if top number of blood pressure is less than 100.     Dispense:  60 tablet     Refill:  1        ---------------------------------------------------------------------------------------------------------------------------          ASSESSMENT/PLAN:      Diagnosis Plan   1. 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, 36-40%)  Basic Metabolic Panel    Magnesium    proBNP    Basic Metabolic Panel    Basic Metabolic Panel    Magnesium    Magnesium    proBNP    proBNP    ReDs Vest      2. Ischemic cardiomyopathy, LV ejection fraction around 45 to 50%        3. Essential hypertension            not acutely decompensated chronic moderate systolic and diastolic heart failure. CHF.     NYHA stage Stage C: Structural heart disease is present AND symptoms have occurredFC-Class III: Marked limitation of physical activity. Comfortable at rest. Less than ordinary activity causes fatigue, palpitation, or dyspnea.     Today, Patient is approaching euvolemiaand with  Moderate perfusion. The patient's hemodynamics are currently acceptable. HR is: normal and is at goal. BP/MAP was reviewed and there isroom for medication up-titration.  Clinical trajectory was assessed and haswaxed and waned.     CHF GOAL DIRECTED MEDICAL THERAPY FOR PATIENT ADDRESSED/ADJUSTED:     GDMT: HFrEF    Drug Class   Drug   Dose Last Dose Adjustment Notes   ACEi/ARB/ARNI Entresto 24-26mg BID     Beta Blocker     MRA HypoTN      SGLT2i Freq UTIs   N/A   Secondaries if applicable:  Verquevo 2.5mg  START      -CHF Specific BB:    Toprol XL stopped by another provider 2/2 hypotension.  Will attempt to restart low dose Toprol XL or Coreg on next visit.    We discussed processes/benefits of HF clinic including  nursing, pharmacist, and provider evaluation during each visit with ability for in office ReDS vest, labs, and ability to provide IV diuresis in the clinic with close outpatient monitoring.  Additionally, patient was educated about the availability of delivery of medications to patient's clinic room prior to leaving the building which assists with medication compliance and insures medications are in hands when changes are made (if patient opts for apothecary usage) with thorough guidance regarding changes and medication schedule provided.          -ACE/ARB/ARNi:   Patient is on Entresto but often missing nightly dose.  We discussed half pill twice daily with strict hold parameters.  We have expanded the hold parameter and discussed with daughter to hold if top number blood pressure is less than 100.    -MRA:   Consider and future visit but at present our ability to add is limited secondary to hypotension asked patient to bring blood pressure log with next visit to further review record.     -SGLT2 inhibitor therapy:   Previously on Jardiance but began having frequent UTIs.  Will hold off on further SGLT2i at present as risk of frequent UTIs likely outweighs benefit and patient is 89 years old.    -Diuretic regimen:   ReDS Vest reading for. 08/12/24 is  22; ReDs Vest reading reviewed with patient.    Continiue Bumex 2mg daily at present.   BMP, Mag, & ProBNP reviewed with patient.      -Fluid restriction/Sodium restriction:   Requested 2000 ml restriction  Patient has been asked to weigh daily and was provided with a printed diuretic strategy.  1,500 mg Na restriction was discussed.      Secondary pharmacological therapy in HFreF:   EF is less than 45% @ 36-40%.   Recent hospitalization or IV diuresis includes June 2024  Given HFrEF with optimally treated with primary therapies for HFrEF and vasodilator therapy, will consider addition of Verquevo if approved by insurance.  Samples provided in office today.       --------------------------------------------------------------------------------        BMI is within normal parameters. No other follow-up for BMI required.              >45 minutes out of 60 minutes face to face spent counseling patient extensively on dietary Na+ intake, importance of activity, weight monitoring, compliance with medications in addition to importance of titration with goal directed medical therapy and follow up appointments.            This document has been electronically signed by Rosa Gavin PA-C  August 12, 2024 09:00 EDT      Dictated Utilizing Dragon Dictation: Part of this note may be an electronic transcription/translation of spoken language to printed text using the Dragon Dictation System.    Follow-up appointment and medication changes provided in hand delivered After Visit Summary as well as reviewed in the room.

## 2024-08-09 NOTE — PROGRESS NOTES
[facility]    Date: 08/09/24     Vitals:    08/09/24 1101   BP: 116/67   Pulse: 67   SpO2: 97%   Weight 191     Method of arrival: Ambulatory with walker    Weighing self daily: No    Monitoring Heart Failure Zones: No    Today's HF Zone: Green    Taking medications as prescribed: Yes    Edema Yes    Shortness of Air: Yes with activity    Number of pillows used at night:<2    Educational Materials given:  AVS                                                                         ReDS Value: 22  21-24 Low Normal      Candice Borges MA 08/09/24 11:02 EDT      1.16

## 2024-08-09 NOTE — PROGRESS NOTES
Heart Failure Clinic  Pharmacist Note     Alberto Guzman is a 89 y.o. male seen in the Heart Failure Clinic for HFrEF with most recent EF of 36-40% on 6/10/24.  He last saw Dr. Alcaraz on 7/9/24 and stopped his Entresto due to hypotension, but instructed him to restart is SBP>100 and to start back with 1/2 tablet bid. His daughter had somehow been transferred to us and was asking medication questions and therefore, he was added to the schedule.     Alberto Guzman reports a fair understanding of medications.  He is accompanied by his daughter today who manages his medications and takes care of him altogether.     Patient reports some swelling in his ankles and reports that it is worst than normal. He reports SOB with activity. He is currently taking Bumex 1mg daily and 2mg on Tuesday and Thursdays.     He has stopped Jardiance, but daughter was unsure why. She also reports that Toprol had been stopped. She is currently giving him a 1/2 tablet of Entresto 24/26mg in the mornings only, as his BP in the morning is usually in the 120-130's/40-60's but is in the 100's/40-60's in the evenings. She reports that she checks his BP all throughout the day. Patient reports some dizziness when getting up too fast, but denies any other episodes outside of getting up.     Patient has a rigoberto approved already for his until 5/17/25- Made patient aware- they would like some assistance with the Entresto copay.       Medication Use:   Hx of med intolerances:  None related to HF  Retail Rx Management: [facility]- has rigoberto approved through 5/17/25 for Verquvo and Entresto- ship to patient    Past Medical History:   Diagnosis Date    Arthritis     Atrial flutter     Back pain     Benign prostatic hyperplasia     Bladder tumor     Cancer     [id number]    Chronic systolic heart failure 09/14/2021    Colon cancer     Coronary artery disease     DVT (deep venous thrombosis)     r leg    Elevated cholesterol     Heart attack      Hypertension     Numbness     feet/hands    Osteoporosis     PVD (peripheral vascular disease)     Rheumatoid arthritis     Stroke     Ventricular tachycardia      ALLERGIES: Patient has no known allergies.  Current Outpatient Medications   Medication Sig Dispense Refill    ascorbic acid (VITAMIN C) 500 MG tablet Take 1 tablet by mouth Daily. OTC Supplement      aspirin 81 MG tablet Take 1 tablet by mouth Daily. 30 tablet 11    atorvastatin (LIPITOR) 20 MG tablet Take 1 tablet by mouth Daily.      bumetanide (BUMEX) 1 MG tablet Take 2 tablets by mouth Daily.      COLLAGEN PO Take 1 capsule by mouth Daily. OTC supplement      empagliflozin (JARDIANCE) 10 MG tablet tablet Take 1 tablet by mouth Daily. 30 tablet 1    HYDROcodone-acetaminophen (NORCO)  MG per tablet Take 1 tablet by mouth Every 8 (Eight) Hours As Needed for Moderate Pain. 12 tablet 0    magnesium, as, gluconate (MAGONATE) 500 (27 Mg) MG tablet Take 1 tablet by mouth Daily. OTC Supplement      metoprolol succinate XL (TOPROL-XL) 50 MG 24 hr tablet Take 0.5 tablets by mouth Daily. (Patient not taking: Reported on 7/9/2024)      nitroglycerin (NITROSTAT) 0.4 MG SL tablet Place 1 tablet under the tongue Every 5 (Five) Minutes As Needed for Chest Pain. Take no more than 3 doses in 15 minutes. (Patient not taking: Reported on 7/9/2024)      pantoprazole (PROTONIX) 40 MG EC tablet Take 1 tablet by mouth Daily.      Potassium 99 MG tablet Take 1 tablet by mouth Daily. OTC Supplement (Patient not taking: Reported on 7/9/2024)      sacubitril-valsartan (ENTRESTO) 24-26 MG tablet Take 1 tablet by mouth Every 12 (Twelve) Hours. 60 tablet 1    tamsulosin (FLOMAX) 0.4 MG capsule 24 hr capsule Take 1 capsule by mouth 2 (Two) Times a Day. (Patient not taking: Reported on 7/9/2024) 30 capsule 1    warfarin (COUMADIN) 5 MG tablet Take 1 tablet by mouth Daily.       No current facility-administered medications for this encounter.       Vaccination History:    Pneumonia: reports UTD  Annual Influenza: will get when due  Shingles: reports UTD    Objective  There were no vitals filed for this visit.  Wt Readings from Last 3 Encounters:   07/16/24 86.6 kg (190 lb 14.7 oz)   07/09/24 86.6 kg (191 lb)   07/03/24 98 kg (216 lb)     There were no vitals filed for this visit.  Lab Results   Component Value Date    GLUCOSE 88 07/11/2024    BUN 13 07/11/2024    CREATININE 1.24 07/11/2024    EGFRIFNONA 73 05/14/2021    BCR 10.5 07/11/2024    K 4.7 07/11/2024    CO2 23.7 07/11/2024    CALCIUM 8.8 07/11/2024    ALBUMIN 3.4 (L) 06/26/2024    AST 40 06/26/2024    ALT 39 06/26/2024     Lab Results   Component Value Date    WBC 4.69 07/11/2024    HGB 11.8 (L) 07/11/2024    HCT 34.9 (L) 07/11/2024    MCV 98.0 (H) 07/11/2024     07/11/2024     Lab Results   Component Value Date    TROPONINT 44 (H) 06/26/2024     Lab Results   Component Value Date    PROBNP 986.4 06/09/2024     Results for orders placed during the hospital encounter of 06/09/24    Adult Transthoracic Echo Complete w/ Color, Spectral and Contrast if Necessary Per Protocol    Interpretation Summary    The study is technically difficult for diagnosis. The quality of the study is limited with poor acoustic windows.    Normal left ventricular cavity size noted.    The following left ventricular wall segments are dyskinetic: apical lateral, apical septal and apex.    Left ventricular ejection fraction appears to be 36 - 40%.    The aortic valve exhibits sclerosis. There is mild calcification of the aortic valve. The aortic valve was poorly visualized but appears trileaflet.    Trace aortic valve regurgitation is present. No hemodynamically significant aortic valve stenosis is present.    There is mild calcification of the mitral valve. Mild mitral valve regurgitation is present. No significant mitral valve stenosis is present.    Mild tricuspid valve regurgitation is present. Estimated right ventricular systolic pressure  from tricuspid regurgitation is mildly elevated (35-45 mmHg).    There is no evidence of pericardial effusion. .         GDMT    Drug Class   Drug   Dose Last Dose Adjustment Additional Titration   Notes   ACEi/ARB/ARNI Entresto 24/26mg 1/2  8/9/24 1/2 bid     Beta Blocker      MRA        SGLT2i  UTI with it     Verquvo 2.5mg 8/9/24         Drug Therapy Problems    1. Drug Interactions Screening  2. Drug-Disease Interactions: NSAIDS  3. GDMT    Recommendations:     No significant drug interactions  2. Patient only uses APAP PRN and never uses any NSAIDS  3. Recommend for patient to take 1/2 tablet of Entresto 24/26mg bid. Encouraged daughter that she did not have to take his BP so often, as patient denies any issues with lightheadedness or dizziness in the past. Counseled her to monitor more if patient is complaining of those types of symptoms. Rosa to start Verquvo 2.5mg daily- copay of $636.58 which will be taken care of by the rigoberto. Rosa gave some samples today, but Rx will be shipped to them on Monday.       Patient was educated on heart failure medications and the importance of medication adherence. All questions were addressed and patient expressed understanding. Used teach-back method to assess understanding.     Thank you for allowing me to participate in the care of your patient,    Keyanna Tinoco [facility]  08/09/24  10:56 EDT

## 2024-08-12 ENCOUNTER — OFFICE VISIT (OUTPATIENT)
Dept: CARDIOLOGY | Facility: CLINIC | Age: 89
End: 2024-08-12
Payer: MEDICARE

## 2024-08-12 VITALS
OXYGEN SATURATION: 98 % | HEART RATE: 65 BPM | BODY MASS INDEX: 23.62 KG/M2 | SYSTOLIC BLOOD PRESSURE: 110 MMHG | WEIGHT: 194 LBS | DIASTOLIC BLOOD PRESSURE: 62 MMHG | HEIGHT: 76 IN

## 2024-08-12 DIAGNOSIS — Z79.01 CHRONIC ANTICOAGULATION: ICD-10-CM

## 2024-08-12 DIAGNOSIS — I50.22 CHRONIC HFREF (HEART FAILURE WITH REDUCED EJECTION FRACTION): ICD-10-CM

## 2024-08-12 DIAGNOSIS — I10 ESSENTIAL HYPERTENSION: ICD-10-CM

## 2024-08-12 DIAGNOSIS — E78.5 HYPERLIPIDEMIA LDL GOAL <70: ICD-10-CM

## 2024-08-12 DIAGNOSIS — I25.10 CORONARY ARTERY DISEASE INVOLVING NATIVE CORONARY ARTERY OF NATIVE HEART WITHOUT ANGINA PECTORIS: Primary | ICD-10-CM

## 2024-08-12 DIAGNOSIS — I47.29 VENTRICULAR TACHYCARDIA (PAROXYSMAL): ICD-10-CM

## 2024-08-12 DIAGNOSIS — I48.92 ATRIAL FLUTTER WITH CONTROLLED RESPONSE: ICD-10-CM

## 2024-08-12 PROCEDURE — 99214 OFFICE O/P EST MOD 30 MIN: CPT | Performed by: INTERNAL MEDICINE

## 2024-08-12 NOTE — PROGRESS NOTES
subjective     Chief Complaint   Patient presents with    Hypertension     Pt states BP has been running low       Problems Addressed This Visit  1. Coronary artery disease, anterior wall myocardial infarction with directional atherectomy of LAD in 1983 and bare-metal stent to the LAD in 1995, nonobstructive disease 2015    2. 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, 46 to 50%)    3. Ventricular tachycardia (paroxysmal)    4. Essential hypertension    5. Hyperlipidemia LDL goal <70    6. Atrial flutter , ventricular paced rhythm    7. Chronic anticoagulation with Coumadin        History of Present Illness  Patient is here for cardiology follow-up.  He states that he is doing very well he denies any chest pain /palpitations .    Patient has known coronary artery disease with prior anterior wall myocardial infarction requiring directional atherectomy LAD 8923 and bare-metal stent in 1995.  He denies any chest pain.    Chronic HFrEF.  He is on Entresto and Verquvo which was recently started at 2.5 mg daily.  He is tolerating it very well along with Bumex.  Blood pressure sometimes goes low with Entresto but he has been able to take it so far.    Marked bilateral lower extremity edema on Bumex along other medications for heart failure including Jardiance, Entresto, Verquvo    Hyperlipidemia on Lipitor therapy.  No drug side effects.    Patient also has history of atrial flutter and is chronically anticoagulated with Coumadin.  He will have his lab work done next week.        Past Surgical History:   Procedure Laterality Date    CATARACT EXTRACTION Bilateral     CHOLECYSTECTOMY      COLON RESECTION      COLONOSCOPY      EXPLORATORY LAPAROTOMY N/A 6/12/2024    Procedure: LAPAROTOMY EXPLORATORY;  Surgeon: Deo Lennon MD;  Location: [facility];  Service: General;  Laterality: N/A;    HEMORROIDECTOMY      HERNIA REPAIR      left inguinal/umbilical    HIP ARTHROPLASTY Right     INSERT / REPLACE /  REMOVE PACEMAKER      JOINT REPLACEMENT      PACEMAKER IMPLANTATION      PROSTATE SURGERY      TRANSURETHRAL RESECTION OF BLADDER TUMOR N/A 04/18/2018    Procedure: CYSTOSCOPY TRANSURETHRAL RESECTION OF SMALL BLADDER TUMOR;  Surgeon: Alfonso Collins MD;  Location: [facility] OR;  Service: Urology    TRANSURETHRAL RESECTION OF BLADDER TUMOR N/A 08/29/2018    Procedure: CYSTOSCOPY TRANSURETHRAL RESECTION OF BLADDER TUMOR;  Surgeon: Alfonso Collins MD;  Location: [facility] OR;  Service: Urology     Family History   Problem Relation Age of Onset    No Known Problems Mother     No Known Problems Father     Heart disease Brother      Past Medical History:   Diagnosis Date    Arthritis     Atrial flutter     Back pain     Benign prostatic hyperplasia     Bladder tumor     Cancer     [id number]/melanoma    Chronic systolic heart failure 09/14/2021    Colon cancer     Coronary artery disease     DVT (deep venous thrombosis)     r leg    Elevated cholesterol     Heart attack     Hypertension     Numbness     feet/hands    Osteoporosis     PVD (peripheral vascular disease)     Rheumatoid arthritis     Stroke     Ventricular tachycardia      Patient Active Problem List   Diagnosis    Urinary retention    Bladder neck contracture    Bladder tumor    Aftercare following surgery of the genitourinary system    Atrial flutter , ventricular paced rhythm    Presence of cardiac pacemaker 6/3/2025    Hyperlipidemia LDL goal <70    Essential hypertension    Chronic anticoagulation with Coumadin    Ventricular tachycardia (paroxysmal)    Asymptomatic PVD (peripheral vascular disease)    Coronary artery disease, anterior wall myocardial infarction with directional atherectomy of LAD in 1983 and bare-metal stent to the LAD in 1995, nonobstructive disease 2015    Ischemic cardiomyopathy, LV ejection fraction around 45 to 50%    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, 46 to 50%)    Bilateral lower extremity edema     Stroke (cerebrum)    Small bowel obstruction    Hypokalemia    Hypomagnesemia       Social History     Tobacco Use    Smoking status: Former     Types: Cigarettes     Passive exposure: Past    Smokeless tobacco: Never   Vaping Use    Vaping status: Never Used   Substance Use Topics    Alcohol use: Yes     Comment: Occasional few times a year    Drug use: No       No Known Allergies    Current Outpatient Medications on File Prior to Visit   Medication Sig    ascorbic acid (VITAMIN C) 500 MG tablet Take 1 tablet by mouth Daily. OTC Supplement    aspirin 81 MG tablet Take 1 tablet by mouth Daily.    atorvastatin (LIPITOR) 20 MG tablet Take 1 tablet by mouth Daily.    bumetanide (BUMEX) 1 MG tablet Take 2 tablets by mouth Daily.    COLLAGEN PO Take 1 capsule by mouth Daily. OTC supplement    gabapentin (NEURONTIN) 600 MG tablet Take 1 tablet by mouth 2 (Two) Times a Day. Only takes at night    HYDROcodone-acetaminophen (NORCO)  MG per tablet Take 1 tablet by mouth Every 8 (Eight) Hours As Needed for Moderate Pain.    pantoprazole (PROTONIX) 40 MG EC tablet Take 1 tablet by mouth Daily.    polyethylene glycol (MiraLax) 17 GM/SCOOP powder Take 17 g by mouth Every Other Day.    sacubitril-valsartan (ENTRESTO) 24-26 MG tablet Take ½ tablet by mouth Every 12 Hours. Hold medication if top number of blood pressure is less than 100.    Vericiguat 2.5 MG tablet Take 1 tablet by mouth Daily.    warfarin (COUMADIN) 5 MG tablet Take 1 tablet by mouth Daily.    [DISCONTINUED] nitroglycerin (NITROSTAT) 0.4 MG SL tablet Place 1 tablet under the tongue Every 5 (Five) Minutes As Needed for Chest Pain. Take no more than 3 doses in 15 minutes. (Patient not taking: Reported on 7/9/2024)     No current facility-administered medications on file prior to visit.     (Not in a hospital admission)      Results for orders placed during the hospital encounter of 06/09/24    Adult Transthoracic Echo Complete w/ Color, Spectral and Contrast  "if Necessary Per Protocol    Interpretation Summary    The study is technically difficult for diagnosis. The quality of the study is limited with poor acoustic windows.    Normal left ventricular cavity size noted.    The following left ventricular wall segments are dyskinetic: apical lateral, apical septal and apex.    Left ventricular ejection fraction appears to be 36 - 40%.    The aortic valve exhibits sclerosis. There is mild calcification of the aortic valve. The aortic valve was poorly visualized but appears trileaflet.    Trace aortic valve regurgitation is present. No hemodynamically significant aortic valve stenosis is present.    There is mild calcification of the mitral valve. Mild mitral valve regurgitation is present. No significant mitral valve stenosis is present.    Mild tricuspid valve regurgitation is present. Estimated right ventricular systolic pressure from tricuspid regurgitation is mildly elevated (35-45 mmHg).    There is no evidence of pericardial effusion. .            The following portions of the patient's history were reviewed and updated as appropriate: allergies, current medications, past family history, past medical history, past social history, past surgical history and problem list.    Review of Systems   Constitutional: Negative.   HENT: Negative.  Negative for congestion.    Eyes: Negative.    Cardiovascular:  Positive for leg swelling. Negative for chest pain, cyanosis, dyspnea on exertion, irregular heartbeat, near-syncope, orthopnea, palpitations, paroxysmal nocturnal dyspnea and syncope.   Respiratory: Negative.  Negative for shortness of breath.    Hematologic/Lymphatic: Negative.    Musculoskeletal: Negative.    Gastrointestinal: Negative.    Neurological: Negative.  Negative for headaches.          Objective:     /62 (BP Location: Left arm, Patient Position: Sitting, Cuff Size: Adult)   Pulse 65   Ht 193 cm (76\")   Wt 88 kg (194 lb)   SpO2 98%   BMI 23.61 kg/m² " "  Cardiovascular:      PMI at left midclavicular line. Normal rate. Regular rhythm. Normal S1. Normal S2.       Murmurs: There is no murmur.      No gallop.  No click. No rub.   Pulses:     Intact distal pulses.   Edema:     Peripheral edema present.     Pretibial: bilateral 3+ edema of the pretibial area.     Ankle: bilateral 3+ edema of the ankle.     Feet: bilateral 2+ edema of the feet.          Lab Review  Lab Results   Component Value Date     08/09/2024    K 3.7 08/09/2024     08/09/2024    BUN 10 08/09/2024    CREATININE 1.06 08/09/2024    GLUCOSE 87 08/09/2024    CALCIUM 8.8 08/09/2024    ALT 39 06/26/2024    ALKPHOS 133 (H) 06/26/2024     No results found for: \"CKTOTAL\"  Lab Results   Component Value Date    WBC 4.69 07/11/2024    HGB 11.8 (L) 07/11/2024    HCT 34.9 (L) 07/11/2024     07/11/2024     Lab Results   Component Value Date    INR 2.47 (H) 06/26/2024     Lab Results   Component Value Date    MG 1.7 08/09/2024     Lab Results   Component Value Date    TSH 1.250 06/10/2024     No results found for: \"BNP\"  No results found for: \"CHLPL\", \"CHOL\", \"TRIG\", \"HDL\", \"VLDL\", \"LDL\"  No results found for: \"LDL\"  Lab Results   Component Value Date    PROBNP 1,279.0 08/09/2024           Lab 08/09/24  1053   PROBNP 1,279.0         Procedures       I personally viewed and interpreted the patient's LAB data         Assessment:     1. Coronary artery disease, anterior wall myocardial infarction with directional atherectomy of LAD in 1983 and bare-metal stent to the LAD in 1995, nonobstructive disease 2015    2. 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, 46 to 50%)    3. Ventricular tachycardia (paroxysmal)    4. Essential hypertension    5. Hyperlipidemia LDL goal <70    6. Atrial flutter , ventricular paced rhythm    7. Chronic anticoagulation with Coumadin          Plan:     Coronary artery disease status post anterior wall myocardial infarction with LAD stent in 1995.  " Patient is asymptomatic, healthy lifestyle and aggressive risk factor modification emphasized.      Chronic HFrEF  He will continue Entresto low-dose along with Verquvo 2.5 mg daily, Bumex 2 mg daily.    Blood pressure is running low she was advised to hold Entresto if blood pressure is less than 100 systolic.  However he will resume with the next dose.    Hyperlipidemia  Patient will continue Lipitor.    Chronic anticoagulation with Coumadin continued INR next week.  Follow-up scheduled    No follow-ups on file.

## 2024-08-12 NOTE — LETTER
August 12, 2024     FLOWER Pickard  [address]  [place] [address]    Patient: Alberto Guzman   YOB: 1931   Date of Visit: 8/12/2024       Dear FLOWER Pickard    Alberto Guzman was in my office today. Below is a copy of my note.    If you have questions, please do not hesitate to call me. I look forward to following Alberto along with you.         Sincerely,        Selam Alcaraz MD        CC: No Recipients    subjective     Chief Complaint   Patient presents with   • Hypertension     Pt states BP has been running low       Problems Addressed This Visit  No diagnosis found.    History of Present Illness          Past Surgical History:   Procedure Laterality Date   • CATARACT EXTRACTION Bilateral    • CHOLECYSTECTOMY     • COLON RESECTION     • COLONOSCOPY     • EXPLORATORY LAPAROTOMY N/A 6/12/2024    Procedure: LAPAROTOMY EXPLORATORY;  Surgeon: Deo Lennon MD;  Location: [facility];  Service: General;  Laterality: N/A;   • HEMORROIDECTOMY     • HERNIA REPAIR      left inguinal/umbilical   • HIP ARTHROPLASTY Right    • INSERT / REPLACE / REMOVE PACEMAKER     • JOINT REPLACEMENT     • PACEMAKER IMPLANTATION     • PROSTATE SURGERY     • TRANSURETHRAL RESECTION OF BLADDER TUMOR N/A 04/18/2018    Procedure: CYSTOSCOPY TRANSURETHRAL RESECTION OF SMALL BLADDER TUMOR;  Surgeon: Alfonso Collins MD;  Location: [facility];  Service: Urology   • TRANSURETHRAL RESECTION OF BLADDER TUMOR N/A 08/29/2018    Procedure: CYSTOSCOPY TRANSURETHRAL RESECTION OF BLADDER TUMOR;  Surgeon: Alfonso Collins MD;  Location: [facility];  Service: Urology     Family History   Problem Relation Age of Onset   • No Known Problems Mother    • No Known Problems Father    • Heart disease Brother      Past Medical History:   Diagnosis Date   • Arthritis    • Atrial flutter    • Back pain    • Benign prostatic hyperplasia    • Bladder tumor    • Cancer     [id number]   •  Chronic systolic heart failure 09/14/2021   • Colon cancer    • Coronary artery disease    • DVT (deep venous thrombosis)     r leg   • Elevated cholesterol    • Heart attack    • Hypertension    • Numbness     feet/hands   • Osteoporosis    • PVD (peripheral vascular disease)    • Rheumatoid arthritis    • Stroke    • Ventricular tachycardia      Patient Active Problem List   Diagnosis   • Urinary retention   • Bladder neck contracture   • Bladder tumor   • Aftercare following surgery of the genitourinary system   • Atrial flutter , ventricular paced rhythm   • Presence of cardiac pacemaker 6/3/2025   • Hyperlipidemia LDL goal <70   • Essential hypertension   • Chronic anticoagulation with Coumadin   • Ventricular tachycardia (paroxysmal)   • Asymptomatic PVD (peripheral vascular disease)   • Coronary artery disease, anterior wall myocardial infarction with directional atherectomy of LAD in 1983 and bare-metal stent to the LAD in 1995, nonobstructive disease 2015   • Ischemic cardiomyopathy, LV ejection fraction around 45 to 50%   • 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, 46 to 50%)   • Bilateral lower extremity edema   • Stroke (cerebrum)   • Small bowel obstruction   • Hypokalemia   • Hypomagnesemia       Social History     Tobacco Use   • Smoking status: Former     Types: Cigarettes     Passive exposure: Past   • Smokeless tobacco: Never   Vaping Use   • Vaping status: Never Used   Substance Use Topics   • Alcohol use: Yes     Comment: Occasional few times a year   • Drug use: No       No Known Allergies    Current Outpatient Medications on File Prior to Visit   Medication Sig   • ascorbic acid (VITAMIN C) 500 MG tablet Take 1 tablet by mouth Daily. OTC Supplement   • aspirin 81 MG tablet Take 1 tablet by mouth Daily.   • atorvastatin (LIPITOR) 20 MG tablet Take 1 tablet by mouth Daily.   • bumetanide (BUMEX) 1 MG tablet Take 2 tablets by mouth Daily.   • COLLAGEN PO Take 1 capsule by mouth  Daily. OTC supplement   • gabapentin (NEURONTIN) 600 MG tablet Take 1 tablet by mouth 2 (Two) Times a Day. Only takes at night   • HYDROcodone-acetaminophen (NORCO)  MG per tablet Take 1 tablet by mouth Every 8 (Eight) Hours As Needed for Moderate Pain.   • pantoprazole (PROTONIX) 40 MG EC tablet Take 1 tablet by mouth Daily.   • polyethylene glycol (MiraLax) 17 GM/SCOOP powder Take 17 g by mouth Every Other Day.   • sacubitril-valsartan (ENTRESTO) 24-26 MG tablet Take ½ tablet by mouth Every 12 Hours. Hold medication if top number of blood pressure is less than 100.   • Vericiguat 2.5 MG tablet Take 1 tablet by mouth Daily.   • warfarin (COUMADIN) 5 MG tablet Take 1 tablet by mouth Daily.   • [DISCONTINUED] nitroglycerin (NITROSTAT) 0.4 MG SL tablet Place 1 tablet under the tongue Every 5 (Five) Minutes As Needed for Chest Pain. Take no more than 3 doses in 15 minutes. (Patient not taking: Reported on 7/9/2024)     No current facility-administered medications on file prior to visit.     (Not in a hospital admission)      Results for orders placed during the hospital encounter of 06/09/24    Adult Transthoracic Echo Complete w/ Color, Spectral and Contrast if Necessary Per Protocol    Interpretation Summary  •  The study is technically difficult for diagnosis. The quality of the study is limited with poor acoustic windows.  •  Normal left ventricular cavity size noted.  •  The following left ventricular wall segments are dyskinetic: apical lateral, apical septal and apex.  •  Left ventricular ejection fraction appears to be 36 - 40%.  •  The aortic valve exhibits sclerosis. There is mild calcification of the aortic valve. The aortic valve was poorly visualized but appears trileaflet.  •  Trace aortic valve regurgitation is present. No hemodynamically significant aortic valve stenosis is present.  •  There is mild calcification of the mitral valve. Mild mitral valve regurgitation is present. No significant  "mitral valve stenosis is present.  •  Mild tricuspid valve regurgitation is present. Estimated right ventricular systolic pressure from tricuspid regurgitation is mildly elevated (35-45 mmHg).  •  There is no evidence of pericardial effusion. .            The following portions of the patient's history were reviewed and updated as appropriate: allergies, current medications, past family history, past medical history, past social history, past surgical history and problem list.    ROS       Objective:     /62 (BP Location: Left arm, Patient Position: Sitting, Cuff Size: Adult)   Pulse 65   Ht 193 cm (76\")   Wt 88 kg (194 lb)   SpO2 98%   BMI 23.61 kg/m²   Physical Exam      Lab Review  Lab Results   Component Value Date     08/09/2024    K 3.7 08/09/2024     08/09/2024    BUN 10 08/09/2024    CREATININE 1.06 08/09/2024    GLUCOSE 87 08/09/2024    CALCIUM 8.8 08/09/2024    ALT 39 06/26/2024    ALKPHOS 133 (H) 06/26/2024     No results found for: \"CKTOTAL\"  Lab Results   Component Value Date    WBC 4.69 07/11/2024    HGB 11.8 (L) 07/11/2024    HCT 34.9 (L) 07/11/2024     07/11/2024     Lab Results   Component Value Date    INR 2.47 (H) 06/26/2024     Lab Results   Component Value Date    MG 1.7 08/09/2024     Lab Results   Component Value Date    TSH 1.250 06/10/2024     No results found for: \"BNP\"  No results found for: \"CHLPL\", \"CHOL\", \"TRIG\", \"HDL\", \"VLDL\", \"LDL\"  No results found for: \"LDL\"  Lab Results   Component Value Date    PROBNP 1,279.0 08/09/2024           Lab 08/09/24  1053   PROBNP 1,279.0         Procedures       I personally viewed and interpreted the patient's LAB data         Assessment:   No diagnosis found.      Plan:              No follow-ups on file.    "

## 2024-09-05 ENCOUNTER — SPECIALTY PHARMACY (OUTPATIENT)
Dept: PHARMACY | Facility: HOSPITAL | Age: 89
End: 2024-09-05
Payer: MEDICARE

## 2024-09-05 RX ORDER — VERICIGUAT 2.5 MG/1
2.5 TABLET, FILM COATED ORAL DAILY
Qty: 30 TABLET | Refills: 0 | Status: SHIPPED | OUTPATIENT
Start: 2024-09-05 | End: 2024-09-06 | Stop reason: HOSPADM

## 2024-09-06 ENCOUNTER — HOSPITAL ENCOUNTER (OUTPATIENT)
Dept: CARDIOLOGY | Facility: HOSPITAL | Age: 89
Discharge: HOME | End: 2024-09-06
Payer: MEDICARE

## 2024-09-06 VITALS
DIASTOLIC BLOOD PRESSURE: 57 MMHG | WEIGHT: 195.6 LBS | HEIGHT: 76 IN | HEART RATE: 68 BPM | OXYGEN SATURATION: 96 % | BODY MASS INDEX: 23.82 KG/M2 | SYSTOLIC BLOOD PRESSURE: 108 MMHG

## 2024-09-06 DIAGNOSIS — E83.42 HYPOMAGNESEMIA: ICD-10-CM

## 2024-09-06 DIAGNOSIS — I48.92 ATRIAL FLUTTER WITH CONTROLLED RESPONSE: ICD-10-CM

## 2024-09-06 DIAGNOSIS — E78.5 HYPERLIPIDEMIA LDL GOAL <70: ICD-10-CM

## 2024-09-06 DIAGNOSIS — Z79.01 CHRONIC ANTICOAGULATION: ICD-10-CM

## 2024-09-06 DIAGNOSIS — I10 ESSENTIAL HYPERTENSION: ICD-10-CM

## 2024-09-06 DIAGNOSIS — I25.5 ISCHEMIC CARDIOMYOPATHY: ICD-10-CM

## 2024-09-06 DIAGNOSIS — I50.22 CHRONIC HFREF (HEART FAILURE WITH REDUCED EJECTION FRACTION): Primary | ICD-10-CM

## 2024-09-06 DIAGNOSIS — I25.10 CORONARY ARTERY DISEASE INVOLVING NATIVE CORONARY ARTERY OF NATIVE HEART WITHOUT ANGINA PECTORIS: ICD-10-CM

## 2024-09-06 LAB
ABSOLUTE LUNG FLUID CONTENT: 27 % (ref 20–35)
ANION GAP SERPL CALCULATED.3IONS-SCNC: 11.4 MMOL/L (ref 5–15)
BUN SERPL-MCNC: 11 MG/DL (ref 8–23)
BUN/CREAT SERPL: 11.1 (ref 7–25)
CALCIUM SPEC-SCNC: 8.8 MG/DL (ref 8.2–9.6)
CHLORIDE SERPL-SCNC: 102 MMOL/L (ref 98–107)
CO2 SERPL-SCNC: 25.6 MMOL/L (ref 22–29)
CREAT SERPL-MCNC: 0.99 MG/DL (ref 0.76–1.27)
EGFRCR SERPLBLD CKD-EPI 2021: 71 ML/MIN/1.73
GLUCOSE SERPL-MCNC: 90 MG/DL (ref 65–99)
MAGNESIUM SERPL-MCNC: 1.5 MG/DL (ref 1.7–2.3)
NT-PROBNP SERPL-MCNC: 879.2 PG/ML (ref 0–1800)
POTASSIUM SERPL-SCNC: 4 MMOL/L (ref 3.5–5.2)
SODIUM SERPL-SCNC: 139 MMOL/L (ref 136–145)

## 2024-09-06 PROCEDURE — 36415 COLL VENOUS BLD VENIPUNCTURE: CPT | Performed by: PHYSICIAN ASSISTANT

## 2024-09-06 PROCEDURE — 83735 ASSAY OF MAGNESIUM: CPT | Performed by: PHYSICIAN ASSISTANT

## 2024-09-06 PROCEDURE — 94726 PLETHYSMOGRAPHY LUNG VOLUMES: CPT | Performed by: PHYSICIAN ASSISTANT

## 2024-09-06 PROCEDURE — 83880 ASSAY OF NATRIURETIC PEPTIDE: CPT | Performed by: PHYSICIAN ASSISTANT

## 2024-09-06 PROCEDURE — 80048 BASIC METABOLIC PNL TOTAL CA: CPT | Performed by: PHYSICIAN ASSISTANT

## 2024-09-06 RX ORDER — VERICIGUAT 5 MG/1
5 TABLET, FILM COATED ORAL DAILY
Qty: 30 TABLET | Refills: 1 | Status: SHIPPED | OUTPATIENT
Start: 2024-09-06 | End: 2024-09-06 | Stop reason: SDUPTHER

## 2024-09-06 RX ORDER — MAGNESIUM OXIDE 400 MG/1
400 TABLET ORAL DAILY
Qty: 30 TABLET | Refills: 2 | Status: SHIPPED | OUTPATIENT
Start: 2024-09-06 | End: 2024-12-05

## 2024-09-06 RX ORDER — VERICIGUAT 5 MG/1
5 TABLET, FILM COATED ORAL DAILY
Qty: 30 TABLET | Refills: 1 | Status: SHIPPED | OUTPATIENT
Start: 2024-09-06 | End: 2024-11-05

## 2024-09-06 RX ORDER — MAGNESIUM OXIDE 400 MG/1
400 TABLET ORAL DAILY
Qty: 30 TABLET | Refills: 2 | Status: SHIPPED | OUTPATIENT
Start: 2024-09-06 | End: 2024-09-06 | Stop reason: SDUPTHER

## 2024-09-06 NOTE — PROGRESS NOTES
Heart Failure Clinic  Pharmacist Note     Alberto Guzman is a 89 y.o. male seen in the Heart Failure Clinic for HFrEF with most recent EF of 36-40% on 6/10/24.  He last saw Dr. Alcaraz on 7/9/24 and stopped his Entresto due to hypotension, but instructed him to restart is SBP>100 and to start back with 1/2 tablet bid.     Alberto Guzman reports a fair understanding of medications.  He is accompanied by his daughter today who manages his medications and takes care of him altogether.     Patient reports  a normal amount of swelling in his ankles and reports that it does get worst in the evenings. He is currently taking Bumex 1mg daily and 2mg on Tuesday and Thursdays.     His daughter reports that she has been giving him the 1/2 tablet of Entresto bid now and he has tolerated it well. He denies any episodes of lightheadedness, other than when he gets up too quickly. She reports that his SBP's have been in the 105-112 range.         Medication Use:   Hx of med intolerances:  None related to HF  Retail Rx Management: [facility]- has rigoberto approved through 5/17/25 for Verquvo and Entresto- ship to patient    Past Medical History:   Diagnosis Date    Arthritis     Atrial flutter     Back pain     Benign prostatic hyperplasia     Bladder tumor     Cancer     [id number]/melanoma    Chronic systolic heart failure 09/14/2021    Colon cancer     Coronary artery disease     DVT (deep venous thrombosis)     r leg    Elevated cholesterol     Heart attack     Hypertension     Numbness     feet/hands    Osteoporosis     PVD (peripheral vascular disease)     Rheumatoid arthritis     Stroke     Ventricular tachycardia      ALLERGIES: Patient has no known allergies.  Current Outpatient Medications   Medication Sig Dispense Refill    aspirin 81 MG tablet Take 1 tablet by mouth Daily. 30 tablet 11    atorvastatin (LIPITOR) 20 MG tablet Take 1 tablet by mouth Daily.      bumetanide (BUMEX) 1 MG tablet Take 2 tablets by mouth Daily.    "   COLLAGEN PO Take 1 capsule by mouth Daily. OTC supplement      gabapentin (NEURONTIN) 600 MG tablet Take 1 tablet by mouth 2 (Two) Times a Day. Only takes at night      HYDROcodone-acetaminophen (NORCO)  MG per tablet Take 1 tablet by mouth Every 8 (Eight) Hours As Needed for Moderate Pain. 12 tablet 0    pantoprazole (PROTONIX) 40 MG EC tablet Take 1 tablet by mouth Daily.      sacubitril-valsartan (ENTRESTO) 24-26 MG tablet Take ½ tablet by mouth Every 12 Hours. Hold medication if top number of blood pressure is less than 100. 60 tablet 1    warfarin (COUMADIN) 5 MG tablet Take 1 tablet by mouth Daily.      Vericiguat (Verquvo) 5 MG tablet Take 1 tablet by mouth Daily for 60 days. 30 tablet 1     No current facility-administered medications for this encounter.       Vaccination History:   Pneumonia: reports UTD  Annual Influenza: will get when due  Shingles: reports UTD    Objective  Vitals:    09/06/24 1038   BP: 108/57   BP Location: Left arm   Patient Position: Sitting   Cuff Size: Adult   Pulse: 68   SpO2: 96%   Weight: 88.7 kg (195 lb 9.6 oz)   Height: 193 cm (76\")     Wt Readings from Last 3 Encounters:   09/06/24 88.7 kg (195 lb 9.6 oz)   08/12/24 88 kg (194 lb)   08/09/24 86.9 kg (191 lb 9.6 oz)         09/06/24  1038   Weight: 88.7 kg (195 lb 9.6 oz)     Lab Results   Component Value Date    GLUCOSE 90 09/06/2024    BUN 11 09/06/2024    CREATININE 0.99 09/06/2024    EGFRIFNONA 73 05/14/2021    BCR 11.1 09/06/2024    K 4.0 09/06/2024    CO2 25.6 09/06/2024    CALCIUM 8.8 09/06/2024    ALBUMIN 3.4 (L) 06/26/2024    AST 40 06/26/2024    ALT 39 06/26/2024     Lab Results   Component Value Date    WBC 4.69 07/11/2024    HGB 11.8 (L) 07/11/2024    HCT 34.9 (L) 07/11/2024    MCV 98.0 (H) 07/11/2024     07/11/2024     Lab Results   Component Value Date    TROPONINT 44 (H) 06/26/2024     Lab Results   Component Value Date    PROBNP 879.2 09/06/2024     Results for orders placed during the hospital " encounter of 06/09/24    Adult Transthoracic Echo Complete w/ Color, Spectral and Contrast if Necessary Per Protocol    Interpretation Summary    The study is technically difficult for diagnosis. The quality of the study is limited with poor acoustic windows.    Normal left ventricular cavity size noted.    The following left ventricular wall segments are dyskinetic: apical lateral, apical septal and apex.    Left ventricular ejection fraction appears to be 36 - 40%.    The aortic valve exhibits sclerosis. There is mild calcification of the aortic valve. The aortic valve was poorly visualized but appears trileaflet.    Trace aortic valve regurgitation is present. No hemodynamically significant aortic valve stenosis is present.    There is mild calcification of the mitral valve. Mild mitral valve regurgitation is present. No significant mitral valve stenosis is present.    Mild tricuspid valve regurgitation is present. Estimated right ventricular systolic pressure from tricuspid regurgitation is mildly elevated (35-45 mmHg).    There is no evidence of pericardial effusion. .         GDMT    Drug Class   Drug   Dose Last Dose Adjustment Additional Titration   Notes   ACEi/ARB/ARNI Entresto 24/26mg 1/2  8/9/24 1/2 bid     Beta Blocker      MRA        SGLT2i  UTI with it     Verquvo 5mg 9/6/24 increased         Drug Therapy Problems    1. GDMT  2. Magnesium low today at 1.5    Recommendations:     Recommend to increase Verquvo to 5mg. Patient just got a 2.5mg Rx shipped to him yesterday...insturcted patient to double up on until new Rx for 5mg gets to them.   Patient had been on a magnesium supplement but had be stopped in July sometime. Recommend restarting. Made Rosa aware.       Patient was educated on heart failure medications and the importance of medication adherence. All questions were addressed and patient expressed understanding. Used teach-back method to assess understanding.     Thank you for allowing me to  participate in the care of your patient,    Keyanna Tinoco, PharmD  09/06/24  11:19 EDT

## 2024-09-06 NOTE — PROGRESS NOTES
Heart Failure Clinic    Date: 09/06/24     Vitals:    09/06/24 1038   BP: 108/57   Pulse: 68   SpO2: 96%    Weight 195.6    Method of arrival: Ambulatory with cane    Weighing self daily: Most days    Monitoring Heart Failure Zones: Most days    Today's HF Zone: Green    Taking medications as prescribed: Yes    Edema Yes no more than usual    Shortness of Air: Yes with activity    Number of pillows used at night:<2    Educational Materials given:  avs                                                                         ReDS Value: 27  25-35 Optimal Value Status      Ishmael Camilo RN 09/06/24 10:40 EDT

## 2024-09-06 NOTE — ADDENDUM NOTE
Encounter addended by: Keyanna Tinoco, PharmD on: 9/6/2024 3:49 PM   Actions taken: Specialty Pharmacy task modified

## 2024-09-06 NOTE — PROGRESS NOTES
[facility]  RYAN Viera Tammie L, APRN  [address] [place] [address]    Thank you for asking me to see Alberto Guzman for congestive heart failure.    HPI:     This is a 89 y.o. male with known past medical history of:    Chronic HFrEF  Ischemic cardiomyopathy  TTE from June 2024 with EF 36-40%; mild TVR.  Mild mitral valve calcification.    TTE from 2015 with EF 45-50% per [facility] records  ASCVD  Anterior MI with directional atherectomy of LAD in 1983 & Baremetal stent to LAD in 1995  Chronic anticoagulation with Coumadin  Paroxysmal Atrial fib/flutter  PPM placement  Colon CA s/p radiation  Hx of SBO requiring hospitalization in June 2024    Alberto Guzman presents for today for Heart Failure clinic evlauation.  The patient is typically seen by Lianet Dia APRN.  Patient's primary cardiologist is Dr. Selam Alcaraz.     Last known EF 36-40%.   Last known hospitalization and/or ED visit: Patient was hospitalized in June 2024 secondary to small bowel obstruction secondary to internal hernia he later had ED visit after being sent from doctor's office for hypotension.  Accompanied by: Daughter          09/06/24 visit data/details regarding:   Dyspnea: Improving dyspnea on exertion  Lower extremity swelling: Lower extremity edema improved but present.    Abdominal swelling: Denies  Home weight: Weight monitoring booklet provided during initial visit; has scale  Home BP: BP monitoring booklet provided during initial visit; has blood pressure cuff  Home heart rate: HR monitoring booklet provided during initial visit;  Daily activities of living: Performing on his own with some assistance from daughter  Pillows/lying flat: Pillow  HF zone: Green  Patient is chest pain free. His daughter accompanies him today and they both feel he has had some improvement in energy and ability to perform daily activities of living.     He is  tolerating Verquevo well so far.    He has currently been on treatment for bronchitis from his PCP including antibiotics and steroids.            Review of Systems - Review of Systems   Constitutional: Negative for decreased appetite, diaphoresis and fever.   HENT:  Negative for congestion and ear pain.    Eyes:  Negative for blurred vision.   Cardiovascular:  Positive for dyspnea on exertion. Negative for chest pain.   Respiratory:  Positive for shortness of breath. Negative for cough and hemoptysis.    Endocrine: Negative for cold intolerance and polydipsia.   Hematologic/Lymphatic: Negative for adenopathy and bleeding problem.   Skin:  Negative for dry skin and nail changes.   Musculoskeletal:  Negative for arthritis and falls.   Gastrointestinal:  Negative for bloating and anorexia.   Genitourinary:  Negative for bladder incontinence and dysuria.   Neurological:  Negative for aphonia and difficulty with concentration.   Psychiatric/Behavioral:  Negative for altered mental status and hallucinations.    Allergic/Immunologic: Negative for environmental allergies and HIV exposure.         All other systems were reviewed and were negative.    Patient Active Problem List   Diagnosis    Urinary retention    Bladder neck contracture    Bladder tumor    Aftercare following surgery of the genitourinary system    Atrial flutter , ventricular paced rhythm    Presence of cardiac pacemaker 6/3/2025    Hyperlipidemia LDL goal <70    Essential hypertension    Chronic anticoagulation with Coumadin    Ventricular tachycardia (paroxysmal)    Asymptomatic PVD (peripheral vascular disease)    Coronary artery disease, anterior wall myocardial infarction with directional atherectomy of LAD in 1983 and bare-metal stent to the LAD in 1995, nonobstructive disease 2015    Ischemic cardiomyopathy, LV ejection fraction around 45 to 50%    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, 46 to 50%)    Bilateral lower extremity  edema    Stroke (cerebrum)    Small bowel obstruction    Hypokalemia    Hypomagnesemia       family history includes Heart disease in his brother; No Known Problems in his father and mother.     reports that he has quit smoking. His smoking use included cigarettes. He has been exposed to tobacco smoke. He has never used smokeless tobacco. He reports current alcohol use. He reports that he does not use drugs.    No Known Allergies      Current Outpatient Medications:     aspirin 81 MG tablet, Take 1 tablet by mouth Daily., Disp: 30 tablet, Rfl: 11    atorvastatin (LIPITOR) 20 MG tablet, Take 1 tablet by mouth Daily., Disp: , Rfl:     bumetanide (BUMEX) 1 MG tablet, Take 2 tablets by mouth Daily., Disp: , Rfl:     COLLAGEN PO, Take 1 capsule by mouth Daily. OTC supplement, Disp: , Rfl:     gabapentin (NEURONTIN) 600 MG tablet, Take 1 tablet by mouth 2 (Two) Times a Day. Only takes at night, Disp: , Rfl:     HYDROcodone-acetaminophen (NORCO)  MG per tablet, Take 1 tablet by mouth Every 8 (Eight) Hours As Needed for Moderate Pain., Disp: 12 tablet, Rfl: 0    magnesium oxide (MAG-OX) 400 MG tablet, Take 1 tablet by mouth Daily for 90 days., Disp: 30 tablet, Rfl: 2    pantoprazole (PROTONIX) 40 MG EC tablet, Take 1 tablet by mouth Daily., Disp: , Rfl:     sacubitril-valsartan (ENTRESTO) 24-26 MG tablet, Take ½ tablet by mouth Every 12 Hours. Hold medication if top number of blood pressure is less than 100., Disp: 60 tablet, Rfl: 1    Vericiguat (Verquvo) 5 MG tablet, Take 1 tablet by mouth Daily for 60 days., Disp: 30 tablet, Rfl: 1    warfarin (COUMADIN) 5 MG tablet, Take 1 tablet by mouth Daily., Disp: , Rfl:       Physical Exam:  I have reviewed the patient's current vital signs as documented in the patient's EMR.   Vitals:    09/06/24 1038   BP: 108/57   Pulse: 68   SpO2: 96%     Body mass index is 23.81 kg/m².       09/06/24  1038   Weight: 88.7 kg (195 lb 9.6 oz)      Physical Exam  Vitals and nursing note  reviewed.   Constitutional:       General: He is awake.      Appearance: Normal appearance. He is well-developed and well-groomed.      Comments: Chronically ill-appearing   HENT:      Head: Normocephalic and atraumatic.   Eyes:      General: Lids are normal.      Conjunctiva/sclera:      Right eye: Right conjunctiva is not injected.      Left eye: Left conjunctiva is not injected.   Cardiovascular:      Rate and Rhythm: Normal rate and regular rhythm.      Comments: Faint bilateral edema of the lower extremities  Pulmonary:      Breath sounds: No decreased breath sounds, wheezing, rhonchi or rales.   Abdominal:      General: Bowel sounds are normal.      Palpations: Abdomen is soft.   Musculoskeletal:      Right lower leg: Edema present.      Left lower leg: Edema present.   Neurological:      Mental Status: He is alert.   Psychiatric:         Attention and Perception: Attention normal.         Mood and Affect: Mood normal.         Behavior: Behavior is cooperative.          JVP: Volume/Pulsation: Normal.        DATA REVIEWED:       ---------------------------------------------------  TTE/DEYANIRA:  Results for orders placed during the hospital encounter of 06/09/24    Adult Transthoracic Echo Complete w/ Color, Spectral and Contrast if Necessary Per Protocol    Interpretation Summary    The study is technically difficult for diagnosis. The quality of the study is limited with poor acoustic windows.    Normal left ventricular cavity size noted.    The following left ventricular wall segments are dyskinetic: apical lateral, apical septal and apex.    Left ventricular ejection fraction appears to be 36 - 40%.    The aortic valve exhibits sclerosis. There is mild calcification of the aortic valve. The aortic valve was poorly visualized but appears trileaflet.    Trace aortic valve regurgitation is present. No hemodynamically significant aortic valve stenosis is present.    There is mild calcification of the mitral valve.  "Mild mitral valve regurgitation is present. No significant mitral valve stenosis is present.    Mild tricuspid valve regurgitation is present. Estimated right ventricular systolic pressure from tricuspid regurgitation is mildly elevated (35-45 mmHg).    There is no evidence of pericardial effusion. .        LAST HEART CATH/IF AVAILABLE:     No results found for this or any previous visit.      -----------------------------------------------------  CXR/Imaging:   Imaging Results (Most Recent)       None            I personally reviewed and interpreted the CXR.      -----------------------------------------------------  CT:   No radiology results for the last 30 days.  I personally reviewed the images of the CT scan.  My personal interpretation is below.      ----------------------------------------------------    --------------------------------------------------------------------------------------------------    Laboratory evaluations:    Lab Results   Component Value Date    GLUCOSE 90 09/06/2024    BUN 11 09/06/2024    CREATININE 0.99 09/06/2024    EGFRIFNONA 73 05/14/2021    BCR 11.1 09/06/2024    K 4.0 09/06/2024    CO2 25.6 09/06/2024    CALCIUM 8.8 09/06/2024    ALBUMIN 3.4 (L) 06/26/2024    AST 40 06/26/2024    ALT 39 06/26/2024     Lab Results   Component Value Date    WBC 4.69 07/11/2024    HGB 11.8 (L) 07/11/2024    HCT 34.9 (L) 07/11/2024    MCV 98.0 (H) 07/11/2024     07/11/2024     No results found for: \"CHOL\", \"CHLPL\", \"TRIG\", \"HDL\", \"LDL\", \"LDLDIRECT\"  Lab Results   Component Value Date    TSH 1.250 06/10/2024     No results found for: \"HGBA1C\"  Lab Results   Component Value Date    ALT 39 06/26/2024     No results found for: \"HGBA1C\"  Lab Results   Component Value Date    CREATININE 0.99 09/06/2024     No results found for: \"IRON\", \"TIBC\", \"FERRITIN\"  Lab Results   Component Value Date    INR 2.47 (H) 06/26/2024    INR 1.33 (H) 06/19/2024    INR 1.26 (H) 06/18/2024    PROTIME 26.8 (H) " 06/26/2024    PROTIME 16.6 (H) 06/19/2024    PROTIME 15.9 (H) 06/18/2024        Lab Results   Component Value Date    ABSOLUTELUNG 27 09/06/2024    ABSOLUTELUNG 22 08/09/2024             1. 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 Essential hypertension    3. Ischemic cardiomyopathy, LV ejection fraction around 45 to 50%    4. Coronary artery disease, anterior wall myocardial infarction with directional atherectomy of LAD in 1983 and bare-metal stent to the LAD in 1995, nonobstructive disease 2015    5. Chronic anticoagulation with Coumadin    6. Hyperlipidemia LDL goal <70    7. Hypomagnesemia    8. Atrial flutter , ventricular paced rhythm          ORDERS PLACED TODAY:  Orders Placed This Encounter   Procedures    ReDs Vest    Basic Metabolic Panel    Magnesium    proBNP    Basic Metabolic Panel    Magnesium    proBNP        Diagnoses and all orders for this visit:    1. 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, 46 to 50%) (Primary)  -     Basic Metabolic Panel; Future  -     Magnesium; Future  -     proBNP; Future  -     Basic Metabolic Panel; Standing  -     Basic Metabolic Panel  -     Magnesium; Standing  -     Magnesium  -     proBNP; Standing  -     proBNP  -     ReDs Vest  -     Discontinue: Vericiguat (Verquvo) 5 MG tablet; Take 1 tablet by mouth Daily for 60 days.  Dispense: 30 tablet; Refill: 1  -     Vericiguat (Verquvo) 5 MG tablet; Take 1 tablet by mouth Daily for 60 days.  Dispense: 30 tablet; Refill: 1    2. Essential hypertension  -     Basic Metabolic Panel; Future  -     proBNP; Future  -     Basic Metabolic Panel; Standing  -     Basic Metabolic Panel  -     proBNP; Standing  -     proBNP    3. Ischemic cardiomyopathy, LV ejection fraction around 45 to 50%  -     Basic Metabolic Panel; Future  -     Magnesium; Future  -     proBNP; Future  -     Basic Metabolic Panel; Standing  -     Basic Metabolic Panel  -     Magnesium; Standing  -      Magnesium  -     proBNP; Standing  -     proBNP  -     Discontinue: Vericiguat (Verquvo) 5 MG tablet; Take 1 tablet by mouth Daily for 60 days.  Dispense: 30 tablet; Refill: 1  -     Vericiguat (Verquvo) 5 MG tablet; Take 1 tablet by mouth Daily for 60 days.  Dispense: 30 tablet; Refill: 1    4. Coronary artery disease, anterior wall myocardial infarction with directional atherectomy of LAD in 1983 and bare-metal stent to the LAD in 1995, nonobstructive disease 2015  -     Basic Metabolic Panel; Future  -     Magnesium; Future  -     proBNP; Future  -     Basic Metabolic Panel; Standing  -     Basic Metabolic Panel  -     Magnesium; Standing  -     Magnesium  -     proBNP; Standing  -     proBNP    5. Chronic anticoagulation with Coumadin    6. Hyperlipidemia LDL goal <70    7. Hypomagnesemia  -     magnesium oxide (MAG-OX) 400 MG tablet; Take 1 tablet by mouth Daily for 90 days.  Dispense: 30 tablet; Refill: 2    8. Atrial flutter , ventricular paced rhythm    Other orders  -     Discontinue: Vericiguat (Verquvo) 5 MG tablet; Take 1 tablet by mouth Daily for 60 days.  Dispense: 30 tablet; Refill: 1  -     Discontinue: magnesium oxide (MAG-OX) 400 MG tablet; Take 1 tablet by mouth Daily for 90 days.  Dispense: 30 tablet; Refill: 2             MEDS ORDERED TODAY:    New Medications Ordered This Visit   Medications    magnesium oxide (MAG-OX) 400 MG tablet     Sig: Take 1 tablet by mouth Daily for 90 days.     Dispense:  30 tablet     Refill:  2    Vericiguat (Verquvo) 5 MG tablet     Sig: Take 1 tablet by mouth Daily for 60 days.     Dispense:  30 tablet     Refill:  1        ---------------------------------------------------------------------------------------------------------------------------          ASSESSMENT/PLAN:      Diagnosis Plan   1. 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, 46 to 50%)  Basic Metabolic Panel    Magnesium    proBNP    Basic Metabolic Panel    Basic Metabolic Panel     Magnesium    Magnesium    proBNP    proBNP    ReDs Vest    Vericiguat (Verquvo) 5 MG tablet      2. Essential hypertension  Basic Metabolic Panel    proBNP    Basic Metabolic Panel    Basic Metabolic Panel    proBNP    proBNP      3. Ischemic cardiomyopathy, LV ejection fraction around 45 to 50%  Basic Metabolic Panel    Magnesium    proBNP    Basic Metabolic Panel    Basic Metabolic Panel    Magnesium    Magnesium    proBNP    proBNP    Vericiguat (Verquvo) 5 MG tablet      4. Coronary artery disease, anterior wall myocardial infarction with directional atherectomy of LAD in 1983 and bare-metal stent to the LAD in 1995, nonobstructive disease 2015  Basic Metabolic Panel    Magnesium    proBNP    Basic Metabolic Panel    Basic Metabolic Panel    Magnesium    Magnesium    proBNP    proBNP      5. Chronic anticoagulation with Coumadin        6. Hyperlipidemia LDL goal <70        7. Hypomagnesemia  magnesium oxide (MAG-OX) 400 MG tablet      8. Atrial flutter , ventricular paced rhythm            not acutely decompensated chronic moderate systolic and diastolic heart failure. CHF.     NYHA stage Stage C: Structural heart disease is present AND symptoms have occurredFC-Class III: Marked limitation of physical activity. Comfortable at rest. Less than ordinary activity causes fatigue, palpitation, or dyspnea.     Today, Patient is approaching euvolemiaand with  Moderate perfusion. The patient's hemodynamics are currently acceptable. HR is: normal and is at goal. BP/MAP was reviewed and there isroom for medication up-titration.  Clinical trajectory was assessed and haswaxed and waned.     CHF GOAL DIRECTED MEDICAL THERAPY FOR PATIENT ADDRESSED/ADJUSTED:     GDMT: HFrEF    Drug Class   Drug   Dose Last Dose Adjustment Notes   ACEi/ARB/ARNI Entresto 24-26mg BID     Beta Blocker        MRA HypoTN      SGLT2i Freq UTIs   N/A   Secondaries if applicable:  Verquevo 5mg       -CHF Specific BB:    Toprol XL stopped by another  provider 2/2 hypotension.  Will attempt to restart low dose Toprol XL or Coreg on next visit.    We discussed processes/benefits of HF clinic including nursing, pharmacist, and provider evaluation during each visit with ability for in office ReDS vest, labs, and ability to provide IV diuresis in the clinic with close outpatient monitoring.  Additionally, patient was educated about the availability of delivery of medications to patient's clinic room prior to leaving the building which assists with medication compliance and insures medications are in hands when changes are made (if patient opts for apothecary usage) with thorough guidance regarding changes and medication schedule provided.          -ACE/ARB/ARNi:   Patient is on Entresto but often missing nightly dose.  We discussed half pill twice daily with strict hold parameters.  We have expanded the hold parameter and discussed with daughter to hold if top number blood pressure is less than 100.    -MRA:   Consider and future visit but at present our ability to add is limited secondary to hypotension asked patient to bring blood pressure log with next visit to further review record.     -SGLT2 inhibitor therapy:   Previously on Jardiance but began having frequent UTIs.  Will hold off on further SGLT2i at present as risk of frequent UTIs likely outweighs benefit and patient is 89 years old.    -Diuretic regimen:   ReDS Vest reading for. 09/06/24 is  27; ReDs Vest reading reviewed with patient.    Continiue Bumex 2mg daily at present.   BMP, Mag, & ProBNP reviewed with patient.      -Fluid restriction/Sodium restriction:   Requested 2000 ml restriction  Patient has been asked to weigh daily and was provided with a printed diuretic strategy.  1,500 mg Na restriction was discussed.      -Essential HTN  Entresto 24-26mg BID on board.   Continue monitoring.     -Chronic anticoagulation with Coumadin:   -Atrial flutter with ventricular paced rhythm:   Continue Coumadin  with management per PCP.   Continue f/u with [facility].     -Hypomagnesemia:   Mag 400 mg daily added.     -ASCVD with prior directional arhtrectomy and bare metal stent, stable without chest pain:   -Hyperlipidemia:    Continue ASA & statin.   Continue f/u with [facility].         --------------------------------------------------------------------------------        BMI is within normal parameters. No other follow-up for BMI required.              >45 minutes out of 60 minutes face to face spent counseling patient extensively on dietary Na+ intake, importance of activity, weight monitoring, compliance with medications in addition to importance of titration with goal directed medical therapy and follow up appointments.            This document has been electronically signed by Rosa Gavin PA-C  September 6, 2024 15:35 EDT      Dictated Utilizing Dragon Dictation: Part of this note may be an electronic transcription/translation of spoken language to printed text using the Dragon Dictation System.    Follow-up appointment and medication changes provided in hand delivered After Visit Summary as well as reviewed in the room.

## 2024-09-24 ENCOUNTER — HOSPITAL ENCOUNTER (INPATIENT)
Facility: HOSPITAL | Age: 89
LOS: 7 days | Discharge: HOME HEALTH | End: 2024-10-01
Attending: STUDENT IN AN ORGANIZED HEALTH CARE EDUCATION/TRAINING PROGRAM | Admitting: STUDENT IN AN ORGANIZED HEALTH CARE EDUCATION/TRAINING PROGRAM
Payer: MEDICARE

## 2024-09-24 ENCOUNTER — APPOINTMENT (OUTPATIENT)
Dept: GENERAL RADIOLOGY | Facility: HOSPITAL | Age: 89
End: 2024-09-24
Payer: MEDICARE

## 2024-09-24 ENCOUNTER — APPOINTMENT (OUTPATIENT)
Dept: CT IMAGING | Facility: HOSPITAL | Age: 89
End: 2024-09-24
Payer: MEDICARE

## 2024-09-24 DIAGNOSIS — I42.9 CARDIOMYOPATHY, UNSPECIFIED TYPE: ICD-10-CM

## 2024-09-24 DIAGNOSIS — K56.600 PARTIAL SMALL BOWEL OBSTRUCTION: Primary | ICD-10-CM

## 2024-09-24 DIAGNOSIS — Z93.3 COLOSTOMY IN PLACE: ICD-10-CM

## 2024-09-24 DIAGNOSIS — I48.92 ATRIAL FLUTTER, UNSPECIFIED TYPE: ICD-10-CM

## 2024-09-24 DIAGNOSIS — R33.8 ACUTE URINARY RETENTION: ICD-10-CM

## 2024-09-24 DIAGNOSIS — K56.609 SMALL BOWEL OBSTRUCTION: ICD-10-CM

## 2024-09-24 LAB
ALBUMIN SERPL-MCNC: 4 G/DL (ref 3.5–5.2)
ALBUMIN/GLOB SERPL: 1.4 G/DL
ALP SERPL-CCNC: 86 U/L (ref 39–117)
ALT SERPL W P-5'-P-CCNC: 10 U/L (ref 1–41)
ANION GAP SERPL CALCULATED.3IONS-SCNC: 9.2 MMOL/L (ref 5–15)
AST SERPL-CCNC: 18 U/L (ref 1–40)
BASOPHILS # BLD AUTO: 0.01 10*3/MM3 (ref 0–0.2)
BASOPHILS NFR BLD AUTO: 0.2 % (ref 0–1.5)
BILIRUB SERPL-MCNC: 1.1 MG/DL (ref 0–1.2)
BUN SERPL-MCNC: 11 MG/DL (ref 8–23)
BUN/CREAT SERPL: 10.6 (ref 7–25)
CALCIUM SPEC-SCNC: 9.6 MG/DL (ref 8.2–9.6)
CHLORIDE SERPL-SCNC: 100 MMOL/L (ref 98–107)
CK SERPL-CCNC: 39 U/L (ref 20–200)
CO2 SERPL-SCNC: 30.8 MMOL/L (ref 22–29)
CREAT SERPL-MCNC: 1.04 MG/DL (ref 0.76–1.27)
D-LACTATE SERPL-SCNC: 1.2 MMOL/L (ref 0.5–2)
DEPRECATED RDW RBC AUTO: 50.3 FL (ref 37–54)
EGFRCR SERPLBLD CKD-EPI 2021: 66.9 ML/MIN/1.73
EOSINOPHIL # BLD AUTO: 0.05 10*3/MM3 (ref 0–0.4)
EOSINOPHIL NFR BLD AUTO: 0.9 % (ref 0.3–6.2)
ERYTHROCYTE [DISTWIDTH] IN BLOOD BY AUTOMATED COUNT: 13.7 % (ref 12.3–15.4)
GEN 5 2HR TROPONIN T REFLEX: 31 NG/L
GLOBULIN UR ELPH-MCNC: 2.9 GM/DL
GLUCOSE SERPL-MCNC: 106 MG/DL (ref 65–99)
HCT VFR BLD AUTO: 39.1 % (ref 37.5–51)
HGB BLD-MCNC: 12.7 G/DL (ref 13–17.7)
HOLD SPECIMEN: NORMAL
IMM GRANULOCYTES # BLD AUTO: 0.01 10*3/MM3 (ref 0–0.05)
IMM GRANULOCYTES NFR BLD AUTO: 0.2 % (ref 0–0.5)
INR PPP: 1.62 (ref 0.9–1.1)
LIPASE SERPL-CCNC: 10 U/L (ref 13–60)
LYMPHOCYTES # BLD AUTO: 0.94 10*3/MM3 (ref 0.7–3.1)
LYMPHOCYTES NFR BLD AUTO: 17.3 % (ref 19.6–45.3)
MCH RBC QN AUTO: 32.4 PG (ref 26.6–33)
MCHC RBC AUTO-ENTMCNC: 32.5 G/DL (ref 31.5–35.7)
MCV RBC AUTO: 99.7 FL (ref 79–97)
MONOCYTES # BLD AUTO: 0.41 10*3/MM3 (ref 0.1–0.9)
MONOCYTES NFR BLD AUTO: 7.6 % (ref 5–12)
NEUTROPHILS NFR BLD AUTO: 4.01 10*3/MM3 (ref 1.7–7)
NEUTROPHILS NFR BLD AUTO: 73.8 % (ref 42.7–76)
NRBC BLD AUTO-RTO: 0 /100 WBC (ref 0–0.2)
PLATELET # BLD AUTO: 163 10*3/MM3 (ref 140–450)
PMV BLD AUTO: 9.8 FL (ref 6–12)
POTASSIUM SERPL-SCNC: 4.5 MMOL/L (ref 3.5–5.2)
PROT SERPL-MCNC: 6.9 G/DL (ref 6–8.5)
PROTHROMBIN TIME: 19.3 SECONDS (ref 12.1–14.7)
QT INTERVAL: 486 MS
QTC INTERVAL: 505 MS
RBC # BLD AUTO: 3.92 10*6/MM3 (ref 4.14–5.8)
SODIUM SERPL-SCNC: 140 MMOL/L (ref 136–145)
TROPONIN T DELTA: -3 NG/L
TROPONIN T SERPL HS-MCNC: 34 NG/L
WBC NRBC COR # BLD AUTO: 5.43 10*3/MM3 (ref 3.4–10.8)
WHOLE BLOOD HOLD COAG: NORMAL
WHOLE BLOOD HOLD COAG: NORMAL
WHOLE BLOOD HOLD SPECIMEN: NORMAL
WHOLE BLOOD HOLD SPECIMEN: NORMAL

## 2024-09-24 PROCEDURE — 71045 X-RAY EXAM CHEST 1 VIEW: CPT

## 2024-09-24 PROCEDURE — 25510000001 IOPAMIDOL 61 % SOLUTION: Performed by: STUDENT IN AN ORGANIZED HEALTH CARE EDUCATION/TRAINING PROGRAM

## 2024-09-24 PROCEDURE — 71045 X-RAY EXAM CHEST 1 VIEW: CPT | Performed by: RADIOLOGY

## 2024-09-24 PROCEDURE — 99285 EMERGENCY DEPT VISIT HI MDM: CPT

## 2024-09-24 PROCEDURE — 85610 PROTHROMBIN TIME: CPT | Performed by: STUDENT IN AN ORGANIZED HEALTH CARE EDUCATION/TRAINING PROGRAM

## 2024-09-24 PROCEDURE — 83605 ASSAY OF LACTIC ACID: CPT | Performed by: STUDENT IN AN ORGANIZED HEALTH CARE EDUCATION/TRAINING PROGRAM

## 2024-09-24 PROCEDURE — 93005 ELECTROCARDIOGRAM TRACING: CPT | Performed by: STUDENT IN AN ORGANIZED HEALTH CARE EDUCATION/TRAINING PROGRAM

## 2024-09-24 PROCEDURE — 25810000003 SODIUM CHLORIDE 0.9 % SOLUTION: Performed by: STUDENT IN AN ORGANIZED HEALTH CARE EDUCATION/TRAINING PROGRAM

## 2024-09-24 PROCEDURE — 99223 1ST HOSP IP/OBS HIGH 75: CPT

## 2024-09-24 PROCEDURE — 83690 ASSAY OF LIPASE: CPT | Performed by: STUDENT IN AN ORGANIZED HEALTH CARE EDUCATION/TRAINING PROGRAM

## 2024-09-24 PROCEDURE — 36415 COLL VENOUS BLD VENIPUNCTURE: CPT

## 2024-09-24 PROCEDURE — 74177 CT ABD & PELVIS W/CONTRAST: CPT | Performed by: RADIOLOGY

## 2024-09-24 PROCEDURE — 25010000002 PROCHLORPERAZINE 10 MG/2ML SOLUTION: Performed by: STUDENT IN AN ORGANIZED HEALTH CARE EDUCATION/TRAINING PROGRAM

## 2024-09-24 PROCEDURE — 80053 COMPREHEN METABOLIC PANEL: CPT | Performed by: STUDENT IN AN ORGANIZED HEALTH CARE EDUCATION/TRAINING PROGRAM

## 2024-09-24 PROCEDURE — 82550 ASSAY OF CK (CPK): CPT | Performed by: STUDENT IN AN ORGANIZED HEALTH CARE EDUCATION/TRAINING PROGRAM

## 2024-09-24 PROCEDURE — 74177 CT ABD & PELVIS W/CONTRAST: CPT

## 2024-09-24 PROCEDURE — 93010 ELECTROCARDIOGRAM REPORT: CPT | Performed by: INTERNAL MEDICINE

## 2024-09-24 PROCEDURE — 84484 ASSAY OF TROPONIN QUANT: CPT | Performed by: STUDENT IN AN ORGANIZED HEALTH CARE EDUCATION/TRAINING PROGRAM

## 2024-09-24 PROCEDURE — 25010000002 MORPHINE PER 10 MG: Performed by: STUDENT IN AN ORGANIZED HEALTH CARE EDUCATION/TRAINING PROGRAM

## 2024-09-24 PROCEDURE — 85025 COMPLETE CBC W/AUTO DIFF WBC: CPT | Performed by: STUDENT IN AN ORGANIZED HEALTH CARE EDUCATION/TRAINING PROGRAM

## 2024-09-24 RX ORDER — GABAPENTIN 300 MG/1
600 CAPSULE ORAL NIGHTLY
Status: DISCONTINUED | OUTPATIENT
Start: 2024-09-24 | End: 2024-10-01 | Stop reason: HOSPADM

## 2024-09-24 RX ORDER — MORPHINE SULFATE 2 MG/ML
2 INJECTION, SOLUTION INTRAMUSCULAR; INTRAVENOUS EVERY 4 HOURS PRN
Status: DISCONTINUED | OUTPATIENT
Start: 2024-09-24 | End: 2024-09-28

## 2024-09-24 RX ORDER — ATORVASTATIN CALCIUM 20 MG/1
20 TABLET, FILM COATED ORAL NIGHTLY
Status: DISCONTINUED | OUTPATIENT
Start: 2024-09-24 | End: 2024-10-01 | Stop reason: HOSPADM

## 2024-09-24 RX ORDER — PROCHLORPERAZINE EDISYLATE 5 MG/ML
10 INJECTION INTRAMUSCULAR; INTRAVENOUS ONCE
Status: COMPLETED | OUTPATIENT
Start: 2024-09-24 | End: 2024-09-24

## 2024-09-24 RX ORDER — HYDROCODONE BITARTRATE AND ACETAMINOPHEN 10; 325 MG/1; MG/1
1 TABLET ORAL EVERY 6 HOURS PRN
COMMUNITY

## 2024-09-24 RX ORDER — SODIUM CHLORIDE 9 MG/ML
40 INJECTION, SOLUTION INTRAVENOUS AS NEEDED
Status: DISCONTINUED | OUTPATIENT
Start: 2024-09-24 | End: 2024-10-01 | Stop reason: HOSPADM

## 2024-09-24 RX ORDER — ATORVASTATIN CALCIUM 20 MG/1
20 TABLET, FILM COATED ORAL NIGHTLY
Status: CANCELLED | OUTPATIENT
Start: 2024-09-24

## 2024-09-24 RX ORDER — PANTOPRAZOLE SODIUM 40 MG/10ML
40 INJECTION, POWDER, LYOPHILIZED, FOR SOLUTION INTRAVENOUS
Status: DISCONTINUED | OUTPATIENT
Start: 2024-09-25 | End: 2024-10-01 | Stop reason: HOSPADM

## 2024-09-24 RX ORDER — BUMETANIDE 1 MG/1
1 TABLET ORAL
Status: CANCELLED | OUTPATIENT
Start: 2024-09-24

## 2024-09-24 RX ORDER — ASPIRIN 81 MG/1
81 TABLET ORAL DAILY
Status: DISCONTINUED | OUTPATIENT
Start: 2024-09-25 | End: 2024-10-01 | Stop reason: HOSPADM

## 2024-09-24 RX ORDER — PANTOPRAZOLE SODIUM 40 MG/1
40 TABLET, DELAYED RELEASE ORAL DAILY
Status: CANCELLED | OUTPATIENT
Start: 2024-09-24

## 2024-09-24 RX ORDER — PANTOPRAZOLE SODIUM 40 MG/10ML
40 INJECTION, POWDER, LYOPHILIZED, FOR SOLUTION INTRAVENOUS ONCE
Status: COMPLETED | OUTPATIENT
Start: 2024-09-24 | End: 2024-09-24

## 2024-09-24 RX ORDER — SODIUM CHLORIDE 0.9 % (FLUSH) 0.9 %
10 SYRINGE (ML) INJECTION EVERY 12 HOURS SCHEDULED
Status: DISCONTINUED | OUTPATIENT
Start: 2024-09-24 | End: 2024-10-01 | Stop reason: HOSPADM

## 2024-09-24 RX ORDER — IOPAMIDOL 612 MG/ML
100 INJECTION, SOLUTION INTRAVASCULAR
Status: COMPLETED | OUTPATIENT
Start: 2024-09-24 | End: 2024-09-24

## 2024-09-24 RX ORDER — HYDROCODONE BITARTRATE AND ACETAMINOPHEN 10; 325 MG/1; MG/1
1 TABLET ORAL EVERY 6 HOURS PRN
Status: DISCONTINUED | OUTPATIENT
Start: 2024-09-24 | End: 2024-10-01 | Stop reason: HOSPADM

## 2024-09-24 RX ORDER — NITROGLYCERIN 0.4 MG/1
0.4 TABLET SUBLINGUAL
Status: DISCONTINUED | OUTPATIENT
Start: 2024-09-24 | End: 2024-10-01 | Stop reason: HOSPADM

## 2024-09-24 RX ORDER — BUMETANIDE 1 MG/1
1 TABLET ORAL 2 TIMES DAILY
Status: CANCELLED | OUTPATIENT
Start: 2024-09-24

## 2024-09-24 RX ORDER — WARFARIN SODIUM 1 MG/1
1 TABLET ORAL 3 TIMES WEEKLY
COMMUNITY

## 2024-09-24 RX ORDER — PANTOPRAZOLE SODIUM 40 MG/1
40 TABLET, DELAYED RELEASE ORAL
Status: CANCELLED | OUTPATIENT
Start: 2024-09-25

## 2024-09-24 RX ORDER — GABAPENTIN 300 MG/1
600 CAPSULE ORAL NIGHTLY
Status: CANCELLED | OUTPATIENT
Start: 2024-09-24

## 2024-09-24 RX ORDER — WARFARIN SODIUM 5 MG/1
5 TABLET ORAL DAILY
Status: CANCELLED | OUTPATIENT
Start: 2024-09-25

## 2024-09-24 RX ORDER — HYDROCODONE BITARTRATE AND ACETAMINOPHEN 10; 325 MG/1; MG/1
1 TABLET ORAL EVERY 6 HOURS PRN
Status: CANCELLED | OUTPATIENT
Start: 2024-09-24

## 2024-09-24 RX ORDER — ASPIRIN 81 MG/1
81 TABLET ORAL DAILY
Status: CANCELLED | OUTPATIENT
Start: 2024-09-25

## 2024-09-24 RX ORDER — BUMETANIDE 1 MG/1
1 TABLET ORAL 2 TIMES DAILY
Status: DISCONTINUED | OUTPATIENT
Start: 2024-09-24 | End: 2024-10-01 | Stop reason: HOSPADM

## 2024-09-24 RX ORDER — ASPIRIN 81 MG/1
81 TABLET, CHEWABLE ORAL DAILY
Status: CANCELLED | OUTPATIENT
Start: 2024-09-25

## 2024-09-24 RX ORDER — SODIUM CHLORIDE 0.9 % (FLUSH) 0.9 %
10 SYRINGE (ML) INJECTION AS NEEDED
Status: DISCONTINUED | OUTPATIENT
Start: 2024-09-24 | End: 2024-10-01 | Stop reason: HOSPADM

## 2024-09-24 RX ORDER — WARFARIN SODIUM 1 MG/1
1 TABLET ORAL 3 TIMES WEEKLY
Status: CANCELLED | OUTPATIENT
Start: 2024-09-25

## 2024-09-24 RX ADMIN — PROCHLORPERAZINE EDISYLATE 10 MG: 5 INJECTION INTRAMUSCULAR; INTRAVENOUS at 13:50

## 2024-09-24 RX ADMIN — MORPHINE SULFATE 2 MG: 2 INJECTION, SOLUTION INTRAMUSCULAR; INTRAVENOUS at 21:15

## 2024-09-24 RX ADMIN — Medication 10 ML: at 21:15

## 2024-09-24 RX ADMIN — MORPHINE SULFATE 2 MG: 2 INJECTION, SOLUTION INTRAMUSCULAR; INTRAVENOUS at 17:01

## 2024-09-24 RX ADMIN — SODIUM CHLORIDE 2000 ML: 9 INJECTION, SOLUTION INTRAVENOUS at 13:49

## 2024-09-24 RX ADMIN — IOPAMIDOL 80 ML: 612 INJECTION, SOLUTION INTRAVENOUS at 12:26

## 2024-09-24 RX ADMIN — PANTOPRAZOLE SODIUM 40 MG: 40 INJECTION, POWDER, FOR SOLUTION INTRAVENOUS at 13:49

## 2024-09-24 NOTE — ED PROVIDER NOTES
Subjective   History of Present Illness  Patient is a 89-year-old male with history significant for atrial flutter on Coumadin, chronic HFrEF and recent bowel obstruction status post mesh removal who comes to the ER with a 3 to 4-day history of nausea, vomiting and inability to keep down his home meds.  He complains of generalized abdominal pain and cramping.  Last bowel movement was on Sunday but states this is normal for him.  Decreased flatulence.  No fevers or chills.  No respiratory symptoms.  Denies any chest pain/pressure or tightness.      Review of Systems   Constitutional:  Negative for chills, fatigue and fever.   HENT:  Negative for congestion, sinus pain and sore throat.    Respiratory:  Negative for cough, chest tightness, shortness of breath and wheezing.    Cardiovascular:  Negative for chest pain, palpitations and leg swelling.   Gastrointestinal:  Positive for abdominal pain, nausea and vomiting. Negative for constipation and diarrhea.   Genitourinary:  Negative for dysuria, frequency, hematuria and urgency.   Musculoskeletal:  Negative for arthralgias and myalgias.   Neurological:  Negative for dizziness, numbness and headaches.   Psychiatric/Behavioral:  Negative for confusion.        Past Medical History:   Diagnosis Date    Arthritis     Atrial flutter     Back pain     Benign prostatic hyperplasia     Bladder tumor     Cancer     [id number]    Chronic systolic heart failure 09/14/2021    Colon cancer     Coronary artery disease     DVT (deep venous thrombosis)     r leg    Elevated cholesterol     Heart attack     Hypertension     Numbness     feet/hands    Osteoporosis     PVD (peripheral vascular disease)     Rheumatoid arthritis     Stroke     Ventricular tachycardia        No Known Allergies    Past Surgical History:   Procedure Laterality Date    CATARACT EXTRACTION Bilateral     CHOLECYSTECTOMY      COLON RESECTION      COLONOSCOPY      EXPLORATORY LAPAROTOMY N/A 6/12/2024     Procedure: LAPAROTOMY EXPLORATORY;  Surgeon: Deo Lennon MD;  Location: [facility] OR;  Service: General;  Laterality: N/A;    HEMORROIDECTOMY      HERNIA REPAIR      left inguinal/umbilical    HIP ARTHROPLASTY Right     INSERT / REPLACE / REMOVE PACEMAKER      JOINT REPLACEMENT      PACEMAKER IMPLANTATION      PROSTATE SURGERY      TRANSURETHRAL RESECTION OF BLADDER TUMOR N/A 04/18/2018    Procedure: CYSTOSCOPY TRANSURETHRAL RESECTION OF SMALL BLADDER TUMOR;  Surgeon: Alfonso Collins MD;  Location: [facility] OR;  Service: Urology    TRANSURETHRAL RESECTION OF BLADDER TUMOR N/A 08/29/2018    Procedure: CYSTOSCOPY TRANSURETHRAL RESECTION OF BLADDER TUMOR;  Surgeon: Alfonso Collins MD;  Location: [facility] OR;  Service: Urology       Family History   Problem Relation Age of Onset    No Known Problems Mother     No Known Problems Father     Heart disease Brother        Social History     Socioeconomic History    Marital status:    Tobacco Use    Smoking status: Former     Types: Cigarettes     Passive exposure: Past    Smokeless tobacco: Never   Vaping Use    Vaping status: Never Used   Substance and Sexual Activity    Alcohol use: Yes     Comment: Occasional few times a year    Drug use: No    Sexual activity: Defer           Objective   Physical Exam  Vitals and nursing note reviewed. Exam conducted with a chaperone present.   Constitutional:       General: He is not in acute distress.     Appearance: He is well-developed and normal weight. He is not ill-appearing.   HENT:      Head: Normocephalic.      Mouth/Throat:      Mouth: Mucous membranes are moist.      Pharynx: Oropharynx is clear.   Eyes:      Extraocular Movements: Extraocular movements intact.      Pupils: Pupils are equal, round, and reactive to light.   Neck:      Vascular: No JVD.   Cardiovascular:      Rate and Rhythm: Normal rate and regular rhythm.      Heart sounds: No murmur heard.     No friction rub. No gallop.   Pulmonary:       Effort: Pulmonary effort is normal. No tachypnea.      Breath sounds: Normal breath sounds. No decreased breath sounds, wheezing, rhonchi or rales.   Abdominal:      General: Bowel sounds are absent. There is distension.      Palpations: Abdomen is soft.      Tenderness: There is generalized abdominal tenderness.   Musculoskeletal:         General: Normal range of motion.      Cervical back: Normal range of motion and neck supple.      Right lower leg: No edema.      Left lower leg: No edema.   Skin:     General: Skin is warm and dry.      Capillary Refill: Capillary refill takes less than 2 seconds.   Neurological:      General: No focal deficit present.      Mental Status: He is alert and oriented to person, place, and time.   Psychiatric:         Mood and Affect: Mood normal.         Behavior: Behavior normal.         Procedures           ED Course  ED Course as of 09/24/24 1335   Tue Sep 24, 2024   1106 ECG 12 Lead Pre-Op / Pre-Procedure  Paced rhythm, rate 65, QTc 505, no acute ST or T wave changes [CW]      ED Course User Index  [CW] Aydin White, DO                                             Medical Decision Making  --Patient is hemodynamically stable, absent bowel sounds, mildly distended and generally tender on exam  --Labs unremarkable  --CT abdomen pelvis noted early/partial small bowel obstruction  --NG tube placed hooked to low intermittent suction  --IV fluids, PPI, Zofran  --Discussed with general surgery, will consult  --Discussed with medicine, will admit    Amount and/or Complexity of Data Reviewed  Labs: ordered.  Radiology: ordered.  ECG/medicine tests: ordered. Decision-making details documented in ED Course.    Risk  Prescription drug management.        Final diagnoses:   Partial small bowel obstruction   Atrial flutter, unspecified type   Cardiomyopathy, unspecified type       ED Disposition  ED Disposition       ED Disposition   Decision to Admit    Condition   --    Comment    --               No follow-up provider specified.       Medication List      No changes were made to your prescriptions during this visit.            Aydin White,   09/24/24 4550

## 2024-09-24 NOTE — CASE MANAGEMENT/SOCIAL WORK
Discharge Planning Assessment   [place]     Patient Name: Alberto Guzman  MRN: [id number]  Today's Date: 9/24/2024    Admit Date: 9/24/2024    Plan: Spoke with patient and daughter Isabella at bedside. Patient lives at home and daughter lives with him. Patients children are HCS and no LIving Will. Patient uses a cane and walker from unknown supplier. Patient has no oxygen or HH. Patients PCP Kimber Ernandez and pharmacy [facility] in [facility]. Patients family will transport home at discharge.   Discharge Needs Assessment       Row Name 09/24/24 1352       Living Environment    People in Home child(ruma), adult    Name(s) of People in Home Krystin Han Daughter   [phone]    Potentially Unsafe Housing Conditions none    In the past 12 months has the electric, gas, oil, or water company threatened to shut off services in your home? No    Primary Care Provided by self;child(ruma)    Provides Primary Care For no one    Family Caregiver if Needed child(ruma), adult    Family Caregiver Names Krystin Han   [phone]    Quality of Family Relationships helpful;involved;supportive    Able to Return to Prior Arrangements yes       Resource/Environmental Concerns    Resource/Environmental Concerns none    Transportation Concerns none       Transportation Needs    In the past 12 months, has lack of transportation kept you from medical appointments or from getting medications? no    In the past 12 months, has lack of transportation kept you from meetings, work, or from getting things needed for daily living? No       Food Insecurity    Within the past 12 months, you worried that your food would run out before you got the money to buy more. Never true    Within the past 12 months, the food you bought just didn't last and you didn't have money to get more. Never true       Transition Planning    Patient/Family Anticipates Transition to home with family    Patient/Family Anticipated Services at Transition none     Transportation Anticipated family or friend will provide       Discharge Needs Assessment    Readmission Within the Last 30 Days no previous admission in last 30 days    Equipment Currently Used at Home walker, standard;cane, straight    Concerns to be Addressed discharge planning    Anticipated Changes Related to Illness none    Equipment Needed After Discharge none                   Discharge Plan       Row Name 09/24/24 3200       Plan    Plan Spoke with patient and daughter Isabella at bedside. Patient lives at home and daughter lives with him. Patients children are HCS and no LIving Will. Patient uses a cane and walker from unknown supplier. Patient has no oxygen or HH. Patients PCP Kimber Ernandez and pharmacy [facility] in [facility]. Patients family will transport home at discharge.    Patient/Family in Agreement with Plan yes                  Continued Care and Services - Admitted Since 9/24/2024    No active coordination exists for this encounter.       Selected Continued Care - Episodes Includes continued care and service providers with selected services from the active episodes listed below      Heart Failure Episode start date: 8/9/2024   There are no active outsourced providers for this episode.                    Holly Mueller

## 2024-09-24 NOTE — H&P
[facility]  History & Physical    Patient Identification:  Name:  Alberto Guzman  Age:  89 y.o.  Sex:  male  :  3/29/1931  MRN:  [id number]   Visit Number:  [id number]  Admit Date: 2024   Primary Care Physician:  Lianet Dia APRN    Subjective     Chief complaint: Abdominal pain    History of presenting illness:      Alberto Guzman is a 89 y.o. male who presented for further evaluation of 3 to 4-day history of persistent sharp abdominal pain with abdominal distention and tenderness.  He reports 2 days ago developed nausea and has been unable to keep down any food, drink, or home medications.  His last BM was on .  Has not passed any gas in the past 1 to 2 days that he can recall.  On further review of systems he denied any chest pain, palpitations.  No increased lower extremity edema currently.  He is not short of breath.  Further review of systems obtained and otherwise unremarkable, see below.    Past medical history is significant for CAD, HFrEF, atrial fibrillation/flutter chronically anticoagulated with Coumadin, HTN, HLD, PVD, PPM in place, Hx DVT, Hx CVA, RA, recent Hx bowel obstruction s/p mesh removal    Upon arrival to the ED, vital signs were temp 97.7, heart rate 65, respirations 16, /69, SpO2 98% on RA.  Significant laboratory findings included INR subtherapeutic at 1.62.  No leukocytosis or left shift.  CT abdomen and pelvis with contrast showed distended loops of fluid-filled small bowel which terminates in the mid abdomen just right of the midline.  No obstructing mass identified.  Appearance concerning for partial or early complete obstruction    Known Emergency Department medications received prior to my evaluation included Isovue, Protonix, Compazine, 2 L normal saline.   Emergency Department Room location at the time of my evaluation was [facility].      ---------------------------------------------------------------------------------------------------------------------   Review of Systems   Constitutional:  Positive for appetite change. Negative for chills and fever.   HENT:  Negative for congestion and rhinorrhea.    Respiratory:  Negative for cough and shortness of breath.    Cardiovascular:  Negative for chest pain and leg swelling.   Gastrointestinal:  Positive for abdominal distention, abdominal pain, nausea and vomiting.   Genitourinary:  Negative for difficulty urinating and dysuria.   Musculoskeletal:  Negative for arthralgias and myalgias.   Skin:  Negative for rash and wound.   Neurological:  Negative for dizziness and light-headedness.        ---------------------------------------------------------------------------------------------------------------------   Past Medical History:   Diagnosis Date    Arthritis     Atrial flutter     Back pain     Benign prostatic hyperplasia     Bladder tumor     Cancer     [id number]/[id number]    Chronic systolic heart failure 09/14/2021    Colon cancer     Coronary artery disease     DVT (deep venous thrombosis)     r leg    Elevated cholesterol     Heart attack     Hypertension     Numbness     feet/hands    Osteoporosis     PVD (peripheral vascular disease)     Rheumatoid arthritis     Stroke     Ventricular tachycardia      Past Surgical History:   Procedure Laterality Date    CATARACT EXTRACTION Bilateral     CHOLECYSTECTOMY      COLON RESECTION      COLONOSCOPY      EXPLORATORY LAPAROTOMY N/A 6/12/2024    Procedure: LAPAROTOMY EXPLORATORY;  Surgeon: Deo Lennon MD;  Location: [facility];  Service: General;  Laterality: N/A;    HEMORROIDECTOMY      HERNIA REPAIR      left inguinal/umbilical    HIP ARTHROPLASTY Right     INSERT / REPLACE / REMOVE PACEMAKER      JOINT REPLACEMENT      PACEMAKER IMPLANTATION      PROSTATE SURGERY      TRANSURETHRAL RESECTION OF BLADDER TUMOR N/A 04/18/2018    Procedure:  CYSTOSCOPY TRANSURETHRAL RESECTION OF SMALL BLADDER TUMOR;  Surgeon: Alfonso Collins MD;  Location: [facility];  Service: Urology    TRANSURETHRAL RESECTION OF BLADDER TUMOR N/A 08/29/2018    Procedure: CYSTOSCOPY TRANSURETHRAL RESECTION OF BLADDER TUMOR;  Surgeon: Alfonso Collins MD;  Location: [facility];  Service: Urology     Family History   Problem Relation Age of Onset    No Known Problems Mother     No Known Problems Father     Heart disease Brother      Social History     Socioeconomic History    Marital status:    Tobacco Use    Smoking status: Former     Types: Cigarettes     Passive exposure: Past    Smokeless tobacco: Never   Vaping Use    Vaping status: Never Used   Substance and Sexual Activity    Alcohol use: Yes     Comment: Occasional few times a year    Drug use: No    Sexual activity: Defer     ---------------------------------------------------------------------------------------------------------------------   Allergies:  Patient has no known allergies.  ---------------------------------------------------------------------------------------------------------------------   Home medications:    Medications below are reported home medications pulling from within the system; at this time, these medications have not been reconciled unless otherwise specified and are in the verification process for further verifcation as current home medications.  (Not in a hospital admission)      Hospital Scheduled Meds:  sodium chloride, 2,000 mL, Intravenous, Once           Current listed hospital scheduled medications may not yet reflect those currently placed in orders that are signed and held awaiting patient's arrival to floor.   ---------------------------------------------------------------------------------------------------------------------     Objective     Vital Signs:  Temp:  [97.7 °F (36.5 °C)] 97.7 °F (36.5 °C)  Heart Rate:  [65-66] 65  Resp:  [16] 16  BP: (131-153)/(69-78)  153/72      09/24/24  1036   Weight: 89.8 kg (198 lb)     Body mass index is 24.1 kg/m².  ---------------------------------------------------------------------------------------------------------------------       Physical Exam  Vitals and nursing note reviewed.   Constitutional:       General: He is not in acute distress.     Appearance: He is ill-appearing.   HENT:      Head: Normocephalic and atraumatic.      Nose:      Comments: NG tube in place  Eyes:      Extraocular Movements: Extraocular movements intact.   Cardiovascular:      Rate and Rhythm: Normal rate and regular rhythm.   Pulmonary:      Effort: Pulmonary effort is normal.      Breath sounds: Normal breath sounds.   Abdominal:      General: There is no distension.      Palpations: Abdomen is soft.      Tenderness: There is abdominal tenderness. There is no guarding.   Musculoskeletal:      Right lower leg: No edema.      Left lower leg: No edema.   Skin:     General: Skin is warm and dry.   Neurological:      Mental Status: He is alert. Mental status is at baseline.   Psychiatric:         Mood and Affect: Mood normal.         Behavior: Behavior normal.             ---------------------------------------------------------------------------------------------------------------------  EKG:        I have personally looked at the EKG.  ---------------------------------------------------------------------------------------------------------------------   Results from last 7 days   Lab Units 09/24/24  1042   LACTATE mmol/L 1.2   WBC 10*3/mm3 5.43   HEMOGLOBIN g/dL 12.7*   HEMATOCRIT % 39.1   MCV fL 99.7*   MCHC g/dL 32.5   PLATELETS 10*3/mm3 163   INR  1.62*         Results from last 7 days   Lab Units 09/24/24  1042   SODIUM mmol/L 140   POTASSIUM mmol/L 4.5   CHLORIDE mmol/L 100   CO2 mmol/L 30.8*   BUN mg/dL 11   CREATININE mg/dL 1.04   CALCIUM mg/dL 9.6   GLUCOSE mg/dL 106*   ALBUMIN g/dL 4.0   BILIRUBIN mg/dL 1.1   ALK PHOS U/L 86   AST (SGOT) U/L 18  "  ALT (SGPT) U/L 10   Estimated Creatinine Clearance: 56.4 mL/min (by C-G formula based on SCr of 1.04 mg/dL).  No results found for: \"AMMONIA\"  Results from last 7 days   Lab Units 09/24/24  1301 09/24/24  1042   CK TOTAL U/L  --  39   HSTROP T ng/L 31* 34*         No results found for: \"HGBA1C\"  Lab Results   Component Value Date    TSH 1.250 06/10/2024     No results found for: \"PREGTESTUR\", \"PREGSERUM\", \"HCG\", \"HCGQUANT\"  Pain Management Panel           No data to display              No results found for: \"BLOODCX\"  No results found for: \"URINECX\"  No results found for: \"WOUNDCX\"  No results found for: \"STOOLCX\"      ---------------------------------------------------------------------------------------------------------------------  Imaging Results (Last 7 Days)       Procedure Component Value Units Date/Time    CT Abdomen Pelvis With Contrast [[id number]] Collected: 09/24/24 1232     Updated: 09/24/24 1236    Narrative:      EXAM: CT ABDOMEN PELVIS W CONTRAST-         TECHNIQUE: Multiple axial CT images were obtained from lung bases  through pubic symphysis WITH administration of IV contrast. Reformatted  images in the coronal and/or sagittal plane(s) were generated from the  axial data set to facilitate diagnostic accuracy and/or surgical  planning.  Oral Contrast:NONE.     Radiation dose reduction techniques were utilized per ALARA protocol.  Automated exposure control was initiated through either or Storone or  DoseRight software packages by  protocol.    DOSE:     Clinical information acute abdominal pain      Comparison 6/26/2024     FINDINGS:     Lower thorax: Clear. No effusions.     Abdomen:     Liver: Homogeneous. No focal hepatic mass or ductal dilatation.     Gallbladder: Surgically absent     Pancreas: Unremarkable. No mass or ductal dilatation.     Spleen: Homogeneous. No splenomegaly.     Adrenals: No mass.     Kidneys/ureters: No mass. No obstructive uropathy.  No evidence " "of  urolithiasis.     GI tract: Fluid-filled loops of distended small bowel concerning for  partial or early complete obstruction. There is air in the colon.. There  is no evidence of appendicitis     MESENTERY: No free fluid, walled off fluid collections, mesenteric  stranding, or enlarged lymph nodes        Vasculature: Evidence of atherosclerotic vascular disease     Abdominal wall: No focal hernia or mass.        Bladder: No focal mass or significant wall thickening     Reproductive: Unremarkable as visualized     Bones: No acute bony abnormality.       Impression:         1. Distended loops of fluid-filled small bowel terminates in the mid  abdomen just to the right of midline best seen image 28 series 3  coronal. No obstructing mass is identified. Appearance is concerning for  partial or early complete obstruction.     2. Other findings as above                          This report was finalized on 9/24/2024 12:34 PM by Dr. Dav Mike MD.               Cultures:  No results found for: \"BLOODCX\", \"URINECX\", \"WOUNDCX\", \"MRSACX\", \"RESPCX\", \"STOOLCX\"    Last echocardiogram:  Results for orders placed during the hospital encounter of 06/09/24    Adult Transthoracic Echo Complete w/ Color, Spectral and Contrast if Necessary Per Protocol    Interpretation Summary    The study is technically difficult for diagnosis. The quality of the study is limited with poor acoustic windows.    Normal left ventricular cavity size noted.    The following left ventricular wall segments are dyskinetic: apical lateral, apical septal and apex.    Left ventricular ejection fraction appears to be 36 - 40%.    The aortic valve exhibits sclerosis. There is mild calcification of the aortic valve. The aortic valve was poorly visualized but appears trileaflet.    Trace aortic valve regurgitation is present. No hemodynamically significant aortic valve stenosis is present.    There is mild calcification of the mitral valve. Mild mitral valve " regurgitation is present. No significant mitral valve stenosis is present.    Mild tricuspid valve regurgitation is present. Estimated right ventricular systolic pressure from tricuspid regurgitation is mildly elevated (35-45 mmHg).    There is no evidence of pericardial effusion. .          I have personally reviewed the above radiology images and read the final radiology report on 09/24/24  ---------------------------------------------------------------------------------------------------------------------  Assessment / Plan     There are no active hospital problems to display for this patient.      ASSESSMENT/PLAN:    Small bowel obstruction  Patient presented to the ED secondary to 3 to 4-day history N/V with resultant poor p.o. intake.  Complaining of abdominal pain and cramping.  No BM since Sunday but reports not unusual for him.  Notably patient was admitted to this facility 6/9 through 6/19/2024 secondary to small bowel obstruction.  During ex lap during that admission he was found to have internal hernia through previous colon resection mesenteric defect at that time.  Laboratory workup in the ED today relatively benign.  INR is subtherapeutic suspect secondary to intolerance of oral medications.  CT of the abdomen and pelvis with contrast showed distended loops of fluid-filled small bowel which terminate in the mid abdomen just right of midline concerning for partial or early complete obstruction with no obstructing mass identified.  He received fluids, Compazine, Protonix in the ED  Will admit for further workup and management  Consult general surgery for further assistance and recommendations, appreciated.  N.p.o. for now.  Continue NG tube placed in the ED to low wall suction  Continue supportive care measures  Trend labs    Atrial flutter/fibrillation  Chronic anticoagulation with Coumadin  INR subtherapeutic as above  Plan to hold anticoagulation for now in the setting of potential need for surgical  intervention  Will likely consult pharmacy for bridge to Coumadin once indicated    Chronic:  CAD  HFrEF  HTN  HLD  PVD  PPM in place  Hx DVT  Hx CVA  RA  Plan resume home medication regimen as indicated once med rec is complete  Will monitor clinical volume status closely  Monitor vital signs closely    ----------  -DVT prophylaxis: Chronically anticoagulated, hold for procedure  -Activity: As tolerated  -Expected length of stay: INPATIENT status due to the need for care which can only be reasonably provided in an hospital setting such as aggressive/expedited ancillary services and/or consultation services, the necessity for IV medications, close physician monitoring and/or the possible need for procedures.  In such, I feel patient’s risk for adverse outcomes and need for care warrant INPATIENT evaluation and predict the patient’s care encounter to likely last beyond 2 midnights.   -Disposition Pending further course    High risk secondary to SBO    There are no questions and answers to display.       Uday Dalton PA-C   09/24/24  13:53 EDT

## 2024-09-24 NOTE — LETTER
[facility] [place] [facility]  [address]  [place] [address]  [phone]  [phone]        October 1, 2024      Patient: Alberto Guzman  YOB: 1931  Date of Visit: 9/24/2024      Return call to [place] at [phone]        RADHA Lopez

## 2024-09-24 NOTE — PLAN OF CARE
Goal Outcome Evaluation:  Pt resting in bed with no s/s of distress noted. VSS. IV access maintained. Call light and belongings in reach. Pt hooked up to intermittent NG suction. No requests at this time. Plan of care ongoing.

## 2024-09-25 ENCOUNTER — APPOINTMENT (OUTPATIENT)
Dept: GENERAL RADIOLOGY | Facility: HOSPITAL | Age: 89
End: 2024-09-25
Payer: MEDICARE

## 2024-09-25 LAB
ANION GAP SERPL CALCULATED.3IONS-SCNC: 10.2 MMOL/L (ref 5–15)
APTT PPP: 35.8 SECONDS (ref 26.5–34.5)
BILIRUB UR QL STRIP: NEGATIVE
BUN SERPL-MCNC: 10 MG/DL (ref 8–23)
BUN/CREAT SERPL: 11.2 (ref 7–25)
CALCIUM SPEC-SCNC: 8.8 MG/DL (ref 8.2–9.6)
CHLORIDE SERPL-SCNC: 102 MMOL/L (ref 98–107)
CLARITY UR: CLEAR
CO2 SERPL-SCNC: 26.8 MMOL/L (ref 22–29)
COLOR UR: YELLOW
CREAT SERPL-MCNC: 0.89 MG/DL (ref 0.76–1.27)
DEPRECATED RDW RBC AUTO: 50.1 FL (ref 37–54)
EGFRCR SERPLBLD CKD-EPI 2021: 79.9 ML/MIN/1.73
ERYTHROCYTE [DISTWIDTH] IN BLOOD BY AUTOMATED COUNT: 13.5 % (ref 12.3–15.4)
GLUCOSE SERPL-MCNC: 102 MG/DL (ref 65–99)
GLUCOSE UR STRIP-MCNC: NEGATIVE MG/DL
HCT VFR BLD AUTO: 37 % (ref 37.5–51)
HGB BLD-MCNC: 12.2 G/DL (ref 13–17.7)
HGB UR QL STRIP.AUTO: NEGATIVE
INR PPP: 1.61 (ref 0.9–1.1)
KETONES UR QL STRIP: ABNORMAL
LEUKOCYTE ESTERASE UR QL STRIP.AUTO: NEGATIVE
MCH RBC QN AUTO: 32.9 PG (ref 26.6–33)
MCHC RBC AUTO-ENTMCNC: 33 G/DL (ref 31.5–35.7)
MCV RBC AUTO: 99.7 FL (ref 79–97)
NITRITE UR QL STRIP: NEGATIVE
PH UR STRIP.AUTO: 8 [PH] (ref 5–8)
PLATELET # BLD AUTO: 163 10*3/MM3 (ref 140–450)
PMV BLD AUTO: 10.2 FL (ref 6–12)
POTASSIUM SERPL-SCNC: 4.5 MMOL/L (ref 3.5–5.2)
PROT UR QL STRIP: NEGATIVE
PROTHROMBIN TIME: 19.2 SECONDS (ref 12.1–14.7)
RBC # BLD AUTO: 3.71 10*6/MM3 (ref 4.14–5.8)
SODIUM SERPL-SCNC: 139 MMOL/L (ref 136–145)
SP GR UR STRIP: 1.02 (ref 1–1.03)
UFH PPP CHRO-ACNC: <0.1 IU/ML (ref 0.3–0.7)
UROBILINOGEN UR QL STRIP: ABNORMAL
WBC NRBC COR # BLD AUTO: 5.68 10*3/MM3 (ref 3.4–10.8)

## 2024-09-25 PROCEDURE — 0DTN0ZZ RESECTION OF SIGMOID COLON, OPEN APPROACH: ICD-10-PCS | Performed by: SURGERY

## 2024-09-25 PROCEDURE — 74018 RADEX ABDOMEN 1 VIEW: CPT

## 2024-09-25 PROCEDURE — 25010000002 MORPHINE PER 10 MG: Performed by: STUDENT IN AN ORGANIZED HEALTH CARE EDUCATION/TRAINING PROGRAM

## 2024-09-25 PROCEDURE — 0D1M0Z4 BYPASS DESCENDING COLON TO CUTANEOUS, OPEN APPROACH: ICD-10-PCS | Performed by: SURGERY

## 2024-09-25 PROCEDURE — 97162 PT EVAL MOD COMPLEX 30 MIN: CPT

## 2024-09-25 PROCEDURE — 85025 COMPLETE CBC W/AUTO DIFF WBC: CPT | Performed by: STUDENT IN AN ORGANIZED HEALTH CARE EDUCATION/TRAINING PROGRAM

## 2024-09-25 PROCEDURE — 99232 SBSQ HOSP IP/OBS MODERATE 35: CPT | Performed by: STUDENT IN AN ORGANIZED HEALTH CARE EDUCATION/TRAINING PROGRAM

## 2024-09-25 PROCEDURE — 85520 HEPARIN ASSAY: CPT | Performed by: STUDENT IN AN ORGANIZED HEALTH CARE EDUCATION/TRAINING PROGRAM

## 2024-09-25 PROCEDURE — 85610 PROTHROMBIN TIME: CPT | Performed by: STUDENT IN AN ORGANIZED HEALTH CARE EDUCATION/TRAINING PROGRAM

## 2024-09-25 PROCEDURE — 74018 RADEX ABDOMEN 1 VIEW: CPT | Performed by: RADIOLOGY

## 2024-09-25 PROCEDURE — 25010000002 HEPARIN (PORCINE) 25000-0.45 UT/250ML-% SOLUTION: Performed by: STUDENT IN AN ORGANIZED HEALTH CARE EDUCATION/TRAINING PROGRAM

## 2024-09-25 PROCEDURE — 71045 X-RAY EXAM CHEST 1 VIEW: CPT | Performed by: RADIOLOGY

## 2024-09-25 PROCEDURE — 81003 URINALYSIS AUTO W/O SCOPE: CPT | Performed by: STUDENT IN AN ORGANIZED HEALTH CARE EDUCATION/TRAINING PROGRAM

## 2024-09-25 PROCEDURE — 85027 COMPLETE CBC AUTOMATED: CPT | Performed by: STUDENT IN AN ORGANIZED HEALTH CARE EDUCATION/TRAINING PROGRAM

## 2024-09-25 PROCEDURE — 0 DIATRIZOATE MEGLUMINE & SODIUM PER 1 ML: Performed by: SURGERY

## 2024-09-25 PROCEDURE — 0DQV0ZZ REPAIR MESENTERY, OPEN APPROACH: ICD-10-PCS | Performed by: SURGERY

## 2024-09-25 PROCEDURE — 80053 COMPREHEN METABOLIC PANEL: CPT | Performed by: STUDENT IN AN ORGANIZED HEALTH CARE EDUCATION/TRAINING PROGRAM

## 2024-09-25 PROCEDURE — 85730 THROMBOPLASTIN TIME PARTIAL: CPT | Performed by: STUDENT IN AN ORGANIZED HEALTH CARE EDUCATION/TRAINING PROGRAM

## 2024-09-25 PROCEDURE — 97166 OT EVAL MOD COMPLEX 45 MIN: CPT

## 2024-09-25 PROCEDURE — 71045 X-RAY EXAM CHEST 1 VIEW: CPT

## 2024-09-25 RX ORDER — HEPARIN SODIUM 5000 [USP'U]/ML
4000 INJECTION, SOLUTION INTRAVENOUS; SUBCUTANEOUS AS NEEDED
Status: DISCONTINUED | OUTPATIENT
Start: 2024-09-25 | End: 2024-09-25

## 2024-09-25 RX ORDER — DIATRIZOATE MEGLUMINE AND DIATRIZOATE SODIUM 660; 100 MG/ML; MG/ML
120 SOLUTION ORAL; RECTAL ONCE
Status: COMPLETED | OUTPATIENT
Start: 2024-09-25 | End: 2024-09-25

## 2024-09-25 RX ORDER — HEPARIN SODIUM 5000 [USP'U]/ML
2000 INJECTION, SOLUTION INTRAVENOUS; SUBCUTANEOUS AS NEEDED
Status: DISCONTINUED | OUTPATIENT
Start: 2024-09-25 | End: 2024-09-25

## 2024-09-25 RX ORDER — HEPARIN SODIUM 10000 [USP'U]/100ML
13.42 INJECTION, SOLUTION INTRAVENOUS
Status: DISCONTINUED | OUTPATIENT
Start: 2024-09-25 | End: 2024-09-26

## 2024-09-25 RX ADMIN — MORPHINE SULFATE 2 MG: 2 INJECTION, SOLUTION INTRAMUSCULAR; INTRAVENOUS at 23:22

## 2024-09-25 RX ADMIN — HEPARIN SODIUM 11.42 UNITS/KG/HR: 10000 INJECTION, SOLUTION INTRAVENOUS at 17:39

## 2024-09-25 RX ADMIN — MORPHINE SULFATE 2 MG: 2 INJECTION, SOLUTION INTRAMUSCULAR; INTRAVENOUS at 09:38

## 2024-09-25 RX ADMIN — PANTOPRAZOLE SODIUM 40 MG: 40 INJECTION, POWDER, FOR SOLUTION INTRAVENOUS at 05:34

## 2024-09-25 RX ADMIN — MORPHINE SULFATE 2 MG: 2 INJECTION, SOLUTION INTRAMUSCULAR; INTRAVENOUS at 15:26

## 2024-09-25 RX ADMIN — MORPHINE SULFATE 2 MG: 2 INJECTION, SOLUTION INTRAMUSCULAR; INTRAVENOUS at 19:47

## 2024-09-25 RX ADMIN — MORPHINE SULFATE 2 MG: 2 INJECTION, SOLUTION INTRAMUSCULAR; INTRAVENOUS at 01:23

## 2024-09-25 RX ADMIN — DIATRIZOATE MEGLUMINE AND DIATRIZOATE SODIUM 120 ML: 600; 100 SOLUTION ORAL; RECTAL at 10:57

## 2024-09-25 RX ADMIN — Medication 10 ML: at 20:36

## 2024-09-25 RX ADMIN — MORPHINE SULFATE 2 MG: 2 INJECTION, SOLUTION INTRAMUSCULAR; INTRAVENOUS at 05:34

## 2024-09-25 NOTE — PLAN OF CARE
Goal Outcome Evaluation:   Pt has rested intermittently this shift. Pt c/o abdominal pain throughout shift, see MAR. NG tube noted to L nostril, NGT required advancement and placement confirmation multiple times throughout shift due to being dislodged by pt. NG currently anchored at 65 and connected to low intermittent wall suction. Pt educated on NPO status, verbalizes understanding. No further complaints voiced at this time. No visible acute s/s of distress noted. Plan of care ongoing.

## 2024-09-25 NOTE — THERAPY EVALUATION
Patient Name: Alberto Guzman  : 3/29/1931    MRN: [id number]                              Today's Date: 2024       Admit Date: 2024    Visit Dx:     ICD-10-CM ICD-9-CM   1. Partial small bowel obstruction  K56.600 560.9   2. Atrial flutter, unspecified type  I48.92 427.32   3. Cardiomyopathy, unspecified type  I42.9 425.4     Patient Active Problem List   Diagnosis    Urinary retention    Bladder neck contracture    Bladder tumor    Aftercare following surgery of the genitourinary system    Atrial flutter , ventricular paced rhythm    Presence of cardiac pacemaker 6/3/2025    Hyperlipidemia LDL goal <70    Essential hypertension    Chronic anticoagulation with Coumadin    Ventricular tachycardia (paroxysmal)    Asymptomatic PVD (peripheral vascular disease)    Coronary artery disease, anterior wall myocardial infarction with directional atherectomy of LAD in  and bare-metal stent to the LAD in , nonobstructive disease     Ischemic cardiomyopathy, LV ejection fraction around 45 to 50%    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, 46 to 50%)    Bilateral lower extremity edema    Stroke (cerebrum)    Small bowel obstruction    Hypokalemia    Hypomagnesemia     Past Medical History:   Diagnosis Date    Arthritis     Atrial flutter     Back pain     Benign prostatic hyperplasia     Bladder tumor     Cancer     [id number]/melanoma    Chronic systolic heart failure 2021    Colon cancer     Coronary artery disease     DVT (deep venous thrombosis)     r leg    Elevated cholesterol     Heart attack     Hypertension     Numbness     feet/hands    Osteoporosis     PVD (peripheral vascular disease)     Rheumatoid arthritis     Stroke     Ventricular tachycardia      Past Surgical History:   Procedure Laterality Date    CATARACT EXTRACTION Bilateral     CHOLECYSTECTOMY      COLON RESECTION      COLONOSCOPY      EXPLORATORY LAPAROTOMY N/A 2024    Procedure: LAPAROTOMY  EXPLORATORY;  Surgeon: Deo Lennon MD;  Location: [facility];  Service: General;  Laterality: N/A;    HEMORROIDECTOMY      HERNIA REPAIR      left inguinal/umbilical    HIP ARTHROPLASTY Right     INSERT / REPLACE / REMOVE PACEMAKER      JOINT REPLACEMENT      PACEMAKER IMPLANTATION      PROSTATE SURGERY      TRANSURETHRAL RESECTION OF BLADDER TUMOR N/A 04/18/2018    Procedure: CYSTOSCOPY TRANSURETHRAL RESECTION OF SMALL BLADDER TUMOR;  Surgeon: Alfonso Collins MD;  Location: [facility] OR;  Service: Urology    TRANSURETHRAL RESECTION OF BLADDER TUMOR N/A 08/29/2018    Procedure: CYSTOSCOPY TRANSURETHRAL RESECTION OF BLADDER TUMOR;  Surgeon: Alfonso Collins MD;  Location: [facility] OR;  Service: Urology      General Information       Row Name 09/25/24 1417          OT Time and Intention    Document Type evaluation  -     Mode of Treatment individual therapy;occupational therapy  -       Row Name 09/25/24 1417          General Information    Patient Profile Reviewed yes  -     Prior Level of Function --  supervision from daughter but able to complete without physically assist most days per her report; rolling walker in home, cane outside of home; shower chair, elevated commode; functional decline with recent illnesses over past 3 months  -     Barriers to Rehab none identified  -       Row Name 09/25/24 1417          Occupational Profile    Reason for Services/Referral (Occupational Profile) Patient admitted to [facility] on 9/24/2024. He was referred for OT evaluation due to change in functional performance with ADLs, functional mobility, and/or transfers.  -       Row Name 09/25/24 1417          Living Environment    People in Home child(ruma), adult  -       Row Name 09/25/24 1417          Cognition    Orientation Status (Cognition) oriented x 3  -       Row Name 09/25/24 1417          Safety Issues, Functional Mobility    Impairments Affecting Function (Mobility)  balance;endurance/activity tolerance;strength;pain  -               User Key  (r) = Recorded By, (t) = Taken By, (c) = Cosigned By      Initials Name Provider Type     Chery Gomez OT Occupational Therapist                     Mobility/ADL's       [facility] Name 09/25/24 1422          Bed Mobility    Bed Mobility bed mobility (all) activities  -     All Activities, [place] (Bed Mobility) minimum assist (75% patient effort)  -     Assistive Device (Bed Mobility) bed rails;head of bed elevated;draw sheet  -[facility] Name 09/25/24 1422          Transfers    Transfers sit-stand transfer;stand-sit transfer  -     Comment, (Transfers) handheld assist  -[facility] Name 09/25/24 1422          Sit-Stand Transfer    Sit-Stand [place] (Transfers) minimum assist (75% patient effort)  -KP       Row Name 09/25/24 1422          Stand-Sit Transfer    Stand-Sit [place] (Transfers) minimum assist (75% patient effort)  -KP       Row Name 09/25/24 1422          Activities of Daily Living    BADL Assessment/Intervention bathing;upper body dressing;lower body dressing;grooming;toileting  -KP       Row Name 09/25/24 1422          Bathing Assessment/Intervention    [place] Level (Bathing) bathing skills;minimum assist (75% patient effort)  -KP       Row Name 09/25/24 1422          Upper Body Dressing Assessment/Training    [place] Level (Upper Body Dressing) upper body dressing skills;minimum assist (75% patient effort)  -KP       Row Name 09/25/24 1422          Lower Body Dressing Assessment/Training    [place] Level (Lower Body Dressing) lower body dressing skills;minimum assist (75% patient effort)  -KP       Row Name 09/25/24 1422          Grooming Assessment/Training    [place] Level (Grooming) grooming skills;minimum assist (75% patient effort)  -KP       Row Name 09/25/24 1422          Toileting Assessment/Training    [place] Level (Toileting) toileting skills;minimum assist (75%  patient effort)  -               User Key  (r) = Recorded By, (t) = Taken By, (c) = Cosigned By      Initials Name Provider Type    Chery Devine OT Occupational Therapist                   Obj/Interventions       [facility] Name 09/25/24 1423          Sensory Assessment (Somatosensory)    Sensory Assessment (Somatosensory) UE sensation intact  -KP       Row Name 09/25/24 1423          Vision Assessment/Intervention    Visual Impairment/Limitations WFL  -[facility] Name 09/25/24 1423          Range of Motion Comprehensive    General Range of Motion bilateral upper extremity ROM WFL  -[facility] Name 09/25/24 1423          Strength Comprehensive (MMT)    Comment, General Manual Muscle Testing (MMT) Assessment 4+/5 MMT in BUEs  -[facility] Name 09/25/24 1423          Motor Skills    Motor Skills functional endurance;coordination  -     Coordination WNL;bilateral;upper extremity  -     Functional Endurance fair  -[facility] Name 09/25/24 1423          Balance    Balance Assessment sitting static balance;standing static balance  -     Static Sitting Balance modified independence  -     Static Standing Balance minimal assist  -               User Key  (r) = Recorded By, (t) = Taken By, (c) = Cosigned By      Initials Name Provider Type    Chery Devine OT Occupational Therapist                   Goals/Plan       Row Name 09/25/24 1426          Transfer Goal 1 (OT)    Activity/Assistive Device (Transfer Goal 1, OT) toilet  -     [place] Level/Cues Needed (Transfer Goal 1, OT) standby assist  -KP     Time Frame (Transfer Goal 1, OT) by discharge  -[facility] Name 09/25/24 1426          Dressing Goal 1 (OT)    Activity/Device (Dressing Goal 1, OT) dressing skills, all  -KP     [place]/Cues Needed (Dressing Goal 1, OT) standby assist  -     Time Frame (Dressing Goal 1, OT) by discharge  -[facility] Name 09/25/24 1426          Problem Specific Goal 1 (OT)    Problem Specific Goal 1  (OT) Patient will perform sustained activity X12 minutes to promote functional endurance/activity tolerance needed for daily routine.  -     Time Frame (Problem Specific Goal 1, OT) by discharge  -       Row Name 09/25/24 142          Therapy Assessment/Plan (OT)    Planned Therapy Interventions (OT) activity tolerance training;BADL retraining;functional balance retraining;transfer/mobility retraining;strengthening exercise;occupation/activity based interventions;ROM/therapeutic exercise;patient/caregiver education/training;neuromuscular control/coordination retraining  -               User Key  (r) = Recorded By, (t) = Taken By, (c) = Cosigned By      Initials Name Provider Type     Chery Gomez, OT Occupational Therapist                   Clinical Impression       Row Name 09/25/24 1427          Pain Assessment    Pretreatment Pain Rating 1/10  -     Posttreatment Pain Rating 1/10  -     Pain Location - abdomen  -       Row Name 09/25/24 1429          Plan of Care Review    Plan of Care Reviewed With patient  -     Progress no change  -     Outcome Evaluation Patient seen for OT evaluation. He presents with functional limitations including generalized weakness, impaired activity tolerance/functional endurance, and impaired balance. He would benefit from ongoing OT services to promote highest level of independence and safety prior to discharge.  -       Row Name 09/25/24 1426          Therapy Assessment/Plan (OT)    Patient/Family Therapy Goal Statement (OT) go home  -     Rehab Potential (OT) good, to achieve stated therapy goals  -     Criteria for Skilled Therapeutic Interventions Met (OT) yes;meets criteria;skilled treatment is necessary  -     Therapy Frequency (OT) 3 times/wk  3-5x/week  -     Predicted Duration of Therapy Intervention (OT) discharge  -       Row Name 09/25/24 1429          Therapy Plan Review/Discharge Plan (OT)    Anticipated Discharge Disposition (OT) home  with assist  -       Row Name 09/25/24 1428          Positioning and Restraints    Pre-Treatment Position in bed  -KP     Post Treatment Position bed  -KP     In Bed fowlers;call light within reach;encouraged to call for assist;with family/caregiver  -               User Key  (r) = Recorded By, (t) = Taken By, (c) = Cosigned By      Initials Name Provider Type    KP Chery Gomez, TROY Occupational Therapist                   Outcome Measures       Row Name 09/25/24 1115          How much help from another person do you currently need...    Turning from your back to your side while in flat bed without using bedrails? 2  -WT     Moving from lying on back to sitting on the side of a flat bed without bedrails? 2  -WT     Moving to and from a bed to a chair (including a wheelchair)? 2  -WT     Standing up from a chair using your arms (e.g., wheelchair, bedside chair)? 2  -WT     Climbing 3-5 steps with a railing? 2  -WT     To walk in hospital room? 2  -WT     AM-PAC 6 Clicks Score (PT) 12  -WT     Highest Level of Mobility Goal 4 --> Transfer to chair/commode  -WT               User Key  (r) = Recorded By, (t) = Taken By, (c) = Cosigned By      Initials Name Provider Type    WT Nereida Guerra, RN Registered Nurse                      OT Recommendation and Plan  Planned Therapy Interventions (OT): activity tolerance training, BADL retraining, functional balance retraining, transfer/mobility retraining, strengthening exercise, occupation/activity based interventions, ROM/therapeutic exercise, patient/caregiver education/training, neuromuscular control/coordination retraining  Therapy Frequency (OT): 3 times/wk (3-5x/week)  Plan of Care Review  Plan of Care Reviewed With: patient  Progress: no change  Outcome Evaluation: Patient seen for OT evaluation. He presents with functional limitations including generalized weakness, impaired activity tolerance/functional endurance, and impaired balance. He would benefit  from ongoing OT services to promote highest level of independence and safety prior to discharge.     Time Calculation:         Time Calculation- OT       Row Name 09/25/24 1431             Time Calculation- OT    OT Received On 09/25/24  -                User Key  (r) = Recorded By, (t) = Taken By, (c) = Cosigned By      Initials Name Provider Type    Chery Devine OT Occupational Therapist                  Therapy Charges for Today       Code Description Service Date Service Provider Modifiers Qty    [id number] HC OT EVAL MOD COMPLEXITY 4 9/25/2024 Chery Gomez OT GO 1                 Chery Gomez OT  9/25/2024

## 2024-09-25 NOTE — PLAN OF CARE
Goal Outcome Evaluation: Patient has been pleasant. Compliant with care. Patient remains on room air, saturations remaining above 90%. Patient complained of pain, PRN medication given. Patient has had continuous severe pain and started throwing up, MD Lennon made aware. Patient now on continuous suction. Patient resting in bed. Family at bedside, updated on plan of care.

## 2024-09-25 NOTE — CASE MANAGEMENT/SOCIAL WORK
Discharge Planning Assessment  [facility]     Patient Name: Alberto Guzman  MRN: [id number]  Today's Date: 9/25/2024    Admit Date: 9/24/2024    Plan: SS received consult per Case Management for advanced age and discharge planning.  SS noted ED Case Management completed initial referral.  SS spoke with pt and daughter at bedside.  Pt resides at home with daughter Krystin at [address] [place] and plans to return home at discharge.  Pt currently does not utilize home health services.  Pt's PCP is Lianet Dia.  Pt utilizes [facility].  Pt transports via private auto per family.  SS will follow.       Discharge Plan       Row Name 09/25/24 1248       Plan    Plan SS received consult per Case Management for advanced age and discharge planning.  SS noted ED Case Management completed initial referral.  SS spoke with pt and daughter at bedside.  Pt resides at home with daughter Krystin at [address] [place] and plans to return home at discharge.  Pt currently does not utilize home health services.  Pt's PCP is Lianet Dia.  Pt utilizes [facility].  Pt transports via private auto per family.  SS will follow.                  Continued Care and Services - Admitted Since 9/24/2024    No active coordination exists for this encounter.       Selected Continued Care - Episodes Includes continued care and service providers with selected services from the active episodes listed below      Heart Failure Episode start date: 8/9/2024   There are no active outsourced providers for this episode.                 Expected Discharge Date and Time       Expected Discharge Date Expected Discharge Time    Sep 27, 2024             Gege Diaz, ANATOLYW

## 2024-09-25 NOTE — THERAPY EVALUATION
Acute Care - Physical Therapy Initial Evaluation   Luis     Patient Name: Alberto Guzman  : 3/29/1931  MRN: [id number]  Today's Date: 2024      Visit Dx:     ICD-10-CM ICD-9-CM   1. Partial small bowel obstruction  K56.600 560.9   2. Atrial flutter, unspecified type  I48.92 427.32   3. Cardiomyopathy, unspecified type  I42.9 425.4     Patient Active Problem List   Diagnosis    Urinary retention    Bladder neck contracture    Bladder tumor    Aftercare following surgery of the genitourinary system    Atrial flutter , ventricular paced rhythm    Presence of cardiac pacemaker 6/3/2025    Hyperlipidemia LDL goal <70    Essential hypertension    Chronic anticoagulation with Coumadin    Ventricular tachycardia (paroxysmal)    Asymptomatic PVD (peripheral vascular disease)    Coronary artery disease, anterior wall myocardial infarction with directional atherectomy of LAD in  and bare-metal stent to the LAD in , nonobstructive disease     Ischemic cardiomyopathy, LV ejection fraction around 45 to 50%    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, 46 to 50%)    Bilateral lower extremity edema    Stroke (cerebrum)    Small bowel obstruction    Hypokalemia    Hypomagnesemia     Past Medical History:   Diagnosis Date    Arthritis     Atrial flutter     Back pain     Benign prostatic hyperplasia     Bladder tumor     Cancer     [id number]    Chronic systolic heart failure 2021    Colon cancer     Coronary artery disease     DVT (deep venous thrombosis)     r leg    Elevated cholesterol     Heart attack     Hypertension     Numbness     feet/hands    Osteoporosis     PVD (peripheral vascular disease)     Rheumatoid arthritis     Stroke     Ventricular tachycardia      Past Surgical History:   Procedure Laterality Date    CATARACT EXTRACTION Bilateral     CHOLECYSTECTOMY      COLON RESECTION      COLONOSCOPY      EXPLORATORY LAPAROTOMY N/A 2024    Procedure: LAPAROTOMY  EXPLORATORY;  Surgeon: Deo Lennon MD;  Location: [facility];  Service: General;  Laterality: N/A;    HEMORROIDECTOMY      HERNIA REPAIR      left inguinal/umbilical    HIP ARTHROPLASTY Right     INSERT / REPLACE / REMOVE PACEMAKER      JOINT REPLACEMENT      PACEMAKER IMPLANTATION      PROSTATE SURGERY      TRANSURETHRAL RESECTION OF BLADDER TUMOR N/A 04/18/2018    Procedure: CYSTOSCOPY TRANSURETHRAL RESECTION OF SMALL BLADDER TUMOR;  Surgeon: Alfonso Collins MD;  Location: [facility] OR;  Service: Urology    TRANSURETHRAL RESECTION OF BLADDER TUMOR N/A 08/29/2018    Procedure: CYSTOSCOPY TRANSURETHRAL RESECTION OF BLADDER TUMOR;  Surgeon: Alfonso Collins MD;  Location: [facility] OR;  Service: Urology     PT Assessment (Last 12 Hours)       PT Evaluation and Treatment       Row Name 09/25/24 1521          Physical Therapy Time and Intention    Document Type evaluation  -KM     Mode of Treatment physical therapy  -KM     Patient Effort good  -KM     Symptoms Noted During/After Treatment fatigue  -KM       Row Name 09/25/24 1521          General Information    Patient Profile Reviewed yes  -KM     Patient Observations alert;cooperative;agree to therapy  -KM     Prior Level of Function --  Pt. requires supervision from daughter at home. He uses RW in home and cane outside of home. He has had minor functional decline over last 3 months d/t procedures and illnesses.  -KM     Existing Precautions/Restrictions fall  -KM     Risks Reviewed patient and family:;LOB;nausea/vomiting;dizziness;increased discomfort  -KM     Benefits Reviewed patient and family:;improve function;increase independence;increase strength;increase balance  -KM     Barriers to Rehab none identified  -KM       Row Name 09/25/24 1521          Living Environment    Current Living Arrangements home  -KM     People in Home child(ruma), adult  -KM     Primary Care Provided by self;child(ruma)  -KM       Row Name 09/25/24 1521          Home  Use of Assistive/Adaptive Equipment    Equipment Currently Used at Home walker, rolling;cane, straight  -[facility] Name 09/25/24 1521          Cognition    Affect/Mental Status (Cognition) [facility]     Orientation Status (Cognition) oriented x 3  -KM     Follows Commands (Cognition) [facility] Name 09/25/24 1521          Range of Motion (ROM)    Range of Motion bilateral lower extremities;ROM is WFL  -KM       Row Name 09/25/24 1521          Strength (Manual Muscle Testing)    Strength (Manual Muscle Testing) bilateral lower extremities;strength is WFL  -KM       Row Name 09/25/24 1521          Bed Mobility    Bed Mobility bed mobility (all) activities  -KM     All Activities, [place] (Bed Mobility) minimum assist (75% patient effort)  -     Assistive Device (Bed Mobility) bed rails;head of bed elevated;draw sheet  -KM       Row Name 09/25/24 1521          Transfers    Transfers sit-stand transfer;stand-sit transfer  -     Comment, (Transfers) HHA  -KM       Row Name 09/25/24 1521          Sit-Stand Transfer    Sit-Stand [place] (Transfers) minimum assist (75% patient effort)  -     Assistive Device (Sit-Stand Transfers) --  [facility] 09/25/24 1521          Stand-Sit Transfer    Stand-Sit [place] (Transfers) minimum assist (75% patient effort)  -     Assistive Device (Stand-Sit Transfers) --  [facility] 09/25/24 1521          Gait/Stairs (Locomotion)    Gait/Stairs Locomotion gait/ambulation independence;distance ambulated  -KM     [place] Level (Gait) minimum assist (75% patient effort)  -     Assistive Device (Gait) --  [facility]     Patient was able to Ambulate yes  -KM     Distance in Feet (Gait) 50  -KM     Pattern (Gait) step-through  -KM     Deviations/Abnormal Patterns (Gait) base of support, narrow;gait speed decreased  -KM     Bilateral Gait Deviations forward flexed posture  -[facility] Name 09/25/24 1521          Safety Issues,  Functional Mobility    Impairments Affecting Function (Mobility) balance;endurance/activity tolerance;strength;pain  -KM       Row Name 09/25/24 1521          Balance    Balance Assessment sitting static balance;standing dynamic balance  -KM     Static Sitting Balance modified independence  -KM     Dynamic Standing Balance minimal assist  -KM     Position/Device Used, Standing Balance --  HHA  -KM       Row Name             Wound 09/24/24 2115 Right posterior heel Abrasion    Wound - Properties Group Placement Date: 09/24/24  -RW Placement Time: 2115 -RW Present on Original Admission: Y  -RW Side: Right  -RW Orientation: posterior  -RW Location: heel  -RW Primary Wound Type: Abrasion  -RW    Retired Wound - Properties Group Placement Date: 09/24/24  -RW Placement Time: 2115 -RW Present on Original Admission: Y  -RW Side: Right  -RW Orientation: posterior  -RW Location: heel  -RW Primary Wound Type: Abrasion  -RW    Retired Wound - Properties Group Date first assessed: 09/24/24  -RW Time first assessed: 2115 -RW Present on Original Admission: Y  -RW Side: Right  -RW Location: heel  -RW Primary Wound Type: Abrasion  -RW      Row Name             Wound 09/24/24 2115 Left posterior heel Abrasion    Wound - Properties Group Placement Date: 09/24/24  -RW Placement Time: 2115 -RW Present on Original Admission: Y  -RW Side: Left  -RW Orientation: posterior  -RW Location: heel  -RW Primary Wound Type: Abrasion  -RW    Retired Wound - Properties Group Placement Date: 09/24/24  -RW Placement Time: 2115 -RW Present on Original Admission: Y  -RW Side: Left  -RW Orientation: posterior  -RW Location: heel  -RW Primary Wound Type: Abrasion  -RW    Retired Wound - Properties Group Date first assessed: 09/24/24  -RW Time first assessed: 2115 -RW Present on Original Admission: Y  -RW Side: Left  -RW Location: heel  -RW Primary Wound Type: Abrasion  -RW      Row Name             Wound 09/24/24 2115 Bilateral other (see comments)  gluteal Pressure Injury    Wound - Properties Group Placement Date: 09/24/24  -RW Placement Time: 2115 -RW Present on Original Admission: Y  -RW Side: Bilateral  -RW Orientation: other (see comments)  -RW, multiple wounds noted to bilateral upper and lower glutes  Location: gluteal  -RW Primary Wound Type: Pressure inj  -RW    Retired Wound - Properties Group Placement Date: 09/24/24  -RW Placement Time: 2115 -RW Present on Original Admission: Y  -RW Side: Bilateral  -RW Orientation: other (see comments)  -RW, multiple wounds noted to bilateral upper and lower glutes  Location: gluteal  -RW Primary Wound Type: Pressure inj  -RW    Retired Wound - Properties Group Date first assessed: 09/24/24  -RW Time first assessed: 2115 -RW Present on Original Admission: Y  -RW Side: Bilateral  -RW Location: gluteal  -RW Primary Wound Type: Pressure inj  -RW      Row Name 09/25/24 1521          Plan of Care Review    Plan of Care Reviewed With patient  -KM     Outcome Evaluation Pt. evaluation completed during PT session. He was able to perform functional mobility skills w/ Ann-Marie. He ambulated short distance w/ HHA and Ann-Marie. He tolerated session well w/ complaints of fatigue. Pt. would benefit from skilled PT services.  -KM       Row Name 09/25/24 1521          Therapy Assessment/Plan (PT)    Patient/Family Therapy Goals Statement (PT) return home and take care of self  -KM     Functional Level at Time of Evaluation (PT) Ann-Marie  -KM     PT Diagnosis (PT) decreased mobility  -KM     Rehab Potential (PT) good, to achieve stated therapy goals  -KM     Criteria for Skilled Interventions Met (PT) yes;skilled treatment is necessary  -KM     Therapy Frequency (PT) 2 times/wk  2-5x/wk  -KM     Predicted Duration of Therapy Intervention (PT) until discharge  -KM     Problem List (PT) problems related to;balance;mobility  -KM     Activity Limitations Related to Problem List (PT) unable to ambulate safely;unable to transfer safely  -KM        Row Name 09/25/24 1521          Therapy Plan Review/Discharge Plan (PT)    Therapy Plan Review (PT) evaluation/treatment results reviewed;care plan/treatment goals reviewed;risks/benefits reviewed;patient;daughter  -KM       Row Name 09/25/24 1521          Physical Therapy Goals    Bed Mobility Goal Selection (PT) bed mobility, PT goal 1  -KM     Transfer Goal Selection (PT) transfer, PT goal 1  -KM     Gait Training Goal Selection (PT) gait training, PT goal 1  -KM       Row Name 09/25/24 1521          Bed Mobility Goal 1 (PT)    Activity/Assistive Device (Bed Mobility Goal 1, PT) bed mobility activities, all  -KM     [place] Level/Cues Needed (Bed Mobility Goal 1, PT) modified independence  -KM     Time Frame (Bed Mobility Goal 1, PT) by discharge  -       Row Name 09/25/24 1521          Transfer Goal 1 (PT)    Activity/Assistive Device (Transfer Goal 1, PT) transfers, all;walker, rolling  -KM     [place] Level/Cues Needed (Transfer Goal 1, PT) modified independence  -KM     Time Frame (Transfer Goal 1, PT) by discharge  -       Row Name 09/25/24 1521          Gait Training Goal 1 (PT)    Activity/Assistive Device (Gait Training Goal 1, PT) gait (walking locomotion);assistive device use;walker, rolling  -KM     [place] Level (Gait Training Goal 1, PT) modified independence  -KM     Distance (Gait Training Goal 1, PT) 150'  -KM     Time Frame (Gait Training Goal 1, PT) by discharge  -               User Key  (r) = Recorded By, (t) = Taken By, (c) = Cosigned By      Initials Name Provider Type    [place], RN Registered Nurse    Gume Guerrero, LUIS ALFREDO Physical Therapist                    Physical Therapy Education       Title: PT OT SLP Therapies (Done)       Topic: Physical Therapy (Done)       Point: Mobility training (Done)       Learning Progress Summary             Patient Acceptance, E,TB, VU by DONALD at 9/25/2024 1537                         Point: Home exercise program (Done)        Learning Progress Summary             Patient Acceptance, E,TB, VU by  at 9/25/2024 1537                         Point: Body mechanics (Done)       Learning Progress Summary             Patient Acceptance, E,TB, VU by  at 9/25/2024 1537                         Point: Precautions (Done)       Learning Progress Summary             Patient Acceptance, E,TB, VU by  at 9/25/2024 1537                                         User Key       Initials Effective Dates Name Provider Type Discipline     05/24/22 -  Gume Oconnor, PT Physical Therapist PT                  PT Recommendation and Plan  Anticipated Discharge Disposition (PT): home with assist  Planned Therapy Interventions (PT): balance training, bed mobility training, gait training, home exercise program, patient/family education, postural re-education, ROM (range of motion), strengthening, stretching, transfer training  Therapy Frequency (PT): 2 times/wk (2-5x/wk)  Plan of Care Reviewed With: patient  Outcome Evaluation: Pt. evaluation completed during PT session. He was able to perform functional mobility skills w/ Ann-Marie. He ambulated short distance w/ HHA and Ann-Marie. He tolerated session well w/ complaints of fatigue. Pt. would benefit from skilled PT services.       Time Calculation:    PT Charges       Row Name 09/25/24 1521             Time Calculation    PT Received On 09/25/24  -KM      PT Goal Re-Cert Due Date 10/09/24  -KM                User Key  (r) = Recorded By, (t) = Taken By, (c) = Cosigned By      Initials Name Provider Type     Gume Oconnor, LUIS ALFREDO Physical Therapist                  Therapy Charges for Today       Code Description Service Date Service Provider Modifiers Qty    96810905496 HC PT EVAL MOD COMPLEXITY 4 9/25/2024 Gume Oconnor, PT GP 1            PT G-Codes  AM-PAC 6 Clicks Score (PT): 12    Gume Oconnor PT  9/25/2024

## 2024-09-26 ENCOUNTER — ANESTHESIA EVENT (OUTPATIENT)
Dept: PERIOP | Facility: HOSPITAL | Age: 89
End: 2024-09-26
Payer: MEDICARE

## 2024-09-26 ENCOUNTER — ANESTHESIA (OUTPATIENT)
Dept: PERIOP | Facility: HOSPITAL | Age: 89
End: 2024-09-26
Payer: MEDICARE

## 2024-09-26 PROBLEM — K56.600 PARTIAL SMALL BOWEL OBSTRUCTION: Status: ACTIVE | Noted: 2024-09-24

## 2024-09-26 LAB
ALBUMIN SERPL-MCNC: 3.7 G/DL (ref 3.5–5.2)
ALBUMIN/GLOB SERPL: 1.5 G/DL
ALP SERPL-CCNC: 73 U/L (ref 39–117)
ALT SERPL W P-5'-P-CCNC: 7 U/L (ref 1–41)
ANION GAP SERPL CALCULATED.3IONS-SCNC: 12.1 MMOL/L (ref 5–15)
AST SERPL-CCNC: 15 U/L (ref 1–40)
BASOPHILS # BLD AUTO: 0.01 10*3/MM3 (ref 0–0.2)
BASOPHILS NFR BLD AUTO: 0.2 % (ref 0–1.5)
BILIRUB SERPL-MCNC: 1.3 MG/DL (ref 0–1.2)
BUN SERPL-MCNC: 15 MG/DL (ref 8–23)
BUN/CREAT SERPL: 15.5 (ref 7–25)
CALCIUM SPEC-SCNC: 9.1 MG/DL (ref 8.2–9.6)
CHLORIDE SERPL-SCNC: 103 MMOL/L (ref 98–107)
CO2 SERPL-SCNC: 25.9 MMOL/L (ref 22–29)
CREAT SERPL-MCNC: 0.97 MG/DL (ref 0.76–1.27)
DEPRECATED RDW RBC AUTO: 51.3 FL (ref 37–54)
EGFRCR SERPLBLD CKD-EPI 2021: 72.8 ML/MIN/1.73
EOSINOPHIL # BLD AUTO: 0.01 10*3/MM3 (ref 0–0.4)
EOSINOPHIL NFR BLD AUTO: 0.2 % (ref 0.3–6.2)
ERYTHROCYTE [DISTWIDTH] IN BLOOD BY AUTOMATED COUNT: 13.9 % (ref 12.3–15.4)
GLOBULIN UR ELPH-MCNC: 2.5 GM/DL
GLUCOSE BLDC GLUCOMTR-MCNC: 84 MG/DL (ref 70–130)
GLUCOSE SERPL-MCNC: 100 MG/DL (ref 65–99)
HCT VFR BLD AUTO: 36.8 % (ref 37.5–51)
HCT VFR BLD AUTO: 39.4 % (ref 37.5–51)
HGB BLD-MCNC: 12 G/DL (ref 13–17.7)
HGB BLD-MCNC: 12.6 G/DL (ref 13–17.7)
IMM GRANULOCYTES # BLD AUTO: 0.01 10*3/MM3 (ref 0–0.05)
IMM GRANULOCYTES NFR BLD AUTO: 0.2 % (ref 0–0.5)
LYMPHOCYTES # BLD AUTO: 1.05 10*3/MM3 (ref 0.7–3.1)
LYMPHOCYTES NFR BLD AUTO: 19.1 % (ref 19.6–45.3)
MCH RBC QN AUTO: 32.6 PG (ref 26.6–33)
MCHC RBC AUTO-ENTMCNC: 32.6 G/DL (ref 31.5–35.7)
MCV RBC AUTO: 100 FL (ref 79–97)
MONOCYTES # BLD AUTO: 0.65 10*3/MM3 (ref 0.1–0.9)
MONOCYTES NFR BLD AUTO: 11.8 % (ref 5–12)
NEUTROPHILS NFR BLD AUTO: 3.78 10*3/MM3 (ref 1.7–7)
NEUTROPHILS NFR BLD AUTO: 68.5 % (ref 42.7–76)
NRBC BLD AUTO-RTO: 0 /100 WBC (ref 0–0.2)
PLATELET # BLD AUTO: 171 10*3/MM3 (ref 140–450)
PMV BLD AUTO: 10.5 FL (ref 6–12)
POTASSIUM SERPL-SCNC: 3.7 MMOL/L (ref 3.5–5.2)
PROT SERPL-MCNC: 6.2 G/DL (ref 6–8.5)
RBC # BLD AUTO: 3.68 10*6/MM3 (ref 4.14–5.8)
SODIUM SERPL-SCNC: 141 MMOL/L (ref 136–145)
UFH PPP CHRO-ACNC: 0.16 IU/ML (ref 0.3–0.7)
UFH PPP CHRO-ACNC: 0.26 IU/ML (ref 0.3–0.7)
WBC NRBC COR # BLD AUTO: 5.51 10*3/MM3 (ref 3.4–10.8)

## 2024-09-26 PROCEDURE — 25810000003 LACTATED RINGERS PER 1000 ML: Performed by: NURSE ANESTHETIST, CERTIFIED REGISTERED

## 2024-09-26 PROCEDURE — 25010000002 MORPHINE PER 10 MG: Performed by: HOSPITALIST

## 2024-09-26 PROCEDURE — 44320 COLOSTOMY: CPT | Performed by: SURGERY

## 2024-09-26 PROCEDURE — 25010000002 AMPICILLIN-SULBACTAM PER 1.5 G: Performed by: NURSE ANESTHETIST, CERTIFIED REGISTERED

## 2024-09-26 PROCEDURE — 25010000002 MORPHINE PER 10 MG: Performed by: STUDENT IN AN ORGANIZED HEALTH CARE EDUCATION/TRAINING PROGRAM

## 2024-09-26 PROCEDURE — 99232 SBSQ HOSP IP/OBS MODERATE 35: CPT | Performed by: STUDENT IN AN ORGANIZED HEALTH CARE EDUCATION/TRAINING PROGRAM

## 2024-09-26 PROCEDURE — 25010000002 MORPHINE PER 10 MG: Performed by: SURGERY

## 2024-09-26 PROCEDURE — 25010000002 NEOSTIGMINE 10 MG/10ML SOLUTION: Performed by: NURSE ANESTHETIST, CERTIFIED REGISTERED

## 2024-09-26 PROCEDURE — 82948 REAGENT STRIP/BLOOD GLUCOSE: CPT

## 2024-09-26 PROCEDURE — 85520 HEPARIN ASSAY: CPT

## 2024-09-26 PROCEDURE — 85014 HEMATOCRIT: CPT | Performed by: STUDENT IN AN ORGANIZED HEALTH CARE EDUCATION/TRAINING PROGRAM

## 2024-09-26 PROCEDURE — 25010000002 GLYCOPYRROLATE 0.4 MG/2ML SOLUTION: Performed by: NURSE ANESTHETIST, CERTIFIED REGISTERED

## 2024-09-26 PROCEDURE — 85018 HEMOGLOBIN: CPT | Performed by: STUDENT IN AN ORGANIZED HEALTH CARE EDUCATION/TRAINING PROGRAM

## 2024-09-26 PROCEDURE — 25810000003 LACTATED RINGERS PER 1000 ML: Performed by: SURGERY

## 2024-09-26 PROCEDURE — 25010000002 HEPARIN (PORCINE) PER 1000 UNITS

## 2024-09-26 PROCEDURE — 99222 1ST HOSP IP/OBS MODERATE 55: CPT | Performed by: SURGERY

## 2024-09-26 PROCEDURE — 25010000002 FENTANYL CITRATE (PF) 50 MCG/ML SOLUTION: Performed by: NURSE ANESTHETIST, CERTIFIED REGISTERED

## 2024-09-26 PROCEDURE — 25010000002 HEPARIN (PORCINE) 25000-0.45 UT/250ML-% SOLUTION: Performed by: SURGERY

## 2024-09-26 PROCEDURE — 25010000002 ONDANSETRON PER 1 MG: Performed by: NURSE ANESTHETIST, CERTIFIED REGISTERED

## 2024-09-26 PROCEDURE — 44050 REDUCE BOWEL OBSTRUCTION: CPT | Performed by: SURGERY

## 2024-09-26 DEVICE — ECHELON CONTOUR W/ BLUE RELOAD
Type: IMPLANTABLE DEVICE | Site: ABDOMEN | Status: FUNCTIONAL
Brand: ECHELON

## 2024-09-26 RX ORDER — SODIUM CHLORIDE, SODIUM LACTATE, POTASSIUM CHLORIDE, CALCIUM CHLORIDE 600; 310; 30; 20 MG/100ML; MG/100ML; MG/100ML; MG/100ML
75 INJECTION, SOLUTION INTRAVENOUS CONTINUOUS
Status: DISCONTINUED | OUTPATIENT
Start: 2024-09-26 | End: 2024-09-29

## 2024-09-26 RX ORDER — LIDOCAINE HYDROCHLORIDE 20 MG/ML
INJECTION, SOLUTION EPIDURAL; INFILTRATION; INTRACAUDAL; PERINEURAL AS NEEDED
Status: DISCONTINUED | OUTPATIENT
Start: 2024-09-26 | End: 2024-09-26 | Stop reason: SURG

## 2024-09-26 RX ORDER — ROCURONIUM BROMIDE 10 MG/ML
INJECTION, SOLUTION INTRAVENOUS AS NEEDED
Status: DISCONTINUED | OUTPATIENT
Start: 2024-09-26 | End: 2024-09-26 | Stop reason: SURG

## 2024-09-26 RX ORDER — ONDANSETRON 2 MG/ML
4 INJECTION INTRAMUSCULAR; INTRAVENOUS AS NEEDED
Status: DISCONTINUED | OUTPATIENT
Start: 2024-09-26 | End: 2024-09-26 | Stop reason: HOSPADM

## 2024-09-26 RX ORDER — IPRATROPIUM BROMIDE AND ALBUTEROL SULFATE 2.5; .5 MG/3ML; MG/3ML
3 SOLUTION RESPIRATORY (INHALATION) ONCE AS NEEDED
Status: DISCONTINUED | OUTPATIENT
Start: 2024-09-26 | End: 2024-09-26 | Stop reason: HOSPADM

## 2024-09-26 RX ORDER — OXYCODONE AND ACETAMINOPHEN 5; 325 MG/1; MG/1
1 TABLET ORAL ONCE AS NEEDED
Status: DISCONTINUED | OUTPATIENT
Start: 2024-09-26 | End: 2024-09-26 | Stop reason: HOSPADM

## 2024-09-26 RX ORDER — FENTANYL CITRATE 50 UG/ML
INJECTION, SOLUTION INTRAMUSCULAR; INTRAVENOUS AS NEEDED
Status: DISCONTINUED | OUTPATIENT
Start: 2024-09-26 | End: 2024-09-26 | Stop reason: SURG

## 2024-09-26 RX ORDER — MAGNESIUM HYDROXIDE 1200 MG/15ML
LIQUID ORAL AS NEEDED
Status: DISCONTINUED | OUTPATIENT
Start: 2024-09-26 | End: 2024-09-26 | Stop reason: HOSPADM

## 2024-09-26 RX ORDER — SODIUM CHLORIDE, SODIUM LACTATE, POTASSIUM CHLORIDE, CALCIUM CHLORIDE 600; 310; 30; 20 MG/100ML; MG/100ML; MG/100ML; MG/100ML
INJECTION, SOLUTION INTRAVENOUS CONTINUOUS PRN
Status: DISCONTINUED | OUTPATIENT
Start: 2024-09-26 | End: 2024-09-26 | Stop reason: SURG

## 2024-09-26 RX ORDER — ETOMIDATE 2 MG/ML
INJECTION INTRAVENOUS AS NEEDED
Status: DISCONTINUED | OUTPATIENT
Start: 2024-09-26 | End: 2024-09-26 | Stop reason: SURG

## 2024-09-26 RX ORDER — FENTANYL CITRATE 50 UG/ML
50 INJECTION, SOLUTION INTRAMUSCULAR; INTRAVENOUS
Status: DISCONTINUED | OUTPATIENT
Start: 2024-09-26 | End: 2024-09-26 | Stop reason: HOSPADM

## 2024-09-26 RX ORDER — HEPARIN SODIUM 5000 [USP'U]/ML
2000 INJECTION, SOLUTION INTRAVENOUS; SUBCUTANEOUS ONCE
Status: COMPLETED | OUTPATIENT
Start: 2024-09-26 | End: 2024-09-26

## 2024-09-26 RX ORDER — GLYCOPYRROLATE 0.2 MG/ML
INJECTION INTRAMUSCULAR; INTRAVENOUS AS NEEDED
Status: DISCONTINUED | OUTPATIENT
Start: 2024-09-26 | End: 2024-09-26 | Stop reason: SURG

## 2024-09-26 RX ORDER — ONDANSETRON 2 MG/ML
INJECTION INTRAMUSCULAR; INTRAVENOUS AS NEEDED
Status: DISCONTINUED | OUTPATIENT
Start: 2024-09-26 | End: 2024-09-26 | Stop reason: SURG

## 2024-09-26 RX ORDER — FAMOTIDINE 10 MG/ML
INJECTION, SOLUTION INTRAVENOUS AS NEEDED
Status: DISCONTINUED | OUTPATIENT
Start: 2024-09-26 | End: 2024-09-26 | Stop reason: SURG

## 2024-09-26 RX ORDER — AMPICILLIN AND SULBACTAM 2; 1 G/1; G/1
INJECTION, POWDER, FOR SOLUTION INTRAMUSCULAR; INTRAVENOUS AS NEEDED
Status: DISCONTINUED | OUTPATIENT
Start: 2024-09-26 | End: 2024-09-26 | Stop reason: SURG

## 2024-09-26 RX ORDER — ACETAMINOPHEN 325 MG/1
650 TABLET ORAL ONCE
Status: DISCONTINUED | OUTPATIENT
Start: 2024-09-26 | End: 2024-09-26 | Stop reason: HOSPADM

## 2024-09-26 RX ORDER — SODIUM CHLORIDE, SODIUM LACTATE, POTASSIUM CHLORIDE, CALCIUM CHLORIDE 600; 310; 30; 20 MG/100ML; MG/100ML; MG/100ML; MG/100ML
100 INJECTION, SOLUTION INTRAVENOUS ONCE AS NEEDED
Status: DISCONTINUED | OUTPATIENT
Start: 2024-09-26 | End: 2024-09-26 | Stop reason: HOSPADM

## 2024-09-26 RX ORDER — NEOSTIGMINE METHYLSULFATE 1 MG/ML
INJECTION INTRAVENOUS AS NEEDED
Status: DISCONTINUED | OUTPATIENT
Start: 2024-09-26 | End: 2024-09-26 | Stop reason: SURG

## 2024-09-26 RX ORDER — HEPARIN SODIUM 10000 [USP'U]/100ML
11.42 INJECTION, SOLUTION INTRAVENOUS
Status: DISCONTINUED | OUTPATIENT
Start: 2024-09-26 | End: 2024-09-27

## 2024-09-26 RX ADMIN — ETOMIDATE 16 MG: 2 INJECTION, SOLUTION INTRAVENOUS at 13:32

## 2024-09-26 RX ADMIN — SODIUM CHLORIDE, POTASSIUM CHLORIDE, SODIUM LACTATE AND CALCIUM CHLORIDE: 600; 310; 30; 20 INJECTION, SOLUTION INTRAVENOUS at 13:25

## 2024-09-26 RX ADMIN — SODIUM CHLORIDE, POTASSIUM CHLORIDE, SODIUM LACTATE AND CALCIUM CHLORIDE 100 ML/HR: 600; 310; 30; 20 INJECTION, SOLUTION INTRAVENOUS at 16:33

## 2024-09-26 RX ADMIN — MORPHINE SULFATE 2 MG: 2 INJECTION, SOLUTION INTRAMUSCULAR; INTRAVENOUS at 03:44

## 2024-09-26 RX ADMIN — MORPHINE SULFATE 4 MG: 4 INJECTION, SOLUTION INTRAMUSCULAR; INTRAVENOUS at 01:30

## 2024-09-26 RX ADMIN — Medication 10 ML: at 20:43

## 2024-09-26 RX ADMIN — FENTANYL CITRATE 25 MCG: 50 INJECTION INTRAMUSCULAR; INTRAVENOUS at 14:06

## 2024-09-26 RX ADMIN — NEOSTIGMINE METHYLSULFATE 3 MG: 0.5 INJECTION INTRAVENOUS at 14:27

## 2024-09-26 RX ADMIN — AMPICILLIN SODIUM AND SULBACTAM SODIUM 3 G: 2; 1 INJECTION, POWDER, FOR SOLUTION INTRAMUSCULAR; INTRAVENOUS at 13:27

## 2024-09-26 RX ADMIN — FENTANYL CITRATE 50 MCG: 50 INJECTION, SOLUTION INTRAMUSCULAR; INTRAVENOUS at 15:10

## 2024-09-26 RX ADMIN — MORPHINE SULFATE 2 MG: 2 INJECTION, SOLUTION INTRAMUSCULAR; INTRAVENOUS at 19:17

## 2024-09-26 RX ADMIN — HEPARIN SODIUM 11.42 UNITS/KG/HR: 10000 INJECTION, SOLUTION INTRAVENOUS at 21:37

## 2024-09-26 RX ADMIN — MORPHINE SULFATE 2 MG: 2 INJECTION, SOLUTION INTRAMUSCULAR; INTRAVENOUS at 08:26

## 2024-09-26 RX ADMIN — FAMOTIDINE 20 MG: 10 INJECTION, SOLUTION INTRAVENOUS at 13:27

## 2024-09-26 RX ADMIN — HEPARIN SODIUM 2000 UNITS: 5000 INJECTION, SOLUTION INTRAVENOUS; SUBCUTANEOUS at 00:51

## 2024-09-26 RX ADMIN — FENTANYL CITRATE 50 MCG: 50 INJECTION INTRAMUSCULAR; INTRAVENOUS at 14:37

## 2024-09-26 RX ADMIN — MORPHINE SULFATE 2 MG: 2 INJECTION, SOLUTION INTRAMUSCULAR; INTRAVENOUS at 15:43

## 2024-09-26 RX ADMIN — ONDANSETRON 4 MG: 2 INJECTION INTRAMUSCULAR; INTRAVENOUS at 13:27

## 2024-09-26 RX ADMIN — GABAPENTIN 600 MG: 300 CAPSULE ORAL at 20:43

## 2024-09-26 RX ADMIN — ATORVASTATIN CALCIUM 20 MG: 20 TABLET, FILM COATED ORAL at 20:43

## 2024-09-26 RX ADMIN — ROCURONIUM BROMIDE 40 MG: 10 SOLUTION INTRAVENOUS at 13:32

## 2024-09-26 RX ADMIN — LIDOCAINE HYDROCHLORIDE 40 MG: 20 INJECTION, SOLUTION EPIDURAL; INFILTRATION; INTRACAUDAL; PERINEURAL at 13:32

## 2024-09-26 RX ADMIN — GLYCOPYRROLATE 0.4 MG: 0.4 INJECTION INTRAMUSCULAR; INTRAVENOUS at 14:27

## 2024-09-26 RX ADMIN — PANTOPRAZOLE SODIUM 40 MG: 40 INJECTION, POWDER, FOR SOLUTION INTRAVENOUS at 05:12

## 2024-09-26 RX ADMIN — FENTANYL CITRATE 25 MCG: 50 INJECTION INTRAMUSCULAR; INTRAVENOUS at 13:57

## 2024-09-26 NOTE — PROGRESS NOTES
HEPARIN INFUSION  Alberto Guzman is a  89 y.o. male receiving heparin infusion.     Therapy for (VTE/Cardiac):   Cardiac  Patient Dosing Weight: 87.6 kg  Initial Bolus (Y/N):   N  Any Bolus (Y/N):   Y        Signs or Symptoms of Bleeding: N    Cardiac or Other (Not VTE)   Initial Bolus: 60 units/kg (Max 4,000 units)  Initial rate: 12 units/kg/hr (Max 1,000 units/hr)   Anti Xa Rebolus Infusion Hold time Change infusion Dose (Units/kg/hr) Next Anti Xa or aPTT Level Due   < 0.11 50 Units/kg  (4000 Units Max) None Increase by  3 Units/kg/hr 6 hours   0.11- 0.19 25 Units/kg  (2000 Units Max) None Increase by  2 Units/kg/hr 6 hours   0.2 - 0.29 0 None Increase by  1 Units/kg/hr 6 hours   0.3 - 0.5 0 None No Change 6 hours (after 2 consecutive levels in range check q24h @0700)   0.51 - 0.6 0 None Decrease by  1 Units/kg/hr 6 hours   0.61 - 0.8 0 30 Minutes Decrease by  2 Units/kg/hr 6 hours   0.81 - 1 0 60 Minutes Decrease by  3 Units/kg/hr 6 hours   >1 0 Hold  After Anti Xa less than 0.5 decrease previous rate by  4 Units/kg/hr  Every 2 hours until Anti Xa  less than 0.5 then when infusion restarts in 6 hours         Recommend anti-Xa every 6 hours.          Date   Time   Anti-Xa Current Rate (Unit/kg/hr) Bolus   (Units) Rate Change   (Unit/kg/hr) New Rate (Unit/kg/hr) Next   Anti-Xa Comments  Pump Check Daily   9/25 1702 <0.10 - No initial +11.42 11.42 2330 Patient takes warfarin at home. Subtherapeutic INR of 1.62 yesterday. No s/s of bleeding per nurse Nereida   9/25 2339 0.16 11.42 2000 +2 13.42 0700 Discussed with Uday FIORE. No s/s of bleeding   9/26 0645 0.26 13.42     D/w MACARENA Padron, drip was stopped at 0600 for surgery, defer rate change, will continue at same rate if resumed after surgery   9/26 1612     13.42 9/27 0200 To resume heparin drip at previous rate at 2000 per cezar Bullock RN                                                                                                                                                                                                 Pharmacy will continue to follow anti-Xa results and monitor for signs and symptoms of bleeding or thrombosis.

## 2024-09-26 NOTE — PLAN OF CARE
Goal Outcome Evaluation:   Pt has rested this shift. Pt on RA with sats maintaining above 90%. No s/s of acute distress noted at this time. Plan of care ongoing.

## 2024-09-26 NOTE — NURSING NOTE
Patients son Waqas Guzman called and wants to speak to the Doctor in the morning about the patients procedure.

## 2024-09-26 NOTE — CONSULTS
[facility]   Consult Note    Patient Name: Alberto Guzman  : 3/29/1931  MRN: [id number]  Primary Care Physician:  Lianet Dia APRN  Referring Physician: No ref. provider found  Date of admission: 2024    Consults  Subjective   Subjective     Reason for Consult/ Chief Complaint: Abdominal pain    Abdominal Pain  Pertinent negatives include no constipation, diarrhea, dysuria, fever, headaches, nausea or vomiting.     Alberto Guzman is a 89 y.o. male who 3 to 4 months ago underwent exploratory laparotomy for similar presentation was found to have an internal hernia through the mesocolic defect of his previous extended left colectomy.  The patient was doing well but developed recurrent symptoms of intermittent abdominal pain with nausea and vomiting.  He was admitted and placed on NG tube suction with CT scan concerning for partial versus complete small bowel obstruction.  Clinically he felt to progress and Gastrografin challenge showed no evidence of transit of Gastrografin into the bowel concerning for persistent obstruction.  No peritoneal signs.  He is hemodynamically stable.  He is on a heparin drip due to his chronic anticoagulation with Eliquis and history of DVT.    Review of Systems   Constitutional:  Negative for activity change, appetite change, chills and fever.   HENT:  Negative for sore throat and trouble swallowing.    Eyes:  Negative for visual disturbance.   Respiratory:  Negative for cough and shortness of breath.    Cardiovascular:  Negative for chest pain and palpitations.   Gastrointestinal:  Positive for abdominal pain. Negative for abdominal distention, blood in stool, constipation, diarrhea, nausea and vomiting.   Endocrine: Negative for cold intolerance and heat intolerance.   Genitourinary:  Negative for dysuria.   Musculoskeletal:  Negative for joint swelling.   Skin:  Negative for color change, rash and wound.   Allergic/Immunologic: Negative for immunocompromised  state.   Neurological:  Negative for dizziness, seizures, weakness and headaches.   Hematological:  Negative for adenopathy. Does not bruise/bleed easily.   Psychiatric/Behavioral:  Negative for agitation and confusion.         Personal History     Past Medical History:   Diagnosis Date    Arthritis     Atrial flutter     Back pain     Benign prostatic hyperplasia     Bladder tumor     Cancer     [id number]/melanoma    Chronic systolic heart failure 09/14/2021    Colon cancer     Coronary artery disease     DVT (deep venous thrombosis)     r leg    Elevated cholesterol     Heart attack     Hypertension     Numbness     feet/hands    Osteoporosis     PVD (peripheral vascular disease)     Rheumatoid arthritis     Stroke     Ventricular tachycardia        Past Surgical History:   Procedure Laterality Date    CATARACT EXTRACTION Bilateral     CHOLECYSTECTOMY      COLON RESECTION      COLONOSCOPY      EXPLORATORY LAPAROTOMY N/A 6/12/2024    Procedure: LAPAROTOMY EXPLORATORY;  Surgeon: Deo Lennon MD;  Location: [facility];  Service: General;  Laterality: N/A;    HEMORROIDECTOMY      HERNIA REPAIR      left inguinal/umbilical    HIP ARTHROPLASTY Right     INSERT / REPLACE / REMOVE PACEMAKER      JOINT REPLACEMENT      PACEMAKER IMPLANTATION      PROSTATE SURGERY      TRANSURETHRAL RESECTION OF BLADDER TUMOR N/A 04/18/2018    Procedure: CYSTOSCOPY TRANSURETHRAL RESECTION OF SMALL BLADDER TUMOR;  Surgeon: Alfonso Collins MD;  Location: [facility] OR;  Service: Urology    TRANSURETHRAL RESECTION OF BLADDER TUMOR N/A 08/29/2018    Procedure: CYSTOSCOPY TRANSURETHRAL RESECTION OF BLADDER TUMOR;  Surgeon: Alfonso Collins MD;  Location: [facility];  Service: Urology       Family History: family history includes Heart disease in his brother; No Known Problems in his father and mother. Otherwise pertinent FHx was reviewed and not pertinent to current issue.    Social History:  reports that he has quit smoking.  His smoking use included cigarettes. He has been exposed to tobacco smoke. He has never used smokeless tobacco. He reports current alcohol use. He reports that he does not use drugs.    Home Medications:   Collagen-Vitamin C, HYDROcodone-acetaminophen, Vericiguat, aspirin, atorvastatin, bumetanide, gabapentin, pantoprazole, sacubitril-valsartan, and warfarin    Allergies:  No Known Allergies    Objective    Objective     Vitals:  Temp:  [97.4 °F (36.3 °C)-99.5 °F (37.5 °C)] 98 °F (36.7 °C)  Heart Rate:  [66-82] 73  Resp:  [16-20] 20  BP: (118-159)/(59-71) 121/61    Physical Exam  Constitutional:       Appearance: He is well-developed.   HENT:      Head: Normocephalic and atraumatic.   Eyes:      Conjunctiva/sclera: Conjunctivae normal.      Pupils: Pupils are equal, round, and reactive to light.   Neck:      Thyroid: No thyromegaly.      Vascular: No JVD.      Trachea: No tracheal deviation.   Cardiovascular:      Rate and Rhythm: Normal rate and regular rhythm.      Heart sounds: No murmur heard.     No friction rub. No gallop.   Pulmonary:      Effort: Pulmonary effort is normal.      Breath sounds: Normal breath sounds.   Abdominal:      General: There is no distension.      Palpations: Abdomen is soft. There is no hepatomegaly or splenomegaly.      Tenderness: There is abdominal tenderness.      Hernia: No hernia is present.      Comments: Midline laparotomy scar well-healed, abdomen mildly tender to palpation   Musculoskeletal:         General: No deformity. Normal range of motion.      Cervical back: Neck supple.   Skin:     General: Skin is warm and dry.   Neurological:      Mental Status: He is alert and oriented to person, place, and time.         Result Review    Result Review:  I have personally reviewed the results from the time of this admission to 9/26/2024 10:28 EDT and agree with these findings:  [x]  Laboratory list / accordion  []  Microbiology  [x]  Radiology  []  EKG/Telemetry   []   Cardiology/Vascular   []  Pathology  []  Old records  []  Other:        Assessment & Plan   Assessment / Plan     Brief Patient Summary:  Alberto Guzman is a 89 y.o. male who presents with likely bowel obstruction secondary to possible internal hernia versus adhesions.    Active Hospital Problems:  Active Hospital Problems    Diagnosis     **Small bowel obstruction     Partial small bowel obstruction      Plan:   OR for exploratory laparotomy, possible small bowel resection, possible colostomy    Deo Lennon MD

## 2024-09-26 NOTE — PLAN OF CARE
Goal Outcome Evaluation:   Patient currently resting in bed. A&Ox 4. VSS. No visible s/s of acute distress noted at this time. No requests or complaints at this time. Call light within reach. Plan of care ongoing.

## 2024-09-26 NOTE — OP NOTE
LAPAROTOMY EXPLORATORY  Procedure Note    Alberto Guzman  9/26/2024    Pre-op Diagnosis:   Partial small bowel obstruction [K56.600]    Post-op Diagnosis:     Post-Op Diagnosis Codes:     * Partial small bowel obstruction [K56.600]    Procedure(s):  LAPAROTOMY EXPLORATORY, REDUCTION OF INTERNAL HERNIA, END COLOSTOMY CREATION    Surgeon(s):  Deo Lennon MD    Anesthesia: General    Staff:   Circulator: Lena Herrera RN  Scrub Person: Dav Simmons  Assistant: Socorro Smyth    Findings: Recurrent internal hernia through mesocolic defect causing intermittent obstructive picture.  Due to recurrence and inability to orient the bowel in a way that would avoid recurrent internal hernia the distal colon was transected and an end colostomy created to avoid any internal hernia spaces for recurrence          Operative Procedure: Patient was taken operating suite placed upon operating table.  Bilateral greater compression devices were applied and general endotracheal anesthesia was administered.  The abdomen was prepped and draped in usual sterile fashion.  Timeout procedure was performed.  Through the previous midline laparotomy incision an incision was made dissection was carried down through the scar tissue to the peritoneum which was grasped and retracted anteriorly and entered sharply with Metzenbaum scissors.  There were minimal omental adhesions to the anterior abdominal wall and the peritoneal incision was extended along the length of the skin incision.  In a similar fashion to the previous episode there was a internal hernia with proximal dilated bowel and distal decompressed small bowel loops.  This was through the mesocolic defect from the patient's previous extended left colectomy.  The mesenteric defect was much smaller as it had been approximated last surgery.  The hernia was reduced by running the small bowel from the terminal ileum to the ligament of Treitz.  Given the orientation of the  bowel and the emergent nature of the surgery it was felt that the patient would be best served by end colostomy to eliminate the internal hernia space as this had recurred less than 3 months from the last event.  The patient therefore had his distal colon transected with the laparoscopic stapling device and the mesocolon was mobilized to allow adequate length for colostomy creation.  To allow the colon to keep the proper orientation and avoid any hernia spaces a site on the left upper quadrant was identified just superior and lateral to the umbilicus overlying the rectus musculature.  Once again the small bowel was run from ligament of Treitz to terminal ileum with mild interloop adhesions that were taken down and was the bowel was freely run with no evidence of further obstruction or internal herniation the abdomen was irrigated and all counts were correct.  An aperture was created at the planned colostomy site dissection was carried down to the anterior sheath which was opened with a cruciate fashion.  The rectus musculature was spread along the length of its fibers and the posterior sheath and peritoneum were opened with cautery.  The colon was then pulled through the aperture with adequate length for a tension-free colostomy.  At this time with all sponge lap and needle counts correct the midline incision was closed with running looped PDS suture from the superior and inferior aspects.  Following approximation of the midline fascia the skin was irrigated and closed with staples.  The colostomy was then matured with circumferential chromic suture after removal of the distal staple line.  Following colostomy creation a 2 piece stoma appliance was placed and a Covaderm was placed over the incision.  The patient was awakened from anesthesia and taken to recovery.    Estimated Blood Loss: 10 mL    Specimens:   None           Drains: None    Grafts/Implants: None    Complications: None           Deo Lennon  MD     Date: 9/26/2024  Time: 14:45 EDT

## 2024-09-27 LAB
DEPRECATED RDW RBC AUTO: 52.7 FL (ref 37–54)
ERYTHROCYTE [DISTWIDTH] IN BLOOD BY AUTOMATED COUNT: 13.8 % (ref 12.3–15.4)
HCT VFR BLD AUTO: 39 % (ref 37.5–51)
HGB BLD-MCNC: 12.4 G/DL (ref 13–17.7)
MCH RBC QN AUTO: 32.9 PG (ref 26.6–33)
MCHC RBC AUTO-ENTMCNC: 31.8 G/DL (ref 31.5–35.7)
MCV RBC AUTO: 103.4 FL (ref 79–97)
PLATELET # BLD AUTO: 157 10*3/MM3 (ref 140–450)
PMV BLD AUTO: 10.5 FL (ref 6–12)
RBC # BLD AUTO: 3.77 10*6/MM3 (ref 4.14–5.8)
UFH PPP CHRO-ACNC: 0.25 IU/ML (ref 0.3–0.7)
UFH PPP CHRO-ACNC: 0.35 IU/ML (ref 0.3–0.7)
UFH PPP CHRO-ACNC: 0.4 IU/ML (ref 0.3–0.7)
WBC NRBC COR # BLD AUTO: 8.22 10*3/MM3 (ref 3.4–10.8)

## 2024-09-27 PROCEDURE — 25010000002 ONDANSETRON PER 1 MG: Performed by: SURGERY

## 2024-09-27 PROCEDURE — 25810000003 LACTATED RINGERS PER 1000 ML: Performed by: STUDENT IN AN ORGANIZED HEALTH CARE EDUCATION/TRAINING PROGRAM

## 2024-09-27 PROCEDURE — 85520 HEPARIN ASSAY: CPT | Performed by: SURGERY

## 2024-09-27 PROCEDURE — 25010000002 HYDROMORPHONE PER 4 MG: Performed by: STUDENT IN AN ORGANIZED HEALTH CARE EDUCATION/TRAINING PROGRAM

## 2024-09-27 PROCEDURE — 85027 COMPLETE CBC AUTOMATED: CPT | Performed by: STUDENT IN AN ORGANIZED HEALTH CARE EDUCATION/TRAINING PROGRAM

## 2024-09-27 PROCEDURE — 25010000002 ENOXAPARIN PER 10 MG: Performed by: STUDENT IN AN ORGANIZED HEALTH CARE EDUCATION/TRAINING PROGRAM

## 2024-09-27 PROCEDURE — 85520 HEPARIN ASSAY: CPT

## 2024-09-27 PROCEDURE — 25810000003 LACTATED RINGERS PER 1000 ML: Performed by: SURGERY

## 2024-09-27 PROCEDURE — 99024 POSTOP FOLLOW-UP VISIT: CPT | Performed by: SURGERY

## 2024-09-27 PROCEDURE — 25010000002 MORPHINE PER 10 MG: Performed by: SURGERY

## 2024-09-27 PROCEDURE — 99232 SBSQ HOSP IP/OBS MODERATE 35: CPT | Performed by: STUDENT IN AN ORGANIZED HEALTH CARE EDUCATION/TRAINING PROGRAM

## 2024-09-27 RX ORDER — HYDROMORPHONE HYDROCHLORIDE 1 MG/ML
0.5 INJECTION, SOLUTION INTRAMUSCULAR; INTRAVENOUS; SUBCUTANEOUS
Status: DISCONTINUED | OUTPATIENT
Start: 2024-09-27 | End: 2024-09-28

## 2024-09-27 RX ORDER — ONDANSETRON 2 MG/ML
4 INJECTION INTRAMUSCULAR; INTRAVENOUS EVERY 6 HOURS PRN
Status: DISCONTINUED | OUTPATIENT
Start: 2024-09-27 | End: 2024-10-01 | Stop reason: HOSPADM

## 2024-09-27 RX ORDER — WARFARIN SODIUM 5 MG/1
5 TABLET ORAL
Status: DISCONTINUED | OUTPATIENT
Start: 2024-09-27 | End: 2024-09-27

## 2024-09-27 RX ORDER — PROCHLORPERAZINE EDISYLATE 5 MG/ML
2.5 INJECTION INTRAMUSCULAR; INTRAVENOUS EVERY 6 HOURS PRN
Status: DISCONTINUED | OUTPATIENT
Start: 2024-09-27 | End: 2024-10-01 | Stop reason: HOSPADM

## 2024-09-27 RX ORDER — ENOXAPARIN SODIUM 100 MG/ML
1 INJECTION SUBCUTANEOUS EVERY 12 HOURS
Status: DISCONTINUED | OUTPATIENT
Start: 2024-09-27 | End: 2024-09-29

## 2024-09-27 RX ORDER — WARFARIN SODIUM 3 MG/1
6 TABLET ORAL
Status: COMPLETED | OUTPATIENT
Start: 2024-09-27 | End: 2024-09-27

## 2024-09-27 RX ADMIN — SODIUM CHLORIDE, POTASSIUM CHLORIDE, SODIUM LACTATE AND CALCIUM CHLORIDE 100 ML/HR: 600; 310; 30; 20 INJECTION, SOLUTION INTRAVENOUS at 13:50

## 2024-09-27 RX ADMIN — MORPHINE SULFATE 2 MG: 2 INJECTION, SOLUTION INTRAMUSCULAR; INTRAVENOUS at 06:26

## 2024-09-27 RX ADMIN — HYDROMORPHONE HYDROCHLORIDE 0.5 MG: 1 INJECTION, SOLUTION INTRAMUSCULAR; INTRAVENOUS; SUBCUTANEOUS at 23:22

## 2024-09-27 RX ADMIN — MORPHINE SULFATE 2 MG: 2 INJECTION, SOLUTION INTRAMUSCULAR; INTRAVENOUS at 10:30

## 2024-09-27 RX ADMIN — SODIUM CHLORIDE, POTASSIUM CHLORIDE, SODIUM LACTATE AND CALCIUM CHLORIDE 75 ML/HR: 600; 310; 30; 20 INJECTION, SOLUTION INTRAVENOUS at 23:22

## 2024-09-27 RX ADMIN — SODIUM CHLORIDE, POTASSIUM CHLORIDE, SODIUM LACTATE AND CALCIUM CHLORIDE 100 ML/HR: 600; 310; 30; 20 INJECTION, SOLUTION INTRAVENOUS at 02:52

## 2024-09-27 RX ADMIN — ENOXAPARIN SODIUM 80 MG: 100 INJECTION SUBCUTANEOUS at 20:13

## 2024-09-27 RX ADMIN — Medication 10 ML: at 10:30

## 2024-09-27 RX ADMIN — MORPHINE SULFATE 2 MG: 2 INJECTION, SOLUTION INTRAMUSCULAR; INTRAVENOUS at 14:28

## 2024-09-27 RX ADMIN — WARFARIN SODIUM 6 MG: 3 TABLET ORAL at 21:42

## 2024-09-27 RX ADMIN — Medication 10 ML: at 20:13

## 2024-09-27 RX ADMIN — GABAPENTIN 600 MG: 300 CAPSULE ORAL at 20:13

## 2024-09-27 RX ADMIN — ONDANSETRON 4 MG: 2 INJECTION INTRAMUSCULAR; INTRAVENOUS at 23:20

## 2024-09-27 RX ADMIN — ATORVASTATIN CALCIUM 20 MG: 20 TABLET, FILM COATED ORAL at 20:13

## 2024-09-27 RX ADMIN — MORPHINE SULFATE 2 MG: 2 INJECTION, SOLUTION INTRAMUSCULAR; INTRAVENOUS at 02:54

## 2024-09-27 NOTE — CASE MANAGEMENT/SOCIAL WORK
Discharge Planning Assessment  [facility]     Patient Name: Alberto Guzman  MRN: [id number]  Today's Date: 9/27/2024    Admit Date: 9/24/2024    Plan: SS spoke with pt and pt's daughter at bedside on this date.  Pt resides at home with daughterKrystin, at [address] [place] and plans to return home at discharge.  Pt currently does not utilize home health services.  Pt's PCP is Lianet Dia.  Pt utilizes [facility].  Pt transports via private auto per family.  SS will follow.       Discharge Plan       Row Name 09/27/24 1117       Plan    Plan SS spoke with pt and pt's daughter at bedside on this date.  Pt resides at home with daughter, Krystin, at [address] [place] and plans to return home at discharge.  Pt currently does not utilize home health services.  Pt's PCP is Lianet Dia.  Pt utilizes [email or web address].  Pt transports via private auto per family.  SS will follow.                  Continued Care and Services - Admitted Since 9/24/2024    No active coordination exists for this encounter.       Selected Continued Care - Episodes Includes continued care and service providers with selected services from the active episodes listed below      Heart Failure Episode start date: 8/9/2024   There are no active outsourced providers for this episode.                 Expected Discharge Date and Time       Expected Discharge Date Expected Discharge Time    Sep 27, 2024             RADHA Lopez

## 2024-09-27 NOTE — PLAN OF CARE
Goal Outcome Evaluation:  Plan of Care Reviewed With: family, patient  Progress: no change   Pt resting in bed, watching television. Pt has tolerated all interventions. No complaints/concerns. No acute distress noted. Call light within reach. Plan of care ongoing.

## 2024-09-27 NOTE — PROGRESS NOTES
[facility] HOSPITALIST PROGRESS NOTE     Patient Identification:  Name:  Alberto Guzman  Age:  89 y.o.  Sex:  male  :  3/29/1931  MRN:  [id number]  Visit Number:  [id number]  ROOM: [address] [place] [address]     Primary Care Provider:  Lianet Dia APRN    Length of stay in inpatient status:  2    Subjective     Chief Compliant:    Chief Complaint   Patient presents with    Abdominal Pain       History of Presenting Illness:    Patient seen and examined prior to going to the OR today. He had reported continued abdominal pain. NG still to low wall suction. He went for exploratory laparotomy this afternoon.     Objective     Current Hospital Meds:[Held by provider] aspirin, 81 mg, Oral, Daily  atorvastatin, 20 mg, Oral, Nightly  [Held by provider] bumetanide, 1 mg, Oral, BID  gabapentin, 600 mg, Oral, Nightly  pantoprazole, 40 mg, Intravenous, Q AM  [Held by provider] sacubitril-valsartan, 0.5 tablet, Oral, Q12H  sodium chloride, 10 mL, Intravenous, Q12H  [Held by provider] Vericiguat, 5 mg, Oral, Daily    heparin, 13.42 Units/kg/hr (Dosing Weight)  lactated ringers, 100 mL/hr, Last Rate: 100 mL/hr (24 1633)  Pharmacy to Dose Heparin,         Current Antimicrobial Therapy:  Anti-Infectives (From admission, onward)      None          Current Diuretic Therapy:  Diuretics (From admission, onward)      Ordered     Dose/Rate Route Frequency Start Stop    24  [Held by provider]  bumetanide (BUMEX) tablet 1 mg        (On hold since 2024 at  until manually unheld; held by Delisa Valdez DOHold Reason: NPO)   Ordering Provider: Delisa Valdez DO    1 mg Oral 2 Times Daily 24            ----------------------------------------------------------------------------------------------------------------------  Vital Signs:  Temp:  [97 °F (36.1 °C)-99.5 °F (37.5 °C)] 97.7 °F (36.5 °C)  Heart Rate:  [66-77] 67  Resp:  [16-20] 20  BP: (118-161)/(60-87) 138/62  SpO2:  [94 %-99 %]  99 %  on  Flow (L/min):  [2] 2;   Device (Oxygen Therapy): room air  Body mass index is 22.71 kg/m².    Wt Readings from Last 3 Encounters:   09/26/24 84.6 kg (186 lb 9.6 oz)   09/06/24 88.7 kg (195 lb 9.6 oz)   08/12/24 88 kg (194 lb)     Intake & Output (last 3 days)         09/24 0701 09/25 0700 09/25 0701 09/26 0700 09/26 0701 09/27 0700    P.O.   0    I.V. (mL/kg)   600 (6.8)    NG/GT 60      Total Intake(mL/kg) 60 (0.7)  600 (6.8)    Urine (mL/kg/hr) 800 350 (0.2) 120 (0.1)    Emesis/NG output 1175 650 550    Total Output 1975 1000 670    Net -1915 -1000 -70                 Diet: Liquid; Clear Liquid; Fluid Consistency: Thin (IDDSI 0)  ----------------------------------------------------------------------------------------------------------------------  Physical exam:   Constitutional:  Well-developed and well-nourished.  No acute distress.      HENT:  Head:  Normocephalic and atraumatic.   Cardiovascular:  Normal rate, regular rhythm   Pulmonary/Chest:  No respiratory distress, no wheezes, no crackles, no rhonchi  Abdominal:  Soft. Abdomen is distended with generalized tenderness to palpation, worse on right side of abdomen. Hypoactive bowel sounds.  Musculoskeletal:  No deformity.      Neurological: Awake, alert, no focal deficit on gross examination. No slurred speech or facial droop.    Skin:  Skin is warm and dry.   Peripheral vascular:  No cyanosis  Psychiatric: Appropriate mood and affect  Edited by: Delisa Valdez DO at 9/26/2024 2026  ----------------------------------------------------------------------------------------------------------------------  Results from last 7 days   Lab Units 09/25/24  2339 09/25/24  1704 09/25/24  0114 09/24/24  1042   LACTATE mmol/L  --   --   --  1.2   WBC 10*3/mm3 5.51  --  5.68 5.43   HEMOGLOBIN g/dL 12.0*  --  12.2* 12.7*   HEMATOCRIT % 36.8*  --  37.0* 39.1   MCV fL 100.0*  --  99.7* 99.7*   MCHC g/dL 32.6  --  33.0 32.5   PLATELETS 10*3/mm3 171  --  163 163  "  INR   --  1.61*  --  1.62*         Results from last 7 days   Lab Units 09/25/24  2339 09/25/24  0114 09/24/24  1042   SODIUM mmol/L 141 139 140   POTASSIUM mmol/L 3.7 4.5 4.5   CHLORIDE mmol/L 103 102 100   CO2 mmol/L 25.9 26.8 30.8*   BUN mg/dL 15 10 11   CREATININE mg/dL 0.97 0.89 1.04   CALCIUM mg/dL 9.1 8.8 9.6   GLUCOSE mg/dL 100* 102* 106*   ALBUMIN g/dL 3.7  --  4.0   BILIRUBIN mg/dL 1.3*  --  1.1   ALK PHOS U/L 73  --  86   AST (SGOT) U/L 15  --  18   ALT (SGPT) U/L 7  --  10   Estimated Creatinine Clearance: 56.9 mL/min (by C-G formula based on SCr of 0.97 mg/dL).  No results found for: \"AMMONIA\"  Results from last 7 days   Lab Units 09/24/24  1301 09/24/24  1042   CK TOTAL U/L  --  39   HSTROP T ng/L 31* 34*             Glucose   Date/Time Value Ref Range Status   09/26/2024 1248 84 70 - 130 mg/dL Final     Lab Results   Component Value Date    TSH 1.250 06/10/2024     No results found for: \"PREGTESTUR\", \"PREGSERUM\", \"HCG\", \"HCGQUANT\"  Pain Management Panel           No data to display              Brief Urine Lab Results  (Last result in the past 365 days)        Color   Clarity   Blood   Leuk Est   Nitrite   Protein   CREAT   Urine HCG        09/25/24 0259 Yellow   Clear   Negative   Negative   Negative   Negative                 No results found for: \"BLOODCX\"  No results found for: \"URINECX\"  No results found for: \"WOUNDCX\"  No results found for: \"STOOLCX\"  No results found for: \"RESPCX\"  No results found for: \"AFBCX\"  Results from last 7 days   Lab Units 09/24/24  1042   LACTATE mmol/L 1.2       I have personally looked at the labs and they are summarized above.  ----------------------------------------------------------------------------------------------------------------------  Detailed radiology reports for the last 24 hours:  Imaging Results (Last 24 Hours)       ** No results found for the last 24 hours. **          Assessment & Plan      #Small bowel obstruction  - Patient had similar " episode three months ago requiring exploratory laparotomy.   - General Surgery following, appreciate assistance  - s/p exploratory laparotomy, reduction of internal hernia, and creation of end colostomy today    #Atrial flutter/fibrillation  #Chronically anticoagulated with warfarin  - Holding warfarin for now and bridging with heparin drip. Heparin was held today for surgery. Restart when okay with surgery if patient is not having obvious active bleeding.    #Mild anemia  - appears chronic. Hemoglobin stable around 12 so far this admission. Repeat CBC in a.m.    Chronic medical conditions:  #Coronary artery disease  #HFrEF  #Hypertension  #Hyperlipidemia  #PVD  #history of PPM implantation  #History of DVT  #History of CVA  #Rheumatoid arthritis  - Volume status appears compensated and BP has been well controlled. Home oral medications have been held due to SBO. Restart home atorvastatin and gabapentin now that he has a clear liquid diet. Hold antihypertensives, aspirin, and bumex for now due to bleeding risk and as blood pressure likely would not tolerate restarting antihypertensive at this time  Edited by: Delisa Valdez DO at 9/26/2024 2026      Dispo: pending clinical progress, expect home at discharge     Delisa Valedz DO  [facility]  09/26/24  20:26 EDT

## 2024-09-27 NOTE — PROGRESS NOTES
[facility] HOSPITALIST PROGRESS NOTE     Patient Identification:  Name:  Alberto Guzmna  Age:  89 y.o.  Sex:  male  :  3/29/1931  MRN:  [id number]  Visit Number:  [id number]  ROOM: [address] [place] [address]     Primary Care Provider:  Lianet Dia APRN    Length of stay in inpatient status:  3    Subjective     Chief Compliant:    Chief Complaint   Patient presents with    Abdominal Pain       History of Presenting Illness:    Patient seen and examined around lunch, no family present at bedside. He was sleeping and woke easily, but was drowsy. He reported his abdomen was sore. Denied shortness of breath.    Objective     Current Hospital Meds:[Held by provider] aspirin, 81 mg, Oral, Daily  atorvastatin, 20 mg, Oral, Nightly  [Held by provider] bumetanide, 1 mg, Oral, BID  enoxaparin, 1 mg/kg, Subcutaneous, Q12H  gabapentin, 600 mg, Oral, Nightly  pantoprazole, 40 mg, Intravenous, Q AM  [Held by provider] sacubitril-valsartan, 0.5 tablet, Oral, Q12H  sodium chloride, 10 mL, Intravenous, Q12H  [Held by provider] Vericiguat, 5 mg, Oral, Daily    lactated ringers, 75 mL/hr, Last Rate: 100 mL/hr (24 1350)  Pharmacy to Dose Heparin,   Pharmacy to dose warfarin,         Current Antimicrobial Therapy:  Anti-Infectives (From admission, onward)      None          Current Diuretic Therapy:  Diuretics (From admission, onward)      Ordered     Dose/Rate Route Frequency Start Stop    24  [Held by provider]  bumetanide (BUMEX) tablet 1 mg        (On hold since 2024 at  until manually unheld; held by Delisa Valdez DOHold Reason: NPO)   Ordering Provider: Delisa Valdez DO    1 mg Oral 2 Times Daily 24            ----------------------------------------------------------------------------------------------------------------------  Vital Signs:  Temp:  [97.6 °F (36.4 °C)-98.3 °F (36.8 °C)] 98.2 °F (36.8 °C)  Heart Rate:  [64-83] 65  Resp:  [18-20] 20  BP: (100-147)/(46-63)  142/59  SpO2:  [96 %-99 %] 98 %  on   ;   Device (Oxygen Therapy): room air  Body mass index is 22.76 kg/m².    Wt Readings from Last 3 Encounters:   09/27/24 84.8 kg (187 lb)   09/06/24 88.7 kg (195 lb 9.6 oz)   08/12/24 88 kg (194 lb)     Intake & Output (last 3 days)         09/25 0701 09/26 0700 09/26 0701 09/27 0700 09/27 0701 09/28 0700    P.O.  0 720    I.V. (mL/kg)  600 (6.8)     NG/GT       Total Intake(mL/kg)  600 (6.8) 720 (8.2)    Urine (mL/kg/hr) 350 (0.2) 245 (0.1) 300 (0.3)    Emesis/NG output 650 550     Total Output 1000 795 300    Net -1000 -195 +420                 Diet: Liquid; Clear Liquid; Fluid Consistency: Thin (IDDSI 0)  ----------------------------------------------------------------------------------------------------------------------  Physical exam:   Constitutional:  Well-developed and well-nourished.  No acute distress.      HENT:  Head:  Normocephalic and atraumatic.   Cardiovascular:  Normal rate, regular rhythm   Pulmonary/Chest:  No respiratory distress, no wheezes, no crackles, no rhonchi  Abdominal:  Soft. Abdomen is distended with appropriate tenderness to palpation. Hypoactive bowel sounds. Serosanguinous output in ostomy bag.  Musculoskeletal:  No deformity.      Neurological: Sleeping and woke easily but remained drowsy. No focal deficits noted on gross examination. Answered questions appropriately.  Skin:  Skin is warm and dry.   Peripheral vascular:  No cyanosis  Edited by: Delisa Valdez DO at 9/27/2024 1947  ----------------------------------------------------------------------------------------------------------------------  Results from last 7 days   Lab Units 09/27/24  0406 09/26/24 2033 09/25/24  2339 09/25/24  1704 09/25/24  0114 09/24/24  1042   LACTATE mmol/L  --   --   --   --   --  1.2   WBC 10*3/mm3 8.22  --  5.51  --  5.68 5.43   HEMOGLOBIN g/dL 12.4* 12.6* 12.0*  --  12.2* 12.7*   HEMATOCRIT % 39.0 39.4 36.8*  --  37.0* 39.1   MCV fL 103.4*  --  100.0*   "--  99.7* 99.7*   MCHC g/dL 31.8  --  32.6  --  33.0 32.5   PLATELETS 10*3/mm3 157  --  171  --  163 163   INR   --   --   --  1.61*  --  1.62*         Results from last 7 days   Lab Units 09/25/24  2339 09/25/24  0114 09/24/24  1042   SODIUM mmol/L 141 139 140   POTASSIUM mmol/L 3.7 4.5 4.5   CHLORIDE mmol/L 103 102 100   CO2 mmol/L 25.9 26.8 30.8*   BUN mg/dL 15 10 11   CREATININE mg/dL 0.97 0.89 1.04   CALCIUM mg/dL 9.1 8.8 9.6   GLUCOSE mg/dL 100* 102* 106*   ALBUMIN g/dL 3.7  --  4.0   BILIRUBIN mg/dL 1.3*  --  1.1   ALK PHOS U/L 73  --  86   AST (SGOT) U/L 15  --  18   ALT (SGPT) U/L 7  --  10   Estimated Creatinine Clearance: 57.1 mL/min (by C-G formula based on SCr of 0.97 mg/dL).  No results found for: \"AMMONIA\"  Results from last 7 days   Lab Units 09/24/24  1301 09/24/24  1042   CK TOTAL U/L  --  39   HSTROP T ng/L 31* 34*             Glucose   Date/Time Value Ref Range Status   09/26/2024 1248 84 70 - 130 mg/dL Final     Lab Results   Component Value Date    TSH 1.250 06/10/2024     No results found for: \"PREGTESTUR\", \"PREGSERUM\", \"HCG\", \"HCGQUANT\"  Pain Management Panel           No data to display              Brief Urine Lab Results  (Last result in the past 365 days)        Color   Clarity   Blood   Leuk Est   Nitrite   Protein   CREAT   Urine HCG        09/25/24 0259 Yellow   Clear   Negative   Negative   Negative   Negative                 No results found for: \"BLOODCX\"  No results found for: \"URINECX\"  No results found for: \"WOUNDCX\"  No results found for: \"STOOLCX\"  No results found for: \"RESPCX\"  No results found for: \"AFBCX\"  Results from last 7 days   Lab Units 09/24/24  1042   LACTATE mmol/L 1.2       I have personally looked at the labs and they are summarized above.  ----------------------------------------------------------------------------------------------------------------------  Detailed radiology reports for the last 24 hours:  Imaging Results (Last 24 Hours)       ** No results " found for the last 24 hours. **          Assessment & Plan      #Small bowel obstruction  - Patient had similar episode three months ago requiring exploratory laparotomy.   - General Surgery following, appreciate assistance  - s/p exploratory laparotomy, reduction of internal hernia, and creation of end colostomy 9/26/24.  - Continue post-op management per surgery  - currently awaiting return of bowel function  - clear liquid diet per surgeon  - pain control with home hydrocodone-acetaminophen (now restarted) and dilaudid prn breakthrough pain     #Atrial flutter/fibrillation  #Chronically anticoagulated with warfarin  - Holding warfarin for now and bridging with heparin drip.   - Hemoglobin has remained stable and it is okay per surgery to restart anticoagulation, so I have ordered lovenox with plan to bridge back to warfarin. Plan to discuss reason for warfarin rather than DOAC with patient when he is able and consider changing to DOAC if it is affordable for him, to reduce risk of bleeding and adverse events which would be higher risk with warfarin.    #Mild anemia  - appears chronic. Hemoglobin stable around 12 so far this admission. Hgb stable around 12 after surgery. Repeat CBC in a.m.    Chronic medical conditions:  #Coronary artery disease  #HFrEF  #Hypertension  #Hyperlipidemia  #PVD  #history of PPM implantation  #History of DVT  #History of CVA  #Rheumatoid arthritis  - Volume status appears compensated and BP has been well controlled. Home oral medications have been held due to SBO. Continue home atorvastatin and gabapentin now that he has a clear liquid diet. Hold antihypertensives, aspirin, and bumex for now due to bleeding risk and as blood pressure likely would not tolerate restarting antihypertensive at this time. Monitor volume status closely with intake/output and restart bumex when he begins to have more fluid intake. Decrease maintenance IV fluids to 75cc/hr to lower risk of developing fluid  overload and discontinue when he is tolerating adequate oral intake.  Edited by: Delisa Valdez DO at 9/27/2024 1947    Active VTE Prophylaxis:  Pharmacologic:        Start     Dose Route Frequency Stop    09/27/24 2100  Enoxaparin Sodium (LOVENOX) syringe 80 mg         1 mg/kg SC Every 12 Hours --    09/27/24 2100  warfarin (COUMADIN) tablet 5 mg        Question:  Target INR  Answer:  2 - 3    5 mg PO Once (Warfarin) --    09/27/24 1942  Pharmacy to dose warfarin        Question:  Target INR  Answer:  2 - 3    -- XX Continuous PRN --    09/25/24 1650  Pharmacy to Dose Heparin         -- XX Continuous PRN --                    Dispo: pending clinical progress    Delisa Valdez DO  [facility]  09/27/24  19:47 EDT

## 2024-09-27 NOTE — NURSING NOTE
Patient had a bowel obstruction repair today with and had a new colostomy placed. Upon assessment the colostomy had a small amount of blood present in the bag and the midline incision dressing had some blood leaking through as well. Patient had an order to start Heparin drip @ 2200. Contacted Gretel DILLON who advised me to hold the drip and contact surgery for further recommendations. I spoke to Dr. Arrieta who put in an order to start the heparin at a lower rate of 10 mL/hr.

## 2024-09-27 NOTE — PROGRESS NOTES
[facility]  Alberto Guzman is a  89 y.o. male receiving heparin infusion.     Therapy for (VTE/Cardiac):   Cardiac  Patient Dosing Weight: 87.6 kg  Initial Bolus (Y/N):   N  Any Bolus (Y/N):   Y        Signs or Symptoms of Bleeding: N    Cardiac or Other (Not VTE)   Initial Bolus: 60 units/kg (Max 4,000 units)  Initial rate: 12 units/kg/hr (Max 1,000 units/hr)   Anti Xa Rebolus Infusion Hold time Change infusion Dose (Units/kg/hr) Next Anti Xa or aPTT Level Due   < 0.11 50 Units/kg  (4000 Units Max) None Increase by  3 Units/kg/hr 6 hours   0.11- 0.19 25 Units/kg  (2000 Units Max) None Increase by  2 Units/kg/hr 6 hours   0.2 - 0.29 0 None Increase by  1 Units/kg/hr 6 hours   0.3 - 0.5 0 None No Change 6 hours (after 2 consecutive levels in range check q24h @0700)   0.51 - 0.6 0 None Decrease by  1 Units/kg/hr 6 hours   0.61 - 0.8 0 30 Minutes Decrease by  2 Units/kg/hr 6 hours   0.81 - 1 0 60 Minutes Decrease by  3 Units/kg/hr 6 hours   >1 0 Hold  After Anti Xa less than 0.5 decrease previous rate by  4 Units/kg/hr  Every 2 hours until Anti Xa  less than 0.5 then when infusion restarts in 6 hours         Recommend anti-Xa every 6 hours.          Date   Time   Anti-Xa Current Rate (Unit/kg/hr) Bolus   (Units) Rate Change   (Unit/kg/hr) New Rate (Unit/kg/hr) Next   Anti-Xa Comments  Pump Check Daily   9/25 1702 <0.10 - No initial +11.42 11.42 2330 Patient takes warfarin at home. Subtherapeutic INR of 1.62 yesterday. No s/s of bleeding per nurse Nereida   9/25 2339 0.16 11.42 2000 +2 13.42 0700 Discussed with Uday FIORE. No s/s of bleeding   9/26 0645 0.26 13.42     D/w MACARENA Padron, drip was stopped at 0600 for surgery, defer rate change, will continue at same rate if resumed after surgery   9/26 1612     13.42 9/27 0200 To resume heparin drip at previous rate at 2000 per cezar Bullock, MACARENA   9/26 2130  13.42  -1 11.42 0400 Received call from Uday FIORE. Patient has had a small amount of blood in  colotomy bag and around midline dressing. He discussed with surgery and received order per Dr Arrieta to decrease rate to 10 ml/hr. Will follow up with a level 6 hours after after change to ensure level is not supra-therapeutic and follow up in AM.    9/27 0515 0.25 11.42   11.42 1100 The patients level came back slightly subtherapeutic, but since Dr. Arrieta gave orders to decrease  to a specific rate, I will not adjust the rate at this time.  The nurse, Danis, said that the   colostomy bag had about the same amount of blood as it had earlier tonight.    9/27 1129 0.35 11.42    1900 Tx x 1, d/w  MACARENA Ramirez, no further bleeding in colostomy                                                                                                                                                                Pharmacy will continue to follow anti-Xa results and monitor for signs and symptoms of bleeding or thrombosis.

## 2024-09-28 LAB
ANION GAP SERPL CALCULATED.3IONS-SCNC: 16.6 MMOL/L (ref 5–15)
BUN SERPL-MCNC: 23 MG/DL (ref 8–23)
BUN/CREAT SERPL: 22.8 (ref 7–25)
CALCIUM SPEC-SCNC: 8.5 MG/DL (ref 8.2–9.6)
CHLORIDE SERPL-SCNC: 102 MMOL/L (ref 98–107)
CO2 SERPL-SCNC: 23.4 MMOL/L (ref 22–29)
CREAT SERPL-MCNC: 1.01 MG/DL (ref 0.76–1.27)
DEPRECATED RDW RBC AUTO: 51.5 FL (ref 37–54)
EGFRCR SERPLBLD CKD-EPI 2021: 69.3 ML/MIN/1.73
ERYTHROCYTE [DISTWIDTH] IN BLOOD BY AUTOMATED COUNT: 13.4 % (ref 12.3–15.4)
GLUCOSE SERPL-MCNC: 139 MG/DL (ref 65–99)
HCT VFR BLD AUTO: 36.7 % (ref 37.5–51)
HGB BLD-MCNC: 11.8 G/DL (ref 13–17.7)
INR PPP: 2.31 (ref 0.9–1.1)
MCH RBC QN AUTO: 32.9 PG (ref 26.6–33)
MCHC RBC AUTO-ENTMCNC: 32.2 G/DL (ref 31.5–35.7)
MCV RBC AUTO: 102.2 FL (ref 79–97)
PLATELET # BLD AUTO: 185 10*3/MM3 (ref 140–450)
PMV BLD AUTO: 10 FL (ref 6–12)
POTASSIUM SERPL-SCNC: 3.9 MMOL/L (ref 3.5–5.2)
PROTHROMBIN TIME: 25.5 SECONDS (ref 12.1–14.7)
RBC # BLD AUTO: 3.59 10*6/MM3 (ref 4.14–5.8)
SODIUM SERPL-SCNC: 142 MMOL/L (ref 136–145)
WBC NRBC COR # BLD AUTO: 7.08 10*3/MM3 (ref 3.4–10.8)

## 2024-09-28 PROCEDURE — 25010000002 HYDROMORPHONE PER 4 MG: Performed by: STUDENT IN AN ORGANIZED HEALTH CARE EDUCATION/TRAINING PROGRAM

## 2024-09-28 PROCEDURE — 80048 BASIC METABOLIC PNL TOTAL CA: CPT | Performed by: STUDENT IN AN ORGANIZED HEALTH CARE EDUCATION/TRAINING PROGRAM

## 2024-09-28 PROCEDURE — 99232 SBSQ HOSP IP/OBS MODERATE 35: CPT | Performed by: STUDENT IN AN ORGANIZED HEALTH CARE EDUCATION/TRAINING PROGRAM

## 2024-09-28 PROCEDURE — 25010000002 ENOXAPARIN PER 10 MG: Performed by: STUDENT IN AN ORGANIZED HEALTH CARE EDUCATION/TRAINING PROGRAM

## 2024-09-28 PROCEDURE — 85610 PROTHROMBIN TIME: CPT | Performed by: STUDENT IN AN ORGANIZED HEALTH CARE EDUCATION/TRAINING PROGRAM

## 2024-09-28 PROCEDURE — 25810000003 LACTATED RINGERS PER 1000 ML: Performed by: STUDENT IN AN ORGANIZED HEALTH CARE EDUCATION/TRAINING PROGRAM

## 2024-09-28 PROCEDURE — 25010000002 ONDANSETRON PER 1 MG: Performed by: SURGERY

## 2024-09-28 PROCEDURE — 99024 POSTOP FOLLOW-UP VISIT: CPT | Performed by: SURGERY

## 2024-09-28 PROCEDURE — 85027 COMPLETE CBC AUTOMATED: CPT | Performed by: STUDENT IN AN ORGANIZED HEALTH CARE EDUCATION/TRAINING PROGRAM

## 2024-09-28 RX ORDER — WARFARIN SODIUM 3 MG/1
6 TABLET ORAL
Status: COMPLETED | OUTPATIENT
Start: 2024-09-28 | End: 2024-09-28

## 2024-09-28 RX ORDER — HYDROMORPHONE HYDROCHLORIDE 1 MG/ML
0.25 INJECTION, SOLUTION INTRAMUSCULAR; INTRAVENOUS; SUBCUTANEOUS
Status: DISCONTINUED | OUTPATIENT
Start: 2024-09-28 | End: 2024-10-01 | Stop reason: HOSPADM

## 2024-09-28 RX ADMIN — Medication 10 ML: at 08:49

## 2024-09-28 RX ADMIN — ATORVASTATIN CALCIUM 20 MG: 20 TABLET, FILM COATED ORAL at 20:33

## 2024-09-28 RX ADMIN — GABAPENTIN 600 MG: 300 CAPSULE ORAL at 20:33

## 2024-09-28 RX ADMIN — ONDANSETRON 4 MG: 2 INJECTION INTRAMUSCULAR; INTRAVENOUS at 20:34

## 2024-09-28 RX ADMIN — ONDANSETRON 4 MG: 2 INJECTION INTRAMUSCULAR; INTRAVENOUS at 08:49

## 2024-09-28 RX ADMIN — ENOXAPARIN SODIUM 80 MG: 100 INJECTION SUBCUTANEOUS at 08:49

## 2024-09-28 RX ADMIN — HYDROMORPHONE HYDROCHLORIDE 0.25 MG: 1 INJECTION, SOLUTION INTRAMUSCULAR; INTRAVENOUS; SUBCUTANEOUS at 20:34

## 2024-09-28 RX ADMIN — WARFARIN SODIUM 6 MG: 3 TABLET ORAL at 17:13

## 2024-09-28 RX ADMIN — HYDROMORPHONE HYDROCHLORIDE 0.5 MG: 1 INJECTION, SOLUTION INTRAMUSCULAR; INTRAVENOUS; SUBCUTANEOUS at 14:56

## 2024-09-28 RX ADMIN — ENOXAPARIN SODIUM 80 MG: 100 INJECTION SUBCUTANEOUS at 20:33

## 2024-09-28 RX ADMIN — SODIUM CHLORIDE, POTASSIUM CHLORIDE, SODIUM LACTATE AND CALCIUM CHLORIDE 75 ML/HR: 600; 310; 30; 20 INJECTION, SOLUTION INTRAVENOUS at 14:57

## 2024-09-28 RX ADMIN — PANTOPRAZOLE SODIUM 40 MG: 40 INJECTION, POWDER, FOR SOLUTION INTRAVENOUS at 05:53

## 2024-09-28 RX ADMIN — SACUBITRIL AND VALSARTAN 0.5 TABLET: 24; 26 TABLET, FILM COATED ORAL at 20:33

## 2024-09-28 RX ADMIN — SACUBITRIL AND VALSARTAN 0.5 TABLET: 24; 26 TABLET, FILM COATED ORAL at 08:48

## 2024-09-28 RX ADMIN — Medication 10 ML: at 20:34

## 2024-09-28 NOTE — PROGRESS NOTES
Chief complaint: Abdominal pain     Postoperative day 2: Exploratory laparotomy with end colostomy creation due to recurrent internal hernia through mesocolic defect     Patient doing well this morning.  Serous output from stoma, no flatus.  Some emesis with clear liquids.  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  Due to recurrent nature, and inability to orient the bowel in a way that would avoid recurrent internal hernia the distal colon was transected near the rectum and an end colostomy was performed to eliminate the internal hernia space.  -Awaiting return of bowel function  -Reduce diet to sips and ice chips until flatus from stoma.  -Okay to resume anticoagulation

## 2024-09-28 NOTE — NURSING NOTE
Patient started vomiting greenish brown vomit, patient was complaining of abdominal discomfort. Tender with touch. Called Dr. Lennon made aware Verbal order PRN Zofran. Ordered and given.

## 2024-09-28 NOTE — PROGRESS NOTES
[facility] HOSPITALIST PROGRESS NOTE     Patient Identification:  Name:  Alberto Guzman  Age:  89 y.o.  Sex:  male  :  3/29/1931  MRN:  [id number]  Visit Number:  [id number]  ROOM: [address] [place] [address]     Primary Care Provider:  Lianet Dia APRN    Length of stay in inpatient status:  4    Subjective     Chief Compliant:    Chief Complaint   Patient presents with    Abdominal Pain       History of Presenting Illness:    Patient seen and examined today with his daughter present at bedside. He reportedly had vomiting last night and diet was reduced back to sips and ice chips. At time of my exam he was drowsy, but denied shortness of breath or chest pain. He did report abdominal soreness but said the abdominal pain was better than it was overnight.     Objective     Current Hospital Meds:aspirin, 81 mg, Oral, Daily  atorvastatin, 20 mg, Oral, Nightly  [Held by provider] bumetanide, 1 mg, Oral, BID  enoxaparin, 1 mg/kg, Subcutaneous, Q12H  gabapentin, 600 mg, Oral, Nightly  pantoprazole, 40 mg, Intravenous, Q AM  sacubitril-valsartan, 0.5 tablet, Oral, Q12H  sodium chloride, 10 mL, Intravenous, Q12H  [Held by provider] Vericiguat, 5 mg, Oral, Daily    lactated ringers, 75 mL/hr, Last Rate: 75 mL/hr (24 1457)  Pharmacy to dose warfarin,         Current Antimicrobial Therapy:  Anti-Infectives (From admission, onward)      None          Current Diuretic Therapy:  Diuretics (From admission, onward)      Ordered     Dose/Rate Route Frequency Start Stop    24  [Held by provider]  bumetanide (BUMEX) tablet 1 mg        (On hold since 2024 at  until manually unheld; held by Delisa Valdez DOHold Reason: NPO)   Ordering Provider: Delisa Valdez DO    1 mg Oral 2 Times Daily 24            ----------------------------------------------------------------------------------------------------------------------  Vital Signs:  Temp:  [98.2 °F (36.8 °C)-99 °F (37.2 °C)]  98.7 °F (37.1 °C)  Heart Rate:  [64-70] 65  Resp:  [18-20] 20  BP: (119-151)/(58-77) 119/58  SpO2:  [93 %-98 %] 95 %  on  Flow (L/min):  [0-2] 2;   Device (Oxygen Therapy): nasal cannula  Body mass index is 23.46 kg/m².    Wt Readings from Last 3 Encounters:   09/28/24 87.4 kg (192 lb 11.2 oz)   09/06/24 88.7 kg (195 lb 9.6 oz)   08/12/24 88 kg (194 lb)     Intake & Output (last 3 days)         09/25 0701 09/26 0700 09/26 0701 09/27 0700 09/27 0701 09/28 0700 09/28 0701 09/29 0700    P.O.  0 720 120    I.V. (mL/kg)  600 (6.8)      NG/GT        Total Intake(mL/kg)  600 (6.8) 720 (8.2) 120 (1.4)    Urine (mL/kg/hr) 350 (0.2) 245 (0.1) 400 (0.2) 250 (0.2)    Emesis/NG output 650 550      Stool    25    Total Output 1000 795 400 275    Net -1000 -195 +320 -155                  NPO Diet NPO Type: Sips with Meds, Ice Chips  ----------------------------------------------------------------------------------------------------------------------  Physical exam:   Constitutional:  Well-developed and well-nourished.  No acute distress.      HENT:  Head:  Normocephalic and atraumatic.   Cardiovascular:  Normal rate, regular rhythm   Pulmonary/Chest:  No respiratory distress, no wheezes, no crackles, no rhonchi  Abdominal:  Soft. Abdomen is distended and diffusely tender to palpation. Hypoactive bowel sounds are present. Ostomy bag had recently been changed due to saturation with serosanguinous drainage and had no output yet.  Musculoskeletal:  No deformity.      Neurological: Sleeping and woke easily but remained drowsy and quickly fell back asleep. No focal deficits noted on gross examination. Answered questions appropriately.  Skin:  Skin is warm and dry.   Peripheral vascular:  No cyanosis  Edited by: Delisa Valdez DO at 9/28/2024 1857  ----------------------------------------------------------------------------------------------------------------------  Results from last 7 days   Lab Units 09/28/24  1050 09/28/24  0001  "09/27/24  0406 09/26/24  2033 09/25/24  2339 09/25/24  1704 09/25/24  0114 09/24/24  1042   LACTATE mmol/L  --   --   --   --   --   --   --  1.2   WBC 10*3/mm3  --  7.08 8.22  --  5.51  --    < > 5.43   HEMOGLOBIN g/dL  --  11.8* 12.4* 12.6* 12.0*  --    < > 12.7*   HEMATOCRIT %  --  36.7* 39.0 39.4 36.8*  --    < > 39.1   MCV fL  --  102.2* 103.4*  --  100.0*  --    < > 99.7*   MCHC g/dL  --  32.2 31.8  --  32.6  --    < > 32.5   PLATELETS 10*3/mm3  --  185 157  --  171  --    < > 163   INR  2.31*  --   --   --   --  1.61*  --  1.62*    < > = values in this interval not displayed.         Results from last 7 days   Lab Units 09/28/24  0001 09/25/24  2339 09/25/24  0114 09/24/24  1042   SODIUM mmol/L 142 141 139 140   POTASSIUM mmol/L 3.9 3.7 4.5 4.5   CHLORIDE mmol/L 102 103 102 100   CO2 mmol/L 23.4 25.9 26.8 30.8*   BUN mg/dL 23 15 10 11   CREATININE mg/dL 1.01 0.97 0.89 1.04   CALCIUM mg/dL 8.5 9.1 8.8 9.6   GLUCOSE mg/dL 139* 100* 102* 106*   ALBUMIN g/dL  --  3.7  --  4.0   BILIRUBIN mg/dL  --  1.3*  --  1.1   ALK PHOS U/L  --  73  --  86   AST (SGOT) U/L  --  15  --  18   ALT (SGPT) U/L  --  7  --  10   Estimated Creatinine Clearance: 56.5 mL/min (by C-G formula based on SCr of 1.01 mg/dL).  No results found for: \"AMMONIA\"  Results from last 7 days   Lab Units 09/24/24  1301 09/24/24  1042   CK TOTAL U/L  --  39   HSTROP T ng/L 31* 34*             Glucose   Date/Time Value Ref Range Status   09/26/2024 1248 84 70 - 130 mg/dL Final     Lab Results   Component Value Date    TSH 1.250 06/10/2024     No results found for: \"PREGTESTUR\", \"PREGSERUM\", \"HCG\", \"HCGQUANT\"  Pain Management Panel           No data to display              Brief Urine Lab Results  (Last result in the past 365 days)        Color   Clarity   Blood   Leuk Est   Nitrite   Protein   CREAT   Urine HCG        09/25/24 0259 Yellow   Clear   Negative   Negative   Negative   Negative                 No results found for: \"BLOODCX\"  No results found " "for: \"URINECX\"  No results found for: \"WOUNDCX\"  No results found for: \"STOOLCX\"  No results found for: \"RESPCX\"  No results found for: \"AFBCX\"  Results from last 7 days   Lab Units 09/24/24  1042   LACTATE mmol/L 1.2       I have personally looked at the labs and they are summarized above.  ----------------------------------------------------------------------------------------------------------------------  Detailed radiology reports for the last 24 hours:  Imaging Results (Last 24 Hours)       ** No results found for the last 24 hours. **          Assessment & Plan      #Small bowel obstruction  - Patient had similar episode three months ago requiring exploratory laparotomy.   - General Surgery following, appreciate assistance  - s/p exploratory laparotomy, reduction of internal hernia, and creation of end colostomy 9/26/24.  - Continue post-op management per surgery  - awaiting return of bowel function  - diet had to be reduced to sips and ice chips due to vomiting overnight  - pain control with home hydrocodone-acetaminophen (now restarted) and dilaudid prn breakthrough pain. Dilaudid dose was decreased to reduce risk of oversedation and respiratory depression.    #Atrial flutter/fibrillation  #Chronically anticoagulated with warfarin  - Holding warfarin for now and bridging with heparin drip.   - Hemoglobin has remained stable and it is okay per surgery to restart anticoagulation. Continue lovenox while bridging back to warfarin. Pharmacy consulted for warfarin dosing, appreciate assistance. Plan to discuss reason for warfarin rather than DOAC with patient when he is able and consider changing to DOAC if it is affordable for him, to reduce risk of bleeding and adverse events which would be higher risk with warfarin.    #Mild anemia  - appears chronic. Hemoglobin relatively stable at 11.8 today. Repeat CBC in a.m.    Chronic medical conditions:  #Coronary artery " disease  #HFrEF  #Hypertension  #Hyperlipidemia  #PVD  #history of PPM implantation  #History of DVT  #History of CVA  #Rheumatoid arthritis  - Volume status appears compensated and BP has been well controlled. Home oral medications have been held due to SBO. Continue home atorvastatin and gabapentin now that he has a clear liquid diet. Hold antihypertensives, aspirin, and bumex for now due to bleeding risk and as blood pressure likely would not tolerate restarting antihypertensive at this time. Monitor volume status closely with intake/output and restart bumex when he begins to have more fluid intake. Continue maintenance IV fluids to 75cc/hr and monitor closely for signs of developing fluid overload. Plan to discontinue IV fluids when he is tolerating more oral intake.  Edited by: Delisa Valdez DO at 9/28/2024 7506    Active VTE Prophylaxis:  Pharmacologic:        Start     Dose Route Frequency Stop    09/27/24 2100  Enoxaparin Sodium (LOVENOX) syringe 80 mg         1 mg/kg SC Every 12 Hours --    09/27/24 1942  Pharmacy to dose warfarin        Question:  Target INR  Answer:  2 - 3    -- XX Continuous PRN --                    Dispo: pending clinical progress    Delisa Valdez DO  [facility]  09/28/24  18:58 EDT

## 2024-09-28 NOTE — PLAN OF CARE
Goal Outcome Evaluation: Patient has been pleasant. Compliant with care. Patient remains on room air, saturations remaining above 90%. Patient complained of pain, PRN medication given. Patient vomited during shift, greenish brown, MD [place] made aware. PRN medication given for nausea. Patient resting in bed. Call light in reach.

## 2024-09-29 LAB
ANION GAP SERPL CALCULATED.3IONS-SCNC: 10.8 MMOL/L (ref 5–15)
BASOPHILS # BLD AUTO: 0.01 10*3/MM3 (ref 0–0.2)
BASOPHILS NFR BLD AUTO: 0.2 % (ref 0–1.5)
BUN SERPL-MCNC: 25 MG/DL (ref 8–23)
BUN/CREAT SERPL: 27.2 (ref 7–25)
CALCIUM SPEC-SCNC: 8.5 MG/DL (ref 8.2–9.6)
CHLORIDE SERPL-SCNC: 104 MMOL/L (ref 98–107)
CO2 SERPL-SCNC: 22.2 MMOL/L (ref 22–29)
CREAT SERPL-MCNC: 0.92 MG/DL (ref 0.76–1.27)
DEPRECATED RDW RBC AUTO: 54.4 FL (ref 37–54)
EGFRCR SERPLBLD CKD-EPI 2021: 77.6 ML/MIN/1.73
EOSINOPHIL # BLD AUTO: 0.13 10*3/MM3 (ref 0–0.4)
EOSINOPHIL NFR BLD AUTO: 2.2 % (ref 0.3–6.2)
ERYTHROCYTE [DISTWIDTH] IN BLOOD BY AUTOMATED COUNT: 13.3 % (ref 12.3–15.4)
GLUCOSE SERPL-MCNC: 111 MG/DL (ref 65–99)
HCT VFR BLD AUTO: 37.3 % (ref 37.5–51)
HGB BLD-MCNC: 11.2 G/DL (ref 13–17.7)
IMM GRANULOCYTES # BLD AUTO: 0.03 10*3/MM3 (ref 0–0.05)
IMM GRANULOCYTES NFR BLD AUTO: 0.5 % (ref 0–0.5)
INR PPP: 2.62 (ref 0.9–1.1)
LYMPHOCYTES # BLD AUTO: 0.89 10*3/MM3 (ref 0.7–3.1)
LYMPHOCYTES NFR BLD AUTO: 15.2 % (ref 19.6–45.3)
MCH RBC QN AUTO: 32.9 PG (ref 26.6–33)
MCHC RBC AUTO-ENTMCNC: 30 G/DL (ref 31.5–35.7)
MCV RBC AUTO: 109.7 FL (ref 79–97)
MONOCYTES # BLD AUTO: 0.57 10*3/MM3 (ref 0.1–0.9)
MONOCYTES NFR BLD AUTO: 9.7 % (ref 5–12)
NEUTROPHILS NFR BLD AUTO: 4.23 10*3/MM3 (ref 1.7–7)
NEUTROPHILS NFR BLD AUTO: 72.2 % (ref 42.7–76)
NRBC BLD AUTO-RTO: 0 /100 WBC (ref 0–0.2)
PLATELET # BLD AUTO: 173 10*3/MM3 (ref 140–450)
PMV BLD AUTO: 10.1 FL (ref 6–12)
POTASSIUM SERPL-SCNC: 3.7 MMOL/L (ref 3.5–5.2)
PROTHROMBIN TIME: 28.1 SECONDS (ref 12.1–14.7)
RBC # BLD AUTO: 3.4 10*6/MM3 (ref 4.14–5.8)
SODIUM SERPL-SCNC: 137 MMOL/L (ref 136–145)
WBC NRBC COR # BLD AUTO: 5.86 10*3/MM3 (ref 3.4–10.8)

## 2024-09-29 PROCEDURE — 25010000002 HYDROMORPHONE PER 4 MG: Performed by: STUDENT IN AN ORGANIZED HEALTH CARE EDUCATION/TRAINING PROGRAM

## 2024-09-29 PROCEDURE — 25010000002 ENOXAPARIN PER 10 MG: Performed by: STUDENT IN AN ORGANIZED HEALTH CARE EDUCATION/TRAINING PROGRAM

## 2024-09-29 PROCEDURE — 85025 COMPLETE CBC W/AUTO DIFF WBC: CPT | Performed by: STUDENT IN AN ORGANIZED HEALTH CARE EDUCATION/TRAINING PROGRAM

## 2024-09-29 PROCEDURE — 99232 SBSQ HOSP IP/OBS MODERATE 35: CPT | Performed by: STUDENT IN AN ORGANIZED HEALTH CARE EDUCATION/TRAINING PROGRAM

## 2024-09-29 PROCEDURE — 25810000003 LACTATED RINGERS PER 1000 ML: Performed by: STUDENT IN AN ORGANIZED HEALTH CARE EDUCATION/TRAINING PROGRAM

## 2024-09-29 PROCEDURE — 85610 PROTHROMBIN TIME: CPT | Performed by: STUDENT IN AN ORGANIZED HEALTH CARE EDUCATION/TRAINING PROGRAM

## 2024-09-29 PROCEDURE — 80048 BASIC METABOLIC PNL TOTAL CA: CPT | Performed by: STUDENT IN AN ORGANIZED HEALTH CARE EDUCATION/TRAINING PROGRAM

## 2024-09-29 PROCEDURE — 99024 POSTOP FOLLOW-UP VISIT: CPT | Performed by: SURGERY

## 2024-09-29 RX ORDER — WARFARIN SODIUM 5 MG/1
5 TABLET ORAL
Status: COMPLETED | OUTPATIENT
Start: 2024-09-29 | End: 2024-09-29

## 2024-09-29 RX ADMIN — WARFARIN SODIUM 5 MG: 5 TABLET ORAL at 18:03

## 2024-09-29 RX ADMIN — HYDROCODONE BITARTRATE AND ACETAMINOPHEN 1 TABLET: 10; 325 TABLET ORAL at 20:40

## 2024-09-29 RX ADMIN — Medication 10 ML: at 08:28

## 2024-09-29 RX ADMIN — PANTOPRAZOLE SODIUM 40 MG: 40 INJECTION, POWDER, FOR SOLUTION INTRAVENOUS at 05:59

## 2024-09-29 RX ADMIN — SODIUM CHLORIDE, POTASSIUM CHLORIDE, SODIUM LACTATE AND CALCIUM CHLORIDE 75 ML/HR: 600; 310; 30; 20 INJECTION, SOLUTION INTRAVENOUS at 03:31

## 2024-09-29 RX ADMIN — ENOXAPARIN SODIUM 80 MG: 100 INJECTION SUBCUTANEOUS at 08:28

## 2024-09-29 RX ADMIN — SACUBITRIL AND VALSARTAN 0.5 TABLET: 24; 26 TABLET, FILM COATED ORAL at 20:40

## 2024-09-29 RX ADMIN — SACUBITRIL AND VALSARTAN 0.5 TABLET: 24; 26 TABLET, FILM COATED ORAL at 08:28

## 2024-09-29 RX ADMIN — GABAPENTIN 600 MG: 300 CAPSULE ORAL at 20:40

## 2024-09-29 RX ADMIN — HYDROMORPHONE HYDROCHLORIDE 0.25 MG: 1 INJECTION, SOLUTION INTRAMUSCULAR; INTRAVENOUS; SUBCUTANEOUS at 22:53

## 2024-09-29 RX ADMIN — Medication 10 ML: at 20:42

## 2024-09-29 RX ADMIN — ATORVASTATIN CALCIUM 20 MG: 20 TABLET, FILM COATED ORAL at 20:40

## 2024-09-29 RX ADMIN — HYDROMORPHONE HYDROCHLORIDE 0.25 MG: 1 INJECTION, SOLUTION INTRAMUSCULAR; INTRAVENOUS; SUBCUTANEOUS at 14:25

## 2024-09-29 RX ADMIN — HYDROMORPHONE HYDROCHLORIDE 0.25 MG: 1 INJECTION, SOLUTION INTRAMUSCULAR; INTRAVENOUS; SUBCUTANEOUS at 03:31

## 2024-09-29 RX ADMIN — ASPIRIN 81 MG: 81 TABLET, COATED ORAL at 08:28

## 2024-09-29 NOTE — PROGRESS NOTES
Chief complaint: Abdominal pain     Postoperative day 3: Exploratory laparotomy with end colostomy creation due to recurrent internal hernia through mesocolic defect     Patient doing well this morning.  Flatus and stool from stoma.  Tolerating sips and ice chips.      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  Due to recurrent nature, and inability to orient the bowel in a way that would avoid recurrent internal hernia the distal colon was transected near the rectum and an end colostomy was performed to eliminate the internal hernia space.  -Stoma now functioning  -Clear liquid diet, advance as tolerated  -Continue anticoagulation

## 2024-09-29 NOTE — PROGRESS NOTES
[facility] HOSPITALIST PROGRESS NOTE     Patient Identification:  Name:  Alberto Guzman  Age:  89 y.o.  Sex:  male  :  3/29/1931  MRN:  [id number]  Visit Number:  [id number]  ROOM: [address] [place] [address]     Primary Care Provider:  Lianet Dia APRN    Length of stay in inpatient status:  5    Subjective     Chief Compliant:    Chief Complaint   Patient presents with    Abdominal Pain       History of Presenting Illness:    Patient seen and examined today with his daughter present at bedside. He reported some intermittent abdominal pain, but improved from yesterday, and denied nausea. No vomiting noted today.     Objective     Current Hospital Meds:aspirin, 81 mg, Oral, Daily  atorvastatin, 20 mg, Oral, Nightly  [Held by provider] bumetanide, 1 mg, Oral, BID  gabapentin, 600 mg, Oral, Nightly  pantoprazole, 40 mg, Intravenous, Q AM  sacubitril-valsartan, 0.5 tablet, Oral, Q12H  sodium chloride, 10 mL, Intravenous, Q12H  [Held by provider] Vericiguat, 5 mg, Oral, Daily    Pharmacy to dose warfarin,         Current Antimicrobial Therapy:  Anti-Infectives (From admission, onward)      None          Current Diuretic Therapy:  Diuretics (From admission, onward)      Ordered     Dose/Rate Route Frequency Start Stop    24  [Held by provider]  bumetanide (BUMEX) tablet 1 mg        (On hold since 2024 at  until manually unheld; held by Delisa Valdez DOHold Reason: NPO)   Ordering Provider: Delisa Valdez DO    1 mg Oral 2 Times Daily 240            ----------------------------------------------------------------------------------------------------------------------  Vital Signs:  Temp:  [97.9 °F (36.6 °C)-98.6 °F (37 °C)] 98 °F (36.7 °C)  Heart Rate:  [65-84] 80  Resp:  [20] 20  BP: (109-138)/(54-67) 109/61  SpO2:  [95 %-100 %] 97 %  on  Flow (L/min):  [2] 2;   Device (Oxygen Therapy): nasal cannula  Body mass index is 23.27 kg/m².    Wt Readings from Last 3 Encounters:    09/29/24 86.7 kg (191 lb 3.2 oz)   09/06/24 88.7 kg (195 lb 9.6 oz)   08/12/24 88 kg (194 lb)     Intake & Output (last 3 days)         09/27 0701 09/28 0700 09/28 0701 09/29 0700 09/29 0701 09/30 0700    P.O. 720 120 200    I.V. (mL/kg)       Total Intake(mL/kg) 720 (8.2) 120 (1.4) 200 (2.3)    Urine (mL/kg/hr) 400 (0.2) 900 (0.4) 400 (0.4)    Emesis/NG output       Stool  25     Total Output 400 925 400    Net +320 -805 -200                 Diet: Liquid; Clear Liquid; Fluid Consistency: Thin (IDDSI 0)  ----------------------------------------------------------------------------------------------------------------------  Physical exam:   Constitutional:  Well-developed and well-nourished. Appeared tired but was not in acute distress.      HENT:  Head:  Normocephalic and atraumatic.   Cardiovascular:  Normal rate, regular rhythm   Pulmonary/Chest:  No respiratory distress, no wheezes, no crackles, no rhonchi  Abdominal:  Soft. Abdomen is distended and appropriately tender to palpation. Bowel sounds are present. Small amount of feculent output in ostomy bag.  Musculoskeletal:  No deformity.      Neurological: Awake, alert, no focal deficit on gross examination. No slurred speech or facial droop.   Skin:  Skin is warm and dry.   Peripheral vascular:  No cyanosis, no extremity edema  Edited by: Delisa Valdez DO at 9/29/2024 1908  ----------------------------------------------------------------------------------------------------------------------  Results from last 7 days   Lab Units 09/29/24  0040 09/29/24  0037 09/28/24  1050 09/28/24  0001 09/27/24  0406 09/25/24  2339 09/25/24  1704 09/25/24  0114 09/24/24  1042   LACTATE mmol/L  --   --   --   --   --   --   --   --  1.2   WBC 10*3/mm3  --  5.86  --  7.08 8.22   < >  --    < > 5.43   HEMOGLOBIN g/dL  --  11.2*  --  11.8* 12.4*   < >  --    < > 12.7*   HEMATOCRIT %  --  37.3*  --  36.7* 39.0   < >  --    < > 39.1   MCV fL  --  109.7*  --  102.2* 103.4*   < >  " --    < > 99.7*   MCHC g/dL  --  30.0*  --  32.2 31.8   < >  --    < > 32.5   PLATELETS 10*3/mm3  --  173  --  185 157   < >  --    < > 163   INR  2.62*  --  2.31*  --   --   --  1.61*  --  1.62*    < > = values in this interval not displayed.         Results from last 7 days   Lab Units 09/29/24  0040 09/28/24  0001 09/25/24  2339 09/25/24  0114 09/24/24  1042   SODIUM mmol/L 137 142 141   < > 140   POTASSIUM mmol/L 3.7 3.9 3.7   < > 4.5   CHLORIDE mmol/L 104 102 103   < > 100   CO2 mmol/L 22.2 23.4 25.9   < > 30.8*   BUN mg/dL 25* 23 15   < > 11   CREATININE mg/dL 0.92 1.01 0.97   < > 1.04   CALCIUM mg/dL 8.5 8.5 9.1   < > 9.6   GLUCOSE mg/dL 111* 139* 100*   < > 106*   ALBUMIN g/dL  --   --  3.7  --  4.0   BILIRUBIN mg/dL  --   --  1.3*  --  1.1   ALK PHOS U/L  --   --  73  --  86   AST (SGOT) U/L  --   --  15  --  18   ALT (SGPT) U/L  --   --  7  --  10    < > = values in this interval not displayed.   Estimated Creatinine Clearance: 61.5 mL/min (by C-G formula based on SCr of 0.92 mg/dL).  No results found for: \"AMMONIA\"  Results from last 7 days   Lab Units 09/24/24  1301 09/24/24  1042   CK TOTAL U/L  --  39   HSTROP T ng/L 31* 34*             No results found for: \"HGBA1C\", \"POCGLU\"  Lab Results   Component Value Date    TSH 1.250 06/10/2024     No results found for: \"PREGTESTUR\", \"PREGSERUM\", \"HCG\", \"HCGQUANT\"  Pain Management Panel           No data to display              Brief Urine Lab Results  (Last result in the past 365 days)        Color   Clarity   Blood   Leuk Est   Nitrite   Protein   CREAT   Urine HCG        09/25/24 0259 Yellow   Clear   Negative   Negative   Negative   Negative                 No results found for: \"BLOODCX\"  No results found for: \"URINECX\"  No results found for: \"WOUNDCX\"  No results found for: \"STOOLCX\"  No results found for: \"RESPCX\"  No results found for: \"AFBCX\"  Results from last 7 days   Lab Units 09/24/24  1042   LACTATE mmol/L 1.2       I have personally looked at " the labs and they are summarized above.  ----------------------------------------------------------------------------------------------------------------------  Detailed radiology reports for the last 24 hours:  Imaging Results (Last 24 Hours)       ** No results found for the last 24 hours. **          Assessment & Plan      #Small bowel obstruction  - Patient had similar episode three months ago requiring exploratory laparotomy.   - General Surgery following, appreciate assistance  - s/p exploratory laparotomy, reduction of internal hernia, and creation of end colostomy 9/26/24.  - Continue post-op management per surgery  - diet has been increased up to clear liquids again and he has had flatus noted from stoma today  - pain control with home hydrocodone-acetaminophen (now restarted) and dilaudid prn breakthrough pain. Dilaudid dose was decreased to reduce risk of oversedation and respiratory depression.    #Atrial flutter/fibrillation  #Chronically anticoagulated with warfarin  - Holding warfarin for now and bridging with heparin drip.   - Hemoglobin has remained stable and it is okay per surgery to restart anticoagulation. Continue lovenox while bridging back to warfarin. Pharmacy consulted for warfarin dosing, appreciate assistance. Consider changing to DOAC if it is affordable for him, to reduce risk of bleeding and adverse events which would be higher risk with warfarin.    #Mild anemia  - appears chronic. Hemoglobin relatively stable at 11.2 today. Repeat CBC in a.m.    Chronic medical conditions:  #Coronary artery disease  #HFrEF  #Hypertension  #Hyperlipidemia  #PVD  #history of PPM implantation  #History of DVT  #History of CVA  #Rheumatoid arthritis  - Volume status appears compensated and BP has been well controlled. Home oral medications have been held due to SBO. Continue home atorvastatin and gabapentin now that he has a clear liquid diet. Hold antihypertensives, aspirin, and bumex for now due to  bleeding risk and as blood pressure likely would not tolerate restarting antihypertensive at this time. Monitor volume status closely with intake/output and restart bumex when he begins to have more fluid intake. Continue maintenance IV fluids to 75cc/hr and monitor closely for signs of developing fluid overload. Plan to discontinue IV fluids when he is tolerating more oral intake.    Copied portions of the note have been reviewed and are correct as of 09/29/24.   Edited by: Delisa Valdez DO at 9/29/2024 1908    Active VTE Prophylaxis:  Pharmacologic:        Start     Dose Route Frequency Stop    09/27/24 1942  Pharmacy to dose warfarin        Question:  Target INR  Answer:  2 - 3    -- XX Continuous PRN --                    Dispo: pending clinical progress and PT/OT evaluation    Delisa Valdez DO  [facility]  09/29/24  19:08 EDT

## 2024-09-30 LAB
ANION GAP SERPL CALCULATED.3IONS-SCNC: 6.5 MMOL/L (ref 5–15)
BUN SERPL-MCNC: 24 MG/DL (ref 8–23)
BUN/CREAT SERPL: 26.4 (ref 7–25)
CALCIUM SPEC-SCNC: 7.9 MG/DL (ref 8.2–9.6)
CHLORIDE SERPL-SCNC: 104 MMOL/L (ref 98–107)
CO2 SERPL-SCNC: 28.5 MMOL/L (ref 22–29)
CREAT SERPL-MCNC: 0.91 MG/DL (ref 0.76–1.27)
DEPRECATED RDW RBC AUTO: 49.7 FL (ref 37–54)
EGFRCR SERPLBLD CKD-EPI 2021: 78.6 ML/MIN/1.73
ERYTHROCYTE [DISTWIDTH] IN BLOOD BY AUTOMATED COUNT: 13.1 % (ref 12.3–15.4)
GLUCOSE BLDC GLUCOMTR-MCNC: 86 MG/DL (ref 70–130)
GLUCOSE SERPL-MCNC: 101 MG/DL (ref 65–99)
HCT VFR BLD AUTO: 29.5 % (ref 37.5–51)
HGB BLD-MCNC: 9.5 G/DL (ref 13–17.7)
INR PPP: 3.02 (ref 0.9–1.1)
MCH RBC QN AUTO: 33.2 PG (ref 26.6–33)
MCHC RBC AUTO-ENTMCNC: 32.2 G/DL (ref 31.5–35.7)
MCV RBC AUTO: 103.1 FL (ref 79–97)
PLATELET # BLD AUTO: 157 10*3/MM3 (ref 140–450)
PMV BLD AUTO: 10.2 FL (ref 6–12)
POTASSIUM SERPL-SCNC: 3.3 MMOL/L (ref 3.5–5.2)
PROTHROMBIN TIME: 31.3 SECONDS (ref 12.1–14.7)
RBC # BLD AUTO: 2.86 10*6/MM3 (ref 4.14–5.8)
SODIUM SERPL-SCNC: 139 MMOL/L (ref 136–145)
WBC NRBC COR # BLD AUTO: 4.51 10*3/MM3 (ref 3.4–10.8)

## 2024-09-30 PROCEDURE — 25010000002 ONDANSETRON PER 1 MG: Performed by: SURGERY

## 2024-09-30 PROCEDURE — 99024 POSTOP FOLLOW-UP VISIT: CPT | Performed by: SURGERY

## 2024-09-30 PROCEDURE — 80048 BASIC METABOLIC PNL TOTAL CA: CPT | Performed by: STUDENT IN AN ORGANIZED HEALTH CARE EDUCATION/TRAINING PROGRAM

## 2024-09-30 PROCEDURE — 85610 PROTHROMBIN TIME: CPT | Performed by: STUDENT IN AN ORGANIZED HEALTH CARE EDUCATION/TRAINING PROGRAM

## 2024-09-30 PROCEDURE — 85027 COMPLETE CBC AUTOMATED: CPT | Performed by: STUDENT IN AN ORGANIZED HEALTH CARE EDUCATION/TRAINING PROGRAM

## 2024-09-30 PROCEDURE — 82948 REAGENT STRIP/BLOOD GLUCOSE: CPT

## 2024-09-30 PROCEDURE — 99232 SBSQ HOSP IP/OBS MODERATE 35: CPT | Performed by: STUDENT IN AN ORGANIZED HEALTH CARE EDUCATION/TRAINING PROGRAM

## 2024-09-30 RX ORDER — WARFARIN SODIUM 4 MG/1
4 TABLET ORAL
Status: COMPLETED | OUTPATIENT
Start: 2024-09-30 | End: 2024-09-30

## 2024-09-30 RX ORDER — AMMONIUM LACTATE 12 G/100G
1 CREAM TOPICAL 2 TIMES DAILY PRN
Status: DISCONTINUED | OUTPATIENT
Start: 2024-09-30 | End: 2024-10-01 | Stop reason: HOSPADM

## 2024-09-30 RX ORDER — POTASSIUM CHLORIDE 1500 MG/1
40 TABLET, EXTENDED RELEASE ORAL 2 TIMES DAILY WITH MEALS
Status: COMPLETED | OUTPATIENT
Start: 2024-09-30 | End: 2024-09-30

## 2024-09-30 RX ADMIN — Medication 10 ML: at 20:42

## 2024-09-30 RX ADMIN — GABAPENTIN 600 MG: 300 CAPSULE ORAL at 20:41

## 2024-09-30 RX ADMIN — PANTOPRAZOLE SODIUM 40 MG: 40 INJECTION, POWDER, FOR SOLUTION INTRAVENOUS at 06:11

## 2024-09-30 RX ADMIN — ATORVASTATIN CALCIUM 20 MG: 20 TABLET, FILM COATED ORAL at 20:41

## 2024-09-30 RX ADMIN — POTASSIUM CHLORIDE 40 MEQ: 1500 TABLET, EXTENDED RELEASE ORAL at 17:35

## 2024-09-30 RX ADMIN — Medication 10 ML: at 09:16

## 2024-09-30 RX ADMIN — HYDROCODONE BITARTRATE AND ACETAMINOPHEN 1 TABLET: 10; 325 TABLET ORAL at 22:37

## 2024-09-30 RX ADMIN — POTASSIUM CHLORIDE 40 MEQ: 1500 TABLET, EXTENDED RELEASE ORAL at 11:40

## 2024-09-30 RX ADMIN — SACUBITRIL AND VALSARTAN 0.5 TABLET: 24; 26 TABLET, FILM COATED ORAL at 09:15

## 2024-09-30 RX ADMIN — ONDANSETRON 4 MG: 2 INJECTION INTRAMUSCULAR; INTRAVENOUS at 20:41

## 2024-09-30 RX ADMIN — ASPIRIN 81 MG: 81 TABLET, COATED ORAL at 09:15

## 2024-09-30 RX ADMIN — HYDROCODONE BITARTRATE AND ACETAMINOPHEN 1 TABLET: 10; 325 TABLET ORAL at 03:33

## 2024-09-30 RX ADMIN — WARFARIN SODIUM 4 MG: 4 TABLET ORAL at 17:35

## 2024-09-30 RX ADMIN — SACUBITRIL AND VALSARTAN 0.5 TABLET: 24; 26 TABLET, FILM COATED ORAL at 20:41

## 2024-09-30 NOTE — PROGRESS NOTES
Chief complaint: Abdominal pain     Postoperative day 4: Exploratory laparotomy with end colostomy creation due to recurrent internal hernia through mesocolic defect     Patient doing well this morning.  Flatus and stool from stoma.  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  Due to recurrent nature, and inability to orient the bowel in a way that would avoid recurrent internal hernia the distal colon was transected near the rectum and an end colostomy was performed to eliminate the internal hernia space.  -Stoma now functioning  -Regular diet  -Stoma teaching  -Surgery will sign off, follow-up in 1 week wound examination.

## 2024-09-30 NOTE — CASE MANAGEMENT/SOCIAL WORK
Discharge Planning Assessment  [facility]     Patient Name: Alberto Guzman  MRN: [id number]  Today's Date: 9/30/2024    Admit Date: 9/24/2024    Plan: Pt resides at home with daughterKrystin, at [address] [place] and plans to return home at discharge.  Pt currently does not utilize home health services  Pt's PCP is Lianet Dia.  Pt utilizes [facility].  Pt transports via private auto per family  SS will follow.       Discharge Plan       Row Name 09/30/24 1713       Plan    Plan Pt resides at home with daughterKrystin, at [address] [place] and plans to return home at discharge.  Pt currently does not utilize home health services  Pt's PCP is Lianet Dia.  Pt utilizes [facility].  Pt transports via private auto per family  SS will follow.                  Continued Care and Services - Admitted Since 9/24/2024    No active coordination exists for this encounter.       Selected Continued Care - Episodes Includes continued care and service providers with selected services from the active episodes listed below      Heart Failure Episode start date: 8/9/2024   There are no active outsourced providers for this episode.                 Expected Discharge Date and Time       Expected Discharge Date Expected Discharge Time    Sep 30, 2024           RADHA Lopez

## 2024-09-30 NOTE — PLAN OF CARE
Goal Outcome Evaluation:      Patient sitting in chair at bedside with chair alarm in place at this time. Pt pleasant and cooperative with care this shift. IV access maintained, no s/s of acute distress noted at this time. Plan of care ongoing.

## 2024-09-30 NOTE — NURSING NOTE
Abrasion to right and left heel documented since admit.  No abrasions noted.  LDAs removed.  Patient does have thick, dry skin to heels.  Order placed for amlactin cream BID.    Stage 1 PI to bilat gluteal documented since admit.  Gluteals pink and intact.  LDA removed.  Silicone border dressing to sacrum.  Removed to reveal MASD.  Skin red, intact and blanchable.  Order placed to treat with Z-Guard BID and leave open to air.    Midline abdominal incision dressing loose.  Removed.  Intact staples noted.  Small amount of serosanguinous drainage noted.  Cleansed with NS and applied dressing.    Colostomy to LUQ with 100cc dark brown liquid stool noted.  Removed ostomy appliance.  Stoma red and moist; protrudes well above skin level.  Peristomal skin pink and intact.  Cleansed with cleansing wipes. Applied 3 layers of skin protectant barrier spray to peristomal skin. A thin line of stoma paste was applied around the base of the stoma.  Patient placed in 2-piece 70mm [place] appliance.  Ostomy protocol orders placed.    Daughter at bedside.  States she lives with patient and plans to provide ostomy care at home.  Educated her on general ostomy care and changing appliance.  She verbalized understanding.  [facility] provided with needed home supplies clearly marked.  Instructed patient to call upon discharge to order supplies.  She verbalized understanding.    Dionicio score 17.  PI prevention orders initiated.        09/30/24 1020   Wound 09/26/24 1346 abdomen Incision   Placement Date/Time: 09/26/24 1346   Present on Original Admission: No  Location: abdomen  Primary Wound Type: Incision   Dressing Appearance moist drainage;dressing loose   Closure Staples   Base other (see comments)  (incision line with intact staples)   Periwound intact;pink;blanchable   Periwound Temperature warm   Periwound Skin Turgor soft   Drainage Characteristics/Odor serosanguineous   Drainage Amount small   Care, Wound cleansed  "with;sterile normal saline   Dressing Care dressing applied   Wound 09/30/24 1101 sacral spine MASD (Moisture associated skin damage)   Placement Date/Time: 09/30/24 1101   Location: sacral spine  Primary Wound Type: MASD (Moisture associated skin damage)   Dressing Appearance no drainage   Base blanchable;red   Periwound intact   Periwound Temperature warm   Periwound Skin Turgor soft   Edges rolled/closed   Colostomy LUQ   Placement date: If unknown, DO NOT use \"Add Comment\" note/Placement time: If unknown, DO NOT use \"Add Comment\" note: 09/26/24 1422   Inserted by: DR. GARCIA  Hand Hygiene Completed: Yes  Colostomy Type: End  Location: LUQ   Stomal Appliance 2 piece;Clean;Dry;Intact   Stoma Appearance round;moist;red;protruding above skin level   Peristomal Assessment Clean;Intact;Pink   Accessories/Skin Care cleansed with soap and water;barrier substance over peristomal skin;skin barrier paste   Stoma Function stool;flatus   Stool Color brown, dark   Stool Consistency liquid   Treatment Site care   Output (mL) 100 mL       "

## 2024-09-30 NOTE — PLAN OF CARE
Pt has been resting in bed this shift. Pt has had abdominal pain and PRN meds. Given. No s/s of acute distress noted. Plan of care is ongoing at this time.

## 2024-10-01 ENCOUNTER — READMISSION MANAGEMENT (OUTPATIENT)
Dept: CALL CENTER | Facility: HOSPITAL | Age: 89
End: 2024-10-01
Payer: MEDICARE

## 2024-10-01 VITALS
TEMPERATURE: 97.7 F | RESPIRATION RATE: 18 BRPM | DIASTOLIC BLOOD PRESSURE: 53 MMHG | WEIGHT: 198.3 LBS | BODY MASS INDEX: 24.15 KG/M2 | HEART RATE: 66 BPM | HEIGHT: 76 IN | SYSTOLIC BLOOD PRESSURE: 97 MMHG | OXYGEN SATURATION: 93 %

## 2024-10-01 LAB
ANION GAP SERPL CALCULATED.3IONS-SCNC: 6.1 MMOL/L (ref 5–15)
BUN SERPL-MCNC: 22 MG/DL (ref 8–23)
BUN/CREAT SERPL: 24.2 (ref 7–25)
CALCIUM SPEC-SCNC: 8.1 MG/DL (ref 8.2–9.6)
CHLORIDE SERPL-SCNC: 102 MMOL/L (ref 98–107)
CO2 SERPL-SCNC: 28.9 MMOL/L (ref 22–29)
CREAT SERPL-MCNC: 0.91 MG/DL (ref 0.76–1.27)
DEPRECATED RDW RBC AUTO: 48.7 FL (ref 37–54)
EGFRCR SERPLBLD CKD-EPI 2021: 78.6 ML/MIN/1.73
ERYTHROCYTE [DISTWIDTH] IN BLOOD BY AUTOMATED COUNT: 13 % (ref 12.3–15.4)
GLUCOSE SERPL-MCNC: 101 MG/DL (ref 65–99)
HCT VFR BLD AUTO: 32.8 % (ref 37.5–51)
HGB BLD-MCNC: 10.4 G/DL (ref 13–17.7)
INR PPP: 2.28 (ref 0.9–1.1)
MCH RBC QN AUTO: 32.1 PG (ref 26.6–33)
MCHC RBC AUTO-ENTMCNC: 31.7 G/DL (ref 31.5–35.7)
MCV RBC AUTO: 101.2 FL (ref 79–97)
PLATELET # BLD AUTO: 205 10*3/MM3 (ref 140–450)
PMV BLD AUTO: 10 FL (ref 6–12)
POTASSIUM SERPL-SCNC: 3.8 MMOL/L (ref 3.5–5.2)
PROTHROMBIN TIME: 25.2 SECONDS (ref 12.1–14.7)
RBC # BLD AUTO: 3.24 10*6/MM3 (ref 4.14–5.8)
SODIUM SERPL-SCNC: 137 MMOL/L (ref 136–145)
WBC NRBC COR # BLD AUTO: 5.38 10*3/MM3 (ref 3.4–10.8)

## 2024-10-01 PROCEDURE — 80048 BASIC METABOLIC PNL TOTAL CA: CPT | Performed by: STUDENT IN AN ORGANIZED HEALTH CARE EDUCATION/TRAINING PROGRAM

## 2024-10-01 PROCEDURE — 25010000002 HYDROMORPHONE PER 4 MG: Performed by: STUDENT IN AN ORGANIZED HEALTH CARE EDUCATION/TRAINING PROGRAM

## 2024-10-01 PROCEDURE — 97535 SELF CARE MNGMENT TRAINING: CPT

## 2024-10-01 PROCEDURE — 97530 THERAPEUTIC ACTIVITIES: CPT

## 2024-10-01 PROCEDURE — 85610 PROTHROMBIN TIME: CPT | Performed by: STUDENT IN AN ORGANIZED HEALTH CARE EDUCATION/TRAINING PROGRAM

## 2024-10-01 PROCEDURE — 85027 COMPLETE CBC AUTOMATED: CPT | Performed by: STUDENT IN AN ORGANIZED HEALTH CARE EDUCATION/TRAINING PROGRAM

## 2024-10-01 PROCEDURE — 99239 HOSP IP/OBS DSCHRG MGMT >30: CPT | Performed by: STUDENT IN AN ORGANIZED HEALTH CARE EDUCATION/TRAINING PROGRAM

## 2024-10-01 RX ORDER — BUMETANIDE 1 MG/1
1 TABLET ORAL DAILY
Qty: 30 TABLET | Refills: 0 | Status: SHIPPED | OUTPATIENT
Start: 2024-10-01 | End: 2024-10-31

## 2024-10-01 RX ORDER — WARFARIN SODIUM 3 MG/1
6 TABLET ORAL
Status: DISCONTINUED | OUTPATIENT
Start: 2024-10-01 | End: 2024-10-01 | Stop reason: HOSPADM

## 2024-10-01 RX ADMIN — PANTOPRAZOLE SODIUM 40 MG: 40 INJECTION, POWDER, FOR SOLUTION INTRAVENOUS at 05:30

## 2024-10-01 RX ADMIN — ASPIRIN 81 MG: 81 TABLET, COATED ORAL at 09:34

## 2024-10-01 RX ADMIN — Medication 10 ML: at 09:41

## 2024-10-01 RX ADMIN — SACUBITRIL AND VALSARTAN 0.5 TABLET: 24; 26 TABLET, FILM COATED ORAL at 09:34

## 2024-10-01 RX ADMIN — HYDROMORPHONE HYDROCHLORIDE 0.25 MG: 1 INJECTION, SOLUTION INTRAMUSCULAR; INTRAVENOUS; SUBCUTANEOUS at 01:21

## 2024-10-01 NOTE — PLAN OF CARE
Goal Outcome Evaluation:   Patient to be discharged at this time  Report called to Giselle from [facility] to give report.

## 2024-10-01 NOTE — NURSING NOTE
Patient to be discharged at this time. Patient unable to void consistently during shift after removal of alfredo catheter yesterday. Per Dr. Meier bladder scan again at 1300 to check for urinary retention. If retention continues will place a alfredo catheter for patient to be discharged with and follow up outpatient with urology. Patient and family agreeable.    Unknown Stable

## 2024-10-01 NOTE — PHARMACY PATIENT ASSISTANCE
"Pharmacy was consulted to evaluate the cost of DOACs for patient. Both Eliquis and Xarelto are technically \"covered\" on the patient's insurance, but unfortunately the copay is still ~$560 for either. This appears to be a coinsurance price so I do not believe this amount would decrease even after the first fill or two. Patient will likely need to stay on warfarin due to affordability.     Thank you,    Amanda Funk, PharmD  10/01/24  08:41 EDT    "

## 2024-10-01 NOTE — PROGRESS NOTES
[facility] HOSPITALIST PROGRESS NOTE     Patient Identification:  Name:  Alberto Guzman  Age:  89 y.o.  Sex:  male  :  3/29/1931  MRN:  [id number]  Visit Number:  [id number]  ROOM: [address] [place] [address]     Primary Care Provider:  Lianet Dia APRN    Length of stay in inpatient status:  6    Subjective     Chief Compliant:    Chief Complaint   Patient presents with    Abdominal Pain       History of Presenting Illness: Patient seen evaluated in follow-up for small bowel obstruction status post exploratory laparotomy with reduction of internal hernia and creation of colostomy.  Patient tolerating clear liquid diets with ostomy output and transition to regular diet today with good tolerance.  Patient still with Michael catheter placed initially at admission in the emergency department for retention and will remove today and perform voiding trial monitor for recurrent retention before discharge home likely tomorrow.    Objective     Current Hospital Meds:  aspirin, 81 mg, Oral, Daily  atorvastatin, 20 mg, Oral, Nightly  [Held by provider] bumetanide, 1 mg, Oral, BID  gabapentin, 600 mg, Oral, Nightly  pantoprazole, 40 mg, Intravenous, Q AM  sacubitril-valsartan, 0.5 tablet, Oral, Q12H  sodium chloride, 10 mL, Intravenous, Q12H  Vericiguat, 5 mg, Oral, Daily      Pharmacy Consult,   Pharmacy to dose warfarin,       ----------------------------------------------------------------------------------------------------------------------  Vital Signs:  Temp:  [97.7 °F (36.5 °C)-98.9 °F (37.2 °C)] 98.9 °F (37.2 °C)  Heart Rate:  [65-89] 89  Resp:  [20] 20  BP: ()/(51-65) 99/65  SpO2:  [95 %-99 %] 95 %  on   ;   Device (Oxygen Therapy): room air  Body mass index is 23.24 kg/m².      Intake/Output Summary (Last 24 hours) at 2024  Last data filed at 2024 1451  Gross per 24 hour   Intake 300 ml   Output 1100 ml   Net -800 ml     "  ----------------------------------------------------------------------------------------------------------------------  Physical exam:  Constitutional:  Well-developed and well-nourished elderly adult male resting in bed in no distress.  HENT:  Head:  Normocephalic and atraumatic.  Mouth:  Moist mucous membranes.    Eyes:  Conjunctivae and EOM are normal. No scleral icterus.     Cardiovascular:  Normal rate, regular rhythm and normal heart sounds with no murmur.  Pulmonary/Chest:  No respiratory distress, no wheezes, no crackles, with normal breath sounds and good air movement.  Abdominal:  Soft.  Bowel sounds are normal.  No distension and no tenderness.  Colostomy present with scant output in bag.  Musculoskeletal:  No tenderness, and no deformity.  No red or swollen joints anywhere.  Functional ROM intact.   Neurological:  Alert and oriented to person, place, and time.  No cranial nerve deficit.  No tongue deviation.  No facial droop.  No slurred speech. Intact Sensation throughout  Skin:  Skin is warm and dry. No rash or lesion noted. No pallor.   Peripheral vascular:  Pulses in all 4 extremities with no clubbing, no cyanosis, no edema.  Psychiatric: Appropriate mood and affect, pleasant.   ----------------------------------------------------------------------------------------------------------------------  WBC/HGB/HCT/PLT   4.51/9.5/29.5/157 (09/30 0533)  BUN/CREAT/GLUC/ALT/AST/SERGEI/LIP    24/0.91/101/--/--/--/-- (09/30 0208)  LYTES - Na/K/Cl/CO2: 139/3.3*/104/28.5 (09/30 0208)  COAG - PT/INR/PTT: 31.3*/3.02*/-- (09/30 0208)     No results found for: \"URINECX\"  No results found for: \"BLOODCX\"    I have personally looked at the labs and they are summarized above.  ----------------------------------------------------------------------------------------------------------------------  Detailed radiology reports for the last 24 hours:  No radiology results for the last day  Assessment & Plan      Small bowel " obstruction    -Previous episode 3 months prior this time with ex lap requiring reduction of internal hernia and creation of end colostomy on .    -Patient  advance to regular and tolerating well with bowel movements.    -Continue as needed pain control with oral analgesics.    -Cleared by general surgery for discharge home    Acute urinary retention  History of BPH    -Patient with acute urinary retention at presentation emergency department with Michael catheter placed.  No attempts at voiding trial as of yet during this hospitalization and given plan for discharge home in the next 24 hours Michael catheter removed and voiding trial obtained.  If patient unable to void or having urinary retention will replace Michael catheter and have urology follow-up in the outpatient setting.    Atrial flutter/fibrillation  Chronic anticoagulation with warfarin    -Currently on Coumadin, have consulted pharmacy for cost evaluation of DOAC to facilitate easier dosing and anticoagulation.    Chronic anemia    -Hemoglobin remaining stable, continue to monitor    Chronic:  Hx HFrEF -consider resumption of diuretic at discharge pending clinical volume status.  History CAD  Hypertension  Lipidemia  Peripheral vascular disease  Hx pacemaker placement  History of DVT  History of stroke  History rheumatoid arthritis    Copied text in portions of the note has been reviewed and is accurate as of 24    VTE Prophylaxis:     Active VTE Prophylaxis:  Pharmacologic:        Start     Dose Route Frequency Stop    24  Pharmacy to dose warfarin        Question:  Target INR  Answer:  2 - 3    -- XX Continuous PRN --                  Select Mechanical VTE Prophylaxis if Desired & Appropriate       Disposition Home tomorrow    Jairo Meier DO  [facility] Hospitalist  24  21:10 EDT

## 2024-10-01 NOTE — THERAPY TREATMENT NOTE
Patient Name: Alberto Guzman  : 3/29/1931    MRN: [id number]                              Today's Date: 10/1/2024       Admit Date: 2024    Visit Dx:     ICD-10-CM ICD-9-CM   1. Partial small bowel obstruction  K56.600 560.9   2. Atrial flutter, unspecified type  I48.92 427.32   3. Cardiomyopathy, unspecified type  I42.9 425.4   4. Small bowel obstruction  K56.609 560.9   5. Colostomy in place  Z93.3 V44.3   6. Acute urinary retention  R33.8 788.29     Patient Active Problem List   Diagnosis    Urinary retention    Bladder neck contracture    Bladder tumor    Aftercare following surgery of the genitourinary system    Atrial flutter , ventricular paced rhythm    Presence of cardiac pacemaker 6/3/2025    Hyperlipidemia LDL goal <70    Essential hypertension    Chronic anticoagulation with Coumadin    Ventricular tachycardia (paroxysmal)    Asymptomatic PVD (peripheral vascular disease)    Coronary artery disease, anterior wall myocardial infarction with directional atherectomy of LAD in  and bare-metal stent to the LAD in , nonobstructive disease     Ischemic cardiomyopathy, LV ejection fraction around 45 to 50%    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, 46 to 50%)    Bilateral lower extremity edema    Stroke (cerebrum)    Small bowel obstruction    Hypokalemia    Hypomagnesemia    Partial small bowel obstruction     Past Medical History:   Diagnosis Date    Arthritis     Atrial flutter     Back pain     Benign prostatic hyperplasia     Bladder tumor     Cancer     [id number]/melanoma    Chronic systolic heart failure 2021    Colon cancer     Coronary artery disease     DVT (deep venous thrombosis)     r leg    Elevated cholesterol     Heart attack         Hypertension     Numbness     feet/hands    Osteoporosis     PVD (peripheral vascular disease)     Rheumatoid arthritis     Stroke     Ventricular tachycardia      Past Surgical History:   Procedure Laterality Date     CATARACT EXTRACTION Bilateral     CHOLECYSTECTOMY      COLON RESECTION      COLONOSCOPY      CORONARY ANGIOPLASTY WITH STENT PLACEMENT  1995    X1    EXPLORATORY LAPAROTOMY N/A 06/12/2024    Procedure: LAPAROTOMY EXPLORATORY;  Surgeon: Deo Lennon MD;  Location: [facility] OR;  Service: General;  Laterality: N/A;    EXPLORATORY LAPAROTOMY N/A 9/26/2024    Procedure: LAPAROTOMY EXPLORATORY, REDUCTION OF INTERNAL HERNIA, END COLOSTOMY CREATION;  Surgeon: Deo Lennon MD;  Location: [facility] OR;  Service: General;  Laterality: N/A;    HEMORROIDECTOMY      HERNIA REPAIR      left inguinal/umbilical    HIP ARTHROPLASTY Right     INSERT / REPLACE / REMOVE PACEMAKER      JOINT REPLACEMENT Right     HIP    PACEMAKER IMPLANTATION      PROSTATE SURGERY      TRANSURETHRAL RESECTION OF BLADDER TUMOR N/A 04/18/2018    Procedure: CYSTOSCOPY TRANSURETHRAL RESECTION OF SMALL BLADDER TUMOR;  Surgeon: Alfonso Collins MD;  Location: [facility];  Service: Urology    TRANSURETHRAL RESECTION OF BLADDER TUMOR N/A 08/29/2018    Procedure: CYSTOSCOPY TRANSURETHRAL RESECTION OF BLADDER TUMOR;  Surgeon: Alfonso Collins MD;  Location: [facility];  Service: Urology      General Information       Row Name 10/01/24 1430          OT Time and Intention    Document Type therapy note (daily note)  -     Mode of Treatment individual therapy;occupational therapy  -       Row Name 10/01/24 1430          General Information    Patient Profile Reviewed yes  -     Existing Precautions/Restrictions fall  -       Row Name 10/01/24 1430          Cognition    Orientation Status (Cognition) oriented x 3  -       Row Name 10/01/24 1430          Safety Issues, Functional Mobility    Impairments Affecting Function (Mobility) balance;endurance/activity tolerance;strength;pain  -               User Key  (r) = Recorded By, (t) = Taken By, (c) = Cosigned By      Initials Name Provider Type    Chery Devine, OT Occupational  Therapist                     Mobility/ADL's       Row Name 10/01/24 1432          Bed Mobility    Bed Mobility supine-sit  -     Supine-Sit Tracy (Bed Mobility) minimum assist (75% patient effort)  -     Assistive Device (Bed Mobility) bed rails;head of bed elevated;draw sheet  -       Row Name 10/01/24 1432          Transfers    Transfers sit-stand transfer;stand-sit transfer  -       Row Name 10/01/24 1432          Sit-Stand Transfer    Sit-Stand Tracy (Transfers) contact guard  -     Assistive Device (Sit-Stand Transfers) walker, front-wheeled  -       Row Name 10/01/24 1432          Stand-Sit Transfer    Stand-Sit Tracy (Transfers) contact guard  -     Assistive Device (Stand-Sit Transfers) walker, front-wheeled  -       Row Name 10/01/24 1432          Functional Mobility    Functional Mobility- Ind. Level contact guard assist;minimum assist (75% patient effort)  -     Functional Mobility- Device walker, front-wheeled  -     Functional Mobility- Comment 100 feet  -       Row Name 10/01/24 1432          Activities of Daily Living    BADL Assessment/Intervention toileting  -       Row Name 10/01/24 1432          Toileting Assessment/Training    Tracy Level (Toileting) toileting skills;minimum assist (75% patient effort)  -     Comment, (Toileting) standing with urinal, rolling walker  -               User Key  (r) = Recorded By, (t) = Taken By, (c) = Cosigned By      Initials Name Provider Type    Chery Devine OT Occupational Therapist                   Obj/Interventions       Row Name 10/01/24 1442          Motor Skills    Motor Skills functional endurance  -     Functional Endurance fair plus  -               User Key  (r) = Recorded By, (t) = Taken By, (c) = Cosigned By      Initials Name Provider Type    Chery Devine OT Occupational Therapist                   Goals/Plan    No documentation.                  Clinical Impression       Row  Name 10/01/24 1442          Pain Assessment    Pretreatment Pain Rating 0/10 - no pain  -     Posttreatment Pain Rating 0/10 - no pain  -       Row Name 10/01/24 1442          Plan of Care Review    Plan of Care Reviewed With patient  -     Progress improving  -     Outcome Evaluation Patient seen for OT treatment. He performed toileting as documented and then functional mobility in the hallway with rolling walker. Pending discharge. Continue plan of care.  -       Row Name 10/01/24 1442          Therapy Plan Review/Discharge Plan (OT)    Anticipated Discharge Disposition (OT) home with assist  -       Row Name 10/01/24 1442          Positioning and Restraints    Pre-Treatment Position in bed  -     Post Treatment Position bed  -     In Bed sitting EOB;call light within reach;encouraged to call for assist;with nsg;with family/caregiver  -               User Key  (r) = Recorded By, (t) = Taken By, (c) = Cosigned By      Initials Name Provider Type     Chery Gomez OT Occupational Therapist                   Outcome Measures       Row Name 10/01/24 0830          How much help from another person do you currently need...    Turning from your back to your side while in flat bed without using bedrails? 3  -MC     Moving from lying on back to sitting on the side of a flat bed without bedrails? 3  -MC     Moving to and from a bed to a chair (including a wheelchair)? 2  -MC     Standing up from a chair using your arms (e.g., wheelchair, bedside chair)? 3  -MC     Climbing 3-5 steps with a railing? 2  -MC     To walk in hospital room? 2  -MC     AM-PAC 6 Clicks Score (PT) 15  -MC     Highest Level of Mobility Goal 4 --> Transfer to chair/commode  -               User Key  (r) = Recorded By, (t) = Taken By, (c) = Cosigned By      Initials Name Provider Type     Collett, Morgan, RN Registered Nurse                      OT Recommendation and Plan  Planned Therapy Interventions (OT): activity tolerance  training, BADL retraining, functional balance retraining, transfer/mobility retraining, strengthening exercise, occupation/activity based interventions, ROM/therapeutic exercise, patient/caregiver education/training, neuromuscular control/coordination retraining  Therapy Frequency (OT): 3 times/wk (3-5x/week)  Plan of Care Review  Plan of Care Reviewed With: patient  Progress: improving  Outcome Evaluation: Patient seen for OT treatment. He performed toileting as documented and then functional mobility in the hallway with rolling walker. Pending discharge. Continue plan of care.     Time Calculation:         Time Calculation- OT       Row Name 10/01/24 1201             Time Calculation- OT    OT Received On 10/01/24  -                User Key  (r) = Recorded By, (t) = Taken By, (c) = Cosigned By      Initials Name Provider Type     Chery Gomez OT Occupational Therapist                  Therapy Charges for Today       Code Description Service Date Service Provider Modifiers Qty    36640402452 HC OT SELF CARE/MGMT/TRAIN EA 15 MIN 10/1/2024 Chery Gomez OT GO 1    [id number]  OT THERAPEUTIC ACT EA 15 MIN 10/1/2024 Chery Gomez OT GO 1                 Chery Gomez OT  10/1/2024

## 2024-10-01 NOTE — NURSING NOTE
Pt being discharged home today on room air. MACARENA Berrios made aware the discharge is complete. Family to transport home.

## 2024-10-01 NOTE — DISCHARGE SUMMARY
[facility] HOSPITALISTS DISCHARGE SUMMARY    Patient Identification:  Name:  Alberto Guzman  Age:  89 y.o.  Sex:  male  :  3/29/1931  MRN:  [id number]  Visit Number:  [id number]    Date of Admission: 2024  Date of Discharge:  10/1/2024     PCP: Lianet Dia APRN    DISCHARGE DIAGNOSIS  Small bowel obstruction  Acute urinary retention  History of BPH  Atrial flutter/fibrillation  Chronic anticoagulation with warfarin  Chronic anemia  Hx HFrEF   History CAD  Hypertension  Lipidemia  Peripheral vascular disease  Hx pacemaker placement  History of DVT  History of stroke  History rheumatoid arthritis    CONSULTS   General Surgery    PROCEDURES PERFORMED      HOSPITAL COURSE  Patient is a 89 y.o. male presented to [facility] complaining of 3 to 4 days of persistent sharp abdominal pain with distention and tenderness.  Please see the admitting history and physical for further details.      Patient upon initial evaluation emergency department reporting inability to keep down any food or drink or medications for the previous 2 days as well as no bowel movement during the last 2 days and passing no gas for the last 1-2.  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  CT of the abdomen pelvis with contrast obtained during this admission showed distended loops of fluid-filled small bowel with termination in the mid abdomen just right of midline concerning for partial or early complete obstruction with no obstructing mass identified.  Patient subsequently taken for repeat ex lap with reduction of internal hernia and end colostomy creation due to patient's recurrence of bowel obstruction.  Post surgery patient with slow advancement of diet and tolerating well with development of bowel movements through colostomy without issue.   Patient with only as needed oral analgesics and doing well.  General surgery after return of bowel function and tolerating regular diet cleared for discharge from their standpoint.  Patient with Michael catheter initially placed at admission removed prior to discharge with patient unable to appropriate void and empty bladder and Michael catheter replaced and referral made for outpatient follow-up with urology as patient has followed with them before as well as having urinary retention issues in the past with history of BPH.  Patient with history of atrial flutter/fibrillation chronically anticoagulated with warfarin with INR maintained while here in hospital and being bridged with INR in between perioperative time period.  Patient was evaluated for potential transition to DOAC but unfortunately due to insurance unwillingness to provide adequate coverage it was cost prohibitive and patient was kept on Coumadin at discharge.  Patient's hospitalization otherwise uncomplicated and on day of discharge was having normal bowel function and tolerating a regular diet and as such felt to have achieved maximal benefit of continued inpatient hospitalization and subsequently discharged.  He will follow-up with general surgery for wound check, primary care for posthospital follow-up and has referral to urology for recurrent acute urinary retention.      VITAL SIGNS:  Temp:  [97.4 °F (36.3 °C)-98.9 °F (37.2 °C)] 97.5 °F (36.4 °C)  Heart Rate:  [65-89] 77  Resp:  [18-20] 18  BP: ()/(51-65) 102/60  SpO2:  [95 %-98 %] 95 %  on   ;   Device (Oxygen Therapy): room air    Body mass index is 24.14 kg/m².  Wt Readings from Last 3 Encounters:   10/01/24 89.9 kg (198 lb 4.8 oz)   09/06/24 88.7 kg (195 lb 9.6 oz)   08/12/24 88 kg (194 lb)       PHYSICAL EXAM:  Constitutional:  Well-developed and well-nourished elderly adult male resting in bed in no distress.  HENT:  Head:  Normocephalic and atraumatic.  Mouth:  Moist mucous membranes.     Eyes:  Conjunctivae and EOM are normal. No scleral icterus.     Cardiovascular:  Normal rate, regular rhythm and normal heart sounds with no murmur.  Pulmonary/Chest:  No respiratory distress, no wheezes, no crackles, with normal breath sounds and good air movement.  Abdominal:  Soft.  Bowel sounds are normal.  No distension and no tenderness.  Colostomy present with scant output in bag.  Musculoskeletal:  No tenderness, and no deformity.  No red or swollen joints anywhere.  Functional ROM intact.   Neurological:  Alert and oriented to person, place, and time.  No cranial nerve deficit.  No tongue deviation.  No facial droop.  No slurred speech. Intact Sensation throughout  Skin:  Skin is warm and dry. No rash or lesion noted. No pallor.   Peripheral vascular:  Pulses in all 4 extremities with no clubbing, no cyanosis, no edema.  Psychiatric: Appropriate mood and affect, pleasant.     DISCHARGE DISPOSITION   Stable    DISCHARGE MEDICATIONS:     Discharge Medications        Changes to Medications        Instructions Start Date   bumetanide 1 MG tablet  Commonly known as: BUMEX  What changed:   when to take this  additional instructions   1 mg, Oral, Daily, Resume 2 days post discharge or sooner if lower extremity swelling returns             Continue These Medications        Instructions Start Date   aspirin 81 MG tablet   81 mg, Oral, Daily      atorvastatin 20 MG tablet  Commonly known as: LIPITOR   20 mg, Oral, Nightly      COLLAGEN-VITAMIN C PO   1 capsule, Oral, Daily      Entresto 24-26 MG tablet  Generic drug: sacubitril-valsartan   Take ½ tablet by mouth Every 12 Hours. Hold medication if top number of blood pressure is less than 100.      gabapentin 600 MG tablet  Commonly known as: NEURONTIN   600 mg, Oral, Nightly      HYDROcodone-acetaminophen  MG per tablet  Commonly known as: NORCO   1 tablet, Oral, Every 6 Hours PRN      pantoprazole 40 MG EC tablet  Commonly known as: PROTONIX   40 mg, Oral,  Daily      Verquvo 5 MG tablet  Generic drug: Vericiguat   5 mg, Oral, Daily      Vericiguat 2.5 MG tablet   5 mg, Oral, Daily      warfarin 5 MG tablet  Commonly known as: COUMADIN   5 mg, Oral, Daily      warfarin 1 MG tablet  Commonly known as: COUMADIN   1 mg, Oral, 3 Times Weekly                 Additional Instructions for the Follow-ups that You Need to Schedule       Ambulatory Referral to Home Health (Hospital)   As directed      Face to Face Visit Date: 10/1/2024   Follow-up provider for Plan of Care?: I treated the patient in an acute care facility and will not continue treatment after discharge.   Follow-up provider: LIANET DIA [[id number]]   Reason/Clinical Findings: newly placed colostomy   Describe mobility limitations that make leaving home difficult: same as above   Nursing/Therapeutic Services Requested: Skilled Nursing   Skilled nursing orders: Ostomy instruction   Frequency: 1 Week 1        Discharge Follow-up with PCP   As directed       Currently Documented PCP:    Lianet Dia APRN    PCP Phone Number:    [phone]     Follow Up Details: 1 week post op follow up for wound check        Discharge Follow-up with Specialty: General Surgery; 1 Week   As directed      Specialty: General Surgery   Follow Up: 1 Week   Follow Up Details: 1 week post hospital follow up               Follow-up Information       Lianet Dia APRN .    Specialty: Nurse Practitioner  Why: 1 week post op follow up for wound check  Contact information:  [address]  [place] [address]  [phone]                              TEST  RESULTS PENDING AT DISCHARGE       CODE STATUS  Code Status and Medical Interventions: CPR (Attempt to Resuscitate); Full Support   Ordered at: 09/24/24 2030     Level Of Support Discussed With:    Patient     Code Status (Patient has no pulse and is not breathing):    CPR (Attempt to Resuscitate)     Medical Interventions (Patient has pulse or is  breathing):    Full Support       Jairo Meier DO  [facility] Hospitalist  10/01/24  11:38 EDT    Please note that this discharge summary required more than 30 minutes to complete.

## 2024-10-01 NOTE — DISCHARGE PLACEMENT REQUEST
"Alberto Bates (89 y.o. Male)       Date of Birth   03/29/1931    Social Security Number       Address   [address] [place] [address]    Home Phone   [phone]    MRN   [id number]       Hinduism   [facility]    Marital Status                               Admission Date   9/24/24    Admission Type   Emergency    Admitting Provider   Delisa Valdez DO    Attending Provider   Jairo Meier DO    Department, Room/Bed   [address], 3306/1S       Discharge Date       Discharge Disposition   Home-Health Care [facility]    Discharge Destination                                 Attending Provider: Jairo Meier DO    Allergies: No Known Allergies    Isolation: None   Infection: None   Code Status: CPR    Ht: 193 cm (76\")   Wt: 89.9 kg (198 lb 4.8 oz)    Admission Cmt: None   Principal Problem: Small bowel obstruction [K56.609]                   Active Insurance as of 9/24/2024       Primary Coverage       Payor Plan Insurance Group Employer/Plan Group    MEDICARE MEDICARE A & B        Payor Plan Address Payor Plan Phone Number Payor Plan Fax Number Effective Dates     [id number] [phone]  6/1/1995 - None Entered    [facility] [id number]         Subscriber Name Subscriber Birth Date Member ID       ALBERTO BATES 3/29/1931 [id number]               Secondary Coverage       Payor Plan Insurance Group Employer/Plan Group    [employer] [place] [employer] [place]        Payor Plan Address Payor Plan Phone Number Payor Plan Fax Number Effective Dates    [address] [address] [phone]  11/1/1999 - None Entered    [facility] [id number]         Subscriber Name Subscriber Birth Date Member ID       ALBERTO BATES 3/29/1931 [id number]                     Emergency Contacts        (Rel.) Home Phone Work Phone Mobile Phone    EmelyKrystin (Daughter) -- -- [phone]    MeganWaqas (Son) [phone] -- [phone]    MeganBessy (Daughter) -- -- [phone]      "         Emergency Contact Information       Name Relation Home Work Mobile    Krystin Han Daughter   [phone]    Waqas Guzman Son [phone]    Bessy Guzman Daughter   [phone]          Insurance Information                  MEDICARE/MEDICARE A & B Phone: [phone]    Subscriber: Alberto Guzman Subscriber#: [id number]    Group#: -- Precert#: --        [employer] Phone: [phone]    Subscriber: Alberto Guzman Subscriber#: [id number]    Group#: -- Precert#: --             History & Physical        Uday Dalton PA-C at 24 1353       Attestation signed by Delisa Valdez DO at 24    I have evaluated the patient independently, I have reviewed H&P, all labs and image reports from today and evaluated the patient at bedside.  I have discussed with Uday Dalton PA-C and agree.                          [facility]  History & Physical    Patient Identification:  Name:  Alberto Guzman  Age:  89 y.o.  Sex:  male  :  3/29/1931  MRN:  [id number]   Visit Number:  [id number]  Admit Date: 2024   Primary Care Physician:  Lianet Dia APRN    Subjective     Chief complaint: Abdominal pain    History of presenting illness:      Alberto Guzman is a 89 y.o. male who presented for further evaluation of 3 to 4-day history of persistent sharp abdominal pain with abdominal distention and tenderness.  He reports 2 days ago developed nausea and has been unable to keep down any food, drink, or home medications.  His last BM was on .  Has not passed any gas in the past 1 to 2 days that he can recall.  On further review of systems he denied any chest pain, palpitations.  No increased lower extremity edema currently.  He is not short of breath.  Further review of systems obtained and otherwise unremarkable, see below.    Past medical history is significant for CAD, HFrEF, atrial  fibrillation/flutter chronically anticoagulated with Coumadin, HTN, HLD, PVD, PPM in place, Hx DVT, Hx CVA, RA, recent Hx bowel obstruction s/p mesh removal    Upon arrival to the ED, vital signs were temp 97.7, heart rate 65, respirations 16, /69, SpO2 98% on RA.  Significant laboratory findings included INR subtherapeutic at 1.62.  No leukocytosis or left shift.  CT abdomen and pelvis with contrast showed distended loops of fluid-filled small bowel which terminates in the mid abdomen just right of the midline.  No obstructing mass identified.  Appearance concerning for partial or early complete obstruction    Known Emergency Department medications received prior to my evaluation included Isovue, Protonix, Compazine, 2 L normal saline.   Emergency Department Room location at the time of my evaluation was 113.     ---------------------------------------------------------------------------------------------------------------------   Review of Systems   Constitutional:  Positive for appetite change. Negative for chills and fever.   HENT:  Negative for congestion and rhinorrhea.    Respiratory:  Negative for cough and shortness of breath.    Cardiovascular:  Negative for chest pain and leg swelling.   Gastrointestinal:  Positive for abdominal distention, abdominal pain, nausea and vomiting.   Genitourinary:  Negative for difficulty urinating and dysuria.   Musculoskeletal:  Negative for arthralgias and myalgias.   Skin:  Negative for rash and wound.   Neurological:  Negative for dizziness and light-headedness.        ---------------------------------------------------------------------------------------------------------------------   Past Medical History:   Diagnosis Date    Arthritis     Atrial flutter     Back pain     Benign prostatic hyperplasia     Bladder tumor     Cancer     [id number]/[id number]    Chronic systolic heart failure 09/14/2021    Colon cancer     Coronary artery disease     DVT (deep venous  thrombosis)     r leg    Elevated cholesterol     Heart attack     Hypertension     Numbness     feet/hands    Osteoporosis     PVD (peripheral vascular disease)     Rheumatoid arthritis     Stroke     Ventricular tachycardia      Past Surgical History:   Procedure Laterality Date    CATARACT EXTRACTION Bilateral     CHOLECYSTECTOMY      COLON RESECTION      COLONOSCOPY      EXPLORATORY LAPAROTOMY N/A 6/12/2024    Procedure: LAPAROTOMY EXPLORATORY;  Surgeon: Deo Lennon MD;  Location: [facility];  Service: General;  Laterality: N/A;    HEMORROIDECTOMY      HERNIA REPAIR      left inguinal/umbilical    HIP ARTHROPLASTY Right     INSERT / REPLACE / REMOVE PACEMAKER      JOINT REPLACEMENT      PACEMAKER IMPLANTATION      PROSTATE SURGERY      TRANSURETHRAL RESECTION OF BLADDER TUMOR N/A 04/18/2018    Procedure: CYSTOSCOPY TRANSURETHRAL RESECTION OF SMALL BLADDER TUMOR;  Surgeon: Alfonso Collins MD;  Location: [facility] OR;  Service: Urology    TRANSURETHRAL RESECTION OF BLADDER TUMOR N/A 08/29/2018    Procedure: CYSTOSCOPY TRANSURETHRAL RESECTION OF BLADDER TUMOR;  Surgeon: Alfonso Collins MD;  Location: [facility];  Service: Urology     Family History   Problem Relation Age of Onset    No Known Problems Mother     No Known Problems Father     Heart disease Brother      Social History     Socioeconomic History    Marital status:    Tobacco Use    Smoking status: Former     Types: Cigarettes     Passive exposure: Past    Smokeless tobacco: Never   Vaping Use    Vaping status: Never Used   Substance and Sexual Activity    Alcohol use: Yes     Comment: Occasional few times a year    Drug use: No    Sexual activity: Defer     ---------------------------------------------------------------------------------------------------------------------   Allergies:  Patient has no known  allergies.  ---------------------------------------------------------------------------------------------------------------------   Home medications:    Medications below are reported home medications pulling from within the system; at this time, these medications have not been reconciled unless otherwise specified and are in the verification process for further verifcation as current home medications.  (Not in a hospital admission)      Hospital Scheduled Meds:  sodium chloride, 2,000 mL, Intravenous, Once           Current listed hospital scheduled medications may not yet reflect those currently placed in orders that are signed and held awaiting patient's arrival to floor.   ---------------------------------------------------------------------------------------------------------------------     Objective     Vital Signs:  Temp:  [97.7 °F (36.5 °C)] 97.7 °F (36.5 °C)  Heart Rate:  [65-66] 65  Resp:  [16] 16  BP: (131-153)/(69-78) 153/72      09/24/24  1036   Weight: 89.8 kg (198 lb)     Body mass index is 24.1 kg/m².  ---------------------------------------------------------------------------------------------------------------------       Physical Exam  Vitals and nursing note reviewed.   Constitutional:       General: He is not in acute distress.     Appearance: He is ill-appearing.   HENT:      Head: Normocephalic and atraumatic.      Nose:      Comments: NG tube in place  Eyes:      Extraocular Movements: Extraocular movements intact.   Cardiovascular:      Rate and Rhythm: Normal rate and regular rhythm.   Pulmonary:      Effort: Pulmonary effort is normal.      Breath sounds: Normal breath sounds.   Abdominal:      General: There is no distension.      Palpations: Abdomen is soft.      Tenderness: There is abdominal tenderness. There is no guarding.   Musculoskeletal:      Right lower leg: No edema.      Left lower leg: No edema.   Skin:     General: Skin is warm and dry.   Neurological:      Mental Status:  "He is alert. Mental status is at baseline.   Psychiatric:         Mood and Affect: Mood normal.         Behavior: Behavior normal.             ---------------------------------------------------------------------------------------------------------------------  EKG:        I have personally looked at the EKG.  ---------------------------------------------------------------------------------------------------------------------   Results from last 7 days   Lab Units 09/24/24  1042   LACTATE mmol/L 1.2   WBC 10*3/mm3 5.43   HEMOGLOBIN g/dL 12.7*   HEMATOCRIT % 39.1   MCV fL 99.7*   MCHC g/dL 32.5   PLATELETS 10*3/mm3 163   INR  1.62*         Results from last 7 days   Lab Units 09/24/24  1042   SODIUM mmol/L 140   POTASSIUM mmol/L 4.5   CHLORIDE mmol/L 100   CO2 mmol/L 30.8*   BUN mg/dL 11   CREATININE mg/dL 1.04   CALCIUM mg/dL 9.6   GLUCOSE mg/dL 106*   ALBUMIN g/dL 4.0   BILIRUBIN mg/dL 1.1   ALK PHOS U/L 86   AST (SGOT) U/L 18   ALT (SGPT) U/L 10   Estimated Creatinine Clearance: 56.4 mL/min (by C-G formula based on SCr of 1.04 mg/dL).  No results found for: \"AMMONIA\"  Results from last 7 days   Lab Units 09/24/24  1301 09/24/24  1042   CK TOTAL U/L  --  39   HSTROP T ng/L 31* 34*         No results found for: \"HGBA1C\"  Lab Results   Component Value Date    TSH 1.250 06/10/2024     No results found for: \"PREGTESTUR\", \"PREGSERUM\", \"HCG\", \"HCGQUANT\"  Pain Management Panel           No data to display              No results found for: \"BLOODCX\"  No results found for: \"URINECX\"  No results found for: \"WOUNDCX\"  No results found for: \"STOOLCX\"      ---------------------------------------------------------------------------------------------------------------------  Imaging Results (Last 7 Days)       Procedure Component Value Units Date/Time    CT Abdomen Pelvis With Contrast [[id number]] Collected: 09/24/24 1232     Updated: 09/24/24 1236    Narrative:      EXAM: CT ABDOMEN PELVIS W CONTRAST-         TECHNIQUE: " Multiple axial CT images were obtained from lung bases  through pubic symphysis WITH administration of IV contrast. Reformatted  images in the coronal and/or sagittal plane(s) were generated from the  axial data set to facilitate diagnostic accuracy and/or surgical  planning.  Oral Contrast:NONE.     Radiation dose reduction techniques were utilized per ALARA protocol.  Automated exposure control was initiated through either or Catavolt or  DoseRight software packages by  protocol.    DOSE:     Clinical information acute abdominal pain      Comparison 6/26/2024     FINDINGS:     Lower thorax: Clear. No effusions.     Abdomen:     Liver: Homogeneous. No focal hepatic mass or ductal dilatation.     Gallbladder: Surgically absent     Pancreas: Unremarkable. No mass or ductal dilatation.     Spleen: Homogeneous. No splenomegaly.     Adrenals: No mass.     Kidneys/ureters: No mass. No obstructive uropathy.  No evidence of  urolithiasis.     GI tract: Fluid-filled loops of distended small bowel concerning for  partial or early complete obstruction. There is air in the colon.. There  is no evidence of appendicitis     MESENTERY: No free fluid, walled off fluid collections, mesenteric  stranding, or enlarged lymph nodes        Vasculature: Evidence of atherosclerotic vascular disease     Abdominal wall: No focal hernia or mass.        Bladder: No focal mass or significant wall thickening     Reproductive: Unremarkable as visualized     Bones: No acute bony abnormality.       Impression:         1. Distended loops of fluid-filled small bowel terminates in the mid  abdomen just to the right of midline best seen image 28 series 3  coronal. No obstructing mass is identified. Appearance is concerning for  partial or early complete obstruction.     2. Other findings as above                          This report was finalized on 9/24/2024 12:34 PM by Dr. Dav Mike MD.               Cultures:  No results found for:  "\"BLOODCX\", \"URINECX\", \"WOUNDCX\", \"MRSACX\", \"RESPCX\", \"STOOLCX\"    Last echocardiogram:  Results for orders placed during the hospital encounter of 06/09/24    Adult Transthoracic Echo Complete w/ Color, Spectral and Contrast if Necessary Per Protocol    Interpretation Summary    The study is technically difficult for diagnosis. The quality of the study is limited with poor acoustic windows.    Normal left ventricular cavity size noted.    The following left ventricular wall segments are dyskinetic: apical lateral, apical septal and apex.    Left ventricular ejection fraction appears to be 36 - 40%.    The aortic valve exhibits sclerosis. There is mild calcification of the aortic valve. The aortic valve was poorly visualized but appears trileaflet.    Trace aortic valve regurgitation is present. No hemodynamically significant aortic valve stenosis is present.    There is mild calcification of the mitral valve. Mild mitral valve regurgitation is present. No significant mitral valve stenosis is present.    Mild tricuspid valve regurgitation is present. Estimated right ventricular systolic pressure from tricuspid regurgitation is mildly elevated (35-45 mmHg).    There is no evidence of pericardial effusion. .          I have personally reviewed the above radiology images and read the final radiology report on 09/24/24  ---------------------------------------------------------------------------------------------------------------------  Assessment / Plan     There are no active hospital problems to display for this patient.      ASSESSMENT/PLAN:    Small bowel obstruction  Patient presented to the ED secondary to 3 to 4-day history N/V with resultant poor p.o. intake.  Complaining of abdominal pain and cramping.  No BM since Sunday but reports not unusual for him.  Notably patient was admitted to this facility 6/9 through 6/19/2024 secondary to small bowel obstruction.  During ex lap during that admission he was found to " have internal hernia through previous colon resection mesenteric defect at that time.  Laboratory workup in the ED today relatively benign.  INR is subtherapeutic suspect secondary to intolerance of oral medications.  CT of the abdomen and pelvis with contrast showed distended loops of fluid-filled small bowel which terminate in the mid abdomen just right of midline concerning for partial or early complete obstruction with no obstructing mass identified.  He received fluids, Compazine, Protonix in the ED  Will admit for further workup and management  Consult general surgery for further assistance and recommendations, appreciated.  N.p.o. for now.  Continue NG tube placed in the ED to low wall suction  Continue supportive care measures  Trend labs    Atrial flutter/fibrillation  Chronic anticoagulation with Coumadin  INR subtherapeutic as above  Plan to hold anticoagulation for now in the setting of potential need for surgical intervention  Will likely consult pharmacy for bridge to Coumadin once indicated    Chronic:  CAD  HFrEF  HTN  HLD  PVD  PPM in place  Hx DVT  Hx CVA  RA  Plan resume home medication regimen as indicated once med rec is complete  Will monitor clinical volume status closely  Monitor vital signs closely    ----------  -DVT prophylaxis: Chronically anticoagulated, hold for procedure  -Activity: As tolerated  -Expected length of stay: INPATIENT status due to the need for care which can only be reasonably provided in an hospital setting such as aggressive/expedited ancillary services and/or consultation services, the necessity for IV medications, close physician monitoring and/or the possible need for procedures.  In such, I feel patient’s risk for adverse outcomes and need for care warrant INPATIENT evaluation and predict the patient’s care encounter to likely last beyond 2 midnights.   -Disposition Pending further course    High risk secondary to SBO    There are no questions and answers to  display.       Uday Dalton PA-C   09/24/24  13:53 EDT    Electronically signed by Delisa Valdez DO at 09/24/24 2027       Lines, Drains & Airways       Active LDAs       Name Placement date Placement time Site Days    Peripheral IV 09/26/24 2100 Anterior;Right Wrist 09/26/24  2100  Wrist  4    Peripheral IV 09/28/24 0740 Anterior;Left Forearm 09/28/24  0740  Forearm  3    Colostomy LUQ 09/26/24  1422  LUQ  4                  Current Facility-Administered Medications   Medication Dose Route Frequency Provider Last Rate Last Admin    ammonium lactate (AMLACTIN) 12 % cream 1 Application  1 Application Topical BID PRN Jairo Meier DO        aspirin EC tablet 81 mg  81 mg Oral Daily Delisa Valdez DO   81 mg at 10/01/24 0934    atorvastatin (LIPITOR) tablet 20 mg  20 mg Oral Nightly Delisa Valdez DO   20 mg at 09/30/24 2041    [Held by provider] bumetanide (BUMEX) tablet 1 mg  1 mg Oral BID Delisa Valdez DO        gabapentin (NEURONTIN) capsule 600 mg  600 mg Oral Nightly Delisa Valdez DO   600 mg at 09/30/24 2041    HYDROcodone-acetaminophen (NORCO)  MG per tablet 1 tablet  1 tablet Oral Q6H PRN Delisa Valdez DO   1 tablet at 09/30/24 2237    HYDROmorphone (DILAUDID) injection 0.25 mg  0.25 mg Intravenous Q2H PRN Delisa Valdez DO   0.25 mg at 10/01/24 0121    nitroglycerin (NITROSTAT) SL tablet 0.4 mg  0.4 mg Sublingual Q5 Min PRN Deo Lennon MD        ondansetron (ZOFRAN) injection 4 mg  4 mg Intravenous Q6H PRN Deo Lennon MD   4 mg at 09/30/24 2041    pantoprazole (PROTONIX) injection 40 mg  40 mg Intravenous Q AM Deo Lennon MD   40 mg at 10/01/24 0530    Pharmacy Consult   Does not apply Continuous PRN Jairo Meier DO        Pharmacy to dose warfarin   Does not apply Continuous PRN Delisa Valdez DO        prochlorperazine (COMPAZINE) injection 2.5 mg  2.5 mg Intravenous Q6H PRN Yoly Majano PA-C        sacubitril-valsartan (ENTRESTO) 24-26 MG tablet  0.5 tablet  0.5 tablet Oral Q12H Delisa Valdez DO   0.5 tablet at 10/01/24 0934    sodium chloride 0.9 % flush 10 mL  10 mL Intravenous PRN Deo Lennon MD        sodium chloride 0.9 % flush 10 mL  10 mL Intravenous Q12H Deo Lennon MD   10 mL at 10/01/24 0941    sodium chloride 0.9 % flush 10 mL  10 mL Intravenous PRN Deo Lennon MD        sodium chloride 0.9 % infusion 40 mL  40 mL Intravenous PRN Deo Lennon MD        Vericiguat tablet 5 mg  5 mg Oral Daily Jairo Meier DO        warfarin (COUMADIN) tablet 6 mg  6 mg Oral Once Jairo Meier DO         Lab Results (most recent)       Procedure Component Value Units Date/Time    Basic Metabolic Panel [[id number]]  (Abnormal) Collected: 10/01/24 0228    Specimen: Blood Updated: 10/01/24 0314     Glucose 101 mg/dL      BUN 22 mg/dL      Creatinine 0.91 mg/dL      Sodium 137 mmol/L      Potassium 3.8 mmol/L      Chloride 102 mmol/L      CO2 28.9 mmol/L      Calcium 8.1 mg/dL      BUN/Creatinine Ratio 24.2     Anion Gap 6.1 mmol/L      eGFR 78.6 mL/min/1.73     Narrative:      GFR Normal >60  Chronic Kidney Disease <60  Kidney Failure <15    The GFR formula is only valid for adults with stable renal function between ages 18 and 70.    Protime-INR [[id number]]  (Abnormal) Collected: 10/01/24 0228    Specimen: Blood Updated: 10/01/24 0301     Protime 25.2 Seconds      INR 2.28    Narrative:      Suggested INR therapeutic range for stable oral anticoagulant therapy:    Low Intensity therapy:   1.5-2.0  Moderate Intensity therapy:   2.0-3.0  High Intensity therapy:   2.5-4.0    CBC (No Diff) [[id number]]  (Abnormal) Collected: 10/01/24 0228    Specimen: Blood Updated: 10/01/24 0254     WBC 5.38 10*3/mm3      RBC 3.24 10*6/mm3      Hemoglobin 10.4 g/dL      Hematocrit 32.8 %      .2 fL      MCH 32.1 pg      MCHC 31.7 g/dL      RDW 13.0 %      RDW-SD 48.7 fl      MPV 10.0 fL      Platelets 205 10*3/mm3     POC Glucose Once  [[id number]]  (Normal) Collected: 09/30/24 1626    Specimen: Blood Updated: 09/30/24 1633     Glucose 86 mg/dL     CBC (No Diff) [[id number]]  (Abnormal) Collected: 09/30/24 0533    Specimen: Blood Updated: 09/30/24 0545     WBC 4.51 10*3/mm3      RBC 2.86 10*6/mm3      Hemoglobin 9.5 g/dL      Hematocrit 29.5 %      .1 fL      MCH 33.2 pg      MCHC 32.2 g/dL      RDW 13.1 %      RDW-SD 49.7 fl      MPV 10.2 fL      Platelets 157 10*3/mm3     Basic Metabolic Panel [[id number]]  (Abnormal) Collected: 09/30/24 0208    Specimen: Blood Updated: 09/30/24 0309     Glucose 101 mg/dL      BUN 24 mg/dL      Creatinine 0.91 mg/dL      Sodium 139 mmol/L      Potassium 3.3 mmol/L      Chloride 104 mmol/L      CO2 28.5 mmol/L      Calcium 7.9 mg/dL      BUN/Creatinine Ratio 26.4     Anion Gap 6.5 mmol/L      eGFR 78.6 mL/min/1.73     Narrative:      GFR Normal >60  Chronic Kidney Disease <60  Kidney Failure <15    The GFR formula is only valid for adults with stable renal function between ages 18 and 70.    Protime-INR [[id number]]  (Abnormal) Collected: 09/30/24 0208    Specimen: Blood Updated: 09/30/24 0252     Protime 31.3 Seconds      INR 3.02    Narrative:      Suggested INR therapeutic range for stable oral anticoagulant therapy:    Low Intensity therapy:   1.5-2.0  Moderate Intensity therapy:   2.0-3.0  High Intensity therapy:   2.5-4.0    CBC & Differential [[id number]]  (Abnormal) Collected: 09/29/24 0037    Specimen: Blood Updated: 09/29/24 0104    Narrative:      The following orders were created for panel order CBC & Differential.  Procedure                               Abnormality         Status                     ---------                               -----------         ------                     CBC Auto Differential[[id number]]        Abnormal            Final result               Scan Slide[[id number]]                                                                    Please view results for these tests on  the individual orders.    CBC Auto Differential [[id number]]  (Abnormal) Collected: 09/29/24 0037    Specimen: Blood Updated: 09/29/24 0104     WBC 5.86 10*3/mm3      RBC 3.40 10*6/mm3      Hemoglobin 11.2 g/dL      Hematocrit 37.3 %      .7 fL      MCH 32.9 pg      MCHC 30.0 g/dL      RDW 13.3 %      RDW-SD 54.4 fl      MPV 10.1 fL      Platelets 173 10*3/mm3      Neutrophil % 72.2 %      Lymphocyte % 15.2 %      Monocyte % 9.7 %      Eosinophil % 2.2 %      Basophil % 0.2 %      Immature Grans % 0.5 %      Neutrophils, Absolute 4.23 10*3/mm3      Lymphocytes, Absolute 0.89 10*3/mm3      Monocytes, Absolute 0.57 10*3/mm3      Eosinophils, Absolute 0.13 10*3/mm3      Basophils, Absolute 0.01 10*3/mm3      Immature Grans, Absolute 0.03 10*3/mm3      nRBC 0.0 /100 WBC     Heparin Anti-Xa [[id number]]  (Normal) Collected: 09/27/24 1900    Specimen: Blood Updated: 09/27/24 1929     Heparin Anti-Xa (UFH) 0.40 IU/ml     Heparin Anti-Xa [[id number]]  (Normal) Collected: 09/27/24 1129    Specimen: Blood Updated: 09/27/24 1225     Heparin Anti-Xa (UFH) 0.35 IU/ml     Hemoglobin & Hematocrit, Blood [[id number]]  (Abnormal) Collected: 09/26/24 2033    Specimen: Blood Updated: 09/26/24 2124     Hemoglobin 12.6 g/dL      Hematocrit 39.4 %     POC Glucose Once [[id number]]  (Normal) Collected: 09/26/24 1248    Specimen: Blood Updated: 09/26/24 1254     Glucose 84 mg/dL     Comprehensive Metabolic Panel [[id number]]  (Abnormal) Collected: 09/25/24 2339    Specimen: Blood Updated: 09/26/24 0044     Glucose 100 mg/dL      BUN 15 mg/dL      Creatinine 0.97 mg/dL      Sodium 141 mmol/L      Potassium 3.7 mmol/L      Chloride 103 mmol/L      CO2 25.9 mmol/L      Calcium 9.1 mg/dL      Total Protein 6.2 g/dL      Albumin 3.7 g/dL      ALT (SGPT) 7 U/L      AST (SGOT) 15 U/L      Alkaline Phosphatase 73 U/L      Total Bilirubin 1.3 mg/dL      Globulin 2.5 gm/dL      A/G Ratio 1.5 g/dL      BUN/Creatinine Ratio 15.5     Anion Gap 12.1  mmol/L      eGFR 72.8 mL/min/1.73     Narrative:      GFR Normal >60  Chronic Kidney Disease <60  Kidney Failure <15    The GFR formula is only valid for adults with stable renal function between ages 18 and 70.    CBC & Differential [[id number]]  (Abnormal) Collected: 09/25/24 2339    Specimen: Blood Updated: 09/26/24 0011    Narrative:      The following orders were created for panel order CBC & Differential.  Procedure                               Abnormality         Status                     ---------                               -----------         ------                     CBC Auto Differential[[id number]]        Abnormal            Final result                 Please view results for these tests on the individual orders.    CBC Auto Differential [[id number]]  (Abnormal) Collected: 09/25/24 2339    Specimen: Blood Updated: 09/26/24 0011     WBC 5.51 10*3/mm3      RBC 3.68 10*6/mm3      Hemoglobin 12.0 g/dL      Hematocrit 36.8 %      .0 fL      MCH 32.6 pg      MCHC 32.6 g/dL      RDW 13.9 %      RDW-SD 51.3 fl      MPV 10.5 fL      Platelets 171 10*3/mm3      Neutrophil % 68.5 %      Lymphocyte % 19.1 %      Monocyte % 11.8 %      Eosinophil % 0.2 %      Basophil % 0.2 %      Immature Grans % 0.2 %      Neutrophils, Absolute 3.78 10*3/mm3      Lymphocytes, Absolute 1.05 10*3/mm3      Monocytes, Absolute 0.65 10*3/mm3      Eosinophils, Absolute 0.01 10*3/mm3      Basophils, Absolute 0.01 10*3/mm3      Immature Grans, Absolute 0.01 10*3/mm3      nRBC 0.0 /100 WBC     aPTT [[id number]]  (Abnormal) Collected: 09/25/24 1704    Specimen: Blood Updated: 09/25/24 1737     PTT 35.8 seconds     Narrative:      PTT Heparin Therapeutic Range:  45 - 116 seconds      Urinalysis With Culture If Indicated - Urine, Clean Catch [[id number]]  (Abnormal) Collected: 09/25/24 0259    Specimen: Urine, Clean Catch Updated: 09/25/24 0309     Color, UA Yellow     Appearance, UA Clear     pH, UA 8.0     Specific Gravity, UA 1.021      Glucose, UA Negative     Ketones, UA Trace     Bilirubin, UA Negative     Blood, UA Negative     Protein, UA Negative     Leuk Esterase, UA Negative     Nitrite, UA Negative     Urobilinogen, UA 0.2 E.U./dL    Narrative:      In absence of clinical symptoms, the presence of pyuria, bacteria, and/or nitrites on the urinalysis result does not correlate with infection.  Urine microscopic not indicated.    High Sensitivity Troponin T 2Hr [[id number]]  (Abnormal) Collected: 09/24/24 1301    Specimen: Blood Updated: 09/24/24 1325     HS Troponin T 31 ng/L      Troponin T Delta -3 ng/L     Narrative:      High Sensitive Troponin T Reference Range:  <14.0 ng/L- Negative Female for AMI  <22.0 ng/L- Negative Male for AMI  >=14 - Abnormal Female indicating possible myocardial injury.  >=22 - Abnormal Male indicating possible myocardial injury.   Clinicians would have to utilize clinical acumen, EKG, Troponin, and serial changes to determine if it is an Acute Myocardial Infarction or myocardial injury due to an underlying chronic condition.         Comprehensive Metabolic Panel [[id number]]  (Abnormal) Collected: 09/24/24 1042    Specimen: Blood Updated: 09/24/24 1116     Glucose 106 mg/dL      BUN 11 mg/dL      Creatinine 1.04 mg/dL      Sodium 140 mmol/L      Potassium 4.5 mmol/L      Chloride 100 mmol/L      CO2 30.8 mmol/L      Calcium 9.6 mg/dL      Total Protein 6.9 g/dL      Albumin 4.0 g/dL      ALT (SGPT) 10 U/L      AST (SGOT) 18 U/L      Alkaline Phosphatase 86 U/L      Total Bilirubin 1.1 mg/dL      Globulin 2.9 gm/dL      A/G Ratio 1.4 g/dL      BUN/Creatinine Ratio 10.6     Anion Gap 9.2 mmol/L      eGFR 66.9 mL/min/1.73     Narrative:      GFR Normal >60  Chronic Kidney Disease <60  Kidney Failure <15    The GFR formula is only valid for adults with stable renal function between ages 18 and 70.    Lipase [[id number]]  (Abnormal) Collected: 09/24/24 1042    Specimen: Blood Updated: 09/24/24 1116     Lipase 10 U/L      CK [[id number]]  (Normal) Collected: 09/24/24 1042    Specimen: Blood Updated: 09/24/24 1116     Creatine Kinase 39 U/L     High Sensitivity Troponin T [[id number]]  (Abnormal) Collected: 09/24/24 1042    Specimen: Blood Updated: 09/24/24 1116     HS Troponin T 34 ng/L     Narrative:      High Sensitive Troponin T Reference Range:  <14.0 ng/L- Negative Female for AMI  <22.0 ng/L- Negative Male for AMI  >=14 - Abnormal Female indicating possible myocardial injury.  >=22 - Abnormal Male indicating possible myocardial injury.   Clinicians would have to utilize clinical acumen, EKG, Troponin, and serial changes to determine if it is an Acute Myocardial Infarction or myocardial injury due to an underlying chronic condition.         Lactic Acid, Plasma [[id number]]  (Normal) Collected: 09/24/24 1042    Specimen: Blood Updated: 09/24/24 1112     Lactate 1.2 mmol/L     [place] Draw [[id number]] Collected: 09/24/24 1041    Specimen: Blood Updated: 09/24/24 1101    Narrative:      The following orders were created for panel order [place] Draw.  Procedure                               Abnormality         Status                     ---------                               -----------         ------                     Green Top (Gel)[[id number]]                                  Final result               Lavender Top[[id number]]                                     Final result               Gold Top - SST[[id number]]                                   Final result               Light Blue Top[[id number]]                                   Final result                 Please view results for these tests on the individual orders.    Lavender Top [[id number]] Collected: 09/24/24 1041    Specimen: Blood Updated: 09/24/24 1101     Extra Tube hold for add-on     Comment: Auto resulted       [place] Draw [[id number]] Collected: 09/24/24 1042    Specimen: Blood Updated: 09/24/24 1101    Narrative:      The following orders were created for  panel order [place] Draw.  Procedure                               Abnormality         Status                     ---------                               -----------         ------                     Green Top (Gel)[[id number]]                                  Final result               Lavender Top[[id number]]                                     Final result               Gold Top - SST[[id number]]                                   Final result               Light Blue Top[[id number]]                                   Final result                 Please view results for these tests on the individual orders.    Green Top (Gel) [[id number]] Collected: 09/24/24 1042    Specimen: Blood Updated: 09/24/24 1101     Extra Tube Hold for add-ons.     Comment: Auto resulted.       Lavender Top [[id number]] Collected: 09/24/24 1042    Specimen: Blood Updated: 09/24/24 1101     Extra Tube hold for add-on     Comment: Auto resulted       Green Top (Gel) [[id number]] Collected: 09/24/24 1041    Specimen: Blood Updated: 09/24/24 1101     Extra Tube Hold for add-ons.     Comment: Auto resulted.       Gold Top - SST [[id number]] Collected: 09/24/24 1041    Specimen: Blood Updated: 09/24/24 1101     Extra Tube Hold for add-ons.     Comment: Auto resulted.       Gold Top - SST [[id number]] Collected: 09/24/24 1042    Specimen: Blood Updated: 09/24/24 1101     Extra Tube Hold for add-ons.     Comment: Auto resulted.       Light Blue Top [[id number]] Collected: 09/24/24 1041    Specimen: Blood Updated: 09/24/24 1045     Extra Tube Hold for add-ons.     Comment: Auto resulted       Light Blue Top [[id number]] Collected: 09/24/24 1042    Specimen: Blood Updated: 09/24/24 1045     Extra Tube Hold for add-ons.     Comment: Auto resulted             Orders (last 24 hrs)        Start     Ordered    10/01/24 1800  warfarin (COUMADIN) tablet 6 mg  Once (Warfarin)         10/01/24 0932    10/01/24 1137  Reason For No SGLT2 Inhibitor at Discharge  Patient  / Family Refused  Once         10/01/24 1137    10/01/24 1126  Discharge patient  Once         10/01/24 1136    10/01/24 0600  Wound Care Incision  Daily       09/30/24 1123    10/01/24 0600  Basic Metabolic Panel  Morning Draw         09/30/24 2117    10/01/24 0600  CBC (No Diff)  Morning Draw         09/30/24 2117    10/01/24 0000  bumetanide (BUMEX) 1 MG tablet  Daily         10/01/24 1136    10/01/24 0000  Discharge Follow-up with PCP         10/01/24 1136    10/01/24 0000  Discharge Follow-up with Specialty: General Surgery; 1 Week         10/01/24 1136    10/01/24 0000  Ambulatory Referral to Home Health ([facility])         10/01/24 1136    09/30/24 1800  warfarin (COUMADIN) tablet 4 mg  Once (Warfarin)         09/30/24 1231    09/30/24 1634  POC Glucose Once  PROCEDURE ONCE        Comments: Complete no more than 45 minutes prior to patient eating      09/30/24 1626    09/30/24 1340  Discontinue Indwelling Urinary Catheter  Once         09/30/24 1339    09/30/24 1340  Notify Provider if Bladder Distention Continues  Continuous        Comments: Open Order Report to View Parameters Requiring Provider Notification    09/30/24 1339    09/30/24 1340  Consult Pharmacist For Review of Medications That May Cause Urinary Retention - RN To Place Order for Consult it Needed  Continuous         09/30/24 1339    09/30/24 1200  ammonium lactate (AMLACTIN) 12 % cream 1 Application  2 Times Daily PRN         09/30/24 1119    09/30/24 1122  Assess Stoma  Every Shift       09/30/24 1123    09/30/24 1015  potassium chloride (KLOR-CON M20) CR tablet 40 mEq  2 Times Daily With Meals         09/30/24 0918    09/30/24 0920  Pharmacy Consult  Continuous PRN         09/30/24 0920    09/29/24 0600  Protime-INR  Daily       09/28/24 0908    09/28/24 1612  HYDROmorphone (DILAUDID) injection 0.25 mg  Every 2 Hours PRN         09/28/24 1612    09/27/24 2316  ondansetron (ZOFRAN) injection 4 mg  Every 6 Hours PRN         09/27/24 2316     09/27/24 2308  prochlorperazine (COMPAZINE) injection 2.5 mg  Every 6 Hours PRN         09/27/24 2308 09/27/24 1942  Pharmacy to dose warfarin  Continuous PRN         09/27/24 1942 09/26/24 0600  CBC & Differential  Every Third Day      Comments: Discontinue After Heparin Stopped      09/25/24 1651    09/25/24 0900  aspirin EC tablet 81 mg  Daily         09/24/24 2037 09/25/24 0900  Vericiguat tablet 5 mg  Daily         09/24/24 2037 09/25/24 0600  pantoprazole (PROTONIX) injection 40 mg  Every Early Morning         09/24/24 2037 09/25/24 0329  Silicone Border Dressing to Bony Prominences  Every Shift,   Status:  Canceled       09/25/24 0330    09/24/24 2130  atorvastatin (LIPITOR) tablet 20 mg  Nightly         09/24/24 2037 09/24/24 2130  [Held by provider]  bumetanide (BUMEX) tablet 1 mg  2 Times Daily        (On hold since Tue 9/24/2024 at 2037 until manually unheld; held by Delisa Valdez DOHold Reason: NPO)    09/24/24 2037 09/24/24 2130  gabapentin (NEURONTIN) capsule 600 mg  Nightly         09/24/24 2037 09/24/24 2130  sacubitril-valsartan (ENTRESTO) 24-26 MG tablet 0.5 tablet  Every 12 Hours Scheduled         09/24/24 2037 09/24/24 2100  sodium chloride 0.9 % flush 10 mL  Every 12 Hours Scheduled         09/24/24 1510 09/24/24 2037  HYDROcodone-acetaminophen (NORCO)  MG per tablet 1 tablet  Every 6 Hours PRN         09/24/24 2037 09/24/24 1800  Oral Care  2 Times Daily       09/24/24 1510 09/24/24 1600  Vital Signs  Every 4 Hours       09/24/24 1510 09/24/24 1511  Intake & Output  Every Shift       09/24/24 1510 09/24/24 1510  sodium chloride 0.9 % flush 10 mL  As Needed         09/24/24 1510    09/24/24 1510  sodium chloride 0.9 % infusion 40 mL  As Needed         09/24/24 1510 09/24/24 1510  nitroglycerin (NITROSTAT) SL tablet 0.4 mg  Every 5 Minutes PRN         09/24/24 1510    09/24/24 1041  Urinary Catheter Care  Every Shift,   Status:  Canceled        09/24/24 1041    09/24/24 1039  sodium chloride 0.9 % flush 10 mL  As Needed         09/24/24 1041    Unscheduled  Up With Assistance  As Needed       09/24/24 1510    Unscheduled  Wound Care  As Needed       09/25/24 0330    Unscheduled  Empty Pouch As Needed  As Needed       09/30/24 1123    Unscheduled  Change Pouch Immediately if Leaking  As Needed       09/30/24 1123    Unscheduled  Bladder Scan if Patient Unable to Void 4-6 Hours After Catheter Removal  As Needed         09/30/24 1339    Unscheduled  If Bladder Scan Volume is Less Than 500mL & Patient is Without Symptoms of Bladder Discomfort / Distention Monitor Every 1-2 Hours for Spontaneous Void  As Needed       09/30/24 1339    Unscheduled  Straight Cath Every 4-6 Hours As Needed If Patient is Unable to Void After 4-6 Hours, Bladder Scan Volume is Greater Than 500mL & Patient Has Symptoms of Bladder Discomfort / Distention  As Needed       09/30/24 1339    Unscheduled  Schedule / Prompt Voiding For Patients With Urinary Incontinence  As Needed       09/30/24 1339    --  HYDROcodone-acetaminophen (NORCO)  MG per tablet  Every 6 Hours PRN         09/24/24 1554    --  Vericiguat 2.5 MG tablet  Daily         09/24/24 1554    --  warfarin (COUMADIN) 1 MG tablet  3 Times Weekly         09/24/24 1559    --  COLLAGEN-VITAMIN C PO  Daily         09/24/24 1559                     Operative/Procedure Notes (all)        Deo Lennon MD at 09/26/24 1443  Version 1 of 1         LAPAROTOMY EXPLORATORY  Procedure Note    Alberto Guzman  9/26/2024    Pre-op Diagnosis:   Partial small bowel obstruction [K56.600]    Post-op Diagnosis:     Post-Op Diagnosis Codes:     * Partial small bowel obstruction [K56.600]    Procedure(s):  LAPAROTOMY EXPLORATORY, REDUCTION OF INTERNAL HERNIA, END COLOSTOMY CREATION    Surgeon(s):  Deo Lennon MD    Anesthesia: General    Staff:   Circulator: Lena Herrera RN  Scrub Person: Dav Simmons  Assistant:  Socorro Smyth    Findings: Recurrent internal hernia through mesocolic defect causing intermittent obstructive picture.  Due to recurrence and inability to orient the bowel in a way that would avoid recurrent internal hernia the distal colon was transected and an end colostomy created to avoid any internal hernia spaces for recurrence          Operative Procedure: Patient was taken operating suite placed upon operating table.  Bilateral greater compression devices were applied and general endotracheal anesthesia was administered.  The abdomen was prepped and draped in usual sterile fashion.  Timeout procedure was performed.  Through the previous midline laparotomy incision an incision was made dissection was carried down through the scar tissue to the peritoneum which was grasped and retracted anteriorly and entered sharply with Metzenbaum scissors.  There were minimal omental adhesions to the anterior abdominal wall and the peritoneal incision was extended along the length of the skin incision.  In a similar fashion to the previous episode there was a internal hernia with proximal dilated bowel and distal decompressed small bowel loops.  This was through the mesocolic defect from the patient's previous extended left colectomy.  The mesenteric defect was much smaller as it had been approximated last surgery.  The hernia was reduced by running the small bowel from the terminal ileum to the ligament of Treitz.  Given the orientation of the bowel and the emergent nature of the surgery it was felt that the patient would be best served by end colostomy to eliminate the internal hernia space as this had recurred less than 3 months from the last event.  The patient therefore had his distal colon transected with the laparoscopic stapling device and the mesocolon was mobilized to allow adequate length for colostomy creation.  To allow the colon to keep the proper orientation and avoid any hernia spaces a site on the left  upper quadrant was identified just superior and lateral to the umbilicus overlying the rectus musculature.  Once again the small bowel was run from ligament of Treitz to terminal ileum with mild interloop adhesions that were taken down and was the bowel was freely run with no evidence of further obstruction or internal herniation the abdomen was irrigated and all counts were correct.  An aperture was created at the planned colostomy site dissection was carried down to the anterior sheath which was opened with a cruciate fashion.  The rectus musculature was spread along the length of its fibers and the posterior sheath and peritoneum were opened with cautery.  The colon was then pulled through the aperture with adequate length for a tension-free colostomy.  At this time with all sponge lap and needle counts correct the midline incision was closed with running looped PDS suture from the superior and inferior aspects.  Following approximation of the midline fascia the skin was irrigated and closed with staples.  The colostomy was then matured with circumferential chromic suture after removal of the distal staple line.  Following colostomy creation a 2 piece stoma appliance was placed and a Covaderm was placed over the incision.  The patient was awakened from anesthesia and taken to recovery.    Estimated Blood Loss: 10 mL    Specimens:   None           Drains: None    Grafts/Implants: None    Complications: None           Deo Lennon MD     Date: 2024  Time: 14:45 EDT    Electronically signed by Deo Lennon MD at 24 1449          Physician Progress Notes (most recent note)        Jairo Meier DO at 24 2110              [facility]     Patient Identification:  Name:  Alberto Guzman  Age:  89 y.o.  Sex:  male  :  3/29/1931  MRN:  [id number]  Visit Number:  [id number]  ROOM: [address] [place] [address]     Primary Care Provider:  Lianet Dia  APRN    Length of stay in inpatient status:  6    Subjective     Chief Compliant:    Chief Complaint   Patient presents with    Abdominal Pain       History of Presenting Illness: Patient seen evaluated in follow-up for small bowel obstruction status post exploratory laparotomy with reduction of internal hernia and creation of colostomy.  Patient tolerating clear liquid diets with ostomy output and transition to regular diet today with good tolerance.  Patient still with Michael catheter placed initially at admission in the emergency department for retention and will remove today and perform voiding trial monitor for recurrent retention before discharge home likely tomorrow.    Objective     Current Hospital Meds:  aspirin, 81 mg, Oral, Daily  atorvastatin, 20 mg, Oral, Nightly  [Held by provider] bumetanide, 1 mg, Oral, BID  gabapentin, 600 mg, Oral, Nightly  pantoprazole, 40 mg, Intravenous, Q AM  sacubitril-valsartan, 0.5 tablet, Oral, Q12H  sodium chloride, 10 mL, Intravenous, Q12H  Vericiguat, 5 mg, Oral, Daily      Pharmacy Consult,   Pharmacy to dose warfarin,       ----------------------------------------------------------------------------------------------------------------------  Vital Signs:  Temp:  [97.7 °F (36.5 °C)-98.9 °F (37.2 °C)] 98.9 °F (37.2 °C)  Heart Rate:  [65-89] 89  Resp:  [20] 20  BP: ()/(51-65) 99/65  SpO2:  [95 %-99 %] 95 %  on   ;   Device (Oxygen Therapy): room air  Body mass index is 23.24 kg/m².      Intake/Output Summary (Last 24 hours) at 9/30/2024 2110  Last data filed at 9/30/2024 1451  Gross per 24 hour   Intake 300 ml   Output 1100 ml   Net -800 ml      ----------------------------------------------------------------------------------------------------------------------  Physical exam:  Constitutional:  Well-developed and well-nourished elderly adult male resting in bed in no distress.  HENT:  Head:  Normocephalic and atraumatic.  Mouth:  Moist mucous membranes.    Eyes:   "Conjunctivae and EOM are normal. No scleral icterus.     Cardiovascular:  Normal rate, regular rhythm and normal heart sounds with no murmur.  Pulmonary/Chest:  No respiratory distress, no wheezes, no crackles, with normal breath sounds and good air movement.  Abdominal:  Soft.  Bowel sounds are normal.  No distension and no tenderness.  Colostomy present with scant output in bag.  Musculoskeletal:  No tenderness, and no deformity.  No red or swollen joints anywhere.  Functional ROM intact.   Neurological:  Alert and oriented to person, place, and time.  No cranial nerve deficit.  No tongue deviation.  No facial droop.  No slurred speech. Intact Sensation throughout  Skin:  Skin is warm and dry. No rash or lesion noted. No pallor.   Peripheral vascular:  Pulses in all 4 extremities with no clubbing, no cyanosis, no edema.  Psychiatric: Appropriate mood and affect, pleasant.   ----------------------------------------------------------------------------------------------------------------------  WBC/HGB/HCT/PLT   4.51/9.5/29.5/157 (533)  BUN/CREAT/GLUC/ALT/AST/SERGEI/LIP    24/0.91/101/--/--/--/-- (208)  LYTES - Na/K/Cl/CO2: 139/3.3*/104/28.5 (208)  COAG - PT/INR/PTT: 31.3*/3.02*/-- (208)     No results found for: \"URINECX\"  No results found for: \"BLOODCX\"    I have personally looked at the labs and they are summarized above.  ----------------------------------------------------------------------------------------------------------------------  Detailed radiology reports for the last 24 hours:  No radiology results for the last day  Assessment & Plan      Small bowel obstruction    -Previous episode 3 months prior this time with ex lap requiring reduction of internal hernia and creation of end colostomy on .    -Patient  advance to regular and tolerating well with bowel movements.    -Continue as needed pain control with oral analgesics.    -Cleared by general surgery for discharge " home    Acute urinary retention  History of BPH    -Patient with acute urinary retention at presentation emergency department with Michael catheter placed.  No attempts at voiding trial as of yet during this hospitalization and given plan for discharge home in the next 24 hours Michael catheter removed and voiding trial obtained.  If patient unable to void or having urinary retention will replace Michael catheter and have urology follow-up in the outpatient setting.    Atrial flutter/fibrillation  Chronic anticoagulation with warfarin    -Currently on Coumadin, have consulted pharmacy for cost evaluation of DOAC to facilitate easier dosing and anticoagulation.    Chronic anemia    -Hemoglobin remaining stable, continue to monitor    Chronic:  Hx HFrEF -consider resumption of diuretic at discharge pending clinical volume status.  History CAD  Hypertension  Lipidemia  Peripheral vascular disease  Hx pacemaker placement  History of DVT  History of stroke  History rheumatoid arthritis    Copied text in portions of the note has been reviewed and is accurate as of 24    VTE Prophylaxis:     Active VTE Prophylaxis:  Pharmacologic:        Start     Dose Route Frequency Stop    24  Pharmacy to dose warfarin        Question:  Target INR  Answer:  2 - 3    -- XX Continuous PRN --                  Select Mechanical VTE Prophylaxis if Desired & Appropriate       Disposition Home tomorrow    Jairo Meier DO  [facility]ist  24  21:10 EDT      Electronically signed by Jairo Meier DO at 24 2117          Consult Notes (most recent note)        Deo Lennon MD at 24 1027          [facility]   Consult Note    Patient Name: Alberto Guzman  : 3/29/1931  MRN: [id number]  Primary Care Physician:  Lianet Dia, APRN  Referring Physician: No ref. provider found  Date of admission: 2024    Consults  Subjective   Subjective     Reason for Consult/ Chief  Complaint: Abdominal pain    Abdominal Pain  Pertinent negatives include no constipation, diarrhea, dysuria, fever, headaches, nausea or vomiting.     Alberto Guzman is a 89 y.o. male who 3 to 4 months ago underwent exploratory laparotomy for similar presentation was found to have an internal hernia through the mesocolic defect of his previous extended left colectomy.  The patient was doing well but developed recurrent symptoms of intermittent abdominal pain with nausea and vomiting.  He was admitted and placed on NG tube suction with CT scan concerning for partial versus complete small bowel obstruction.  Clinically he felt to progress and Gastrografin challenge showed no evidence of transit of Gastrografin into the bowel concerning for persistent obstruction.  No peritoneal signs.  He is hemodynamically stable.  He is on a heparin drip due to his chronic anticoagulation with Eliquis and history of DVT.    Review of Systems   Constitutional:  Negative for activity change, appetite change, chills and fever.   HENT:  Negative for sore throat and trouble swallowing.    Eyes:  Negative for visual disturbance.   Respiratory:  Negative for cough and shortness of breath.    Cardiovascular:  Negative for chest pain and palpitations.   Gastrointestinal:  Positive for abdominal pain. Negative for abdominal distention, blood in stool, constipation, diarrhea, nausea and vomiting.   Endocrine: Negative for cold intolerance and heat intolerance.   Genitourinary:  Negative for dysuria.   Musculoskeletal:  Negative for joint swelling.   Skin:  Negative for color change, rash and wound.   Allergic/Immunologic: Negative for immunocompromised state.   Neurological:  Negative for dizziness, seizures, weakness and headaches.   Hematological:  Negative for adenopathy. Does not bruise/bleed easily.   Psychiatric/Behavioral:  Negative for agitation and confusion.         Personal History     Past Medical History:   Diagnosis Date     Arthritis     Atrial flutter     Back pain     Benign prostatic hyperplasia     Bladder tumor     Cancer     [id number]/melanoma    Chronic systolic heart failure 09/14/2021    Colon cancer     Coronary artery disease     DVT (deep venous thrombosis)     r leg    Elevated cholesterol     Heart attack     Hypertension     Numbness     feet/hands    Osteoporosis     PVD (peripheral vascular disease)     Rheumatoid arthritis     Stroke     Ventricular tachycardia        Past Surgical History:   Procedure Laterality Date    CATARACT EXTRACTION Bilateral     CHOLECYSTECTOMY      COLON RESECTION      COLONOSCOPY      EXPLORATORY LAPAROTOMY N/A 6/12/2024    Procedure: LAPAROTOMY EXPLORATORY;  Surgeon: Deo Lennon MD;  Location: [facility];  Service: General;  Laterality: N/A;    HEMORROIDECTOMY      HERNIA REPAIR      left inguinal/umbilical    HIP ARTHROPLASTY Right     INSERT / REPLACE / REMOVE PACEMAKER      JOINT REPLACEMENT      PACEMAKER IMPLANTATION      PROSTATE SURGERY      TRANSURETHRAL RESECTION OF BLADDER TUMOR N/A 04/18/2018    Procedure: CYSTOSCOPY TRANSURETHRAL RESECTION OF SMALL BLADDER TUMOR;  Surgeon: Alfonso Collins MD;  Location: [facility];  Service: Urology    TRANSURETHRAL RESECTION OF BLADDER TUMOR N/A 08/29/2018    Procedure: CYSTOSCOPY TRANSURETHRAL RESECTION OF BLADDER TUMOR;  Surgeon: Alfonso Collins MD;  Location: [facility];  Service: Urology       Family History: family history includes Heart disease in his brother; No Known Problems in his father and mother. Otherwise pertinent FHx was reviewed and not pertinent to current issue.    Social History:  reports that he has quit smoking. His smoking use included cigarettes. He has been exposed to tobacco smoke. He has never used smokeless tobacco. He reports current alcohol use. He reports that he does not use drugs.    Home Medications:   Collagen-Vitamin C, HYDROcodone-acetaminophen, Vericiguat, aspirin, atorvastatin,  bumetanide, gabapentin, pantoprazole, sacubitril-valsartan, and warfarin    Allergies:  No Known Allergies    Objective    Objective     Vitals:  Temp:  [97.4 °F (36.3 °C)-99.5 °F (37.5 °C)] 98 °F (36.7 °C)  Heart Rate:  [66-82] 73  Resp:  [16-20] 20  BP: (118-159)/(59-71) 121/61    Physical Exam  Constitutional:       Appearance: He is well-developed.   HENT:      Head: Normocephalic and atraumatic.   Eyes:      Conjunctiva/sclera: Conjunctivae normal.      Pupils: Pupils are equal, round, and reactive to light.   Neck:      Thyroid: No thyromegaly.      Vascular: No JVD.      Trachea: No tracheal deviation.   Cardiovascular:      Rate and Rhythm: Normal rate and regular rhythm.      Heart sounds: No murmur heard.     No friction rub. No gallop.   Pulmonary:      Effort: Pulmonary effort is normal.      Breath sounds: Normal breath sounds.   Abdominal:      General: There is no distension.      Palpations: Abdomen is soft. There is no hepatomegaly or splenomegaly.      Tenderness: There is abdominal tenderness.      Hernia: No hernia is present.      Comments: Midline laparotomy scar well-healed, abdomen mildly tender to palpation   Musculoskeletal:         General: No deformity. Normal range of motion.      Cervical back: Neck supple.   Skin:     General: Skin is warm and dry.   Neurological:      Mental Status: He is alert and oriented to person, place, and time.         Result Review    Result Review:  I have personally reviewed the results from the time of this admission to 9/26/2024 10:28 EDT and agree with these findings:  [x]  Laboratory list / accordion  []  Microbiology  [x]  Radiology  []  EKG/Telemetry   []  Cardiology/Vascular   []  Pathology  []  Old records  []  Other:        Assessment & Plan   Assessment / Plan     Brief Patient Summary:  Alberto Guzman is a 89 y.o. male who presents with likely bowel obstruction secondary to possible internal hernia versus adhesions.    Active Hospital  Problems:  Active Hospital Problems    Diagnosis     **Small bowel obstruction     Partial small bowel obstruction      Plan:   OR for exploratory laparotomy, possible small bowel resection, possible colostomy    Deo Lennon MD      Electronically signed by Deo Lennon MD at 24 1029          Physical Therapy Notes (most recent note)        Gume Oconnor, PT at 24 [id number]  Version 1 of 1         Acute Care - Physical Therapy Initial Evaluation  EFRAÍN Smith     Patient Name: Alberto Guzman  : 3/29/1931  MRN: [id number]  Today's Date: 2024      Visit Dx:     ICD-10-CM ICD-9-CM   1. Partial small bowel obstruction  K56.600 560.9   2. Atrial flutter, unspecified type  I48.92 427.32   3. Cardiomyopathy, unspecified type  I42.9 425.4     Patient Active Problem List   Diagnosis    Urinary retention    Bladder neck contracture    Bladder tumor    Aftercare following surgery of the genitourinary system    Atrial flutter , ventricular paced rhythm    Presence of cardiac pacemaker 6/3/2025    Hyperlipidemia LDL goal <70    Essential hypertension    Chronic anticoagulation with Coumadin    Ventricular tachycardia (paroxysmal)    Asymptomatic PVD (peripheral vascular disease)    Coronary artery disease, anterior wall myocardial infarction with directional atherectomy of LAD in  and bare-metal stent to the LAD in , nonobstructive disease     Ischemic cardiomyopathy, LV ejection fraction around 45 to 50%    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, 46 to 50%)    Bilateral lower extremity edema    Stroke (cerebrum)    Small bowel obstruction    Hypokalemia    Hypomagnesemia     Past Medical History:   Diagnosis Date    Arthritis     Atrial flutter     Back pain     Benign prostatic hyperplasia     Bladder tumor     Cancer     [id number]/melanoma    Chronic systolic heart failure 2021    Colon cancer     Coronary artery disease     DVT (deep venous thrombosis)      r leg    Elevated cholesterol     Heart attack     Hypertension     Numbness     feet/hands    Osteoporosis     PVD (peripheral vascular disease)     Rheumatoid arthritis     Stroke     Ventricular tachycardia      Past Surgical History:   Procedure Laterality Date    CATARACT EXTRACTION Bilateral     CHOLECYSTECTOMY      COLON RESECTION      COLONOSCOPY      EXPLORATORY LAPAROTOMY N/A 6/12/2024    Procedure: LAPAROTOMY EXPLORATORY;  Surgeon: Deo Lennon MD;  Location: [facility];  Service: General;  Laterality: N/A;    HEMORROIDECTOMY      HERNIA REPAIR      left inguinal/umbilical    HIP ARTHROPLASTY Right     INSERT / REPLACE / REMOVE PACEMAKER      JOINT REPLACEMENT      PACEMAKER IMPLANTATION      PROSTATE SURGERY      TRANSURETHRAL RESECTION OF BLADDER TUMOR N/A 04/18/2018    Procedure: CYSTOSCOPY TRANSURETHRAL RESECTION OF SMALL BLADDER TUMOR;  Surgeon: Alfonso Collins MD;  Location: [facility];  Service: Urology    TRANSURETHRAL RESECTION OF BLADDER TUMOR N/A 08/29/2018    Procedure: CYSTOSCOPY TRANSURETHRAL RESECTION OF BLADDER TUMOR;  Surgeon: Alfonso Collins MD;  Location: [facility];  Service: Urology     PT Assessment (Last 12 Hours)       PT Evaluation and Treatment       Row Name 09/25/24 1521          Physical Therapy Time and Intention    Document Type evaluation  -KM     Mode of Treatment physical therapy  -KM     Patient Effort good  -KM     Symptoms Noted During/After Treatment fatigue  -KM       Row Name 09/25/24 1521          General Information    Patient Profile Reviewed yes  -KM     Patient Observations alert;cooperative;agree to therapy  -KM     Prior Level of Function --  Pt. requires supervision from daughter at home. He uses RW in home and cane outside of home. He has had minor functional decline over last 3 months d/t procedures and illnesses.  -KM     Existing Precautions/Restrictions fall  -KM     Risks Reviewed patient and  family:;LOB;nausea/vomiting;dizziness;increased discomfort  -KM     Benefits Reviewed patient and family:;improve function;increase independence;increase strength;increase balance  -KM     Barriers to Rehab none identified  -KM       Row Name 09/25/24 1521          Living Environment    Current Living Arrangements home  -KM     People in Home child(ruma), adult  -KM     Primary Care Provided by self;child(ruma)  -KM       Row Name 09/25/24 1521          Home Use of Assistive/Adaptive Equipment    Equipment Currently Used at Home walker, rolling;cane, straight  -KM       Row Name 09/25/24 1521          Cognition    Affect/Mental Status (Cognition) [facility]  -     Orientation Status (Cognition) oriented x 3  -KM     Follows Commands (Cognition) [facility] Name 09/25/24 1521          Range of Motion (ROM)    Range of Motion bilateral lower extremities;ROM is WFL  -KM       Row Name 09/25/24 1521          Strength (Manual Muscle Testing)    Strength (Manual Muscle Testing) bilateral lower extremities;strength is WFL  -KM       Row Name 09/25/24 1521          Bed Mobility    Bed Mobility bed mobility (all) activities  -KM     All Activities, [place] (Bed Mobility) minimum assist (75% patient effort)  -KM     Assistive Device (Bed Mobility) bed rails;head of bed elevated;draw sheet  -[facility] Name 09/25/24 1521          Transfers    Transfers sit-stand transfer;stand-sit transfer  -     Comment, (Transfers) HHA  -KM       Row Name 09/25/24 1521          Sit-Stand Transfer    Sit-Stand [place] (Transfers) minimum assist (75% patient effort)  -     Assistive Device (Sit-Stand Transfers) --  [facility] 09/25/24 1521          Stand-Sit Transfer    Stand-Sit [place] (Transfers) minimum assist (75% patient effort)  -     Assistive Device (Stand-Sit Transfers) --  [facility]  -[facility] Name 09/25/24 1521          Gait/Stairs (Locomotion)    Gait/Stairs Locomotion gait/ambulation  independence;distance ambulated  -KM     [place] Level (Gait) minimum assist (75% patient effort)  -KM     Assistive Device (Gait) --  HHA  -KM     Patient was able to Ambulate yes  -KM     Distance in Feet (Gait) 50  -KM     Pattern (Gait) step-through  -KM     Deviations/Abnormal Patterns (Gait) base of support, narrow;gait speed decreased  -KM     Bilateral Gait Deviations forward flexed posture  -KM       Row Name 09/25/24 1521          Safety Issues, Functional Mobility    Impairments Affecting Function (Mobility) balance;endurance/activity tolerance;strength;pain  -KM       Row Name 09/25/24 1521          Balance    Balance Assessment sitting static balance;standing dynamic balance  -KM     Static Sitting Balance modified independence  -KM     Dynamic Standing Balance minimal assist  -KM     Position/Device Used, Standing Balance --  HHA  -KM       Row Name             Wound 09/24/24 2115 Right posterior heel Abrasion    Wound - Properties Group Placement Date: 09/24/24  -RW Placement Time: 2115 -RW Present on Original Admission: Y  -RW Side: Right  -RW Orientation: posterior  -RW Location: heel  -RW Primary Wound Type: Abrasion  -RW    Retired Wound - Properties Group Placement Date: 09/24/24  -RW Placement Time: 2115 -RW Present on Original Admission: Y  -RW Side: Right  -RW Orientation: posterior  -RW Location: heel  -RW Primary Wound Type: Abrasion  -RW    Retired Wound - Properties Group Date first assessed: 09/24/24  -RW Time first assessed: 2115 -RW Present on Original Admission: Y  -RW Side: Right  -RW Location: heel  -RW Primary Wound Type: Abrasion  -RW      Row Name             Wound 09/24/24 2115 Left posterior heel Abrasion    Wound - Properties Group Placement Date: 09/24/24  -RW Placement Time: 2115 -RW Present on Original Admission: Y  -RW Side: Left  -RW Orientation: posterior  -RW Location: heel  -RW Primary Wound Type: Abrasion  -RW    Retired Wound - Properties Group Placement  Date: 09/24/24  -RW Placement Time: 2115 -RW Present on Original Admission: Y  -RW Side: Left  -RW Orientation: posterior  -RW Location: heel  -RW Primary Wound Type: Abrasion  -RW    Retired Wound - Properties Group Date first assessed: 09/24/24  -RW Time first assessed: 2115 -RW Present on Original Admission: Y  -RW Side: Left  -RW Location: heel  -RW Primary Wound Type: Abrasion  -RW      Row Name             Wound 09/24/24 2115 Bilateral other (see comments) gluteal Pressure Injury    Wound - Properties Group Placement Date: 09/24/24  -RW Placement Time: 2115 -RW Present on Original Admission: Y  -RW Side: Bilateral  -RW Orientation: other (see comments)  -RW, multiple wounds noted to bilateral upper and lower glutes  Location: gluteal  -RW Primary Wound Type: Pressure inj  -RW    Retired Wound - Properties Group Placement Date: 09/24/24  -RW Placement Time: 2115 -RW Present on Original Admission: Y  -RW Side: Bilateral  -RW Orientation: other (see comments)  -RW, multiple wounds noted to bilateral upper and lower glutes  Location: gluteal  -RW Primary Wound Type: Pressure inj  -RW    Retired Wound - Properties Group Date first assessed: 09/24/24  -RW Time first assessed: 2115 -RW Present on Original Admission: Y  -RW Side: Bilateral  -RW Location: gluteal  -RW Primary Wound Type: Pressure inj  -RW      Row Name 09/25/24 1521          Plan of Care Review    Plan of Care Reviewed With patient  -KM     Outcome Evaluation Pt. evaluation completed during PT session. He was able to perform functional mobility skills w/ Ann-Marie. He ambulated short distance w/ HHA and Ann-Marie. He tolerated session well w/ complaints of fatigue. Pt. would benefit from skilled PT services.  -KM       Row Name 09/25/24 1521          Therapy Assessment/Plan (PT)    Patient/Family Therapy Goals Statement (PT) return home and take care of self  -KM     Functional Level at Time of Evaluation (PT) Ann-Marie  -KM     PT Diagnosis (PT) decreased  mobility  -KM     Rehab Potential (PT) good, to achieve stated therapy goals  -     Criteria for Skilled Interventions Met (PT) yes;skilled treatment is necessary  -     Therapy Frequency (PT) 2 times/wk  2-5x/wk  -KM     Predicted Duration of Therapy Intervention (PT) until discharge  -     Problem List (PT) problems related to;balance;mobility  -KM     Activity Limitations Related to Problem List (PT) unable to ambulate safely;unable to transfer safely  -       Row Name 09/25/24 1521          Therapy Plan Review/Discharge Plan (PT)    Therapy Plan Review (PT) evaluation/treatment results reviewed;care plan/treatment goals reviewed;risks/benefits reviewed;patient;daughter  -       Row Name 09/25/24 1521          Physical Therapy Goals    Bed Mobility Goal Selection (PT) bed mobility, PT goal 1  -KM     Transfer Goal Selection (PT) transfer, PT goal 1  -KM     Gait Training Goal Selection (PT) gait training, PT goal 1  -KM       Row Name 09/25/24 1521          Bed Mobility Goal 1 (PT)    Activity/Assistive Device (Bed Mobility Goal 1, PT) bed mobility activities, all  -KM     [place] Level/Cues Needed (Bed Mobility Goal 1, PT) modified independence  -KM     Time Frame (Bed Mobility Goal 1, PT) by discharge  -       Row Name 09/25/24 1521          Transfer Goal 1 (PT)    Activity/Assistive Device (Transfer Goal 1, PT) transfers, all;walker, rolling  -KM     [place] Level/Cues Needed (Transfer Goal 1, PT) modified independence  -KM     Time Frame (Transfer Goal 1, PT) by discharge  [facility]       Row Name 09/25/24 1521          Gait Training Goal 1 (PT)    Activity/Assistive Device (Gait Training Goal 1, PT) gait (walking locomotion);assistive device use;walker, rolling  -KM     [place] Level (Gait Training Goal 1, PT) modified independence  -KM     Distance (Gait Training Goal 1, PT) 150'  -KM     Time Frame (Gait Training Goal 1, PT) by [facility]               User Key  (r) = Recorded By,  (t) = Taken By, (c) = Cosigned By      Initials Name Provider Type    RW Toña Britton, RN Registered Nurse    Gume Guerrero, PT Physical Therapist                    Physical Therapy Education       Title: PT OT SLP Therapies (Done)       Topic: Physical Therapy (Done)       Point: Mobility training (Done)       Learning Progress Summary             Patient Acceptance, E,TB, VU by  at 9/25/2024 1537                         Point: Home exercise program (Done)       Learning Progress Summary             Patient Acceptance, E,TB, VU by  at 9/25/2024 1537                         Point: Body mechanics (Done)       Learning Progress Summary             Patient Acceptance, E,TB, VU by  at 9/25/2024 1537                         Point: Precautions (Done)       Learning Progress Summary             Patient Acceptance, E,TB, VU by  at 9/25/2024 1537                                         User Key       Initials Effective Dates Name Provider Type Discipline    DONALD 05/24/22 -  Gume Oconnor, LUIS ALFREDO Physical Therapist PT                  PT Recommendation and Plan  Anticipated Discharge Disposition (PT): home with assist  Planned Therapy Interventions (PT): balance training, bed mobility training, gait training, home exercise program, patient/family education, postural re-education, ROM (range of motion), strengthening, stretching, transfer training  Therapy Frequency (PT): 2 times/wk (2-5x/wk)  Plan of Care Reviewed With: patient  Outcome Evaluation: Pt. evaluation completed during PT session. He was able to perform functional mobility skills w/ Ann-Marie. He ambulated short distance w/ HHA and Ann-Marie. He tolerated session well w/ complaints of fatigue. Pt. would benefit from skilled PT services.       Time Calculation:    PT Charges       Row Name 09/25/24 1521             Time Calculation    PT Received On 09/25/24  -KM      PT Goal Re-Cert Due Date 10/09/24  -KM                User Key  (r) = Recorded By, (t) = Taken By, (c) =  Cosigned By      Initials Name Provider Type     Gume Oconnor, PT Physical Therapist                  Therapy Charges for Today       Code Description Service Date Service Provider Modifiers Qty    [id number] HC PT EVAL MOD COMPLEXITY 4 2024 Gume Oconnor, PT GP 1            PT G-Codes  AM-PAC 6 Clicks Score (PT): 12    Gume Oconnor PT  2024      Electronically signed by Gume Oconnor, PT at 24 1537          Occupational Therapy Notes (most recent note)        Chery Gomez, OT at 24 1431          Patient Name: Alberto Guzman  : 3/29/1931    MRN: [id number]                              Today's Date: 2024       Admit Date: 2024    Visit Dx:     ICD-10-CM ICD-9-CM   1. Partial small bowel obstruction  K56.600 560.9   2. Atrial flutter, unspecified type  I48.92 427.32   3. Cardiomyopathy, unspecified type  I42.9 425.4     Patient Active Problem List   Diagnosis    Urinary retention    Bladder neck contracture    Bladder tumor    Aftercare following surgery of the genitourinary system    Atrial flutter , ventricular paced rhythm    Presence of cardiac pacemaker 6/3/2025    Hyperlipidemia LDL goal <70    Essential hypertension    Chronic anticoagulation with Coumadin    Ventricular tachycardia (paroxysmal)    Asymptomatic PVD (peripheral vascular disease)    Coronary artery disease, anterior wall myocardial infarction with directional atherectomy of LAD in  and bare-metal stent to the LAD in , nonobstructive disease     Ischemic cardiomyopathy, LV ejection fraction around 45 to 50%    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, 46 to 50%)    Bilateral lower extremity edema    Stroke (cerebrum)    Small bowel obstruction    Hypokalemia    Hypomagnesemia     Past Medical History:   Diagnosis Date    Arthritis     Atrial flutter     Back pain     Benign prostatic hyperplasia     Bladder tumor     Cancer     [id number]/melanoma    Chronic systolic heart  failure 09/14/2021    Colon cancer     Coronary artery disease     DVT (deep venous thrombosis)     r leg    Elevated cholesterol     Heart attack     Hypertension     Numbness     feet/hands    Osteoporosis     PVD (peripheral vascular disease)     Rheumatoid arthritis     Stroke     Ventricular tachycardia      Past Surgical History:   Procedure Laterality Date    CATARACT EXTRACTION Bilateral     CHOLECYSTECTOMY      COLON RESECTION      COLONOSCOPY      EXPLORATORY LAPAROTOMY N/A 6/12/2024    Procedure: LAPAROTOMY EXPLORATORY;  Surgeon: Deo Lennon MD;  Location: [facility];  Service: General;  Laterality: N/A;    HEMORROIDECTOMY      HERNIA REPAIR      left inguinal/umbilical    HIP ARTHROPLASTY Right     INSERT / REPLACE / REMOVE PACEMAKER      JOINT REPLACEMENT      PACEMAKER IMPLANTATION      PROSTATE SURGERY      TRANSURETHRAL RESECTION OF BLADDER TUMOR N/A 04/18/2018    Procedure: CYSTOSCOPY TRANSURETHRAL RESECTION OF SMALL BLADDER TUMOR;  Surgeon: Alfonso Collins MD;  Location: [facility];  Service: Urology    TRANSURETHRAL RESECTION OF BLADDER TUMOR N/A 08/29/2018    Procedure: CYSTOSCOPY TRANSURETHRAL RESECTION OF BLADDER TUMOR;  Surgeon: Alfonso Collins MD;  Location: [facility];  Service: Urology      General Information       Row Name 09/25/24 1417          OT Time and Intention    Document Type evaluation  -     Mode of Treatment individual therapy;occupational therapy  -       Row Name 09/25/24 1417          General Information    Patient Profile Reviewed yes  -     Prior Level of Function --  supervision from daughter but able to complete without physically assist most days per her report; rolling walker in home, cane outside of home; shower chair, elevated commode; functional decline with recent illnesses over past 3 months  -     Barriers to Rehab none identified  -       Row Name 09/25/24 1417          Occupational Profile    Reason for Services/Referral  (Occupational Profile) Patient [facility] to [facility]. He was referred for OT evaluation due to change in functional performance with ADLs, functional mobility, and/or transfers.  -       Row Name 09/25/24 1417          Living Environment    People in Home child(ruma), adult  -[facility] Name 09/25/24 1417          Cognition    Orientation Status (Cognition) oriented x 3  -[facility] Name 09/25/24 1417          Safety Issues, Functional Mobility    Impairments Affecting Function (Mobility) balance;endurance/activity tolerance;strength;pain  -               User Key  (r) = Recorded By, (t) = Taken By, (c) = Cosigned By      Initials Name Provider Type     Chrey Gomez, TROY Occupational Therapist                     Mobility/ADL's       [facility] Name 09/25/24 1422          Bed Mobility    Bed Mobility bed mobility (all) activities  -     All Activities, [place] (Bed Mobility) minimum assist (75% patient effort)  -     Assistive Device (Bed Mobility) bed rails;head of bed elevated;draw sheet  -[facility] Name 09/25/24 1422          Transfers    Transfers sit-stand transfer;stand-sit transfer  -     Comment, (Transfers) handheld assist  -[facility] Name 09/25/24 1422          Sit-Stand Transfer    Sit-Stand [place] (Transfers) minimum assist (75% patient effort)  -[facility] Name 09/25/24 1422          Stand-Sit Transfer    Stand-Sit [place] (Transfers) minimum assist (75% patient effort)  -[facility] Name 09/25/24 1422          Activities of Daily Living    BADL Assessment/Intervention bathing;upper body dressing;lower body dressing;grooming;toileting  -[facility] Name 09/25/24 1422          Bathing Assessment/Intervention    [place] Level (Bathing) bathing skills;minimum assist (75% patient effort)  -[facility] Name 09/25/24 1422          Upper Body Dressing Assessment/Training    [place] Level (Upper Body Dressing) upper body dressing  skills;minimum assist (75% patient effort)  -[facility] Name 09/25/24 1422          Lower Body Dressing Assessment/Training    [place] Level (Lower Body Dressing) lower body dressing skills;minimum assist (75% patient effort)  -[facility] Name 09/25/24 1422          Grooming Assessment/Training    [place] Level (Grooming) grooming skills;minimum assist (75% patient effort)  -[facility] Name 09/25/24 1422          Toileting Assessment/Training    [place] Level (Toileting) toileting skills;minimum assist (75% patient effort)  -               User Key  (r) = Recorded By, (t) = Taken By, (c) = Cosigned By      Initials Name Provider Type     Chery Gomez OT Occupational Therapist                   Obj/Interventions       [facility] Name 09/25/24 1423          Sensory Assessment (Somatosensory)    Sensory Assessment (Somatosensory) UE sensation intact  -KP       Row Name 09/25/24 1423          Vision Assessment/Intervention    Visual Impairment/Limitations WFL  -KP       Row Name 09/25/24 1423          Range of Motion Comprehensive    General Range of Motion bilateral upper extremity ROM WFL  -KP       Row Name 09/25/24 1423          Strength Comprehensive (MMT)    Comment, General Manual Muscle Testing (MMT) Assessment 4+/5 MMT in BUEs  -[facility] Name 09/25/24 1423          Motor Skills    Motor Skills functional endurance;coordination  -     Coordination WNL;bilateral;upper extremity  -     Functional Endurance fair  -[facility] Name 09/25/24 1423          Balance    Balance Assessment sitting static balance;standing static balance  -     Static Sitting Balance modified independence  -     Static Standing Balance minimal assist  -               User Key  (r) = Recorded By, (t) = Taken By, (c) = Cosigned By      Initials Name Provider Type     Chery Gomez OT Occupational Therapist                   Goals/Plan       [facility] Name 09/25/24 1426          Transfer Goal 1 (OT)     Activity/Assistive Device (Transfer Goal 1, OT) toilet  -     [place] Level/Cues Needed (Transfer Goal 1, OT) standby assist  -     Time Frame (Transfer Goal 1, OT) by discharge  -       Row Name 09/25/24 1424          Dressing Goal 1 (OT)    Activity/Device (Dressing Goal 1, OT) dressing skills, all  -KP     [place]/Cues Needed (Dressing Goal 1, OT) standby assist  -     Time Frame (Dressing Goal 1, OT) by discharge  -       Row Name 09/25/24 142          Problem Specific Goal 1 (OT)    Problem Specific Goal 1 (OT) Patient will perform sustained activity X12 minutes to promote functional endurance/activity tolerance needed for daily routine.  -     Time Frame (Problem Specific Goal 1, OT) by discharge  -       Row Name 09/25/24 1428          Therapy Assessment/Plan (OT)    Planned Therapy Interventions (OT) activity tolerance training;BADL retraining;functional balance retraining;transfer/mobility retraining;strengthening exercise;occupation/activity based interventions;ROM/therapeutic exercise;patient/caregiver education/training;neuromuscular control/coordination retraining  -               User Key  (r) = Recorded By, (t) = Taken By, (c) = Cosigned By      Initials Name Provider Type     Chery Gomez, OT Occupational Therapist                   Clinical Impression       Row Name 09/25/24 1424          Pain Assessment    Pretreatment Pain Rating 1/10  -     Posttreatment Pain Rating 1/10  -     Pain Location - abdomen  -       Row Name 09/25/24 142          Plan of Care Review    Plan of Care Reviewed With patient  -     Progress no change  -     Outcome Evaluation Patient seen for OT evaluation. He presents with functional limitations including generalized weakness, impaired activity tolerance/functional endurance, and impaired balance. He would benefit from ongoing OT services to promote highest level of independence and safety prior to discharge.  -       Row Name  09/25/24 1425          Therapy Assessment/Plan (OT)    Patient/Family Therapy Goal Statement (OT) go home  -     Rehab Potential (OT) good, to achieve stated therapy goals  -     Criteria for Skilled Therapeutic Interventions Met (OT) yes;meets criteria;skilled treatment is necessary  -     Therapy Frequency (OT) 3 times/wk  3-5x/week  -     Predicted Duration of Therapy Intervention (OT) discharge  -       Row Name 09/25/24 1425          Therapy Plan Review/Discharge Plan (OT)    Anticipated Discharge Disposition (OT) home with assist  -       Row Name 09/25/24 1425          Positioning and Restraints    Pre-Treatment Position in bed  -     Post Treatment Position bed  -     In Bed fowlers;call light within reach;encouraged to call for assist;with family/caregiver  -               User Key  (r) = Recorded By, (t) = Taken By, (c) = Cosigned By      Initials Name Provider Type    KP Chery Gomez, OT Occupational Therapist                   Outcome Measures       [facility] Name 09/25/24 1115          How much help from another person do you currently need...    Turning from your back to your side while in flat bed without using bedrails? 2  -WT     Moving from lying on back to sitting on the side of a flat bed without bedrails? 2  -WT     Moving to and from a bed to a chair (including a wheelchair)? 2  -WT     Standing up from a chair using your arms (e.g., wheelchair, bedside chair)? 2  -WT     Climbing 3-5 steps with a railing? 2  -WT     To walk in hospital room? 2  -WT     AM-PAC 6 Clicks Score (PT) 12  -WT     Highest Level of Mobility Goal 4 --> Transfer to chair/commode  -WT               User Key  (r) = Recorded By, (t) = Taken By, (c) = Cosigned By      Initials Name Provider Type    WT Nereida Guerra, RN Registered Nurse                      OT Recommendation and Plan  Planned Therapy Interventions (OT): activity tolerance training, BADL retraining, functional balance retraining,  transfer/mobility retraining, strengthening exercise, occupation/activity based interventions, ROM/therapeutic exercise, patient/caregiver education/training, neuromuscular control/coordination retraining  Therapy Frequency (OT): 3 times/wk (3-5x/week)  Plan of Care Review  Plan of Care Reviewed With: patient  Progress: no change  Outcome Evaluation: Patient seen for OT evaluation. He presents with functional limitations including generalized weakness, impaired activity tolerance/functional endurance, and impaired balance. He would benefit from ongoing OT services to promote highest level of independence and safety prior to discharge.     Time Calculation:         Time Calculation- OT       Row Name 09/25/24 1431             Time Calculation- OT    OT Received On 09/25/24  -                User Key  (r) = Recorded By, (t) = Taken By, (c) = Cosigned By      Initials Name Provider Type    Chery Devine OT Occupational Therapist                  Therapy Charges for Today       Code Description Service Date Service Provider Modifiers Qty    [id number]  OT EVAL MOD COMPLEXITY 4 9/25/2024 Chery Gomez OT GO 1                 Chery Gomez OT  9/25/2024    Electronically signed by Chery Gomez OT at 09/25/24 1431       ADL Documentation (most recent)      Flowsheet Row Most Recent Value   Transferring 3 - assistive equipment and person   Toileting 3 - assistive equipment and person   Bathing 2 - assistive person   Dressing 2 - assistive person   Eating 2 - assistive person   Communication 0 - understands/communicates without difficulty   Swallowing 0 - swallows foods/liquids without difficulty   Equipment Currently Used at Home walker, rolling, cane, straight          [address] [address]  Phone:  [phone]  Fax:  [phone] Date: Oct 1, 2024      Ambulatory Referral to Home Health ([facility])     Patient:  Alberto Guzman MRN:  [id number]   [address] [place] [address]  [place] [address] :  3/29/1931  SSN:    Phone: [phone] Sex:  M      INSURANCE PAYOR PLAN GROUP # SUBSCRIBER ID   Primary:  Secondary:    [employer]IE [id number]  [id number]      [id number]  [id number]      Referring Provider Information:  JAIRO BEEBE Phone: [phone] Fax: [phone]       Referral Information:   # Visits:  999 Referral Type: Home Health [42]   Urgency:  Routine Referral Reason: Specialty Services Required   Start Date: Oct 1, 2024 End Date:  To be determined by Insurer   Diagnosis: Small bowel obstruction (K56.609 [ICD-10-CM] 560.9 [ICD-9-CM])  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 I treated the patient in an acute care facility and will not continue treatment after discharge.  Follow-up provider: OFELIA BECK [[id number]]  Reason/Clinical Findings: newly placed colostomy  Describe mobility limitations that make leaving home difficult: same as above  Nursing/Therapeutic Services Requested: Skilled Nursing  Skilled nursing orders: Ostomy instruction  Frequency: 1 Week 1     This document serves as a request of services and does not constitute Insurance authorization or approval of services.  To determine eligibility, please contact the members Insurance carrier to verify and review coverage.     If you have medical questions regarding this request for services. Please contact [address] at [phone] during normal business hours.        Authorizing Provider:Jairo Beebe DO  Authorizing Provider's NPI: [id number]  Order Entered By: Jairo Beebe DO 10/1/2024 11:36 AM     Electronically signed by: Jairo Beebe DO 10/1/2024 11:36 AM       Discharge Summary    No notes of this type exist for this encounter.       Discharge Order (From  admission, onward)       Start     Ordered    10/01/24 1126  Discharge patient  Once        Expected Discharge Date: 10/01/24   Discharge Disposition: Home-Health Care [facility]   Physician of Record for Attribution - Please select from Treatment Team: JOSE J ESCALERA [[id number]]   Review needed by CMO to determine Physician of Record: No      Question Answer Comment   Physician of Record for Attribution - Please select from Treatment Team JOSE J ESCALERA    Review needed by CMO to determine Physician of Record No        10/01/24 1136

## 2024-10-01 NOTE — CASE MANAGEMENT/SOCIAL WORK
Discharge Planning Assessment   [facility]     Patient Name: Alberto Guzman  MRN: [id number]  Today's Date: 10/1/2024    Admit Date: 9/24/2024    Plan: SS received consult per Physician for home health.  SS spoke with pt and daughter at bedside.  Pt's daughter stated preference for [facility].  SS made referral to [facility] via [facility].       Discharge Plan       Row Name 10/01/24 1242       Plan    Plan SS received consult per Physician for home health.  SS spoke with pt and daughter at bedside.  Pt's daughter stated preference for [facility].  SS made referral to [facility] via [facility].    Final Discharge Disposition Code 06 - home with home health care    Final Note Pt to be discharged home with [facility].                  Continued Care and Services - Admitted Since 9/24/2024       Home Medical Care       Service Provider Request Status Selected Services Address Phone Fax Patient Preferred    [facility] Pending - No Request Sent N/A [address] [facility], [address] [address] [phone] --                  Selected Continued Care - Episodes Includes continued care and service providers with selected services from the active episodes listed below      Heart Failure Episode start date: 8/9/2024   There are no active outsourced providers for this episode.                 Expected Discharge Date and Time       Expected Discharge Date Expected Discharge Time    Oct 1, 2024           RADHA Lopez

## 2024-10-01 NOTE — PLAN OF CARE
Pt has been resting in bed this shift. No s/s of acute distress noted. Plan of care is ongoing at this time.

## 2024-10-02 NOTE — OUTREACH NOTE
Prep Survey      Flowsheet Row Responses   [facility]? [facility]   Is LACE score < 7 ? No   Eligibility Readm Mgmt   Discharge diagnosis Exploratory lap, internal hernia, end colostomy creation   Does the patient have one of the following disease processes/diagnoses(primary or secondary)? General Surgery   Does the patient have Home health ordered? Yes   What is the Home health agency?  [facility]   Is there a DME ordered? No   Prep survey completed? Yes            PRASANNA SHERWOOD - Registered Nurse

## 2024-10-03 ENCOUNTER — SPECIALTY PHARMACY (OUTPATIENT)
Dept: PHARMACY | Facility: HOSPITAL | Age: 89
End: 2024-10-03
Payer: MEDICARE

## 2024-10-07 ENCOUNTER — READMISSION MANAGEMENT (OUTPATIENT)
Dept: CALL CENTER | Facility: HOSPITAL | Age: 89
End: 2024-10-07
Payer: MEDICARE

## 2024-10-07 NOTE — OUTREACH NOTE
General Surgery Week 1 Survey      Flowsheet Row Responses   [facility] patient discharged from? [facility]   Does the patient have one of the following disease processes/diagnoses(primary or secondary)? General Surgery   Week 1 attempt successful? Yes   Call start time 0758   Call end time 0820   Is patient permission given to speak with other caregiver? Yes   Person spoke with today (if not patient) and relationship Krystin-daughter.   Meds reviewed with patient/caregiver? Yes   Is the patient having any side effects they believe may be caused by any medication additions or changes? No   Does the patient have all medications related to this admission filled (includes all antibiotics, pain medications, etc.) N/A   Is the patient taking all medications as directed (includes completed medication regime)? Yes   Does the patient have a follow up appointment scheduled with their surgeon? Yes   Has the patient kept scheduled appointments due by today? N/A   Comments Surgeon appt 10/09/24. PCP appt 10/08/24. Urology appt 10/15/24.   Has home health visited the patient within 72 hours of discharge? Yes   Home health comments  nurse has visited, and  ostomy nurse will visit today.   DME comments Has alfredo catheter.   Psychosocial issues? No   Psychosocial comments Daughter is staying with patient-has stayed with him for past 5 months. Son lives in [place].   Did the patient receive a copy of their discharge instructions? Yes   Nursing interventions Reviewed instructions with patient   What is the patient's perception of their health status since discharge? Same   Nursing interventions Nurse provided patient education   Is the patient /caregiver able to teach back basic post-op care? Continue use of incentive spirometry at least 1 week post discharge, Practice 'cough and deep breath', Drive as instructed by MD in discharge instructions, Take showers only when approved by MD-sponge bathe until then, No tub bath, swimming,  or hot tub until instructed by MD, Keep incision areas clean,dry and protected, Do not remove steri-strips, Lifting as instructed by MD in discharge instructions   Is the patient/caregiver able to teach back signs and symptoms of incisional infection? Increased redness, swelling or pain at the incisonal site, Increased drainage or bleeding, Incisional warmth, Pus or odor from incision, Fever   Is the patient/caregiver able to teach back steps to recovery at home? Set small, achievable goals for return to baseline health, Rest and rebuild strength, gradually increase activity, Practice good oral hygiene, Eat a well-balance diet   If the patient is a current smoker, are they able to teach back resources for cessation? Not a smoker   Is the patient/caregiver able to teach back the hierarchy of who to call/visit for symptoms/problems? PCP, Specialist, Home health nurse, Urgent Care, ED, 911 Yes   Additional teach back comments Daughter states aware of s/s of UTI.   Week 1 call completed? Yes   Is the patient interested in additional calls from an ambulatory ? No   Would this patient benefit from a Referral to [facility] Social Work? No   Wrap up additional comments Daughter states patient is overall stable. Denies any s/s of infection at incision/ostomy. [facility] is passing gas and mostly liquid stools through ostomy. [facility] ostomy nurse from  will visit today. [facility] believes patient may be starting with UTI, and will discuss with  nurse today and [facility]. Denies any needs at this time.   Call end time 0820            Savita MARTINEZ - Registered Nurse

## 2024-10-15 ENCOUNTER — OFFICE VISIT (OUTPATIENT)
Dept: UROLOGY | Facility: CLINIC | Age: 89
End: 2024-10-15
Payer: MEDICARE

## 2024-10-15 VITALS
BODY MASS INDEX: 23.87 KG/M2 | DIASTOLIC BLOOD PRESSURE: 51 MMHG | HEART RATE: 71 BPM | WEIGHT: 196 LBS | SYSTOLIC BLOOD PRESSURE: 108 MMHG | HEIGHT: 76 IN

## 2024-10-15 DIAGNOSIS — R33.9 URINARY RETENTION: Primary | ICD-10-CM

## 2024-10-15 RX ORDER — TAMSULOSIN HYDROCHLORIDE 0.4 MG/1
1 CAPSULE ORAL NIGHTLY
Qty: 30 CAPSULE | Refills: 5 | Status: SHIPPED | OUTPATIENT
Start: 2024-10-15

## 2024-10-15 NOTE — PROGRESS NOTES
Chief Complaint:      Chief Complaint   Patient presents with    Urinary Retention       HPI:   89 y.o. male that is post another bowel resection with a colostomy placed the catheter I know his urinary tract is wide open I took it out I gave him a voiding trial see him back in 3 weeks.    Past Medical History:     Past Medical History:   Diagnosis Date    Arthritis     Atrial flutter     Back pain     Benign prostatic hyperplasia     Bladder tumor     Cancer     [id number]/melanoma    Chronic systolic heart failure 09/14/2021    Colon cancer     Coronary artery disease     DVT (deep venous thrombosis)     r leg    Elevated cholesterol     Heart attack     1993    Hypertension     Numbness     feet/hands    Osteoporosis     PVD (peripheral vascular disease)     Rheumatoid arthritis     Stroke     Ventricular tachycardia        Current Meds:     Current Outpatient Medications   Medication Sig Dispense Refill    aspirin 81 MG tablet Take 1 tablet by mouth Daily. 30 tablet 11    atorvastatin (LIPITOR) 20 MG tablet Take 1 tablet by mouth Every Night.      bumetanide (BUMEX) 1 MG tablet Take 1 tablet by mouth Daily for 30 days. Resume 2 days post discharge or sooner if lower extremity swelling returns 30 tablet 0    COLLAGEN-VITAMIN C PO Take 1 capsule by mouth Daily.      gabapentin (NEURONTIN) 600 MG tablet Take 1 tablet by mouth Every Night.      HYDROcodone-acetaminophen (NORCO)  MG per tablet Take 1 tablet by mouth Every 6 (Six) Hours As Needed for Moderate Pain.      pantoprazole (PROTONIX) 40 MG EC tablet Take 1 tablet by mouth Daily.      sacubitril-valsartan (ENTRESTO) 24-26 MG tablet Take ½ tablet by mouth Every 12 Hours. Hold medication if top number of blood pressure is less than 100. 60 tablet 1    Vericiguat (Verquvo) 5 MG tablet Take 1 tablet by mouth Daily for 60 days. 30 tablet 1    Vericiguat 2.5 MG tablet Take 2 tablets by mouth Daily.      warfarin (COUMADIN) 1 MG tablet Take 1 tablet by mouth 3  (Three) Times a Week.      warfarin (COUMADIN) 5 MG tablet Take 1 tablet by mouth Daily.       No current facility-administered medications for this visit.        Allergies:      No Known Allergies     Past Surgical History:     Past Surgical History:   Procedure Laterality Date    CATARACT EXTRACTION Bilateral     CHOLECYSTECTOMY      COLON RESECTION      COLONOSCOPY      CORONARY ANGIOPLASTY WITH STENT PLACEMENT  1995    X1    EXPLORATORY LAPAROTOMY N/A 06/12/2024    Procedure: LAPAROTOMY EXPLORATORY;  Surgeon: Deo Lennon MD;  Location: [facility];  Service: General;  Laterality: N/A;    EXPLORATORY LAPAROTOMY N/A 9/26/2024    Procedure: LAPAROTOMY EXPLORATORY, REDUCTION OF INTERNAL HERNIA, END COLOSTOMY CREATION;  Surgeon: Deo Lennon MD;  Location: [facility];  Service: General;  Laterality: N/A;    HEMORROIDECTOMY      HERNIA REPAIR      left inguinal/umbilical    HIP ARTHROPLASTY Right     INSERT / REPLACE / REMOVE PACEMAKER      JOINT REPLACEMENT Right     HIP    PACEMAKER IMPLANTATION      PROSTATE SURGERY      TRANSURETHRAL RESECTION OF BLADDER TUMOR N/A 04/18/2018    Procedure: CYSTOSCOPY TRANSURETHRAL RESECTION OF SMALL BLADDER TUMOR;  Surgeon: Alfonso Collins MD;  Location: [facility];  Service: Urology    TRANSURETHRAL RESECTION OF BLADDER TUMOR N/A 08/29/2018    Procedure: CYSTOSCOPY TRANSURETHRAL RESECTION OF BLADDER TUMOR;  Surgeon: Alfonso Collins MD;  Location: [facility];  Service: Urology       Social History:     Social History     Socioeconomic History    Marital status:    Tobacco Use    Smoking status: Former     Types: Cigarettes     Passive exposure: Past    Smokeless tobacco: Never   Vaping Use    Vaping status: Never Used   Substance and Sexual Activity    Alcohol use: Yes     Comment: Occasional few times a year    Drug use: No    Sexual activity: Defer       Family History:     Family History   Problem Relation Age of Onset    No Known Problems  Mother     No Known Problems Father     Heart disease Brother        Review of Systems:     Review of Systems   Constitutional: Negative.    HENT: Negative.     Eyes: Negative.    Respiratory: Negative.     Cardiovascular: Negative.    Gastrointestinal: Negative.    Endocrine: Negative.    Musculoskeletal: Negative.    Allergic/Immunologic: Negative.    Neurological: Negative.    Hematological: Negative.    Psychiatric/Behavioral: Negative.         Physical Exam:     Physical Exam  Vitals and nursing note reviewed.   Constitutional:       Appearance: He is well-developed.   HENT:      Head: Normocephalic and atraumatic.   Eyes:      Conjunctiva/sclera: Conjunctivae normal.      Pupils: Pupils are equal, round, and reactive to light.   Cardiovascular:      Rate and Rhythm: Normal rate and regular rhythm.      Heart sounds: Normal heart sounds.   Pulmonary:      Effort: Pulmonary effort is normal.      Breath sounds: Normal breath sounds.   Abdominal:      General: Bowel sounds are normal.      Palpations: Abdomen is soft.   Musculoskeletal:         General: Normal range of motion.      Cervical back: Normal range of motion.   Skin:     General: Skin is warm and dry.   Neurological:      Mental Status: He is alert and oriented to person, place, and time.      Deep Tendon Reflexes: Reflexes are normal and symmetric.   Psychiatric:         Behavior: Behavior normal.         Thought Content: Thought content normal.         Judgment: Judgment normal.         I have reviewed the following portions of the patient's history: Allergies, current medications, past family history, past medical history, past social history, past surgical history, problem list, and ROS and confirm it is accurate.    Recent Image (CT and/or KUB):      CT Abdomen and Pelvis: No results found for this or any previous visit.       CT Stone Protocol: Results for orders placed during the hospital encounter of 04/09/18    CT Abdomen Pelvis Stone  Protocol    Narrative  CT ABDOMEN AND PELVIS STONE PROTOCOL-    REASON FOR EXAM: Abdominal pain, unspecified; N40.1-Benign prostatic  hyperplasia with lower urinary tract symptoms; N13.8-Other obstructive  and reflux uropathy.    FINDINGS: Spiral scans were obtained through the kidneys, ureterS and  bladder. The kidneys showed no evidence of hydronephrosis. There were no  stones in the intrarenal collecting systems or in the intrarenal  parenchyma. Ureters were not dilated as they coursed through the abdomen  and pelvis. No stones were noted along the course of the ureters.  Urinary bladder was fairly well-distended with urine and was normal in  contour. No focal areas of bladder wall thickening were demonstrated.    Impression  No evidence of stone or obstruction was seen involving the  collecting system of either kidney. The CT does demonstrate marked  scoliosis and degenerative changes in the lumbar spine.      The radiation dose reduction device was utilized for each scan per the  ALARA (as low as reasonably achievable) protocol.    This report was finalized on 4/9/2018 2:16 PM by Dr. Jairo Maya II, MD.       KUB: Results for orders placed during the hospital encounter of 09/24/24    XR Abdomen KUB    Narrative  PROCEDURE: X-ray examination of the abdomen performed on September 25, 2024. 2 films.    HISTORY: Evaluate for bowel obstruction.    COMPARISON: None.    FINDINGS:    Enteric drain in place with tip to the stomach.  Pacing leads to the right atrium and right ventricle.  Scoliosis affecting the thoracic and lumbar spine with levoconvex  curvature at the mid to upper lumbar spine.  Right hip arthroplasty.  Mild osteoarthritis at the left hip joint.  Degenerative disc disease throughout the lumbar spine and lower thoracic  spine.  Air-filled slightly distended small bowel loops in the right and left  abdomen suggestive of mild ileus.  No conclusive features of bowel obstruction.  No pneumatosis  No  free air.    Impression  1.  Air-filled slightly distended small bowel loops in the right and  left abdomen suggestive of mild ileus.  2.  No conclusive features of bowel obstruction.  3.  Enteric drain in place with tip in the stomach.  4.  Contrast noted in the gastric lumen at the fundus.  5.  No oral contrast identified in the small bowel segments  6.  No oral contrast identified in the large bowel segments.  7.  Right hip arthroplasty.  8.  Scoliosis.  9.  No free air.    This report was finalized on 9/25/2024 7:37 PM by Cuba Gomez MD.       Labs (past 3 months):      No results displayed because visit has over 200 results.      Hospital Outpatient Visit on 09/06/2024   Component Date Value Ref Range Status    Glucose 09/06/2024 90  65 - 99 mg/dL Final    BUN 09/06/2024 11  8 - 23 mg/dL Final    Creatinine 09/06/2024 0.99  0.76 - 1.27 mg/dL Final    Sodium 09/06/2024 139  136 - 145 mmol/L Final    Potassium 09/06/2024 4.0  3.5 - 5.2 mmol/L Final    Chloride 09/06/2024 102  98 - 107 mmol/L Final    CO2 09/06/2024 25.6  22.0 - 29.0 mmol/L Final    Calcium 09/06/2024 8.8  8.2 - 9.6 mg/dL Final    BUN/Creatinine Ratio 09/06/2024 11.1  7.0 - 25.0 Final    Anion Gap 09/06/2024 11.4  5.0 - 15.0 mmol/L Final    eGFR 09/06/2024 71.0  >60.0 mL/min/1.73 Final    Magnesium 09/06/2024 1.5 (L)  1.7 - 2.3 mg/dL Final    proBNP 09/06/2024 879.2  0.0 - 1,800.0 pg/mL Final    Absolute Lung Fluid Content 09/06/2024 27  20 - 35 % Final   Hospital Outpatient Visit on 08/09/2024   Component Date Value Ref Range Status    Glucose 08/09/2024 87  65 - 99 mg/dL Final    BUN 08/09/2024 10  8 - 23 mg/dL Final    Creatinine 08/09/2024 1.06  0.76 - 1.27 mg/dL Final    Sodium 08/09/2024 143  136 - 145 mmol/L Final    Potassium 08/09/2024 3.7  3.5 - 5.2 mmol/L Final    Chloride 08/09/2024 104  98 - 107 mmol/L Final    CO2 08/09/2024 27.3  22.0 - 29.0 mmol/L Final    Calcium 08/09/2024 8.8  8.2 - 9.6 mg/dL Final    BUN/Creatinine Ratio  08/09/2024 9.4  7.0 - 25.0 Final    Anion Gap 08/09/2024 11.7  5.0 - 15.0 mmol/L Final    eGFR 08/09/2024 65.4  >60.0 mL/min/1.73 Final    Magnesium 08/09/2024 1.7  1.7 - 2.3 mg/dL Final    proBNP 08/09/2024 1,279.0  0.0 - 1,800.0 pg/mL Final    Absolute Lung Fluid Content 08/09/2024 22  20 - 35 % Final        Procedure:       Assessment/Plan:   .  Urinary retention after colostomy I removed his catheter he is on alpha blockade I will see him back in 3 weeks            This document has been electronically signed by TINY PENG MD October 15, 2024 13:18 EDT    Dictated Utilizing Dragon Dictation: Part of this note may be an electronic transcription/translation of spoken language to printed text using the Dragon Dictation System.

## 2024-10-16 ENCOUNTER — OFFICE VISIT (OUTPATIENT)
Dept: SURGERY | Facility: CLINIC | Age: 89
End: 2024-10-16
Payer: MEDICARE

## 2024-10-16 ENCOUNTER — READMISSION MANAGEMENT (OUTPATIENT)
Dept: CALL CENTER | Facility: HOSPITAL | Age: 89
End: 2024-10-16
Payer: MEDICARE

## 2024-10-16 VITALS — HEIGHT: 75 IN | WEIGHT: 196 LBS | BODY MASS INDEX: 24.37 KG/M2

## 2024-10-16 DIAGNOSIS — K56.609 SMALL BOWEL OBSTRUCTION: Primary | ICD-10-CM

## 2024-10-16 PROCEDURE — 99024 POSTOP FOLLOW-UP VISIT: CPT | Performed by: SURGERY

## 2024-10-16 PROCEDURE — 1159F MED LIST DOCD IN RCRD: CPT | Performed by: SURGERY

## 2024-10-16 PROCEDURE — 1160F RVW MEDS BY RX/DR IN RCRD: CPT | Performed by: SURGERY

## 2024-10-16 NOTE — PROGRESS NOTES
Subjective   Alberto Guzman is a 89 y.o. male  is here today for follow-up.         Alberto Guzman is a 89 y.o. male here following exploratory laparotomy with takedown of colorectal anastomosis and end colostomy due to recurrent internal hernia through mesocolic defect.  Patient is doing well and tolerating diet.  Midline incision healing well  History of Present Illness         Physical Exam  Stoma viable and functioning.  Midline incision healing well  Physical Exam              Assessment     Diagnoses and all orders for this visit:    1. Small bowel obstruction (Primary)      Alberto Guzman is a 89 y.o. male doing well after end colostomy creation to eliminate mesocolic defect causing recurrent internal herniation and small bowel obstruction.  Patient staples removed in the office today.  Continue current care and follow-up in 2 weeks.  Assessment & Plan          This document has been electronically signed by Deo Lennon MD   October 16, 2024 12:52 EDT

## 2024-10-16 NOTE — OUTREACH NOTE
General Surgery Week 2 Survey      Flowsheet Row Responses   [facility] patient discharged from? [facility]   Does the patient have one of the following disease processes/diagnoses(primary or secondary)? General Surgery   Week 2 attempt successful? Yes   Call start time 0914   Call end time 0916   Discharge diagnosis Exploratory lap, internal hernia, end colostomy creation   Meds reviewed with patient/caregiver? Yes   Is the patient taking all medications as directed (includes completed medication regime)? Yes   Does the patient have a follow up appointment scheduled with their surgeon? Yes   Has the patient kept scheduled appointments due by today? Yes   What is the Home health agency?  [facility]   Has home health visited the patient within 72 hours of discharge? Yes   Psychosocial issues? No   Did the patient receive a copy of their discharge instructions? Yes   Nursing interventions Reviewed instructions with patient   What is the patient's perception of their health status since discharge? Improving   Nursing interventions Nurse provided patient education   Is the patient /caregiver able to teach back basic post-op care? Lifting as instructed by MD in discharge instructions   Is the patient/caregiver able to teach back signs and symptoms of incisional infection? Increased redness, swelling or pain at the incisonal site, Increased drainage or bleeding, Incisional warmth, Pus or odor from incision, Fever   Is the patient/caregiver able to teach back steps to recovery at home? Set small, achievable goals for return to baseline health, Eat a well-balance diet   Is the patient/caregiver able to teach back the hierarchy of who to call/visit for symptoms/problems? PCP, Specialist, Home health nurse, Urgent Care, ED, 911 Yes   Week 2 call completed? Yes   Graduated Yes   Graduated/Revoked comments Pt reports he is doing well at this time. States he has appt to get sutures removed today. Has all supplies and denies  issues   Call end time [id number]            MABLE H - Registered Nurse

## 2024-10-30 ENCOUNTER — OFFICE VISIT (OUTPATIENT)
Dept: SURGERY | Facility: CLINIC | Age: 89
End: 2024-10-30
Payer: MEDICARE

## 2024-10-30 VITALS — HEIGHT: 75 IN | WEIGHT: 195 LBS | BODY MASS INDEX: 24.25 KG/M2

## 2024-10-30 DIAGNOSIS — K56.609 SMALL BOWEL OBSTRUCTION: Primary | ICD-10-CM

## 2024-10-30 PROCEDURE — 1160F RVW MEDS BY RX/DR IN RCRD: CPT | Performed by: SURGERY

## 2024-10-30 PROCEDURE — 1159F MED LIST DOCD IN RCRD: CPT | Performed by: SURGERY

## 2024-10-30 PROCEDURE — 99024 POSTOP FOLLOW-UP VISIT: CPT | Performed by: SURGERY

## 2024-10-30 NOTE — PROGRESS NOTES
Subjective   Alberto Guzman is a 89 y.o. male  is here today for follow-up.         Alberto Guzman is a 89 y.o. male here following exploratory laparotomy with takedown of colorectal anastomosis and end colostomy due to recurrent internal hernia through mesocolic defect.  Patient is doing well and tolerating diet.  Midline incision healing well  History of Present Illness         Physical Exam  Stoma viable and functioning.  Midline incision healing well  Physical Exam              Assessment     Diagnoses and all orders for this visit:    1. Small bowel obstruction (Primary)      Alberto Guzman is a 89 y.o. male doing well after end colostomy creation to eliminate mesocolic defect causing recurrent internal herniation and small bowel obstruction.  Patient staples removed in the office today.  Continue current care and follow-up PRN.  Assessment & Plan          This document has been electronically signed by Deo Lennon MD   October 30, 2024 14:21 EDT

## 2024-10-31 DIAGNOSIS — I50.22 CHRONIC HFREF (HEART FAILURE WITH REDUCED EJECTION FRACTION): ICD-10-CM

## 2024-10-31 DIAGNOSIS — I25.5 ISCHEMIC CARDIOMYOPATHY: ICD-10-CM

## 2024-10-31 RX ORDER — VERICIGUAT 5 MG/1
5 TABLET, FILM COATED ORAL DAILY
Qty: 30 TABLET | Refills: 1 | Status: CANCELLED | OUTPATIENT
Start: 2024-10-31 | End: 2024-12-30

## 2024-11-01 ENCOUNTER — HOSPITAL ENCOUNTER (OUTPATIENT)
Dept: CARDIOLOGY | Facility: HOSPITAL | Age: 89
Discharge: HOME | End: 2024-11-01
Payer: MEDICARE

## 2024-11-01 VITALS
SYSTOLIC BLOOD PRESSURE: 98 MMHG | HEIGHT: 75 IN | WEIGHT: 197.6 LBS | OXYGEN SATURATION: 96 % | BODY MASS INDEX: 24.57 KG/M2 | DIASTOLIC BLOOD PRESSURE: 49 MMHG | HEART RATE: 55 BPM

## 2024-11-01 DIAGNOSIS — I50.22 CHRONIC HFREF (HEART FAILURE WITH REDUCED EJECTION FRACTION): Primary | ICD-10-CM

## 2024-11-01 DIAGNOSIS — I25.5 ISCHEMIC CARDIOMYOPATHY: ICD-10-CM

## 2024-11-01 LAB
ABSOLUTE LUNG FLUID CONTENT: 25 % (ref 20–35)
ANION GAP SERPL CALCULATED.3IONS-SCNC: 7.5 MMOL/L (ref 5–15)
BUN SERPL-MCNC: 10 MG/DL (ref 8–23)
BUN/CREAT SERPL: 9.8 (ref 7–25)
CALCIUM SPEC-SCNC: 8.7 MG/DL (ref 8.2–9.6)
CHLORIDE SERPL-SCNC: 100 MMOL/L (ref 98–107)
CO2 SERPL-SCNC: 28.5 MMOL/L (ref 22–29)
CREAT SERPL-MCNC: 1.02 MG/DL (ref 0.76–1.27)
EGFRCR SERPLBLD CKD-EPI 2021: 68.5 ML/MIN/1.73
GLUCOSE SERPL-MCNC: 90 MG/DL (ref 65–99)
MAGNESIUM SERPL-MCNC: 1.7 MG/DL (ref 1.7–2.3)
NT-PROBNP SERPL-MCNC: 1204 PG/ML (ref 0–1800)
POTASSIUM SERPL-SCNC: 3.9 MMOL/L (ref 3.5–5.2)
SODIUM SERPL-SCNC: 136 MMOL/L (ref 136–145)

## 2024-11-01 PROCEDURE — 36415 COLL VENOUS BLD VENIPUNCTURE: CPT | Performed by: PHYSICIAN ASSISTANT

## 2024-11-01 PROCEDURE — 94726 PLETHYSMOGRAPHY LUNG VOLUMES: CPT | Performed by: PHYSICIAN ASSISTANT

## 2024-11-01 PROCEDURE — G2211 COMPLEX E/M VISIT ADD ON: HCPCS | Performed by: PHYSICIAN ASSISTANT

## 2024-11-01 PROCEDURE — 83880 ASSAY OF NATRIURETIC PEPTIDE: CPT | Performed by: PHYSICIAN ASSISTANT

## 2024-11-01 PROCEDURE — 80048 BASIC METABOLIC PNL TOTAL CA: CPT | Performed by: PHYSICIAN ASSISTANT

## 2024-11-01 PROCEDURE — 83735 ASSAY OF MAGNESIUM: CPT | Performed by: PHYSICIAN ASSISTANT

## 2024-11-01 PROCEDURE — 99215 OFFICE O/P EST HI 40 MIN: CPT | Performed by: PHYSICIAN ASSISTANT

## 2024-11-01 RX ORDER — VERICIGUAT 5 MG/1
5 TABLET, FILM COATED ORAL DAILY
Qty: 30 TABLET | Refills: 5 | Status: SHIPPED | OUTPATIENT
Start: 2024-11-01 | End: 2024-12-31

## 2024-11-01 RX ORDER — CIPROFLOXACIN HYDROCHLORIDE 250 MG/1
250 TABLET, FILM COATED ORAL 2 TIMES DAILY
COMMUNITY
Start: 2024-10-23 | End: 2024-11-02

## 2024-11-01 NOTE — PROGRESS NOTES
[facility]    Date: 11/01/24     Vitals:    11/01/24 1035   BP: 98/49   Pulse: 55   SpO2: 96%      Weight 197.6  Method of arrival: Ambulatory with walker    Weighing self daily: Yes    Monitoring Heart Failure Zones: Yes    Today's HF Zone: Green    Taking medications as prescribed: Yes    Edema No    Shortness of Air: Yes with activity    Number of pillows used at night:adjustable bed    Educational Materials given:  shruthi Broderick Value:25   25-35 Optimal Value Status      Ishmael Camilo RN 11/01/24 10:36 EDT

## 2024-11-01 NOTE — PROGRESS NOTES
[facility]  RYAN Viera Tammie L, APRN  [address] [place] [address]    Thank you for asking me to see Alberto Guzman for congestive heart failure.    HPI:     This is a 89 y.o. male with known past medical history of:    Chronic HFrEF  Ischemic cardiomyopathy  TTE from June 2024 with EF 36-40%; mild TVR.  Mild mitral valve calcification.    TTE from 2015 with EF 45-50% per [facility] records  ASCVD  Anterior MI with directional atherectomy of LAD in 1983 & Baremetal stent to LAD in 1995  Chronic anticoagulation with Coumadin  Paroxysmal Atrial fib/flutter  PPM placement  Colon CA s/p radiation  Hx of SBO requiring hospitalization in June 2024    Alberto Guzman presents for today for Heart Failure clinic evlauation.  The patient is typically seen by Lianet Dia APRN.  Patient's primary cardiologist is Dr. Selam Alcaraz.     Last known EF 36-40%.   Last known hospitalization and/or ED visit: Patient was hospitalized in June 2024 secondary to small bowel obstruction secondary to internal hernia he later had ED visit after being sent from doctor's office for hypotension.  Patient hospitalized since last evaluation from September 24 through October 1, 2024 with small bowel obstruction and acute urinary retention.  Patient was taken for ex lap with reduction of internal hernia and end colostomy creation due to patient's recurrence of bowel obstruction.  Patient did have some urinary retention requiring Michael catheter placement during hospitalization with inability to void and empty bladder in spite of bladder training with Michael catheter replaced and referral made for outpatient urology follow-up.  There was an attempt made for chronic anticoagulation with DOAC however given difficulties with insurance he was discharged with Coumadin.  Accompanied by: Daughter          11/01/24 visit data/details regarding:   Dyspnea: Improving  dyspnea on exertion  Lower extremity swelling: Lower extremity edema improved but present.    Abdominal swelling: Denies  Home weight: Weight monitoring booklet provided during initial visit; has scale  Home BP: BP monitoring booklet provided during initial visit; has blood pressure cuff  Home heart rate: HR monitoring booklet provided during initial visit;  Daily activities of living: Performing on his own with some assistance from daughter  Pillows/lying flat: Pillow  HF zone: Green  Patient is chest pain free and doing well.  He reports he is not having significant swelling.   His daughter presents with him during today's evaluation.          Review of Systems - Review of Systems   Constitutional: Negative for decreased appetite, diaphoresis and fever.   HENT:  Negative for congestion and ear pain.    Eyes:  Negative for blurred vision.   Cardiovascular:  Positive for dyspnea on exertion. Negative for chest pain.   Respiratory:  Positive for shortness of breath. Negative for cough and hemoptysis.    Endocrine: Negative for cold intolerance and polydipsia.   Hematologic/Lymphatic: Negative for adenopathy and bleeding problem.   Skin:  Negative for dry skin and nail changes.   Musculoskeletal:  Negative for arthritis and falls.   Gastrointestinal:  Negative for bloating and anorexia.   Genitourinary:  Negative for bladder incontinence and dysuria.   Neurological:  Negative for aphonia and difficulty with concentration.   Psychiatric/Behavioral:  Negative for altered mental status and hallucinations.    Allergic/Immunologic: Negative for environmental allergies and HIV exposure.         All other systems were reviewed and were negative.    Patient Active Problem List   Diagnosis    Urinary retention    Bladder neck contracture    Bladder tumor    Aftercare following surgery of the genitourinary system    Atrial flutter , ventricular paced rhythm    Presence of cardiac pacemaker 6/3/2025    Hyperlipidemia LDL goal  <70    Essential hypertension    Chronic anticoagulation with Coumadin    Ventricular tachycardia (paroxysmal)    Asymptomatic PVD (peripheral vascular disease)    Coronary artery disease, anterior wall myocardial infarction with directional atherectomy of LAD in 1983 and bare-metal stent to the LAD in 1995, nonobstructive disease 2015    Ischemic cardiomyopathy, LV ejection fraction around 45 to 50%    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, 46 to 50%)    Bilateral lower extremity edema    Stroke (cerebrum)    Small bowel obstruction    Hypokalemia    Hypomagnesemia    Partial small bowel obstruction       family history includes Heart disease in his brother; No Known Problems in his father and mother.     reports that he has quit smoking. His smoking use included cigarettes. He has been exposed to tobacco smoke. He has never used smokeless tobacco. He reports current alcohol use. He reports that he does not use drugs.    No Known Allergies      Current Outpatient Medications:     aspirin 81 MG tablet, Take 1 tablet by mouth Daily., Disp: 30 tablet, Rfl: 11    atorvastatin (LIPITOR) 20 MG tablet, Take 1 tablet by mouth Every Night., Disp: , Rfl:     bumetanide (BUMEX) 1 MG tablet, Take 1 tablet by mouth Daily for 30 days. Resume 2 days post discharge or sooner if lower extremity swelling returns (Patient taking differently: Take 1 tablet by mouth Daily. 2mg on Tuesdays and Thursdays), Disp: 30 tablet, Rfl: 0    Cipro 250 MG tablet, Take 1 tablet by mouth 2 (Two) Times a Day., Disp: , Rfl:     COLLAGEN-VITAMIN C PO, Take 1 capsule by mouth Daily., Disp: , Rfl:     gabapentin (NEURONTIN) 600 MG tablet, Take 1 tablet by mouth Every Night., Disp: , Rfl:     HYDROcodone-acetaminophen (NORCO)  MG per tablet, Take 1 tablet by mouth Every 6 (Six) Hours As Needed for Moderate Pain., Disp: , Rfl:     pantoprazole (PROTONIX) 40 MG EC tablet, Take 1 tablet by mouth Daily., Disp: , Rfl:      sacubitril-valsartan (ENTRESTO) 24-26 MG tablet, Take 1/2 tablet by mouth Every 12 (Twelve) Hours. Hold medication if top number of blood pressure is less than 100., Disp: 60 tablet, Rfl: 5    Vericiguat (Verquvo) 5 MG tablet, Take 1 tablet by mouth Daily for 60 days., Disp: 30 tablet, Rfl: 5    warfarin (COUMADIN) 1 MG tablet, Take 1 tablet by mouth 3 (Three) Times a Week., Disp: , Rfl:     warfarin (COUMADIN) 5 MG tablet, Take 1 tablet by mouth Daily., Disp: , Rfl:       Physical Exam:  I have reviewed the patient's current vital signs as documented in the patient's EMR.   Vitals:    11/01/24 1035   BP: 98/49   Pulse: 55   SpO2: 96%       Body mass index is 24.7 kg/m².       11/01/24  1035   Weight: 89.6 kg (197 lb 9.6 oz)        Physical Exam  Vitals and nursing note reviewed.   Constitutional:       General: He is awake.      Appearance: Normal appearance. He is well-developed and well-groomed.      Comments: Chronically ill-appearing   HENT:      Head: Normocephalic and atraumatic.   Eyes:      General: Lids are normal.      Conjunctiva/sclera:      Right eye: Right conjunctiva is not injected.      Left eye: Left conjunctiva is not injected.   Cardiovascular:      Rate and Rhythm: Normal rate and regular rhythm.      Comments: Faint bilateral edema of the lower extremities  Pulmonary:      Breath sounds: No decreased breath sounds, wheezing, rhonchi or rales.   Abdominal:      General: Bowel sounds are normal.      Palpations: Abdomen is soft.   Musculoskeletal:      Right lower leg: No edema.      Left lower leg: No edema.   Neurological:      Mental Status: He is alert.   Psychiatric:         Attention and Perception: Attention normal.         Mood and Affect: Mood normal.         Behavior: Behavior is cooperative.          JVP: Volume/Pulsation: Normal.        DATA REVIEWED:       ---------------------------------------------------  TTE/DEYANIRA:  Results for orders placed during the hospital encounter of  06/09/24    Adult Transthoracic Echo Complete w/ Color, Spectral and Contrast if Necessary Per Protocol    Interpretation Summary    The study is technically difficult for diagnosis. The quality of the study is limited with poor acoustic windows.    Normal left ventricular cavity size noted.    The following left ventricular wall segments are dyskinetic: apical lateral, apical septal and apex.    Left ventricular ejection fraction appears to be 36 - 40%.    The aortic valve exhibits sclerosis. There is mild calcification of the aortic valve. The aortic valve was poorly visualized but appears trileaflet.    Trace aortic valve regurgitation is present. No hemodynamically significant aortic valve stenosis is present.    There is mild calcification of the mitral valve. Mild mitral valve regurgitation is present. No significant mitral valve stenosis is present.    Mild tricuspid valve regurgitation is present. Estimated right ventricular systolic pressure from tricuspid regurgitation is mildly elevated (35-45 mmHg).    There is no evidence of pericardial effusion. .        LAST HEART CATH/IF AVAILABLE:     No results found for this or any previous visit.      -----------------------------------------------------  CXR/Imaging:   Imaging Results (Most Recent)       None            I personally reviewed and interpreted the CXR.      -----------------------------------------------------  CT:   No radiology results for the last 30 days.  I personally reviewed the images of the CT scan.  My personal interpretation is below.      ----------------------------------------------------    --------------------------------------------------------------------------------------------------    Laboratory evaluations:    Lab Results   Component Value Date    GLUCOSE 90 11/01/2024    BUN 10 11/01/2024    CREATININE 1.02 11/01/2024    EGFRIFNONA 73 05/14/2021    BCR 9.8 11/01/2024    K 3.9 11/01/2024    CO2 28.5 11/01/2024    CALCIUM 8.7  "11/01/2024    ALBUMIN 3.7 09/25/2024    AST 15 09/25/2024    ALT 7 09/25/2024     Lab Results   Component Value Date    WBC 5.38 10/01/2024    HGB 10.4 (L) 10/01/2024    HCT 32.8 (L) 10/01/2024    .2 (H) 10/01/2024     10/01/2024     No results found for: \"CHOL\", \"CHLPL\", \"TRIG\", \"HDL\", \"LDL\", \"LDLDIRECT\"  Lab Results   Component Value Date    TSH 1.250 06/10/2024     No results found for: \"HGBA1C\"  Lab Results   Component Value Date    ALT 7 09/25/2024     No results found for: \"HGBA1C\"  Lab Results   Component Value Date    CREATININE 1.02 11/01/2024     No results found for: \"IRON\", \"TIBC\", \"FERRITIN\"  Lab Results   Component Value Date    INR 2.28 (H) 10/01/2024    INR 3.02 (H) 09/30/2024    INR 2.62 (H) 09/29/2024    PROTIME 25.2 (H) 10/01/2024    PROTIME 31.3 (H) 09/30/2024    PROTIME 28.1 (H) 09/29/2024        Lab Results   Component Value Date    ABSOLUTELUNG 25 11/01/2024    ABSOLUTELUNG 27 09/06/2024    ABSOLUTELUNG 22 08/09/2024             1. 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 Ischemic cardiomyopathy, LV ejection fraction around 45 to 50%          ORDERS PLACED TODAY:  Orders Placed This Encounter   Procedures    ReDs Vest    Basic Metabolic Panel    Magnesium    proBNP    Basic Metabolic Panel    Magnesium    proBNP        Diagnoses and all orders for this visit:    1. 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, 46 to 50%) (Primary)  -     Basic Metabolic Panel; Future  -     Magnesium; Future  -     proBNP; Future  -     Basic Metabolic Panel; Standing  -     Basic Metabolic Panel  -     Magnesium; Standing  -     Magnesium  -     proBNP; Standing  -     proBNP  -     ReDs Vest  -     Vericiguat (Verquvo) 5 MG tablet; Take 1 tablet by mouth Daily for 60 days.  Dispense: 30 tablet; Refill: 5    2. Ischemic cardiomyopathy, LV ejection fraction around 45 to 50%  -     Vericiguat (Verquvo) 5 MG tablet; Take 1 tablet by mouth Daily " for 60 days.  Dispense: 30 tablet; Refill: 5    Other orders  -     sacubitril-valsartan (ENTRESTO) 24-26 MG tablet; Take 1/2 tablet by mouth Every 12 (Twelve) Hours. Hold medication if top number of blood pressure is less than 100.  Dispense: 60 tablet; Refill: 5             MEDS ORDERED TODAY:    New Medications Ordered This Visit   Medications    sacubitril-valsartan (ENTRESTO) 24-26 MG tablet     Sig: Take 1/2 tablet by mouth Every 12 (Twelve) Hours. Hold medication if top number of blood pressure is less than 100.     Dispense:  60 tablet     Refill:  5    Vericiguat (Verquvo) 5 MG tablet     Sig: Take 1 tablet by mouth Daily for 60 days.     Dispense:  30 tablet     Refill:  5        ---------------------------------------------------------------------------------------------------------------------------          ASSESSMENT/PLAN:      Diagnosis Plan   1. 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, 46 to 50%)  Basic Metabolic Panel    Magnesium    proBNP    Basic Metabolic Panel    Basic Metabolic Panel    Magnesium    Magnesium    proBNP    proBNP    ReDs Vest    Vericiguat (Verquvo) 5 MG tablet      2. Ischemic cardiomyopathy, LV ejection fraction around 45 to 50%  Vericiguat (Verquvo) 5 MG tablet          not acutely decompensated chronic moderate systolic and diastolic heart failure. CHF.     NYHA stage Stage C: Structural heart disease is present AND symptoms have occurredFC-Class III: Marked limitation of physical activity. Comfortable at rest. Less than ordinary activity causes fatigue, palpitation, or dyspnea.     Today, Patient is approaching euvolemiaand with  Moderate perfusion. The patient's hemodynamics are currently acceptable. HR is: normal and is at goal. BP/MAP was reviewed and there isroom for medication up-titration.  Clinical trajectory was assessed and haswaxed and waned.     CHF GOAL DIRECTED MEDICAL THERAPY FOR PATIENT ADDRESSED/ADJUSTED:     GDMT: HFrEF    Drug Class    Drug   Dose Last Dose Adjustment Notes   ACEi/ARB/ARNI Entresto 24-26mg BID     Beta Blocker        MRA HypoTN      SGLT2i Freq UTIs   N/A   Secondaries if applicable:  Verquevo 5mg       -CHF Specific BB:    Toprol XL stopped by another provider 2/2 hypotension.  Will attempt to restart low dose Toprol XL or Coreg on next visit.    We discussed processes/benefits of HF clinic including nursing, pharmacist, and provider evaluation during each visit with ability for in office ReDS vest, labs, and ability to provide IV diuresis in the clinic with close outpatient monitoring.  Additionally, patient was educated about the availability of delivery of medications to patient's clinic room prior to leaving the building which assists with medication compliance and insures medications are in hands when changes are made (if patient opts for apothecary usage) with thorough guidance regarding changes and medication schedule provided.          -ACE/ARB/ARNi:   Patient is on Entresto but often missing nightly dose.  We discussed half pill twice daily with strict hold parameters.  We have expanded the hold parameter and discussed with daughter to hold if top number blood pressure is less than 100.    -MRA:   Consider and future visit but at present our ability to add is limited secondary to hypotension asked patient to bring blood pressure log with next visit to further review record.     -SGLT2 inhibitor therapy:   Previously on Jardiance but began having frequent UTIs.  Will hold off on further SGLT2i at present as risk of frequent UTIs likely outweighs benefit and patient is 89 years old.    -Diuretic regimen:   ReDS Vest reading for. 11/01/24 is  25; ReDs Vest reading reviewed with patient.    Continiue Bumex 2mg daily at present.   BMP, Mag, & ProBNP reviewed with patient.      -Fluid restriction/Sodium restriction:   Requested 2000 ml restriction  Patient has been asked to weigh daily and was provided with a printed  diuretic strategy.  1,500 mg Na restriction was discussed.      -Essential HTN  Entresto 24-26mg BID on board.   Continue monitoring.     -Chronic anticoagulation with Coumadin:   -Atrial flutter with ventricular paced rhythm:   Continue Coumadin with management per PCP.   Continue f/u with [facility].     -Hypomagnesemia:   Mag 400 mg daily added.     -ASCVD with prior directional arthrectomy and bare metal stent, stable without chest pain:   -Hyperlipidemia:    Continue ASA & statin.   Continue f/u with [facility].         --------------------------------------------------------------------------------        BMI is within normal parameters. No other follow-up for BMI required.              >45minutes out of 60 minutes face to face spent counseling patient extensively on dietary Na+ intake, importance of activity, weight monitoring, compliance with medications in addition to importance of titration with goal directed medical therapy and follow up appointments.            This document has been electronically signed by Rosa Gavin PA-C  November 1, 2024 15:32 EDT      Dictated Utilizing Dragon Dictation: Part of this note may be an electronic transcription/translation of spoken language to printed text using the Dragon Dictation System.    Follow-up appointment and medication changes provided in hand delivered After Visit Summary as well as reviewed in the room.

## 2024-11-01 NOTE — PROGRESS NOTES
Heart Failure Clinic  Pharmacist Note     Alberto Guzman is a 89 y.o. male seen in the Heart Failure Clinic for HFrEF with most recent EF of 36-40% on 6/10/24.      Alberto Guzman reports a fair understanding of medications.  He is accompanied by his daughter today who manages his medications and takes care of him altogether.     Patient denies any swelling with taking Bumex 1mg daily and a total of 2mg on Tuesdays and Thursdays. He reports some Sob with activity.      His daughter reports that she has been giving him the 1/2 tablet of Entresto bid and Verquvo 5mg daily now and he has tolerated it well. He denies any episodes of lightheadedness, other than when he gets up too quickly. She reports that his SBP's have been in the 's range.       Medication Use:   Hx of med intolerances:  None related to HF  Retail Rx Management: [facility]- has rigoberto approved through 5/17/25 for Verquvo and Entresto- ship to patient    Past Medical History:   Diagnosis Date    Arthritis     Atrial flutter     Back pain     Benign prostatic hyperplasia     Bladder tumor     Cancer     [id number]    Chronic systolic heart failure 09/14/2021    Colon cancer     Coronary artery disease     DVT (deep venous thrombosis)     r leg    Elevated cholesterol     Heart attack     1993    Hypertension     Numbness     feet/hands    Osteoporosis     PVD (peripheral vascular disease)     Rheumatoid arthritis     Stroke     Ventricular tachycardia      ALLERGIES: Patient has no known allergies.  Current Outpatient Medications   Medication Sig Dispense Refill    aspirin 81 MG tablet Take 1 tablet by mouth Daily. 30 tablet 11    atorvastatin (LIPITOR) 20 MG tablet Take 1 tablet by mouth Every Night.      bumetanide (BUMEX) 1 MG tablet Take 1 tablet by mouth Daily for 30 days. Resume 2 days post discharge or sooner if lower extremity swelling returns (Patient taking differently: Take 1 tablet by mouth Daily. 2mg on Tuesdays and  "Thursdays) 30 tablet 0    Cipro 250 MG tablet Take 1 tablet by mouth 2 (Two) Times a Day.      COLLAGEN-VITAMIN C PO Take 1 capsule by mouth Daily.      gabapentin (NEURONTIN) 600 MG tablet Take 1 tablet by mouth Every Night.      HYDROcodone-acetaminophen (NORCO)  MG per tablet Take 1 tablet by mouth Every 6 (Six) Hours As Needed for Moderate Pain.      pantoprazole (PROTONIX) 40 MG EC tablet Take 1 tablet by mouth Daily.      sacubitril-valsartan (ENTRESTO) 24-26 MG tablet Take 1/2 tablet by mouth Every 12 (Twelve) Hours. Hold medication if top number of blood pressure is less than 100. 60 tablet 5    Vericiguat (Verquvo) 5 MG tablet Take 1 tablet by mouth Daily for 60 days. 30 tablet 5    warfarin (COUMADIN) 1 MG tablet Take 1 tablet by mouth 3 (Three) Times a Week.      warfarin (COUMADIN) 5 MG tablet Take 1 tablet by mouth Daily.       No current facility-administered medications for this encounter.       Vaccination History:   Pneumonia: reports UTD  Annual Influenza: will get when due  Shingles: reports UTD    Objective  Vitals:    11/01/24 1035   BP: 98/49   BP Location: Left arm   Patient Position: Sitting   Cuff Size: Adult   Pulse: 55   SpO2: 96%   Weight: 89.6 kg (197 lb 9.6 oz)   Height: 190.5 cm (75\")     Wt Readings from Last 3 Encounters:   11/01/24 89.6 kg (197 lb 9.6 oz)   10/30/24 88.5 kg (195 lb)   10/16/24 88.9 kg (196 lb)         11/01/24  1035   Weight: 89.6 kg (197 lb 9.6 oz)     Lab Results   Component Value Date    GLUCOSE 90 11/01/2024    BUN 10 11/01/2024    CREATININE 1.02 11/01/2024    EGFRIFNONA 73 05/14/2021    BCR 9.8 11/01/2024    K 3.9 11/01/2024    CO2 28.5 11/01/2024    CALCIUM 8.7 11/01/2024    ALBUMIN 3.7 09/25/2024    AST 15 09/25/2024    ALT 7 09/25/2024     Lab Results   Component Value Date    WBC 5.38 10/01/2024    HGB 10.4 (L) 10/01/2024    HCT 32.8 (L) 10/01/2024    .2 (H) 10/01/2024     10/01/2024     Lab Results   Component Value Date    CKTOTAL 39 " 09/24/2024    TROPONINT 31 (H) 09/24/2024     Lab Results   Component Value Date    PROBNP 1,204.0 11/01/2024     Results for orders placed during the hospital encounter of 06/09/24    Adult Transthoracic Echo Complete w/ Color, Spectral and Contrast if Necessary Per Protocol    Interpretation Summary    The study is technically difficult for diagnosis. The quality of the study is limited with poor acoustic windows.    Normal left ventricular cavity size noted.    The following left ventricular wall segments are dyskinetic: apical lateral, apical septal and apex.    Left ventricular ejection fraction appears to be 36 - 40%.    The aortic valve exhibits sclerosis. There is mild calcification of the aortic valve. The aortic valve was poorly visualized but appears trileaflet.    Trace aortic valve regurgitation is present. No hemodynamically significant aortic valve stenosis is present.    There is mild calcification of the mitral valve. Mild mitral valve regurgitation is present. No significant mitral valve stenosis is present.    Mild tricuspid valve regurgitation is present. Estimated right ventricular systolic pressure from tricuspid regurgitation is mildly elevated (35-45 mmHg).    There is no evidence of pericardial effusion. .         GDMT    Drug Class   Drug   Dose Last Dose Adjustment Additional Titration   Notes   ACEi/ARB/ARNI Entresto 24/26mg 1/2  8/9/24 1/2 bid     Beta Blocker      MRA        SGLT2i  UTI with it     Verquvo 5mg 9/6/24 increased         Drug Therapy Problems    1. GDMT      Recommendations:     Continue current doses. Shipped out to patient today.         Patient was educated on heart failure medications and the importance of medication adherence. All questions were addressed and patient expressed understanding. Used teach-back method to assess understanding.     Thank you for allowing me to participate in the care of your patient,    Keyanna Tinoco, PharmD  11/01/24  15:37 EDT

## 2024-11-05 ENCOUNTER — OFFICE VISIT (OUTPATIENT)
Dept: UROLOGY | Facility: CLINIC | Age: 89
End: 2024-11-05
Payer: MEDICARE

## 2024-11-05 VITALS
BODY MASS INDEX: 24.74 KG/M2 | DIASTOLIC BLOOD PRESSURE: 53 MMHG | WEIGHT: 199 LBS | HEIGHT: 75 IN | SYSTOLIC BLOOD PRESSURE: 112 MMHG | HEART RATE: 70 BPM

## 2024-11-05 DIAGNOSIS — N32.0 BLADDER NECK CONTRACTURE: Primary | ICD-10-CM

## 2024-11-05 NOTE — PROGRESS NOTES
Chief Complaint:      Chief Complaint   Patient presents with    Urinary Retention     Voiding trial       HPI:   89 y.o. male please advise daughter had a small bowel obstruction now has a colostomy went into retention postop I took his catheter out he is voiding great his postvoid 0 I will see him back on an as-needed basis.    Past Medical History:     Past Medical History:   Diagnosis Date    Arthritis     Atrial flutter     Back pain     Benign prostatic hyperplasia     Bladder tumor     Cancer     [id number]/[id number]    Chronic systolic heart failure 09/14/2021    Colon cancer     Coronary artery disease     DVT (deep venous thrombosis)     r leg    Elevated cholesterol     Heart attack     1993    Hypertension     Numbness     feet/hands    Osteoporosis     PVD (peripheral vascular disease)     Rheumatoid arthritis     Stroke     Ventricular tachycardia        Current Meds:     Current Outpatient Medications   Medication Sig Dispense Refill    aspirin 81 MG tablet Take 1 tablet by mouth Daily. 30 tablet 11    atorvastatin (LIPITOR) 20 MG tablet Take 1 tablet by mouth Every Night.      COLLAGEN-VITAMIN C PO Take 1 capsule by mouth Daily.      gabapentin (NEURONTIN) 600 MG tablet Take 1 tablet by mouth Every Night.      HYDROcodone-acetaminophen (NORCO)  MG per tablet Take 1 tablet by mouth Every 6 (Six) Hours As Needed for Moderate Pain.      pantoprazole (PROTONIX) 40 MG EC tablet Take 1 tablet by mouth Daily.      sacubitril-valsartan (ENTRESTO) 24-26 MG tablet Take 1/2 tablet by mouth Every 12 (Twelve) Hours. Hold medication if top number of blood pressure is less than 100. 60 tablet 5    Vericiguat (Verquvo) 5 MG tablet Take 1 tablet by mouth Daily for 60 days. 30 tablet 5    warfarin (COUMADIN) 1 MG tablet Take 1 tablet by mouth 3 (Three) Times a Week.      warfarin (COUMADIN) 5 MG tablet Take 1 tablet by mouth Daily.      bumetanide (BUMEX) 1 MG tablet Take 1 tablet by mouth Daily for 30 days.  Resume 2 days post discharge or sooner if lower extremity swelling returns (Patient taking differently: Take 1 tablet by mouth Daily. 2mg on Tuesdays and Thursdays) 30 tablet 0     No current facility-administered medications for this visit.        Allergies:      No Known Allergies     Past Surgical History:     Past Surgical History:   Procedure Laterality Date    CATARACT EXTRACTION Bilateral     CHOLECYSTECTOMY      COLON RESECTION      COLONOSCOPY      CORONARY ANGIOPLASTY WITH STENT PLACEMENT  1995    X1    EXPLORATORY LAPAROTOMY N/A 06/12/2024    Procedure: LAPAROTOMY EXPLORATORY;  Surgeon: Deo Lennon MD;  Location: [facility];  Service: General;  Laterality: N/A;    EXPLORATORY LAPAROTOMY N/A 9/26/2024    Procedure: LAPAROTOMY EXPLORATORY, REDUCTION OF INTERNAL HERNIA, END COLOSTOMY CREATION;  Surgeon: Deo Lennon MD;  Location: [facility];  Service: General;  Laterality: N/A;    HEMORROIDECTOMY      HERNIA REPAIR      left inguinal/umbilical    HIP ARTHROPLASTY Right     INSERT / REPLACE / REMOVE PACEMAKER      JOINT REPLACEMENT Right     HIP    PACEMAKER IMPLANTATION      PROSTATE SURGERY      TRANSURETHRAL RESECTION OF BLADDER TUMOR N/A 04/18/2018    Procedure: CYSTOSCOPY TRANSURETHRAL RESECTION OF SMALL BLADDER TUMOR;  Surgeon: Alfonso Collins MD;  Location: [facility];  Service: Urology    TRANSURETHRAL RESECTION OF BLADDER TUMOR N/A 08/29/2018    Procedure: CYSTOSCOPY TRANSURETHRAL RESECTION OF BLADDER TUMOR;  Surgeon: Alfonso Collins MD;  Location: [facility];  Service: Urology       Social History:     Social History     Socioeconomic History    Marital status:    Tobacco Use    Smoking status: Former     Types: Cigarettes     Passive exposure: Past    Smokeless tobacco: Never   Vaping Use    Vaping status: Never Used   Substance and Sexual Activity    Alcohol use: Yes     Comment: Occasional few times a year    Drug use: No    Sexual activity: Defer        Family History:     Family History   Problem Relation Age of Onset    No Known Problems Mother     No Known Problems Father     Heart disease Brother        Review of Systems:     Review of Systems   Constitutional: Negative.    HENT: Negative.     Eyes: Negative.    Respiratory: Negative.     Cardiovascular: Negative.    Gastrointestinal: Negative.    Endocrine: Negative.    Musculoskeletal: Negative.    Allergic/Immunologic: Negative.    Neurological: Negative.    Hematological: Negative.    Psychiatric/Behavioral: Negative.         Physical Exam:     Physical Exam  Vitals and nursing note reviewed.   Constitutional:       Appearance: He is well-developed.   HENT:      Head: Normocephalic and atraumatic.   Eyes:      Conjunctiva/sclera: Conjunctivae normal.      Pupils: Pupils are equal, round, and reactive to light.   Cardiovascular:      Rate and Rhythm: Normal rate and regular rhythm.      Heart sounds: Normal heart sounds.   Pulmonary:      Effort: Pulmonary effort is normal.      Breath sounds: Normal breath sounds.   Abdominal:      General: Bowel sounds are normal.      Palpations: Abdomen is soft.   Musculoskeletal:         General: Normal range of motion.      Cervical back: Normal range of motion.   Skin:     General: Skin is warm and dry.   Neurological:      Mental Status: He is alert and oriented to person, place, and time.      Deep Tendon Reflexes: Reflexes are normal and symmetric.   Psychiatric:         Behavior: Behavior normal.         Thought Content: Thought content normal.         Judgment: Judgment normal.         I have reviewed the following portions of the patient's history: Allergies, current medications, past family history, past medical history, past social history, past surgical history, problem list, and ROS and confirm it is accurate.    Recent Image (CT and/or KUB):      CT Abdomen and Pelvis: No results found for this or any previous visit.       CT Stone Protocol: Results  for orders placed during the hospital encounter of 04/09/18    CT Abdomen Pelvis Stone Protocol    Narrative  CT ABDOMEN AND PELVIS STONE PROTOCOL-    REASON FOR EXAM: Abdominal pain, unspecified; N40.1-Benign prostatic  hyperplasia with lower urinary tract symptoms; N13.8-Other obstructive  and reflux uropathy.    FINDINGS: Spiral scans were obtained through the kidneys, ureterS and  bladder. The kidneys showed no evidence of hydronephrosis. There were no  stones in the intrarenal collecting systems or in the intrarenal  parenchyma. Ureters were not dilated as they coursed through the abdomen  and pelvis. No stones were noted along the course of the ureters.  Urinary bladder was fairly well-distended with urine and was normal in  contour. No focal areas of bladder wall thickening were demonstrated.    Impression  No evidence of stone or obstruction was seen involving the  collecting system of either kidney. The CT does demonstrate marked  scoliosis and degenerative changes in the lumbar spine.      The radiation dose reduction device was utilized for each scan per the  ALARA (as low as reasonably achievable) protocol.    This report was finalized on 4/9/2018 2:16 PM by Dr. Jairo Maya II, MD.       KUB: Results for orders placed during the hospital encounter of 09/24/24    XR Abdomen KUB    Narrative  PROCEDURE: X-ray examination of the abdomen performed on September 25, 2024. 2 films.    HISTORY: Evaluate for bowel obstruction.    COMPARISON: None.    FINDINGS:    Enteric drain in place with tip to the stomach.  Pacing leads to the right atrium and right ventricle.  Scoliosis affecting the thoracic and lumbar spine with levoconvex  curvature at the mid to upper lumbar spine.  Right hip arthroplasty.  Mild osteoarthritis at the left hip joint.  Degenerative disc disease throughout the lumbar spine and lower thoracic  spine.  Air-filled slightly distended small bowel loops in the right and left  abdomen  suggestive of mild ileus.  No conclusive features of bowel obstruction.  No pneumatosis  No free air.    Impression  1.  Air-filled slightly distended small bowel loops in the right and  left abdomen suggestive of mild ileus.  2.  No conclusive features of bowel obstruction.  3.  Enteric drain in place with tip in the stomach.  4.  Contrast noted in the gastric lumen at the fundus.  5.  No oral contrast identified in the small bowel segments  6.  No oral contrast identified in the large bowel segments.  7.  Right hip arthroplasty.  8.  Scoliosis.  9.  No free air.    This report was finalized on 9/25/2024 7:37 PM by Cuba Gomez MD.       Labs (past 3 months):      Hospital Outpatient Visit on 11/01/2024   Component Date Value Ref Range Status    Glucose 11/01/2024 90  65 - 99 mg/dL Final    BUN 11/01/2024 10  8 - 23 mg/dL Final    Creatinine 11/01/2024 1.02  0.76 - 1.27 mg/dL Final    Sodium 11/01/2024 136  136 - 145 mmol/L Final    Potassium 11/01/2024 3.9  3.5 - 5.2 mmol/L Final    Chloride 11/01/2024 100  98 - 107 mmol/L Final    CO2 11/01/2024 28.5  22.0 - 29.0 mmol/L Final    Calcium 11/01/2024 8.7  8.2 - 9.6 mg/dL Final    BUN/Creatinine Ratio 11/01/2024 9.8  7.0 - 25.0 Final    Anion Gap 11/01/2024 7.5  5.0 - 15.0 mmol/L Final    eGFR 11/01/2024 68.5  >60.0 mL/min/1.73 Final    Magnesium 11/01/2024 1.7  1.7 - 2.3 mg/dL Final    proBNP 11/01/2024 1,204.0  0.0 - 1,800.0 pg/mL Final    Absolute Lung Fluid Content 11/01/2024 25  20 - 35 % Final   No results displayed because visit has over 200 results.      Hospital Outpatient Visit on 09/06/2024   Component Date Value Ref Range Status    Glucose 09/06/2024 90  65 - 99 mg/dL Final    BUN 09/06/2024 11  8 - 23 mg/dL Final    Creatinine 09/06/2024 0.99  0.76 - 1.27 mg/dL Final    Sodium 09/06/2024 139  136 - 145 mmol/L Final    Potassium 09/06/2024 4.0  3.5 - 5.2 mmol/L Final    Chloride 09/06/2024 102  98 - 107 mmol/L Final    CO2 09/06/2024 25.6  22.0 - 29.0  mmol/L Final    Calcium 09/06/2024 8.8  8.2 - 9.6 mg/dL Final    BUN/Creatinine Ratio 09/06/2024 11.1  7.0 - 25.0 Final    Anion Gap 09/06/2024 11.4  5.0 - 15.0 mmol/L Final    eGFR 09/06/2024 71.0  >60.0 mL/min/1.73 Final    Magnesium 09/06/2024 1.5 (L)  1.7 - 2.3 mg/dL Final    proBNP 09/06/2024 879.2  0.0 - 1,800.0 pg/mL Final    Absolute Lung Fluid Content 09/06/2024 27  20 - 35 % Final   Hospital Outpatient Visit on 08/09/2024   Component Date Value Ref Range Status    Glucose 08/09/2024 87  65 - 99 mg/dL Final    BUN 08/09/2024 10  8 - 23 mg/dL Final    Creatinine 08/09/2024 1.06  0.76 - 1.27 mg/dL Final    Sodium 08/09/2024 143  136 - 145 mmol/L Final    Potassium 08/09/2024 3.7  3.5 - 5.2 mmol/L Final    Chloride 08/09/2024 104  98 - 107 mmol/L Final    CO2 08/09/2024 27.3  22.0 - 29.0 mmol/L Final    Calcium 08/09/2024 8.8  8.2 - 9.6 mg/dL Final    BUN/Creatinine Ratio 08/09/2024 9.4  7.0 - 25.0 Final    Anion Gap 08/09/2024 11.7  5.0 - 15.0 mmol/L Final    eGFR 08/09/2024 65.4  >60.0 mL/min/1.73 Final    Magnesium 08/09/2024 1.7  1.7 - 2.3 mg/dL Final    proBNP 08/09/2024 1,279.0  0.0 - 1,800.0 pg/mL Final    Absolute Lung Fluid Content 08/09/2024 22  20 - 35 % Final        Procedure:       Assessment/Plan:   Bladder neck contracture-status post cath removal postvoid residual is 0            This document has been electronically signed by TINY PENG MD November 5, 2024 14:06 EST    Dictated Utilizing Dragon Dictation: Part of this note may be an electronic transcription/translation of spoken language to printed text using the Dragon Dictation System.

## 2024-11-11 ENCOUNTER — OFFICE VISIT (OUTPATIENT)
Dept: CARDIOLOGY | Facility: CLINIC | Age: 89
End: 2024-11-11
Payer: MEDICARE

## 2024-11-11 VITALS
DIASTOLIC BLOOD PRESSURE: 60 MMHG | WEIGHT: 197 LBS | HEIGHT: 75 IN | BODY MASS INDEX: 24.49 KG/M2 | HEART RATE: 65 BPM | OXYGEN SATURATION: 97 % | SYSTOLIC BLOOD PRESSURE: 118 MMHG

## 2024-11-11 DIAGNOSIS — E78.5 HYPERLIPIDEMIA LDL GOAL <70: ICD-10-CM

## 2024-11-11 DIAGNOSIS — I50.22 CHRONIC HFREF (HEART FAILURE WITH REDUCED EJECTION FRACTION): Primary | ICD-10-CM

## 2024-11-11 DIAGNOSIS — I10 ESSENTIAL HYPERTENSION: ICD-10-CM

## 2024-11-11 DIAGNOSIS — Z95.0 PRESENCE OF CARDIAC PACEMAKER: ICD-10-CM

## 2024-11-11 DIAGNOSIS — I25.10 CORONARY ARTERY DISEASE INVOLVING NATIVE CORONARY ARTERY OF NATIVE HEART WITHOUT ANGINA PECTORIS: ICD-10-CM

## 2024-11-11 DIAGNOSIS — Z79.01 CHRONIC ANTICOAGULATION: ICD-10-CM

## 2024-11-11 PROCEDURE — 99214 OFFICE O/P EST MOD 30 MIN: CPT | Performed by: INTERNAL MEDICINE

## 2024-11-11 NOTE — ADDENDUM NOTE
Encounter addended by: Keyanna Tinoco, PharmD on: 11/11/2024 9:08 AM   Actions taken: Social Care Target section edited

## 2024-11-11 NOTE — PROGRESS NOTES
subjective     Chief Complaint   Patient presents with    HFrEF     Follow up    Dizziness     This am otherwise doing well        Problems Addressed This Visit  1. 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 Presence of cardiac pacemaker 6/3/2025    3. Hyperlipidemia LDL goal <70    4. Essential hypertension    5. Coronary artery disease, anterior wall myocardial infarction with directional atherectomy of LAD in 1983 and bare-metal stent to the LAD in 1995, nonobstructive disease 2015    6. Chronic anticoagulation with Coumadin        History of Present Illness    Patient is 89 years old elderly gentleman who is doing very well for his age.  He states that recently he had partial intestinal obstruction and was in the hospital under care of Dr. Lennon.  He is doing much better now bowels are moving.  Appetite is very good  Coronary artery disease with prior anterior wall myocardial infarction LAD atherectomy  In 1983 and bare-metal stent in 1995.  Patient denies any chest pain palpitations or shortness of breath.    Chronic HFrEF with ejection fraction 45 to 50%  He is taking Verquvo, Entresto and Bumex.    He is also on anticoagulation because of atrial flutter  Pacemaker functioning normally will have pacemaker check in 3 days    Hypertension blood pressure is controlled with Entresto    Hyperlipidemia  He is taking Lipitor 20 mg daily and has no side effects.    Past Surgical History:   Procedure Laterality Date    CATARACT EXTRACTION Bilateral     CHOLECYSTECTOMY      COLON RESECTION      COLONOSCOPY      CORONARY ANGIOPLASTY WITH STENT PLACEMENT  1995    X1    EXPLORATORY LAPAROTOMY N/A 06/12/2024    Procedure: LAPAROTOMY EXPLORATORY;  Surgeon: Deo Lennon MD;  Location: [facility];  Service: General;  Laterality: N/A;    EXPLORATORY LAPAROTOMY N/A 9/26/2024    Procedure: LAPAROTOMY EXPLORATORY, REDUCTION OF INTERNAL HERNIA, END COLOSTOMY CREATION;  Surgeon: House,  Deo Kilgore MD;  Location: [facility] OR;  Service: General;  Laterality: N/A;    HEMORROIDECTOMY      HERNIA REPAIR      left inguinal/umbilical    HIP ARTHROPLASTY Right     INSERT / REPLACE / REMOVE PACEMAKER      JOINT REPLACEMENT Right     HIP    PACEMAKER IMPLANTATION      PROSTATE SURGERY      TRANSURETHRAL RESECTION OF BLADDER TUMOR N/A 04/18/2018    Procedure: CYSTOSCOPY TRANSURETHRAL RESECTION OF SMALL BLADDER TUMOR;  Surgeon: Alfonso Collins MD;  Location: [facility] OR;  Service: Urology    TRANSURETHRAL RESECTION OF BLADDER TUMOR N/A 08/29/2018    Procedure: CYSTOSCOPY TRANSURETHRAL RESECTION OF BLADDER TUMOR;  Surgeon: Alfonso Collins MD;  Location: [facility] OR;  Service: Urology     Family History   Problem Relation Age of Onset    No Known Problems Mother     No Known Problems Father     Heart disease Brother      Past Medical History:   Diagnosis Date    Arthritis     Atrial flutter     Back pain     Benign prostatic hyperplasia     Bladder tumor     Cancer     [id number]    Chronic systolic heart failure 09/14/2021    Colon cancer     Coronary artery disease     DVT (deep venous thrombosis)     r leg    Elevated cholesterol     Heart attack     1993    Hypertension     Numbness     feet/hands    Osteoporosis     PVD (peripheral vascular disease)     Rheumatoid arthritis     Stroke     Ventricular tachycardia      Patient Active Problem List   Diagnosis    Urinary retention    Bladder neck contracture    Bladder tumor    Aftercare following surgery of the genitourinary system    Atrial flutter , ventricular paced rhythm    Presence of cardiac pacemaker 6/3/2025    Hyperlipidemia LDL goal <70    Essential hypertension    Chronic anticoagulation with Coumadin    Ventricular tachycardia (paroxysmal)    Asymptomatic PVD (peripheral vascular disease)    Coronary artery disease, anterior wall myocardial infarction with directional atherectomy of LAD in 1983 and bare-metal stent to the LAD  in 1995, nonobstructive disease 2015    Ischemic cardiomyopathy, LV ejection fraction around 45 to 50%    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, 46 to 50%)    Bilateral lower extremity edema    Stroke (cerebrum)    Small bowel obstruction    Hypokalemia    Hypomagnesemia    Partial small bowel obstruction       Social History     Tobacco Use    Smoking status: Former     Types: Cigarettes     Passive exposure: Past    Smokeless tobacco: Never   Vaping Use    Vaping status: Never Used   Substance Use Topics    Alcohol use: Yes     Comment: Occasional few times a year    Drug use: No       No Known Allergies    Current Outpatient Medications on File Prior to Visit   Medication Sig    aspirin 81 MG tablet Take 1 tablet by mouth Daily.    atorvastatin (LIPITOR) 20 MG tablet Take 1 tablet by mouth Every Night.    bumetanide (BUMEX) 1 MG tablet Take 1 tablet by mouth Daily for 30 days. Resume 2 days post discharge or sooner if lower extremity swelling returns (Patient taking differently: Take 1 tablet by mouth Daily. 2mg on Tuesdays and Thursdays)    COLLAGEN-VITAMIN C PO Take 1 capsule by mouth Daily.    gabapentin (NEURONTIN) 600 MG tablet Take 1 tablet by mouth Every Night.    HYDROcodone-acetaminophen (NORCO)  MG per tablet Take 1 tablet by mouth Every 6 (Six) Hours As Needed for Moderate Pain.    pantoprazole (PROTONIX) 40 MG EC tablet Take 1 tablet by mouth Daily.    sacubitril-valsartan (ENTRESTO) 24-26 MG tablet Take 1/2 tablet by mouth Every 12 (Twelve) Hours. Hold medication if top number of blood pressure is less than 100.    Vericiguat (Verquvo) 5 MG tablet Take 1 tablet by mouth Daily for 60 days.    warfarin (COUMADIN) 1 MG tablet Take 1 tablet by mouth 3 (Three) Times a Week.    warfarin (COUMADIN) 5 MG tablet Take 1 tablet by mouth Daily.     No current facility-administered medications on file prior to visit.     (Not in a hospital admission)      Results for orders placed  during the hospital encounter of 06/09/24    Adult Transthoracic Echo Complete w/ Color, Spectral and Contrast if Necessary Per Protocol    Interpretation Summary    The study is technically difficult for diagnosis. The quality of the study is limited with poor acoustic windows.    Normal left ventricular cavity size noted.    The following left ventricular wall segments are dyskinetic: apical lateral, apical septal and apex.    Left ventricular ejection fraction appears to be 36 - 40%.    The aortic valve exhibits sclerosis. There is mild calcification of the aortic valve. The aortic valve was poorly visualized but appears trileaflet.    Trace aortic valve regurgitation is present. No hemodynamically significant aortic valve stenosis is present.    There is mild calcification of the mitral valve. Mild mitral valve regurgitation is present. No significant mitral valve stenosis is present.    Mild tricuspid valve regurgitation is present. Estimated right ventricular systolic pressure from tricuspid regurgitation is mildly elevated (35-45 mmHg).    There is no evidence of pericardial effusion. .            The following portions of the patient's history were reviewed and updated as appropriate: allergies, current medications, past family history, past medical history, past social history, past surgical history and problem list.    Review of Systems   Constitutional: Negative.   HENT: Negative.  Negative for congestion.    Eyes: Negative.    Cardiovascular: Negative.  Negative for chest pain, cyanosis, dyspnea on exertion, irregular heartbeat, leg swelling, near-syncope, orthopnea, palpitations, paroxysmal nocturnal dyspnea and syncope.   Respiratory: Negative.  Negative for shortness of breath.    Hematologic/Lymphatic: Negative.    Musculoskeletal: Negative.    Gastrointestinal: Negative.    Neurological: Negative.  Negative for headaches.          Objective:     /60 (BP Location: Left arm, Patient Position:  "Sitting, Cuff Size: Adult)   Pulse 65   Ht 190.5 cm (75\")   Wt 89.4 kg (197 lb)   SpO2 97%   BMI 24.62 kg/m²   Pulmonary:      Effort: Pulmonary effort is normal.      Breath sounds: Normal breath sounds. No stridor. No wheezing. No rhonchi. No rales.   Cardiovascular:      PMI at left midclavicular line. Normal rate. Regular rhythm. Normal S1. Normal S2.       Murmurs: There is no murmur.      No gallop.  No click. No rub.   Pulses:     Intact distal pulses.   Edema:     Peripheral edema absent.           Lab Review  Lab Results   Component Value Date     11/01/2024    K 3.9 11/01/2024     11/01/2024    BUN 10 11/01/2024    CREATININE 1.02 11/01/2024    GLUCOSE 90 11/01/2024    CALCIUM 8.7 11/01/2024    ALT 7 09/25/2024    ALKPHOS 73 09/25/2024     Lab Results   Component Value Date    CKTOTAL 39 09/24/2024     Lab Results   Component Value Date    WBC 5.38 10/01/2024    HGB 10.4 (L) 10/01/2024    HCT 32.8 (L) 10/01/2024     10/01/2024     Lab Results   Component Value Date    INR 2.28 (H) 10/01/2024     Lab Results   Component Value Date    MG 1.7 11/01/2024     Lab Results   Component Value Date    TSH 1.250 06/10/2024     No results found for: \"BNP\"  No results found for: \"CHLPL\", \"CHOL\", \"TRIG\", \"HDL\", \"VLDL\", \"LDL\"  No results found for: \"LDL\"  Lab Results   Component Value Date    PROBNP 1,204.0 11/01/2024               Procedures       I personally viewed and interpreted the patient's LAB data         Assessment:     1. 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 Presence of cardiac pacemaker 6/3/2025    3. Hyperlipidemia LDL goal <70    4. Essential hypertension    5. Coronary artery disease, anterior wall myocardial infarction with directional atherectomy of LAD in 1983 and bare-metal stent to the LAD in 1995, nonobstructive disease 2015    6. Chronic anticoagulation with Coumadin          Plan:     Chronic HFrEF  Recent proBNP is normal at " 1204.  Electrolytes BUN/creatinine are also normal.  Patient was advised to continue Entresto along with Bumex and Leqvio.    Coronary artery disease status post anterior wall myocardial infarction and LAD intervention 1983 in 1995 currently stable asymptomatic he will continue aspirin along with Coumadin    Hypertension  Very well-controlled with Entresto which was continued.    Hyperlipidemia lab work scheduled he will continue Lipitor.    Pacemaker interrogation scheduled in the next few days.  Follow-up arranged with lab work        No follow-ups on file.

## 2024-11-11 NOTE — H&P (VIEW-ONLY)
subjective     Chief Complaint   Patient presents with    HFrEF     Follow up    Dizziness     This am otherwise doing well        Problems Addressed This Visit  1. 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 Presence of cardiac pacemaker 6/3/2025    3. Hyperlipidemia LDL goal <70    4. Essential hypertension    5. Coronary artery disease, anterior wall myocardial infarction with directional atherectomy of LAD in 1983 and bare-metal stent to the LAD in 1995, nonobstructive disease 2015    6. Chronic anticoagulation with Coumadin        History of Present Illness    Patient is 89 years old elderly gentleman who is doing very well for his age.  He states that recently he had partial intestinal obstruction and was in the hospital under care of Dr. Lennon.  He is doing much better now bowels are moving.  Appetite is very good  Coronary artery disease with prior anterior wall myocardial infarction LAD atherectomy  In 1983 and bare-metal stent in 1995.  Patient denies any chest pain palpitations or shortness of breath.    Chronic HFrEF with ejection fraction 45 to 50%  He is taking Verquvo, Entresto and Bumex.    He is also on anticoagulation because of atrial flutter  Pacemaker functioning normally will have pacemaker check in 3 days    Hypertension blood pressure is controlled with Entresto    Hyperlipidemia  He is taking Lipitor 20 mg daily and has no side effects.    Past Surgical History:   Procedure Laterality Date    CATARACT EXTRACTION Bilateral     CHOLECYSTECTOMY      COLON RESECTION      COLONOSCOPY      CORONARY ANGIOPLASTY WITH STENT PLACEMENT  1995    X1    EXPLORATORY LAPAROTOMY N/A 06/12/2024    Procedure: LAPAROTOMY EXPLORATORY;  Surgeon: Deo Lennon MD;  Location: [facility];  Service: General;  Laterality: N/A;    EXPLORATORY LAPAROTOMY N/A 9/26/2024    Procedure: LAPAROTOMY EXPLORATORY, REDUCTION OF INTERNAL HERNIA, END COLOSTOMY CREATION;  Surgeon: House,  Deo Kilgore MD;  Location: [facility] OR;  Service: General;  Laterality: N/A;    HEMORROIDECTOMY      HERNIA REPAIR      left inguinal/umbilical    HIP ARTHROPLASTY Right     INSERT / REPLACE / REMOVE PACEMAKER      JOINT REPLACEMENT Right     HIP    PACEMAKER IMPLANTATION      PROSTATE SURGERY      TRANSURETHRAL RESECTION OF BLADDER TUMOR N/A 04/18/2018    Procedure: CYSTOSCOPY TRANSURETHRAL RESECTION OF SMALL BLADDER TUMOR;  Surgeon: Alfonso Collins MD;  Location: [facility] OR;  Service: Urology    TRANSURETHRAL RESECTION OF BLADDER TUMOR N/A 08/29/2018    Procedure: CYSTOSCOPY TRANSURETHRAL RESECTION OF BLADDER TUMOR;  Surgeon: Alfonso Collins MD;  Location: [facility] OR;  Service: Urology     Family History   Problem Relation Age of Onset    No Known Problems Mother     No Known Problems Father     Heart disease Brother      Past Medical History:   Diagnosis Date    Arthritis     Atrial flutter     Back pain     Benign prostatic hyperplasia     Bladder tumor     Cancer     [id number]    Chronic systolic heart failure 09/14/2021    Colon cancer     Coronary artery disease     DVT (deep venous thrombosis)     r leg    Elevated cholesterol     Heart attack     1993    Hypertension     Numbness     feet/hands    Osteoporosis     PVD (peripheral vascular disease)     Rheumatoid arthritis     Stroke     Ventricular tachycardia      Patient Active Problem List   Diagnosis    Urinary retention    Bladder neck contracture    Bladder tumor    Aftercare following surgery of the genitourinary system    Atrial flutter , ventricular paced rhythm    Presence of cardiac pacemaker 6/3/2025    Hyperlipidemia LDL goal <70    Essential hypertension    Chronic anticoagulation with Coumadin    Ventricular tachycardia (paroxysmal)    Asymptomatic PVD (peripheral vascular disease)    Coronary artery disease, anterior wall myocardial infarction with directional atherectomy of LAD in 1983 and bare-metal stent to the LAD  in 1995, nonobstructive disease 2015    Ischemic cardiomyopathy, LV ejection fraction around 45 to 50%    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, 46 to 50%)    Bilateral lower extremity edema    Stroke (cerebrum)    Small bowel obstruction    Hypokalemia    Hypomagnesemia    Partial small bowel obstruction       Social History     Tobacco Use    Smoking status: Former     Types: Cigarettes     Passive exposure: Past    Smokeless tobacco: Never   Vaping Use    Vaping status: Never Used   Substance Use Topics    Alcohol use: Yes     Comment: Occasional few times a year    Drug use: No       No Known Allergies    Current Outpatient Medications on File Prior to Visit   Medication Sig    aspirin 81 MG tablet Take 1 tablet by mouth Daily.    atorvastatin (LIPITOR) 20 MG tablet Take 1 tablet by mouth Every Night.    bumetanide (BUMEX) 1 MG tablet Take 1 tablet by mouth Daily for 30 days. Resume 2 days post discharge or sooner if lower extremity swelling returns (Patient taking differently: Take 1 tablet by mouth Daily. 2mg on Tuesdays and Thursdays)    COLLAGEN-VITAMIN C PO Take 1 capsule by mouth Daily.    gabapentin (NEURONTIN) 600 MG tablet Take 1 tablet by mouth Every Night.    HYDROcodone-acetaminophen (NORCO)  MG per tablet Take 1 tablet by mouth Every 6 (Six) Hours As Needed for Moderate Pain.    pantoprazole (PROTONIX) 40 MG EC tablet Take 1 tablet by mouth Daily.    sacubitril-valsartan (ENTRESTO) 24-26 MG tablet Take 1/2 tablet by mouth Every 12 (Twelve) Hours. Hold medication if top number of blood pressure is less than 100.    Vericiguat (Verquvo) 5 MG tablet Take 1 tablet by mouth Daily for 60 days.    warfarin (COUMADIN) 1 MG tablet Take 1 tablet by mouth 3 (Three) Times a Week.    warfarin (COUMADIN) 5 MG tablet Take 1 tablet by mouth Daily.     No current facility-administered medications on file prior to visit.     (Not in a hospital admission)      Results for orders placed  during the hospital encounter of 06/09/24    Adult Transthoracic Echo Complete w/ Color, Spectral and Contrast if Necessary Per Protocol    Interpretation Summary    The study is technically difficult for diagnosis. The quality of the study is limited with poor acoustic windows.    Normal left ventricular cavity size noted.    The following left ventricular wall segments are dyskinetic: apical lateral, apical septal and apex.    Left ventricular ejection fraction appears to be 36 - 40%.    The aortic valve exhibits sclerosis. There is mild calcification of the aortic valve. The aortic valve was poorly visualized but appears trileaflet.    Trace aortic valve regurgitation is present. No hemodynamically significant aortic valve stenosis is present.    There is mild calcification of the mitral valve. Mild mitral valve regurgitation is present. No significant mitral valve stenosis is present.    Mild tricuspid valve regurgitation is present. Estimated right ventricular systolic pressure from tricuspid regurgitation is mildly elevated (35-45 mmHg).    There is no evidence of pericardial effusion. .            The following portions of the patient's history were reviewed and updated as appropriate: allergies, current medications, past family history, past medical history, past social history, past surgical history and problem list.    Review of Systems   Constitutional: Negative.   HENT: Negative.  Negative for congestion.    Eyes: Negative.    Cardiovascular: Negative.  Negative for chest pain, cyanosis, dyspnea on exertion, irregular heartbeat, leg swelling, near-syncope, orthopnea, palpitations, paroxysmal nocturnal dyspnea and syncope.   Respiratory: Negative.  Negative for shortness of breath.    Hematologic/Lymphatic: Negative.    Musculoskeletal: Negative.    Gastrointestinal: Negative.    Neurological: Negative.  Negative for headaches.          Objective:     /60 (BP Location: Left arm, Patient Position:  "Sitting, Cuff Size: Adult)   Pulse 65   Ht 190.5 cm (75\")   Wt 89.4 kg (197 lb)   SpO2 97%   BMI 24.62 kg/m²   Pulmonary:      Effort: Pulmonary effort is normal.      Breath sounds: Normal breath sounds. No stridor. No wheezing. No rhonchi. No rales.   Cardiovascular:      PMI at left midclavicular line. Normal rate. Regular rhythm. Normal S1. Normal S2.       Murmurs: There is no murmur.      No gallop.  No click. No rub.   Pulses:     Intact distal pulses.   Edema:     Peripheral edema absent.           Lab Review  Lab Results   Component Value Date     11/01/2024    K 3.9 11/01/2024     11/01/2024    BUN 10 11/01/2024    CREATININE 1.02 11/01/2024    GLUCOSE 90 11/01/2024    CALCIUM 8.7 11/01/2024    ALT 7 09/25/2024    ALKPHOS 73 09/25/2024     Lab Results   Component Value Date    CKTOTAL 39 09/24/2024     Lab Results   Component Value Date    WBC 5.38 10/01/2024    HGB 10.4 (L) 10/01/2024    HCT 32.8 (L) 10/01/2024     10/01/2024     Lab Results   Component Value Date    INR 2.28 (H) 10/01/2024     Lab Results   Component Value Date    MG 1.7 11/01/2024     Lab Results   Component Value Date    TSH 1.250 06/10/2024     No results found for: \"BNP\"  No results found for: \"CHLPL\", \"CHOL\", \"TRIG\", \"HDL\", \"VLDL\", \"LDL\"  No results found for: \"LDL\"  Lab Results   Component Value Date    PROBNP 1,204.0 11/01/2024               Procedures       I personally viewed and interpreted the patient's LAB data         Assessment:     1. 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 Presence of cardiac pacemaker 6/3/2025    3. Hyperlipidemia LDL goal <70    4. Essential hypertension    5. Coronary artery disease, anterior wall myocardial infarction with directional atherectomy of LAD in 1983 and bare-metal stent to the LAD in 1995, nonobstructive disease 2015    6. Chronic anticoagulation with Coumadin          Plan:     Chronic HFrEF  Recent proBNP is normal at " 1204.  Electrolytes BUN/creatinine are also normal.  Patient was advised to continue Entresto along with Bumex and Leqvio.    Coronary artery disease status post anterior wall myocardial infarction and LAD intervention 1983 in 1995 currently stable asymptomatic he will continue aspirin along with Coumadin    Hypertension  Very well-controlled with Entresto which was continued.    Hyperlipidemia lab work scheduled he will continue Lipitor.    Pacemaker interrogation scheduled in the next few days.  Follow-up arranged with lab work        No follow-ups on file.

## 2024-11-13 ENCOUNTER — CLINICAL SUPPORT NO REQUIREMENTS (OUTPATIENT)
Dept: CARDIOLOGY | Facility: CLINIC | Age: 89
End: 2024-11-13
Payer: MEDICARE

## 2024-11-13 ENCOUNTER — TELEPHONE (OUTPATIENT)
Dept: CARDIOLOGY | Facility: CLINIC | Age: 89
End: 2024-11-13
Payer: MEDICARE

## 2024-11-13 DIAGNOSIS — Z95.0 PRESENCE OF CARDIAC PACEMAKER: Primary | ICD-10-CM

## 2024-11-13 NOTE — TELEPHONE ENCOUNTER
Called daughter Isabella to let her know of Ze' appt to have his Pacemaker (Gen Change) procedure with Dr Worrell on Nov 21 st.  Advised her the hospital will be contacting her on wed the day before to let her know what time to arrive the following day.

## 2024-11-18 DIAGNOSIS — I48.92 ATRIAL FLUTTER WITH CONTROLLED RESPONSE: ICD-10-CM

## 2024-11-18 DIAGNOSIS — Z45.018 PACEMAKER END OF LIFE: Primary | ICD-10-CM

## 2024-11-19 PROBLEM — Z45.018 PACEMAKER END OF LIFE: Status: ACTIVE | Noted: 2024-11-18

## 2024-11-20 NOTE — PERIOPERATIVE NURSING NOTE
Patient was scheduled to have ppm gen change on November 21, 2024. When I called to talk to the patient to give pre-procedure instructions I was informed by his daughter Isabella that the patient had not stopped taking his coumadin. I then called Dr. Worrell and informed him that patient had taken coumadin in which Dr. Worrell stated to schedule patient for Dec. 5th, 2024 and for the patient to stop taking coumadin after Ippo Dec. 1, 2024 last dose. Patient was rescheduled for Dec. 5th and the patient was informed not to take blood thinner coumadin after Sunday December 1, 2024. Patient was also informed that we would call the day before the procedure scheduled to give arrival time and further procedure instructions but patient has been educated to stop blood thinner coumadin from [place] Dec 1, 2024 last dose. I also called Dr. Alcaraz per Dr. Worrell's request to inform him that the patient's procedure would be rescheduled for December 5th 2024 since this was a patient of Dr. Alcaraz.

## 2024-11-25 ENCOUNTER — SPECIALTY PHARMACY (OUTPATIENT)
Dept: PHARMACY | Facility: HOSPITAL | Age: 89
End: 2024-11-25
Payer: MEDICARE

## 2024-12-05 ENCOUNTER — HOSPITAL ENCOUNTER (OUTPATIENT)
Facility: HOSPITAL | Age: 89
Setting detail: HOSPITAL OUTPATIENT SURGERY
Discharge: HOME | End: 2024-12-05
Attending: INTERNAL MEDICINE | Admitting: INTERNAL MEDICINE
Payer: MEDICARE

## 2024-12-05 VITALS
SYSTOLIC BLOOD PRESSURE: 124 MMHG | OXYGEN SATURATION: 97 % | DIASTOLIC BLOOD PRESSURE: 74 MMHG | TEMPERATURE: 98 F | HEART RATE: 70 BPM | HEIGHT: 76 IN | BODY MASS INDEX: 24.36 KG/M2 | RESPIRATION RATE: 20 BRPM | WEIGHT: 200 LBS

## 2024-12-05 DIAGNOSIS — Z79.01 CHRONIC ANTICOAGULATION: Primary | ICD-10-CM

## 2024-12-05 DIAGNOSIS — I48.92 ATRIAL FLUTTER WITH CONTROLLED RESPONSE: ICD-10-CM

## 2024-12-05 DIAGNOSIS — Z45.018 PACEMAKER END OF LIFE: ICD-10-CM

## 2024-12-05 LAB
ANION GAP SERPL CALCULATED.3IONS-SCNC: 10.3 MMOL/L (ref 5–15)
BASOPHILS # BLD AUTO: 0.02 10*3/MM3 (ref 0–0.2)
BASOPHILS NFR BLD AUTO: 0.4 % (ref 0–1.5)
BUN SERPL-MCNC: 11 MG/DL (ref 8–23)
BUN/CREAT SERPL: 11.2 (ref 7–25)
CALCIUM SPEC-SCNC: 9.1 MG/DL (ref 8.2–9.6)
CHLORIDE SERPL-SCNC: 103 MMOL/L (ref 98–107)
CO2 SERPL-SCNC: 27.7 MMOL/L (ref 22–29)
CREAT SERPL-MCNC: 0.98 MG/DL (ref 0.76–1.27)
DEPRECATED RDW RBC AUTO: 53.6 FL (ref 37–54)
EGFRCR SERPLBLD CKD-EPI 2021: 71.9 ML/MIN/1.73
EOSINOPHIL # BLD AUTO: 0.26 10*3/MM3 (ref 0–0.4)
EOSINOPHIL NFR BLD AUTO: 5.4 % (ref 0.3–6.2)
ERYTHROCYTE [DISTWIDTH] IN BLOOD BY AUTOMATED COUNT: 13.9 % (ref 12.3–15.4)
GLUCOSE SERPL-MCNC: 100 MG/DL (ref 65–99)
HCT VFR BLD AUTO: 36.6 % (ref 37.5–51)
HGB BLD-MCNC: 11.7 G/DL (ref 13–17.7)
IMM GRANULOCYTES # BLD AUTO: 0.01 10*3/MM3 (ref 0–0.05)
IMM GRANULOCYTES NFR BLD AUTO: 0.2 % (ref 0–0.5)
INR PPP: 1.15 (ref 0.9–1.1)
LYMPHOCYTES # BLD AUTO: 1.88 10*3/MM3 (ref 0.7–3.1)
LYMPHOCYTES NFR BLD AUTO: 38.9 % (ref 19.6–45.3)
MCH RBC QN AUTO: 33.1 PG (ref 26.6–33)
MCHC RBC AUTO-ENTMCNC: 32 G/DL (ref 31.5–35.7)
MCV RBC AUTO: 103.4 FL (ref 79–97)
MONOCYTES # BLD AUTO: 0.42 10*3/MM3 (ref 0.1–0.9)
MONOCYTES NFR BLD AUTO: 8.7 % (ref 5–12)
NEUTROPHILS NFR BLD AUTO: 2.24 10*3/MM3 (ref 1.7–7)
NEUTROPHILS NFR BLD AUTO: 46.4 % (ref 42.7–76)
NRBC BLD AUTO-RTO: 0 /100 WBC (ref 0–0.2)
PLATELET # BLD AUTO: 191 10*3/MM3 (ref 140–450)
PMV BLD AUTO: 9.7 FL (ref 6–12)
POTASSIUM SERPL-SCNC: 4.1 MMOL/L (ref 3.5–5.2)
PROTHROMBIN TIME: 14.8 SECONDS (ref 12.1–14.7)
RBC # BLD AUTO: 3.54 10*6/MM3 (ref 4.14–5.8)
SODIUM SERPL-SCNC: 141 MMOL/L (ref 136–145)
WBC NRBC COR # BLD AUTO: 4.83 10*3/MM3 (ref 3.4–10.8)

## 2024-12-05 PROCEDURE — 33228 REMV&REPLC PM GEN DUAL LEAD: CPT | Performed by: INTERNAL MEDICINE

## 2024-12-05 PROCEDURE — C1785 PMKR, DUAL, RATE-RESP: HCPCS | Performed by: INTERNAL MEDICINE

## 2024-12-05 PROCEDURE — 85025 COMPLETE CBC W/AUTO DIFF WBC: CPT | Performed by: INTERNAL MEDICINE

## 2024-12-05 PROCEDURE — 25010000002 VANCOMYCIN 1 G RECONSTITUTED SOLUTION 1 EACH VIAL: Performed by: INTERNAL MEDICINE

## 2024-12-05 PROCEDURE — 25810000003 SODIUM CHLORIDE 0.9 % SOLUTION 250 ML FLEX CONT: Performed by: INTERNAL MEDICINE

## 2024-12-05 PROCEDURE — 25010000002 LIDOCAINE 2% SOLUTION: Performed by: INTERNAL MEDICINE

## 2024-12-05 PROCEDURE — 25010000002 FENTANYL CITRATE (PF) 50 MCG/ML SOLUTION: Performed by: INTERNAL MEDICINE

## 2024-12-05 PROCEDURE — 80048 BASIC METABOLIC PNL TOTAL CA: CPT | Performed by: INTERNAL MEDICINE

## 2024-12-05 PROCEDURE — 25810000003 SODIUM CHLORIDE 0.9 % SOLUTION: Performed by: INTERNAL MEDICINE

## 2024-12-05 PROCEDURE — 85610 PROTHROMBIN TIME: CPT | Performed by: INTERNAL MEDICINE

## 2024-12-05 DEVICE — GEN PM AZURE XT SURESCAN DR MRI: Type: IMPLANTABLE DEVICE | Status: FUNCTIONAL

## 2024-12-05 RX ORDER — SODIUM CHLORIDE 9 MG/ML
INJECTION, SOLUTION INTRAVENOUS
Status: COMPLETED | OUTPATIENT
Start: 2024-12-05 | End: 2024-12-05

## 2024-12-05 RX ORDER — TAMSULOSIN HYDROCHLORIDE 0.4 MG/1
1 CAPSULE ORAL DAILY
COMMUNITY

## 2024-12-05 RX ORDER — LIDOCAINE HYDROCHLORIDE 20 MG/ML
INJECTION, SOLUTION INFILTRATION; PERINEURAL
Status: DISCONTINUED | OUTPATIENT
Start: 2024-12-05 | End: 2024-12-05 | Stop reason: HOSPADM

## 2024-12-05 RX ORDER — FENTANYL CITRATE 50 UG/ML
INJECTION, SOLUTION INTRAMUSCULAR; INTRAVENOUS
Status: DISCONTINUED | OUTPATIENT
Start: 2024-12-05 | End: 2024-12-05 | Stop reason: HOSPADM

## 2024-12-05 NOTE — Clinical Note
Tested the atrial lead. Disconnecting A lead from old generator connecting to new generator. A lead tested connected to new generator.

## 2024-12-05 NOTE — DISCHARGE INSTRUCTIONS
Site check Monday December 9th, 2024 at Dr. Worrell's office between 0900-1100am.  PT/INR check on Monday December 9th, 2024 at primary care.  Staples removed Thursday December 12th, 2024 at Dr. Worrell's office between 0900-1100am.

## 2024-12-05 NOTE — Clinical Note
Tested the right ventricular lead. Disconnecting V lead from old generator connecting to new generator. V lead tested connected to new generator.

## 2024-12-09 ENCOUNTER — CLINICAL SUPPORT (OUTPATIENT)
Dept: CARDIOLOGY | Facility: CLINIC | Age: 89
End: 2024-12-09
Payer: MEDICARE

## 2024-12-09 NOTE — PROGRESS NOTES
Patient here for a wound check S/P battery change in pacer.  Staples intact.  Wound healing well no sign of infection, zero drainage.  Wound cleaned and new bandage applied.  Patient education given.

## 2024-12-12 ENCOUNTER — CLINICAL SUPPORT (OUTPATIENT)
Dept: CARDIOLOGY | Facility: CLINIC | Age: 89
End: 2024-12-12
Payer: MEDICARE

## 2024-12-12 NOTE — PROGRESS NOTES
DATE: 2024    PATIENT NAME:  Alberto Guzman    : 3/29/1931    DATE OF PACEMAKER IMP:   24    NUMBER OF STAPLES: 6    APPEARANCE OF PACEMAKER SITE: site looked good    AREA CLEANED WITH: peroxide      SITE COVERED WITH:steri strips    DONE BY: radha WELLINGTON

## 2024-12-20 ENCOUNTER — SPECIALTY PHARMACY (OUTPATIENT)
Dept: PHARMACY | Facility: HOSPITAL | Age: 89
End: 2024-12-20
Payer: MEDICARE

## 2025-01-03 ENCOUNTER — HOSPITAL ENCOUNTER (OUTPATIENT)
Dept: CARDIOLOGY | Facility: HOSPITAL | Age: OVER 89
Discharge: HOME | End: 2025-01-03
Payer: MEDICARE

## 2025-01-03 VITALS
HEIGHT: 76 IN | SYSTOLIC BLOOD PRESSURE: 114 MMHG | OXYGEN SATURATION: 97 % | DIASTOLIC BLOOD PRESSURE: 63 MMHG | WEIGHT: 203.6 LBS | BODY MASS INDEX: 24.79 KG/M2 | HEART RATE: 94 BPM

## 2025-01-03 DIAGNOSIS — I50.22 CHRONIC HFREF (HEART FAILURE WITH REDUCED EJECTION FRACTION): Primary | ICD-10-CM

## 2025-01-03 DIAGNOSIS — I48.92 ATRIAL FLUTTER WITH CONTROLLED RESPONSE: ICD-10-CM

## 2025-01-03 DIAGNOSIS — I10 ESSENTIAL HYPERTENSION: ICD-10-CM

## 2025-01-03 DIAGNOSIS — I25.5 ISCHEMIC CARDIOMYOPATHY: ICD-10-CM

## 2025-01-03 DIAGNOSIS — E78.5 HYPERLIPIDEMIA LDL GOAL <70: ICD-10-CM

## 2025-01-03 LAB
ABSOLUTE LUNG FLUID CONTENT: 24 % (ref 20–35)
ANION GAP SERPL CALCULATED.3IONS-SCNC: 11.5 MMOL/L (ref 5–15)
BUN SERPL-MCNC: 11 MG/DL (ref 8–23)
BUN/CREAT SERPL: 10.7 (ref 7–25)
CALCIUM SPEC-SCNC: 8.9 MG/DL (ref 8.2–9.6)
CHLORIDE SERPL-SCNC: 103 MMOL/L (ref 98–107)
CO2 SERPL-SCNC: 26.5 MMOL/L (ref 22–29)
CREAT SERPL-MCNC: 1.03 MG/DL (ref 0.76–1.27)
EGFRCR SERPLBLD CKD-EPI 2021: 67.7 ML/MIN/1.73
GLUCOSE SERPL-MCNC: 85 MG/DL (ref 65–99)
MAGNESIUM SERPL-MCNC: 1.8 MG/DL (ref 1.7–2.3)
NT-PROBNP SERPL-MCNC: 502.6 PG/ML (ref 0–1800)
POTASSIUM SERPL-SCNC: 4 MMOL/L (ref 3.5–5.2)
SODIUM SERPL-SCNC: 141 MMOL/L (ref 136–145)

## 2025-01-03 PROCEDURE — 80048 BASIC METABOLIC PNL TOTAL CA: CPT | Performed by: PHYSICIAN ASSISTANT

## 2025-01-03 PROCEDURE — 83880 ASSAY OF NATRIURETIC PEPTIDE: CPT | Performed by: PHYSICIAN ASSISTANT

## 2025-01-03 PROCEDURE — 83735 ASSAY OF MAGNESIUM: CPT | Performed by: PHYSICIAN ASSISTANT

## 2025-01-03 PROCEDURE — 94726 PLETHYSMOGRAPHY LUNG VOLUMES: CPT

## 2025-01-03 PROCEDURE — 36415 COLL VENOUS BLD VENIPUNCTURE: CPT

## 2025-01-03 RX ORDER — VERICIGUAT 5 MG/1
5 TABLET, FILM COATED ORAL DAILY
Qty: 30 TABLET | Refills: 5 | Status: SHIPPED | OUTPATIENT
Start: 2025-01-03 | End: 2025-07-02

## 2025-01-03 RX ORDER — BUMETANIDE 1 MG/1
1 TABLET ORAL DAILY
Qty: 30 TABLET | Refills: 3 | Status: SHIPPED | OUTPATIENT
Start: 2025-01-03 | End: 2025-02-02

## 2025-01-03 NOTE — PROGRESS NOTES
[facility] Failure [facility]  RYAN Viera Tammie L, APRN  [address] [place] [address]    Thank you for asking me to see Alberto Guzman for congestive heart failure.    HPI:     This is a 89 y.o. male with known past medical history of:    Chronic HFrEF  Ischemic cardiomyopathy  TTE from June 2024 with EF 36-40%; mild TVR.  Mild mitral valve calcification.    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  The patient is typically seen by Lianet Dia APRN.  Patient's primary cardiologist is Dr. Selam Alcaraz.     Last known EF 36-40%.   Last known hospitalization and/or ED visit: Patient was hospitalized in June 2024 secondary to small bowel obstruction secondary to internal hernia he later had ED visit after being sent from doctor's office for hypotension.  Patient hospitalized since last evaluation from September 24 through October 1, 2024 with small bowel obstruction and acute urinary retention.  Patient was taken for ex lap with reduction of internal hernia and end colostomy creation due to patient's recurrence of bowel obstruction.  Patient did have some urinary retention requiring Michael catheter placement during hospitalization with inability to void and empty bladder in spite of bladder training with Michael catheter replaced and referral made for outpatient urology follow-up.  There was an attempt made for chronic anticoagulation with DOAC however given difficulties with insurance he was discharged with Coumadin.  Accompanied by: Daughter          01/06/25 visit data/details  regarding:   Dyspnea: Improving dyspnea on exertion  Lower extremity swelling: Lower extremity edema improved but present.    Abdominal swelling: Denies  Home weight: Weight monitoring booklet provided during initial visit; has scale  Home BP: BP monitoring booklet provided during initial visit; has blood pressure cuff  Home heart rate: HR monitoring booklet provided during initial visit;  Daily activities of living: Performing on his own with some assistance from daughter  Pillows/lying flat: Pillow  HF zone: Green  Patient is doing well from HF standpoint. He ambulated with cane into office and across parking lot today for visit.  He is doing well overall without chest pain.  He reports he does not have significant shortness of breath or swelling.          Review of Systems - Review of Systems   Constitutional: Negative for decreased appetite, diaphoresis and fever.   HENT:  Negative for congestion and ear pain.    Eyes:  Negative for blurred vision.   Cardiovascular:  Positive for dyspnea on exertion. Negative for chest pain.   Respiratory:  Positive for shortness of breath. Negative for cough and hemoptysis.    Endocrine: Negative for cold intolerance and polydipsia.   Hematologic/Lymphatic: Negative for adenopathy and bleeding problem.   Skin:  Negative for dry skin and nail changes.   Musculoskeletal:  Negative for arthritis and falls.   Gastrointestinal:  Negative for bloating and anorexia.   Genitourinary:  Negative for bladder incontinence and dysuria.   Neurological:  Negative for aphonia and difficulty with concentration.   Psychiatric/Behavioral:  Negative for altered mental status and hallucinations.    Allergic/Immunologic: Negative for environmental allergies and HIV exposure.         All other systems were reviewed and were negative.    Patient Active Problem List   Diagnosis    Urinary retention    Bladder neck contracture    Bladder tumor    Aftercare following surgery of the genitourinary system     Atrial flutter , ventricular paced rhythm    Presence of cardiac pacemaker 6/3/2025    Hyperlipidemia LDL goal <70    Essential hypertension    Chronic anticoagulation with Coumadin    Ventricular tachycardia (paroxysmal)    Asymptomatic PVD (peripheral vascular disease)    Coronary artery disease, anterior wall myocardial infarction with directional atherectomy of LAD in 1983 and bare-metal stent to the LAD in 1995, nonobstructive disease 2015    Ischemic cardiomyopathy, LV ejection fraction around 45 to 50%    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, 46 to 50%)    Bilateral lower extremity edema    Stroke (cerebrum)    Small bowel obstruction    Hypokalemia    Hypomagnesemia    Partial small bowel obstruction    Pacemaker end of life       family history includes Heart disease in his brother; No Known Problems in his father and mother.     reports that he has quit smoking. His smoking use included cigarettes. He has been exposed to tobacco smoke. He has never used smokeless tobacco. He reports current alcohol use. He reports that he does not use drugs.    No Known Allergies      Current Outpatient Medications:     aspirin 81 MG tablet, Take 1 tablet by mouth Daily., Disp: 30 tablet, Rfl: 11    atorvastatin (LIPITOR) 20 MG tablet, Take 1 tablet by mouth Every Night., Disp: , Rfl:     bumetanide (BUMEX) 1 MG tablet, Take 1 tablet by mouth Daily for 30 days. Resume 2 days post discharge or sooner if lower extremity swelling returns, Disp: 30 tablet, Rfl: 3    COLLAGEN-VITAMIN C PO, Take 1 capsule by mouth Daily., Disp: , Rfl:     gabapentin (NEURONTIN) 600 MG tablet, Take 1 tablet by mouth Every Night., Disp: , Rfl:     HYDROcodone-acetaminophen (NORCO)  MG per tablet, Take 1 tablet by mouth Every 6 (Six) Hours As Needed for Moderate Pain., Disp: , Rfl:     pantoprazole (PROTONIX) 40 MG EC tablet, Take 1 tablet by mouth Daily., Disp: , Rfl:     sacubitril-valsartan (ENTRESTO) 24-26 MG  tablet, Take 1/2 tablet by mouth Every 12 (Twelve) Hours. Hold medication if top number of blood pressure is less than 100., Disp: 60 tablet, Rfl: 5    tamsulosin (FLOMAX) 0.4 MG capsule 24 hr capsule, Take 1 capsule by mouth Daily., Disp: , Rfl:     Vericiguat (Verquvo) 5 MG tablet, Take 1 tablet by mouth Daily for 180 days., Disp: 30 tablet, Rfl: 5    warfarin (COUMADIN) 1 MG tablet, Take 1 tablet by mouth 3 (Three) Times a Week., Disp: , Rfl:     warfarin (COUMADIN) 5 MG tablet, Take 1 tablet by mouth Daily., Disp: , Rfl:       Physical Exam:  I have reviewed the patient's current vital signs as documented in the patient's EMR.   Vitals:    01/03/25 1027   BP: 114/63   Pulse: 94   SpO2: 97%         Body mass index is 24.78 kg/m².       01/03/25  1027   Weight: 92.4 kg (203 lb 9.6 oz)          Physical Exam  Vitals and nursing note reviewed.   Constitutional:       General: He is awake.      Appearance: Normal appearance. He is well-developed and well-groomed.      Comments: Chronically ill-appearing   HENT:      Head: Normocephalic and atraumatic.   Eyes:      General: Lids are normal.      Extraocular Movements:      Right eye: Normal extraocular motion.      Left eye: Normal extraocular motion.      Conjunctiva/sclera:      Right eye: Right conjunctiva is not injected.      Left eye: Left conjunctiva is not injected.   Cardiovascular:      Rate and Rhythm: Normal rate and regular rhythm.   Pulmonary:      Breath sounds: No decreased breath sounds, wheezing, rhonchi or rales.   Abdominal:      General: Bowel sounds are normal.      Palpations: Abdomen is soft.   Musculoskeletal:      Right lower leg: No edema.      Left lower leg: No edema.   Neurological:      Mental Status: He is alert and oriented to person, place, and time. Mental status is at baseline.   Psychiatric:         Attention and Perception: Attention normal.         Mood and Affect: Mood normal.         Behavior: Behavior is cooperative.           JVP: Volume/Pulsation: Normal.        DATA REVIEWED:       ---------------------------------------------------  TTE/DEYANIRA:  Results for orders placed during the hospital encounter of 06/09/24    Adult Transthoracic Echo Complete w/ Color, Spectral and Contrast if Necessary Per Protocol    Interpretation Summary    The study is technically difficult for diagnosis. The quality of the study is limited with poor acoustic windows.    Normal left ventricular cavity size noted.    The following left ventricular wall segments are dyskinetic: apical lateral, apical septal and apex.    Left ventricular ejection fraction appears to be 36 - 40%.    The aortic valve exhibits sclerosis. There is mild calcification of the aortic valve. The aortic valve was poorly visualized but appears trileaflet.    Trace aortic valve regurgitation is present. No hemodynamically significant aortic valve stenosis is present.    There is mild calcification of the mitral valve. Mild mitral valve regurgitation is present. No significant mitral valve stenosis is present.    Mild tricuspid valve regurgitation is present. Estimated right ventricular systolic pressure from tricuspid regurgitation is mildly elevated (35-45 mmHg).    There is no evidence of pericardial effusion. .        LAST HEART CATH/IF AVAILABLE:     No results found for this or any previous visit.      -----------------------------------------------------  CXR/Imaging:   Imaging Results (Most Recent)       None            I personally reviewed and interpreted the CXR.      -----------------------------------------------------  CT:   No radiology results for the last 30 days.  I personally reviewed the images of the CT scan.  My personal interpretation is below.      ----------------------------------------------------    --------------------------------------------------------------------------------------------------    Laboratory evaluations:    Lab Results   Component Value Date  "   GLUCOSE 85 01/03/2025    BUN 11 01/03/2025    CREATININE 1.03 01/03/2025    EGFRIFNONA 73 05/14/2021    BCR 10.7 01/03/2025    K 4.0 01/03/2025    CO2 26.5 01/03/2025    CALCIUM 8.9 01/03/2025    ALBUMIN 3.7 09/25/2024    AST 15 09/25/2024    ALT 7 09/25/2024     Lab Results   Component Value Date    WBC 4.83 12/05/2024    HGB 11.7 (L) 12/05/2024    HCT 36.6 (L) 12/05/2024    .4 (H) 12/05/2024     12/05/2024     No results found for: \"CHOL\", \"CHLPL\", \"TRIG\", \"HDL\", \"LDL\", \"LDLDIRECT\"  Lab Results   Component Value Date    TSH 1.250 06/10/2024     No results found for: \"HGBA1C\"  Lab Results   Component Value Date    ALT 7 09/25/2024     No results found for: \"HGBA1C\"  Lab Results   Component Value Date    CREATININE 1.03 01/03/2025     No results found for: \"IRON\", \"TIBC\", \"FERRITIN\"  Lab Results   Component Value Date    INR 1.15 (H) 12/05/2024    INR 2.28 (H) 10/01/2024    INR 3.02 (H) 09/30/2024    PROTIME 14.8 (H) 12/05/2024    PROTIME 25.2 (H) 10/01/2024    PROTIME 31.3 (H) 09/30/2024        Lab Results   Component Value Date    ABSOLUTELUNG 24 01/03/2025    ABSOLUTELUNG 25 11/01/2024    ABSOLUTELUNG 27 09/06/2024             1. 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 Ischemic cardiomyopathy, LV ejection fraction around 45 to 50%          ORDERS PLACED TODAY:  Orders Placed This Encounter   Procedures    ReDs Vest    Basic Metabolic Panel    Magnesium    proBNP    Basic Metabolic Panel    Magnesium    proBNP        Diagnoses and all orders for this visit:    1. 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, 46 to 50%) (Primary)  -     Basic Metabolic Panel; Future  -     Magnesium; Future  -     proBNP; Future  -     Basic Metabolic Panel; Standing  -     Basic Metabolic Panel  -     Magnesium; Standing  -     Magnesium  -     proBNP; Standing  -     proBNP  -     ReDs Vest  -     Vericiguat (Verquvo) 5 MG tablet; Take 1 tablet by mouth Daily " for 180 days.  Dispense: 30 tablet; Refill: 5    2. Ischemic cardiomyopathy, LV ejection fraction around 45 to 50%  -     Vericiguat (Verquvo) 5 MG tablet; Take 1 tablet by mouth Daily for 180 days.  Dispense: 30 tablet; Refill: 5    Other orders  -     bumetanide (BUMEX) 1 MG tablet; Take 1 tablet by mouth Daily for 30 days. Resume 2 days post discharge or sooner if lower extremity swelling returns  Dispense: 30 tablet; Refill: 3  -     sacubitril-valsartan (ENTRESTO) 24-26 MG tablet; Take 1/2 tablet by mouth Every 12 (Twelve) Hours. Hold medication if top number of blood pressure is less than 100.  Dispense: 60 tablet; Refill: 5             MEDS ORDERED TODAY:    New Medications Ordered This Visit   Medications    bumetanide (BUMEX) 1 MG tablet     Sig: Take 1 tablet by mouth Daily for 30 days. Resume 2 days post discharge or sooner if lower extremity swelling returns     Dispense:  30 tablet     Refill:  3    sacubitril-valsartan (ENTRESTO) 24-26 MG tablet     Sig: Take 1/2 tablet by mouth Every 12 (Twelve) Hours. Hold medication if top number of blood pressure is less than 100.     Dispense:  60 tablet     Refill:  5    Vericiguat (Verquvo) 5 MG tablet     Sig: Take 1 tablet by mouth Daily for 180 days.     Dispense:  30 tablet     Refill:  5        ---------------------------------------------------------------------------------------------------------------------------          ASSESSMENT/PLAN:      Diagnosis Plan   1. 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, 46 to 50%)  Basic Metabolic Panel    Magnesium    proBNP    Basic Metabolic Panel    Basic Metabolic Panel    Magnesium    Magnesium    proBNP    proBNP    ReDs Vest    Vericiguat (Verquvo) 5 MG tablet      2. Ischemic cardiomyopathy, LV ejection fraction around 45 to 50%  Vericiguat (Verquvo) 5 MG tablet          not acutely decompensated chronic moderate systolic and diastolic heart failure. CHF.     NYHA stage Stage C: Structural  heart disease is present AND symptoms have occurredFC-Class III: Marked limitation of physical activity. Comfortable at rest. Less than ordinary activity causes fatigue, palpitation, or dyspnea.     Today, Patient is approaching euvolemiaand with  Moderate perfusion. The patient's hemodynamics are currently acceptable. HR is: normal and is at goal. BP/MAP was reviewed and there isroom for medication up-titration.  Clinical trajectory was assessed and haswaxed and waned.     CHF GOAL DIRECTED MEDICAL THERAPY FOR PATIENT ADDRESSED/ADJUSTED:     GDMT: HFrEF    Drug Class   Drug   Dose Last Dose Adjustment Notes   ACEi/ARB/ARNI Entresto 24-26 mg BID 1/2 tab     Beta Blocker        MRA HypoTN      SGLT2i Freq UTIs   N/A   Secondaries if applicable:  Verquevo 5mg       -CHF Specific BB:    Toprol XL stopped by another provider 2/2 hypotension.  Will attempt to restart low dose Toprol XL or Coreg on next visit.    We discussed processes/benefits of HF clinic including nursing, pharmacist, and provider evaluation during each visit with ability for in office ReDS vest, labs, and ability to provide IV diuresis in the clinic with close outpatient monitoring.  Additionally, patient was educated about the availability of delivery of medications to patient's clinic room prior to leaving the building which assists with medication compliance and insures medications are in hands when changes are made (if patient opts for apothecary usage) with thorough guidance regarding changes and medication schedule provided.          -ACE/ARB/ARNi:   Continue Entresto with patient no longer missing evening dose.  We have expanded the hold parameter and discussed with daughter to hold if top number blood pressure is less than 100.    -MRA:   Consider and future visit but at present our ability to add is limited secondary to hypotension asked patient to bring blood pressure log with next visit to further review record.     -SGLT2 inhibitor  therapy:   Previously on Jardiance but began having frequent UTIs.  Will hold off on further SGLT2i at present as risk of frequent UTIs likely outweighs benefit and patient is 89 years old.    -Diuretic regimen:   ReDS Vest reading for. 01/06/25 is  24; ReDs Vest reading reviewed with patient.    Continiue Bumex 2mg daily at present.   BMP, Mag, & ProBNP obtained & reviewed with patient.  ProBNP remains WNL.      -Fluid restriction/Sodium restriction:   Requested 2000 ml restriction  Patient has been asked to weigh daily and was provided with a printed diuretic strategy.  1,500 mg Na restriction was discussed.      -Essential HTN  Entresto 24-26mg BID on board.   Continue monitoring.     -Chronic anticoagulation with Coumadin:   -Atrial flutter with ventricular paced rhythm:   Continue Coumadin with management per PCP.   Continue f/u with [facility].       -ASCVD with prior directional arthrectomy and bare metal stent, stable without chest pain:   -Hyperlipidemia:    Continue ASA & statin.   Continue f/u with [facility] (02/05/25).         --------------------------------------------------------------------------------        BMI is within normal parameters. No other follow-up for BMI required.            This document has been electronically signed by Rosa Gavin PA-C  January 6, 2025 09:50 EST      Dictated Utilizing Dragon Dictation: Part of this note may be an electronic transcription/translation of spoken language to printed text using the Dragon Dictation System.    Follow-up appointment and medication changes provided in hand delivered After Visit Summary as well as reviewed in the room.

## 2025-01-03 NOTE — PROGRESS NOTES
Heart Failure Clinic  Pharmacist Note     Alberto Guzman is a 89 y.o. male seen in the Heart Failure Clinic for HFrEF with most recent EF of 36-40% on 6/10/24.      Alberto Guzman reports a fair understanding of medications.  He is accompanied by his daughter today who manages his medications and takes care of him altogether.     Patient denies any swelling with taking Bumex 1mg daily and a total of 2mg on Tuesdays and Thursdays. He reports some Sob with activity.      His daughter reports that she has been giving him the 1/2 tablet of Entresto bid and Verquvo 5mg daily now and he has tolerated it well. He denies any episodes of lightheadedness. She reports that his SBP's have been in the 100's/50-60's range and he is doing well.       Medication Use:   Hx of med intolerances:  None related to HF  Retail Rx Management: [facility]- has rig[employer]to approved through 5/17/25 for Verquvo and Entresto- ship to patient    Past Medical History:   Diagnosis Date    Arthritis     Atrial flutter     Back pain     Benign prostatic hyperplasia     Bladder tumor     Cancer     [id number]/melanoma    Chronic systolic heart failure 09/14/2021    Colon cancer     Coronary artery disease     DVT (deep venous thrombosis)     r leg    Elevated cholesterol     Heart attack     1993    Hypertension     Numbness     feet/hands    Osteoporosis     PVD (peripheral vascular disease)     Rheumatoid arthritis     Stroke     Ventricular tachycardia      ALLERGIES: Patient has no known allergies.  Current Outpatient Medications   Medication Sig Dispense Refill    aspirin 81 MG tablet Take 1 tablet by mouth Daily. 30 tablet 11    atorvastatin (LIPITOR) 20 MG tablet Take 1 tablet by mouth Every Night.      bumetanide (BUMEX) 1 MG tablet Take 1 tablet by mouth Daily for 30 days. Resume 2 days post discharge or sooner if lower extremity swelling returns 30 tablet 3    COLLAGEN-VITAMIN C PO Take 1 capsule by mouth Daily.      gabapentin (NEURONTIN) 600  "MG tablet Take 1 tablet by mouth Every Night.      HYDROcodone-acetaminophen (NORCO)  MG per tablet Take 1 tablet by mouth Every 6 (Six) Hours As Needed for Moderate Pain.      pantoprazole (PROTONIX) 40 MG EC tablet Take 1 tablet by mouth Daily.      sacubitril-valsartan (ENTRESTO) 24-26 MG tablet Take 1/2 tablet by mouth Every 12 (Twelve) Hours. Hold medication if top number of blood pressure is less than 100. 60 tablet 5    tamsulosin (FLOMAX) 0.4 MG capsule 24 hr capsule Take 1 capsule by mouth Daily.      Vericiguat (Verquvo) 5 MG tablet Take 1 tablet by mouth Daily for 180 days. 30 tablet 5    warfarin (COUMADIN) 1 MG tablet Take 1 tablet by mouth 3 (Three) Times a Week.      warfarin (COUMADIN) 5 MG tablet Take 1 tablet by mouth Daily.       No current facility-administered medications for this encounter.       Vaccination History:   Pneumonia: reports UTD  Annual Influenza: wUTD 24/25 season  Shingles: reports UTD    Objective  Vitals:    01/03/25 1027   BP: 114/63   BP Location: Left arm   Patient Position: Sitting   Cuff Size: Adult   Pulse: 94   SpO2: 97%   Weight: 92.4 kg (203 lb 9.6 oz)   Height: 193 cm (76\")     Wt Readings from Last 3 Encounters:   01/03/25 92.4 kg (203 lb 9.6 oz)   12/05/24 90.7 kg (200 lb)   11/11/24 89.4 kg (197 lb)         01/03/25  1027   Weight: 92.4 kg (203 lb 9.6 oz)     Lab Results   Component Value Date    GLUCOSE 85 01/03/2025    BUN 11 01/03/2025    CREATININE 1.03 01/03/2025    EGFRIFNONA 73 05/14/2021    BCR 10.7 01/03/2025    K 4.0 01/03/2025    CO2 26.5 01/03/2025    CALCIUM 8.9 01/03/2025    ALBUMIN 3.7 09/25/2024    AST 15 09/25/2024    ALT 7 09/25/2024     Lab Results   Component Value Date    WBC 4.83 12/05/2024    HGB 11.7 (L) 12/05/2024    HCT 36.6 (L) 12/05/2024    .4 (H) 12/05/2024     12/05/2024     Lab Results   Component Value Date    CKTOTAL 39 09/24/2024    TROPONINT 31 (H) 09/24/2024     Lab Results   Component Value Date    PROBNP " 502.6 01/03/2025     Results for orders placed during the hospital encounter of 06/09/24    Adult Transthoracic Echo Complete w/ Color, Spectral and Contrast if Necessary Per Protocol    Interpretation Summary    The study is technically difficult for diagnosis. The quality of the study is limited with poor acoustic windows.    Normal left ventricular cavity size noted.    The following left ventricular wall segments are dyskinetic: apical lateral, apical septal and apex.    Left ventricular ejection fraction appears to be 36 - 40%.    The aortic valve exhibits sclerosis. There is mild calcification of the aortic valve. The aortic valve was poorly visualized but appears trileaflet.    Trace aortic valve regurgitation is present. No hemodynamically significant aortic valve stenosis is present.    There is mild calcification of the mitral valve. Mild mitral valve regurgitation is present. No significant mitral valve stenosis is present.    Mild tricuspid valve regurgitation is present. Estimated right ventricular systolic pressure from tricuspid regurgitation is mildly elevated (35-45 mmHg).    There is no evidence of pericardial effusion. .         GDMT    Drug Class   Drug   Dose Last Dose Adjustment Additional Titration   Notes   ACEi/ARB/ARNI Entresto 24/26mg 1/2  8/9/24 1/2 bid     Beta Blocker      MRA        SGLT2i  UTI with it     Verquvo 5mg 9/6/24 increased         NO recommendations at this time.         Patient was educated on heart failure medications and the importance of medication adherence. All questions were addressed and patient expressed understanding. Used teach-back method to assess understanding.     Thank you for allowing me to participate in the care of your patient,    Keyanna Tinoco, PharmD  01/03/25  14:36 EST

## 2025-01-03 NOTE — PROGRESS NOTES
Heart Failure Clinic    Date: 01/03/25     Vitals:    01/03/25 1027   BP: 114/63   Pulse: 94   SpO2: 97%    Weight 203    Method of arrival: Ambulatory with cane    Weighing self daily: Most days    Monitoring Heart Failure Zones: No    Today's HF Zone: Green    Taking medications as prescribed: Yes    Edema No    Shortness of Air: Yes with activity    Number of pillows used at night:<2    Educational Materials given:  AVS                                                                         ReDS Value: 24  21-24 Low Normal      Candice Borges MA 01/03/25 10:32 EST

## 2025-01-17 ENCOUNTER — SPECIALTY PHARMACY (OUTPATIENT)
Dept: PHARMACY | Facility: HOSPITAL | Age: OVER 89
End: 2025-01-17
Payer: MEDICARE

## 2025-02-05 ENCOUNTER — OFFICE VISIT (OUTPATIENT)
Dept: CARDIOLOGY | Facility: CLINIC | Age: OVER 89
End: 2025-02-05
Payer: MEDICARE

## 2025-02-05 VITALS
OXYGEN SATURATION: 99 % | SYSTOLIC BLOOD PRESSURE: 122 MMHG | DIASTOLIC BLOOD PRESSURE: 68 MMHG | WEIGHT: 209 LBS | HEIGHT: 75 IN | HEART RATE: 88 BPM | BODY MASS INDEX: 25.99 KG/M2

## 2025-02-05 DIAGNOSIS — I50.22 CHRONIC HFREF (HEART FAILURE WITH REDUCED EJECTION FRACTION): ICD-10-CM

## 2025-02-05 DIAGNOSIS — I25.10 CORONARY ARTERY DISEASE INVOLVING NATIVE CORONARY ARTERY OF NATIVE HEART WITHOUT ANGINA PECTORIS: ICD-10-CM

## 2025-02-05 DIAGNOSIS — I25.5 ISCHEMIC CARDIOMYOPATHY: Primary | ICD-10-CM

## 2025-02-05 DIAGNOSIS — E78.5 HYPERLIPIDEMIA LDL GOAL <70: ICD-10-CM

## 2025-02-05 DIAGNOSIS — I10 ESSENTIAL HYPERTENSION: ICD-10-CM

## 2025-02-05 DIAGNOSIS — Z95.0 PRESENCE OF CARDIAC PACEMAKER: ICD-10-CM

## 2025-02-05 DIAGNOSIS — I48.92 ATRIAL FLUTTER WITH CONTROLLED RESPONSE: ICD-10-CM

## 2025-02-05 NOTE — PROGRESS NOTES
subjective   No chief complaint on file.      Problems Addressed This Visit  1. Ischemic cardiomyopathy, LV ejection fraction around 45 to 50%    2. Coronary artery disease, anterior wall myocardial infarction with directional atherectomy of LAD in 1983 and bare-metal stent to the LAD in 1995, nonobstructive disease 2015    3. 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 Atrial flutter , ventricular paced rhythm    5. Essential hypertension    6. Hyperlipidemia LDL goal <70    7. Presence of cardiac pacemaker 6/3/2025        History of Present Illness    Patient is 89 years old gentleman who recently had pacemaker generator replacement.  He is here for follow-up.    He is doing very well denies any chest pain palpitations or shortness of breath.  He denies any dizziness syncope or presyncope.    Coronary artery disease status post prior anterior wall myocardial infarction requiring bare-metal stent in LAD atherectomy.    Chronic HFrEF with ejection fraction 45 to 50%  He is doing very well on Verquvo Entresto Bumex  He still has a lot of bilateral lower extremity edema, he is following with heart failure clinic  He was advised to keep a record of his blood pressure heart rate and weight.    Patient has history of atrial flutter chronic anticoagulation with Coumadin.  Patient could not afford Eliquis.  He was very noncompliant with the pro time INR.  He is currently getting his INR checked by his PCP.  INR was subtherapeutic Coumadin apparently was increased and he plans to have lab work done again Monday.  He was advised discontinue aspirin.    Hyperlipidemia  He is on statin therapy with Lipitor.  Goal of secondary prevention LDL less than 55.  Past Surgical History:   Procedure Laterality Date    CATARACT EXTRACTION Bilateral     CHOLECYSTECTOMY      COLON RESECTION      COLONOSCOPY      CORONARY ANGIOPLASTY WITH STENT PLACEMENT  1995    X1    EXPLORATORY LAPAROTOMY N/A 06/12/2024     Procedure: LAPAROTOMY EXPLORATORY;  Surgeon: Deo Lennon MD;  Location: [facility] OR;  Service: General;  Laterality: N/A;    EXPLORATORY LAPAROTOMY N/A 9/26/2024    Procedure: LAPAROTOMY EXPLORATORY, REDUCTION OF INTERNAL HERNIA, END COLOSTOMY CREATION;  Surgeon: Deo Lennon MD;  Location: [facility] OR;  Service: General;  Laterality: N/A;    HEMORROIDECTOMY      HERNIA REPAIR      left inguinal/umbilical    HIP ARTHROPLASTY Right     INSERT / REPLACE / REMOVE PACEMAKER      JOINT REPLACEMENT Right     HIP    PACEMAKER IMPLANTATION      PACEMAKER IMPLANTATION N/A 12/5/2024    Procedure: Pacemaker SC generator change;  Surgeon: Eyad Worrell MD;  Location: [facility] CATH INVASIVE LOCATION;  Service: Cardiology;  Laterality: N/A;    PROSTATE SURGERY      TRANSURETHRAL RESECTION OF BLADDER TUMOR N/A 04/18/2018    Procedure: CYSTOSCOPY TRANSURETHRAL RESECTION OF SMALL BLADDER TUMOR;  Surgeon: Alfonso Collins MD;  Location: [facility] OR;  Service: Urology    TRANSURETHRAL RESECTION OF BLADDER TUMOR N/A 08/29/2018    Procedure: CYSTOSCOPY TRANSURETHRAL RESECTION OF BLADDER TUMOR;  Surgeon: Alfonso Collins MD;  Location: [facility] OR;  Service: Urology     Family History   Problem Relation Age of Onset    No Known Problems Mother     No Known Problems Father     Heart disease Brother      Past Medical History:   Diagnosis Date    Arthritis     Atrial flutter     Back pain     Benign prostatic hyperplasia     Bladder tumor     Cancer     [id number]    Chronic systolic heart failure 09/14/2021    Colon cancer     Coronary artery disease     DVT (deep venous thrombosis)     r leg    Elevated cholesterol     Heart attack     1993    Hypertension     Numbness     feet/hands    Osteoporosis     PVD (peripheral vascular disease)     Rheumatoid arthritis     Stroke     Ventricular tachycardia      Patient Active Problem List   Diagnosis    Urinary retention    Bladder neck contracture    Bladder  tumor    Aftercare following surgery of the genitourinary system    Atrial flutter , ventricular paced rhythm    Presence of cardiac pacemaker 6/3/2025    Hyperlipidemia LDL goal <55    Essential hypertension    Chronic anticoagulation with Coumadin    Ventricular tachycardia (paroxysmal)    Asymptomatic PVD (peripheral vascular disease)    Coronary artery disease, anterior wall myocardial infarction with directional atherectomy of LAD in 1983 and bare-metal stent to the LAD in 1995, nonobstructive disease 2015    Ischemic cardiomyopathy, LV ejection fraction around 45 to 50%    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, 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%)    Bilateral lower extremity edema    Stroke (cerebrum)    Small bowel obstruction    Hypokalemia    Hypomagnesemia    Partial small bowel obstruction    Pacemaker end of life       Social History     Tobacco Use    Smoking status: Former     Types: Cigarettes     Passive exposure: Past    Smokeless tobacco: Never   Vaping Use    Vaping status: Never Used   Substance Use Topics    Alcohol use: Yes     Comment: Occasional few times a year    Drug use: No       No Known Allergies    Current Outpatient Medications on File Prior to Visit   Medication Sig    atorvastatin (LIPITOR) 20 MG tablet Take 1 tablet by mouth Every Night.    COLLAGEN-VITAMIN C PO Take 1 capsule by mouth Daily.    gabapentin (NEURONTIN) 600 MG tablet Take 1 tablet by mouth Every Night.    HYDROcodone-acetaminophen (NORCO)  MG per tablet Take 1 tablet by mouth Every 6 (Six) Hours As Needed for Moderate Pain.    pantoprazole (PROTONIX) 40 MG EC tablet Take 1 tablet by mouth Daily.    sacubitril-valsartan (ENTRESTO) 24-26 MG tablet Take 1/2 tablet by mouth Every 12 (Twelve) Hours. Hold medication if top number of blood pressure is less than 100.    tamsulosin (FLOMAX) 0.4 MG capsule 24 hr capsule Take 1 capsule by mouth Daily.    Vericiguat (Verquvo) 5 MG tablet Take 1 tablet by mouth Daily     warfarin (COUMADIN) 1 MG tablet Take 1 tablet by mouth 3 (Three) Times a Week.    warfarin (COUMADIN) 5 MG tablet Take 1 tablet by mouth Daily.    [DISCONTINUED] aspirin 81 MG tablet Take 1 tablet by mouth Daily.    bumetanide (BUMEX) 1 MG tablet Take 1 tablet by mouth Daily for 30 days. Resume 2 days post discharge or sooner if lower extremity swelling returns     No current facility-administered medications on file prior to visit.     (Not in a hospital admission)      Results for orders placed during the hospital encounter of 06/09/24    Adult Transthoracic Echo Complete w/ Color, Spectral and Contrast if Necessary Per Protocol    Interpretation Summary    The study is technically difficult for diagnosis. The quality of the study is limited with poor acoustic windows.    Normal left ventricular cavity size noted.    The following left ventricular wall segments are dyskinetic: apical lateral, apical septal and apex.    Left ventricular ejection fraction appears to be 36 - 40%.    The aortic valve exhibits sclerosis. There is mild calcification of the aortic valve. The aortic valve was poorly visualized but appears trileaflet.    Trace aortic valve regurgitation is present. No hemodynamically significant aortic valve stenosis is present.    There is mild calcification of the mitral valve. Mild mitral valve regurgitation is present. No significant mitral valve stenosis is present.    Mild tricuspid valve regurgitation is present. Estimated right ventricular systolic pressure from tricuspid regurgitation is mildly elevated (35-45 mmHg).    There is no evidence of pericardial effusion. .            The following portions of the patient's history were reviewed and updated as appropriate: allergies, current medications, past family history, past medical history, past social history, past surgical history and problem list.    Review of Systems   Constitutional: Positive for malaise/fatigue.   HENT: Negative.  Negative  "for congestion.    Eyes: Negative.    Cardiovascular:  Positive for dyspnea on exertion and leg swelling. Negative for chest pain, cyanosis, irregular heartbeat, near-syncope, orthopnea, palpitations, paroxysmal nocturnal dyspnea and syncope.   Respiratory:  Positive for shortness of breath.    Hematologic/Lymphatic: Negative.    Musculoskeletal: Negative.    Gastrointestinal: Negative.    Neurological: Negative.  Negative for headaches.          Objective:     /68 (BP Location: Left arm, Patient Position: Sitting, Cuff Size: Adult)   Pulse 88   Ht 190.5 cm (75\")   Wt 94.8 kg (209 lb)   SpO2 99%   BMI 26.12 kg/m²   Cardiovascular:      PMI at left midclavicular line. Normal rate. Regular rhythm. Normal S1. Normal S2.       Murmurs: There is no murmur.      No gallop.  No click. No rub.   Pulses:     Intact distal pulses.   Edema:     Peripheral edema present.     Pretibial: 4+ edema of the left pretibial area and 2+ edema of the right pretibial area.     Ankle: 3+ edema of the left ankle and 2+ edema of the right ankle.     Feet: 3+ edema of the left foot and 2+ edema of the right foot.          Lab Review  Lab Results   Component Value Date     01/03/2025    K 4.0 01/03/2025     01/03/2025    BUN 11 01/03/2025    CREATININE 1.03 01/03/2025    GLUCOSE 85 01/03/2025    CALCIUM 8.9 01/03/2025    ALT 7 09/25/2024    ALKPHOS 73 09/25/2024     Lab Results   Component Value Date    CKTOTAL 39 09/24/2024     Lab Results   Component Value Date    WBC 4.83 12/05/2024    HGB 11.7 (L) 12/05/2024    HCT 36.6 (L) 12/05/2024     12/05/2024     Lab Results   Component Value Date    INR 1.15 (H) 12/05/2024     Lab Results   Component Value Date    MG 1.8 01/03/2025     Lab Results   Component Value Date    TSH 1.250 06/10/2024     No results found for: \"BNP\"  No results found for: \"CHLPL\", \"CHOL\", \"TRIG\", \"HDL\", \"VLDL\", \"LDL\"  No results found for: \"LDL\"  Lab Results   Component Value Date    PROBNP " 502.6 01/03/2025               Procedures       I personally viewed and interpreted the patient's LAB data         Assessment:     1. Ischemic cardiomyopathy, LV ejection fraction around 45 to 50%    2. Coronary artery disease, anterior wall myocardial infarction with directional atherectomy of LAD in 1983 and bare-metal stent to the LAD in 1995, nonobstructive disease 2015    3. 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 Atrial flutter , ventricular paced rhythm    5. Essential hypertension    6. Hyperlipidemia LDL goal <70    7. Presence of cardiac pacemaker 6/3/2025          Plan:     Patient is 89 years old white male with known coronary artery disease with prior anterior wall myocardial infarction and coronary artery stents  He denies any chest pain or palpitations however he complains of mild bilateral lower extremity edema.  Left leg is swollen a lot worse than the right.  Shortness of breath is better.  Denies any chest pain he was advised to discontinue aspirin because he is taking Coumadin.  He will continue statin therapy along with Coumadin.    Chronic HFrEF  He was advised to continue Verquvo Bumex Entresto.  Patient will check his weight closely.  May need to add Aldactone and Farxiga.  proBNP is good at 502    Hyperlipidemia secondary prevention LDL goal of 55.  He is following closely with his PCP and is on Lipitor.  He plans to have lab work with next office visit.    Hypertension is very well-controlled with Entresto.  Follow-up scheduled            No follow-ups on file.

## 2025-02-05 NOTE — LETTER
February 5, 2025     FLOWER Pickard  [address]  [place] [address]    Patient: Alberto Guzman   YOB: 1931   Date of Visit: 2/5/2025       Dear FLOWER Pickard    Alberto Guzman was in my office today. Below is a copy of my note.    If you have questions, please do not hesitate to call me. I look forward to following Alberto along with you.         Sincerely,        Selam lAcaraz MD        CC: No Recipients    subjective   No chief complaint on file.      Problems Addressed This Visit  No diagnosis found.    History of Present Illness          Past Surgical History:   Procedure Laterality Date   • CATARACT EXTRACTION Bilateral    • CHOLECYSTECTOMY     • COLON RESECTION     • COLONOSCOPY     • CORONARY ANGIOPLASTY WITH STENT PLACEMENT  1995    X1   • EXPLORATORY LAPAROTOMY N/A 06/12/2024    Procedure: LAPAROTOMY EXPLORATORY;  Surgeon: Deo Lennon MD;  Location: [facility];  Service: General;  Laterality: N/A;   • EXPLORATORY LAPAROTOMY N/A 9/26/2024    Procedure: LAPAROTOMY EXPLORATORY, REDUCTION OF INTERNAL HERNIA, END COLOSTOMY CREATION;  Surgeon: Deo Lennon MD;  Location: [facility] OR;  Service: General;  Laterality: N/A;   • HEMORROIDECTOMY     • HERNIA REPAIR      left inguinal/umbilical   • HIP ARTHROPLASTY Right    • INSERT / REPLACE / REMOVE PACEMAKER     • JOINT REPLACEMENT Right     HIP   • PACEMAKER IMPLANTATION     • PACEMAKER IMPLANTATION N/A 12/5/2024    Procedure: Pacemaker SC generator change;  Surgeon: Eyad Worrell MD;  Location: [facility] CATH INVASIVE LOCATION;  Service: Cardiology;  Laterality: N/A;   • PROSTATE SURGERY     • TRANSURETHRAL RESECTION OF BLADDER TUMOR N/A 04/18/2018    Procedure: CYSTOSCOPY TRANSURETHRAL RESECTION OF SMALL BLADDER TUMOR;  Surgeon: Alfonso Collins MD;  Location: [facility] OR;  Service: Urology   • TRANSURETHRAL RESECTION OF BLADDER TUMOR N/A 08/29/2018    Procedure: CYSTOSCOPY TRANSURETHRAL  RESECTION OF BLADDER TUMOR;  Surgeon: Alfonso Collins MD;  Location: [facility];  Service: Urology     Family History   Problem Relation Age of Onset   • No Known Problems Mother    • No Known Problems Father    • Heart disease Brother      Past Medical History:   Diagnosis Date   • Arthritis    • Atrial flutter    • Back pain    • Benign prostatic hyperplasia    • Bladder tumor    • Cancer     [id number]   • Chronic systolic heart failure 09/14/2021   • Colon cancer    • Coronary artery disease    • DVT (deep venous thrombosis)     r leg   • Elevated cholesterol    • Heart attack     1993   • Hypertension    • Numbness     feet/hands   • Osteoporosis    • PVD (peripheral vascular disease)    • Rheumatoid arthritis    • Stroke    • Ventricular tachycardia      Patient Active Problem List   Diagnosis   • Urinary retention   • Bladder neck contracture   • Bladder tumor   • Aftercare following surgery of the genitourinary system   • Atrial flutter , ventricular paced rhythm   • Presence of cardiac pacemaker 6/3/2025   • Hyperlipidemia LDL goal <70   • Essential hypertension   • Chronic anticoagulation with Coumadin   • Ventricular tachycardia (paroxysmal)   • Asymptomatic PVD (peripheral vascular disease)   • Coronary artery disease, anterior wall myocardial infarction with directional atherectomy of LAD in 1983 and bare-metal stent to the LAD in 1995, nonobstructive disease 2015   • Ischemic cardiomyopathy, LV ejection fraction around 45 to 50%   • 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, 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%)   • Bilateral lower extremity edema   • Stroke (cerebrum)   • Small bowel obstruction   • Hypokalemia   • Hypomagnesemia   • Partial small bowel obstruction   • Pacemaker end of life       Social History     Tobacco Use   • Smoking status: Former     Types: Cigarettes     Passive exposure: Past   • Smokeless tobacco: Never   Vaping Use   • Vaping status: Never Used   Substance Use Topics    • Alcohol use: Yes     Comment: Occasional few times a year   • Drug use: No       No Known Allergies    Current Outpatient Medications on File Prior to Visit   Medication Sig   • aspirin 81 MG tablet Take 1 tablet by mouth Daily.   • atorvastatin (LIPITOR) 20 MG tablet Take 1 tablet by mouth Every Night.   • COLLAGEN-VITAMIN C PO Take 1 capsule by mouth Daily.   • gabapentin (NEURONTIN) 600 MG tablet Take 1 tablet by mouth Every Night.   • HYDROcodone-acetaminophen (NORCO)  MG per tablet Take 1 tablet by mouth Every 6 (Six) Hours As Needed for Moderate Pain.   • pantoprazole (PROTONIX) 40 MG EC tablet Take 1 tablet by mouth Daily.   • sacubitril-valsartan (ENTRESTO) 24-26 MG tablet Take 1/2 tablet by mouth Every 12 (Twelve) Hours. Hold medication if top number of blood pressure is less than 100.   • tamsulosin (FLOMAX) 0.4 MG capsule 24 hr capsule Take 1 capsule by mouth Daily.   • Vericiguat (Verquvo) 5 MG tablet Take 1 tablet by mouth Daily   • warfarin (COUMADIN) 1 MG tablet Take 1 tablet by mouth 3 (Three) Times a Week.   • warfarin (COUMADIN) 5 MG tablet Take 1 tablet by mouth Daily.   • bumetanide (BUMEX) 1 MG tablet Take 1 tablet by mouth Daily for 30 days. Resume 2 days post discharge or sooner if lower extremity swelling returns     No current facility-administered medications on file prior to visit.     (Not in a hospital admission)      Results for orders placed during the hospital encounter of 06/09/24    Adult Transthoracic Echo Complete w/ Color, Spectral and Contrast if Necessary Per Protocol    Interpretation Summary  •  The study is technically difficult for diagnosis. The quality of the study is limited with poor acoustic windows.  •  Normal left ventricular cavity size noted.  •  The following left ventricular wall segments are dyskinetic: apical lateral, apical septal and apex.  •  Left ventricular ejection fraction appears to be 36 - 40%.  •  The aortic valve exhibits sclerosis. There  "is mild calcification of the aortic valve. The aortic valve was poorly visualized but appears trileaflet.  •  Trace aortic valve regurgitation is present. No hemodynamically significant aortic valve stenosis is present.  •  There is mild calcification of the mitral valve. Mild mitral valve regurgitation is present. No significant mitral valve stenosis is present.  •  Mild tricuspid valve regurgitation is present. Estimated right ventricular systolic pressure from tricuspid regurgitation is mildly elevated (35-45 mmHg).  •  There is no evidence of pericardial effusion. .            The following portions of the patient's history were reviewed and updated as appropriate: allergies, current medications, past family history, past medical history, past social history, past surgical history and problem list.    ROS       Objective:     /68 (BP Location: Left arm, Patient Position: Sitting, Cuff Size: Adult)   Pulse 88   Ht 190.5 cm (75\")   Wt 94.8 kg (209 lb)   SpO2 99%   BMI 26.12 kg/m²   Physical Exam      Lab Review  Lab Results   Component Value Date     01/03/2025    K 4.0 01/03/2025     01/03/2025    BUN 11 01/03/2025    CREATININE 1.03 01/03/2025    GLUCOSE 85 01/03/2025    CALCIUM 8.9 01/03/2025    ALT 7 09/25/2024    ALKPHOS 73 09/25/2024     Lab Results   Component Value Date    CKTOTAL 39 09/24/2024     Lab Results   Component Value Date    WBC 4.83 12/05/2024    HGB 11.7 (L) 12/05/2024    HCT 36.6 (L) 12/05/2024     12/05/2024     Lab Results   Component Value Date    INR 1.15 (H) 12/05/2024     Lab Results   Component Value Date    MG 1.8 01/03/2025     Lab Results   Component Value Date    TSH 1.250 06/10/2024     No results found for: \"BNP\"  No results found for: \"CHLPL\", \"CHOL\", \"TRIG\", \"HDL\", \"VLDL\", \"LDL\"  No results found for: \"LDL\"  Lab Results   Component Value Date    PROBNP 502.6 01/03/2025               Procedures       I personally viewed and interpreted the patient's " LAB data         Assessment:   No diagnosis found.      Plan:              No follow-ups on file.

## 2025-02-20 ENCOUNTER — SPECIALTY PHARMACY (OUTPATIENT)
Dept: CARDIOLOGY | Facility: HOSPITAL | Age: OVER 89
End: 2025-02-20
Payer: MEDICARE

## 2025-03-12 ENCOUNTER — TELEPHONE (OUTPATIENT)
Dept: CARDIOLOGY | Facility: CLINIC | Age: OVER 89
End: 2025-03-12
Payer: MEDICARE

## 2025-03-12 NOTE — TELEPHONE ENCOUNTER
Called his daughter and left a voicemail stating I scheduled Alberto for a Pacemaker check at 1:35 pm on may 7th and then he will see Dr Alcaraz for a follow up afterwards.

## 2025-04-04 ENCOUNTER — HOSPITAL ENCOUNTER (OUTPATIENT)
Dept: CARDIOLOGY | Facility: HOSPITAL | Age: OVER 89
Discharge: HOME | End: 2025-04-04
Payer: MEDICARE

## 2025-04-04 VITALS
HEART RATE: 87 BPM | BODY MASS INDEX: 26.61 KG/M2 | HEIGHT: 75 IN | WEIGHT: 214 LBS | DIASTOLIC BLOOD PRESSURE: 58 MMHG | OXYGEN SATURATION: 97 % | SYSTOLIC BLOOD PRESSURE: 122 MMHG

## 2025-04-04 DIAGNOSIS — I50.22 CHRONIC HFREF (HEART FAILURE WITH REDUCED EJECTION FRACTION): Primary | ICD-10-CM

## 2025-04-04 DIAGNOSIS — I25.10 CORONARY ARTERY DISEASE INVOLVING NATIVE CORONARY ARTERY OF NATIVE HEART WITHOUT ANGINA PECTORIS: ICD-10-CM

## 2025-04-04 DIAGNOSIS — I10 ESSENTIAL HYPERTENSION: ICD-10-CM

## 2025-04-04 DIAGNOSIS — I25.5 ISCHEMIC CARDIOMYOPATHY: ICD-10-CM

## 2025-04-04 PROBLEM — M16.12 OSTEOARTHRITIS OF LEFT HIP: Status: ACTIVE | Noted: 2023-01-05

## 2025-04-04 PROBLEM — I73.9 PERIPHERAL VASCULAR DISEASE: Status: ACTIVE | Noted: 2023-12-27

## 2025-04-04 PROBLEM — E26.1 SECONDARY HYPERALDOSTERONISM: Status: ACTIVE | Noted: 2024-02-29

## 2025-04-04 PROBLEM — E11.51 PERIPHERAL ANGIOPATHY DUE TO TYPE 2 DIABETES MELLITUS: Status: ACTIVE | Noted: 2022-08-16

## 2025-04-04 PROBLEM — E11.42 DIABETIC PERIPHERAL NEUROPATHY ASSOCIATED WITH TYPE 2 DIABETES MELLITUS: Status: ACTIVE | Noted: 2022-08-16

## 2025-04-04 LAB
ANION GAP SERPL CALCULATED.3IONS-SCNC: 7.5 MMOL/L (ref 5–15)
BUN SERPL-MCNC: 12 MG/DL (ref 8–23)
BUN/CREAT SERPL: 10.9 (ref 7–25)
CALCIUM SPEC-SCNC: 8.6 MG/DL (ref 8.2–9.6)
CHLORIDE SERPL-SCNC: 104 MMOL/L (ref 98–107)
CO2 SERPL-SCNC: 28.5 MMOL/L (ref 22–29)
CREAT SERPL-MCNC: 1.1 MG/DL (ref 0.76–1.27)
EGFRCR SERPLBLD CKD-EPI 2021: 62.2 ML/MIN/1.73
GLUCOSE SERPL-MCNC: 110 MG/DL (ref 65–99)
MAGNESIUM SERPL-MCNC: 1.8 MG/DL (ref 1.7–2.3)
NT-PROBNP SERPL-MCNC: 550.4 PG/ML (ref 0–1800)
POTASSIUM SERPL-SCNC: 4.1 MMOL/L (ref 3.5–5.2)
SODIUM SERPL-SCNC: 140 MMOL/L (ref 136–145)

## 2025-04-04 PROCEDURE — 94726 PLETHYSMOGRAPHY LUNG VOLUMES: CPT | Performed by: PHYSICIAN ASSISTANT

## 2025-04-04 PROCEDURE — 83880 ASSAY OF NATRIURETIC PEPTIDE: CPT | Performed by: PHYSICIAN ASSISTANT

## 2025-04-04 PROCEDURE — 36415 COLL VENOUS BLD VENIPUNCTURE: CPT | Performed by: PHYSICIAN ASSISTANT

## 2025-04-04 PROCEDURE — 83735 ASSAY OF MAGNESIUM: CPT | Performed by: PHYSICIAN ASSISTANT

## 2025-04-04 PROCEDURE — 80048 BASIC METABOLIC PNL TOTAL CA: CPT | Performed by: PHYSICIAN ASSISTANT

## 2025-04-04 NOTE — PROGRESS NOTES
Heart Failure Clinic  Pharmacist Note     Alberto Guzman is a 89 y.o. male seen in the Heart Failure Clinic for HFrEF with most recent EF of 36-40% on 6/10/24.      Alberto Guzman reports a fair understanding of medications.  He is accompanied by his daughter today who manages his medications and takes care of him altogether.     Patient reports some increase in swelling today in lower extremeties. Patients daughter reports that this has been more prominent since he started physical rehab which he attends 3 days a week and is doing extensive leg exercises with weights. Patients daughter reports that they do elevate the feet above heart level during the day for 30 minute periods to help with the swelling.     His daughter reports that she has continued giving him the 1/2 tablet of Entresto bid and Verquvo 5mg daily now and he has tolerated it well. He denies any episodes of lightheadedness. They have not been checking his BP because they feel he is doing well and were only checking it during the time that she felt the Entresto full dose was too much.     They report having plenty of Entresto to last through May and that they still had ~20 tablets of Verquvo.       Medication Use:   Hx of med intolerances:  None related to HF  Retail Rx Management: [facility]- has murtaza approved through 5/17/25 for Verquvo and Entresto- ship to patient (Patient currently does not have enough in MURTAZA funds to fill Verquvo. Patient has been provided samples until Murtaza can be re-applied for in May).     Past Medical History:   Diagnosis Date    Arthritis     Atrial flutter     Back pain     Benign prostatic hyperplasia     Bladder tumor     Cancer     [id number]    Chronic systolic heart failure 09/14/2021    Colon cancer     Coronary artery disease     DVT (deep venous thrombosis)     r leg    Elevated cholesterol     Heart attack     1993    Hypertension     Numbness     feet/hands    Osteoporosis     PVD (peripheral  "vascular disease)     Rheumatoid arthritis     Stroke     Ventricular tachycardia      ALLERGIES: Patient has no known allergies.  Current Outpatient Medications   Medication Sig Dispense Refill    atorvastatin (LIPITOR) 20 MG tablet Take 1 tablet by mouth Every Night.      bumetanide (BUMEX) 1 MG tablet Take 1 tablet by mouth Daily for 30 days. Resume 2 days post discharge or sooner if lower extremity swelling returns 30 tablet 3    COLLAGEN-VITAMIN C PO Take 1 capsule by mouth Daily.      gabapentin (NEURONTIN) 600 MG tablet Take 1 tablet by mouth Every Night.      HYDROcodone-acetaminophen (NORCO)  MG per tablet Take 1 tablet by mouth Every 6 (Six) Hours As Needed for Moderate Pain.      pantoprazole (PROTONIX) 40 MG EC tablet Take 1 tablet by mouth Daily.      sacubitril-valsartan (ENTRESTO) 24-26 MG tablet Take 1/2 tablet by mouth Every 12 (Twelve) Hours. Hold medication if top number of blood pressure is less than 100. 60 tablet 5    tamsulosin (FLOMAX) 0.4 MG capsule 24 hr capsule Take 1 capsule by mouth Daily.      Vericiguat (Verquvo) 5 MG tablet Take 1 tablet by mouth Daily 30 tablet 5    warfarin (COUMADIN) 1 MG tablet Take 1 tablet by mouth 3 (Three) Times a Week.      warfarin (COUMADIN) 5 MG tablet Take 1 tablet by mouth Daily.       No current facility-administered medications for this encounter.       Vaccination History:   Pneumonia: reports UTD  Annual Influenza: wUTD 24/25 season  Shingles: reports UTD    Objective  Vitals:    04/04/25 1036   BP: 122/58   BP Location: Left arm   Patient Position: Sitting   Cuff Size: Adult   Pulse: 87   SpO2: 97%   Weight: 97.1 kg (214 lb)   Height: 190.5 cm (75\")     Wt Readings from Last 3 Encounters:   04/04/25 97.1 kg (214 lb)   02/05/25 94.8 kg (209 lb)   01/03/25 92.4 kg (203 lb 9.6 oz)         04/04/25  1036   Weight: 97.1 kg (214 lb)     Lab Results   Component Value Date    GLUCOSE 85 01/03/2025    BUN 11 01/03/2025    CREATININE 1.03 01/03/2025    " EGFRIFNONA 73 05/14/2021    BCR 10.7 01/03/2025    K 4.0 01/03/2025    CO2 26.5 01/03/2025    CALCIUM 8.9 01/03/2025    ALBUMIN 3.7 09/25/2024    AST 15 09/25/2024    ALT 7 09/25/2024     Lab Results   Component Value Date    WBC 4.83 12/05/2024    HGB 11.7 (L) 12/05/2024    HCT 36.6 (L) 12/05/2024    .4 (H) 12/05/2024     12/05/2024     Lab Results   Component Value Date    CKTOTAL 39 09/24/2024    TROPONINT 31 (H) 09/24/2024     Lab Results   Component Value Date    PROBNP 502.6 01/03/2025     Results for orders placed during the hospital encounter of 06/09/24    Adult Transthoracic Echo Complete w/ Color, Spectral and Contrast if Necessary Per Protocol    Interpretation Summary    The study is technically difficult for diagnosis. The quality of the study is limited with poor acoustic windows.    Normal left ventricular cavity size noted.    The following left ventricular wall segments are dyskinetic: apical lateral, apical septal and apex.    Left ventricular ejection fraction appears to be 36 - 40%.    The aortic valve exhibits sclerosis. There is mild calcification of the aortic valve. The aortic valve was poorly visualized but appears trileaflet.    Trace aortic valve regurgitation is present. No hemodynamically significant aortic valve stenosis is present.    There is mild calcification of the mitral valve. Mild mitral valve regurgitation is present. No significant mitral valve stenosis is present.    Mild tricuspid valve regurgitation is present. Estimated right ventricular systolic pressure from tricuspid regurgitation is mildly elevated (35-45 mmHg).    There is no evidence of pericardial effusion. .         GDMT    Drug Class   Drug   Dose Last Dose Adjustment Additional Titration   Notes   ACEi/ARB/ARNI Entresto 24/26mg 1/2  8/9/24 1/2 bid     Beta Blocker      MRA        SGLT2i  UTI with it     Verquvo 5mg 9/6/24 increased         Drug Therapy Problems  MURTAZA funds insufficient:  Application date to re-apply is in May     Recommendations   Patient provided with 4 boxes (14 ct each) of Verquvo 5 mg today. Patient daughter reports having enough Entresto (however if ran out prior to the May MURTAZA re-application date, Melinda, Care coordinator, has reported that the funds remaining would cover a fill of Entresto).       Patient was educated on heart failure medications and the importance of medication adherence. All questions were addressed and patient expressed understanding. Used teach-back method to assess understanding.     Thank you for allowing me to participate in the care of your patient,    Brittney Milligan. Jody, PharmD  04/04/25  10:49 EDT

## 2025-04-04 NOTE — PROGRESS NOTES
[facility]  RYAN Viera, Selam Read MD  [address] [place] [address]    Thank you for asking me to see Alberto Guzman for congestive heart failure.    HPI:     This is a 89 y.o. male with known past medical history of:    Chronic HFrEF  Ischemic cardiomyopathy  TTE from June 2024 with EF 36-40%; mild TVR.  Mild mitral valve calcification.    TTE from 2015 with EF 45-50% per [facility] [place] records  ASCVD  Anterior MI with directional atherectomy of LAD in 1983 & Baremetal stent to LAD in 1995  Chronic anticoagulation with Coumadin  Paroxysmal Atrial fib/flutter  PPM placement  PPM gen change in 12/2024  Colon CA s/p radiation  Hx of SBO requiring hospitalization in June 2024    Alberto Guzman presents for today for Heart Failure clinic evlauation.  The patient is typically seen by Lianet Dia APRN.  Patient's primary cardiologist is Dr. Selam Alcaraz.     Last known EF 36-40%.   Last known hospitalization and/or ED visit: Patient was hospitalized in June 2024 secondary to small bowel obstruction secondary to internal hernia he later had ED visit after being sent from doctor's office for hypotension.  Patient hospitalized since last evaluation from September 24 through October 1, 2024 with small bowel obstruction and acute urinary retention.  Patient was taken for ex lap with reduction of internal hernia and end colostomy creation due to patient's recurrence of bowel obstruction.  Patient did have some urinary retention requiring Michael catheter placement during hospitalization with inability to void and empty bladder in spite of bladder training with Michael catheter replaced and referral made for outpatient urology follow-up.  There was an attempt made for chronic anticoagulation with DOAC however given difficulties with insurance he was discharged with Coumadin.  Accompanied by: Daughter          04/04/25 visit data/details regarding:    Dyspnea: Improving dyspnea on exertion  Lower extremity swelling: Lower extremity edema improved but present.    Abdominal swelling: Denies  Home weight: Weight monitoring booklet provided during initial visit; has scale  Home BP: BP monitoring booklet provided during initial visit; has blood pressure cuff  Home heart rate: HR monitoring booklet provided during initial visit;  Daily activities of living: Performing on his own with some assistance from daughter  Pillows/lying flat: Pillow  HF zone: Green  Mr. Eid is doing well from HF standpoint. He is chest pain free and without significant dyspnea on exertion or swelling of his extremities.  He has been going to Physical Rehab in [place], [place] at least 3 days a week. His is tolerating the activity as well as enjoying it.  He reports he really enjoys riding the stationary bicycle, which really is impressive for a 89 year old man at 6'4.         Review of Systems - Review of Systems   Constitutional: Negative for decreased appetite, diaphoresis and fever.   HENT:  Negative for congestion and ear pain.    Eyes:  Negative for blurred vision.   Cardiovascular:  Positive for dyspnea on exertion. Negative for chest pain.   Respiratory:  Positive for shortness of breath. Negative for cough and hemoptysis.    Endocrine: Negative for cold intolerance and polydipsia.   Hematologic/Lymphatic: Negative for adenopathy and bleeding problem.   Skin:  Negative for dry skin and nail changes.   Musculoskeletal:  Negative for arthritis and falls.   Gastrointestinal:  Negative for bloating and anorexia.   Genitourinary:  Negative for bladder incontinence and dysuria.   Neurological:  Negative for aphonia and difficulty with concentration.   Psychiatric/Behavioral:  Negative for altered mental status and hallucinations.    Allergic/Immunologic: Negative for environmental allergies and HIV exposure.         All other systems were reviewed and were negative.    Patient Active Problem  List   Diagnosis    Urinary retention    Bladder neck contracture    Bladder tumor    Aftercare following surgery of the genitourinary system    Atrial flutter , ventricular paced rhythm    Presence of cardiac pacemaker 6/3/2025    Hyperlipidemia LDL goal <55    Essential hypertension    Chronic anticoagulation with Coumadin    Ventricular tachycardia (paroxysmal)    Asymptomatic PVD (peripheral vascular disease)    Coronary artery disease, anterior wall myocardial infarction with directional atherectomy of LAD in 1983 and bare-metal stent to the LAD in 1995, nonobstructive disease 2015    Ischemic cardiomyopathy, LV ejection fraction around 45 to 50%    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, 46 to 50%)    Bilateral lower extremity edema    Stroke (cerebrum)    Small bowel obstruction    Hypokalemia    Hypomagnesemia    Partial small bowel obstruction    Pacemaker end of life       family history includes Heart disease in his brother; No Known Problems in his father and mother.     reports that he has quit smoking. His smoking use included cigarettes. He has been exposed to tobacco smoke. He has never used smokeless tobacco. He reports current alcohol use. He reports that he does not use drugs.    No Known Allergies      Current Outpatient Medications:     atorvastatin (LIPITOR) 20 MG tablet, Take 1 tablet by mouth Every Night., Disp: , Rfl:     bumetanide (BUMEX) 1 MG tablet, Take 1 tablet by mouth Daily for 30 days. Resume 2 days post discharge or sooner if lower extremity swelling returns, Disp: 30 tablet, Rfl: 3    COLLAGEN-VITAMIN C PO, Take 1 capsule by mouth Daily., Disp: , Rfl:     gabapentin (NEURONTIN) 600 MG tablet, Take 1 tablet by mouth Every Night., Disp: , Rfl:     HYDROcodone-acetaminophen (NORCO)  MG per tablet, Take 1 tablet by mouth Every 6 (Six) Hours As Needed for Moderate Pain., Disp: , Rfl:     pantoprazole (PROTONIX) 40 MG EC tablet, Take 1 tablet by mouth Daily.,  Disp: , Rfl:     sacubitril-valsartan (ENTRESTO) 24-26 MG tablet, Take 1/2 tablet by mouth Every 12 (Twelve) Hours. Hold medication if top number of blood pressure is less than 100., Disp: 60 tablet, Rfl: 5    tamsulosin (FLOMAX) 0.4 MG capsule 24 hr capsule, Take 1 capsule by mouth Daily., Disp: , Rfl:     Vericiguat (Verquvo) 5 MG tablet, Take 1 tablet by mouth Daily, Disp: 30 tablet, Rfl: 5    warfarin (COUMADIN) 1 MG tablet, Take 1 tablet by mouth 3 (Three) Times a Week., Disp: , Rfl:     warfarin (COUMADIN) 5 MG tablet, Take 1 tablet by mouth Daily., Disp: , Rfl:       Physical Exam:  I have reviewed the patient's current vital signs as documented in the patient's EMR.   Vitals:    04/04/25 1036   BP: 122/58   Pulse: 87   SpO2: 97%           Body mass index is 26.75 kg/m².       04/04/25  1036   Weight: 97.1 kg (214 lb)            Physical Exam  Vitals and nursing note reviewed.   Constitutional:       General: He is awake.      Appearance: Normal appearance. He is well-developed and well-groomed.      Comments: Chronically ill-appearing   HENT:      Head: Normocephalic and atraumatic.   Eyes:      General: Lids are normal.      Extraocular Movements:      Right eye: Normal extraocular motion.      Left eye: Normal extraocular motion.      Conjunctiva/sclera:      Right eye: Right conjunctiva is not injected.      Left eye: Left conjunctiva is not injected.   Cardiovascular:      Rate and Rhythm: Normal rate and regular rhythm.   Pulmonary:      Breath sounds: No decreased breath sounds, wheezing, rhonchi or rales.   Abdominal:      General: Bowel sounds are normal.      Palpations: Abdomen is soft.   Musculoskeletal:      Comments: L>R edema;  patient reports LLE always swells more than right.     Neurological:      Mental Status: He is alert and oriented to person, place, and time. Mental status is at baseline.   Psychiatric:         Attention and Perception: Attention normal.         Mood and Affect: Mood  normal.         Behavior: Behavior is cooperative.          JVP: Volume/Pulsation: Normal.        DATA REVIEWED:       ---------------------------------------------------  TTE/DEYANIRA:  Results for orders placed during the hospital encounter of 06/09/24    Adult Transthoracic Echo Complete w/ Color, Spectral and Contrast if Necessary Per Protocol    Interpretation Summary    The study is technically difficult for diagnosis. The quality of the study is limited with poor acoustic windows.    Normal left ventricular cavity size noted.    The following left ventricular wall segments are dyskinetic: apical lateral, apical septal and apex.    Left ventricular ejection fraction appears to be 36 - 40%.    The aortic valve exhibits sclerosis. There is mild calcification of the aortic valve. The aortic valve was poorly visualized but appears trileaflet.    Trace aortic valve regurgitation is present. No hemodynamically significant aortic valve stenosis is present.    There is mild calcification of the mitral valve. Mild mitral valve regurgitation is present. No significant mitral valve stenosis is present.    Mild tricuspid valve regurgitation is present. Estimated right ventricular systolic pressure from tricuspid regurgitation is mildly elevated (35-45 mmHg).    There is no evidence of pericardial effusion. .        LAST HEART CATH/IF AVAILABLE:     No results found for this or any previous visit.      -----------------------------------------------------  CXR/Imaging:   Imaging Results (Most Recent)       None            I personally reviewed and interpreted the CXR.      -----------------------------------------------------  CT:   No radiology results for the last 30 days.  I personally reviewed the images of the CT scan.  My personal interpretation is below.   "    ----------------------------------------------------    --------------------------------------------------------------------------------------------------    Laboratory evaluations:    Lab Results   Component Value Date    GLUCOSE 110 (H) 04/04/2025    BUN 12 04/04/2025    CREATININE 1.10 04/04/2025    EGFRIFNONA 73 05/14/2021    BCR 10.9 04/04/2025    K 4.1 04/04/2025    CO2 28.5 04/04/2025    CALCIUM 8.6 04/04/2025    ALBUMIN 3.7 09/25/2024    AST 15 09/25/2024    ALT 7 09/25/2024     Lab Results   Component Value Date    WBC 4.83 12/05/2024    HGB 11.7 (L) 12/05/2024    HCT 36.6 (L) 12/05/2024    .4 (H) 12/05/2024     12/05/2024     No results found for: \"CHOL\", \"CHLPL\", \"TRIG\", \"HDL\", \"LDL\", \"LDLDIRECT\"  Lab Results   Component Value Date    TSH 1.250 06/10/2024     No results found for: \"HGBA1C\"  Lab Results   Component Value Date    ALT 7 09/25/2024     No results found for: \"HGBA1C\"  Lab Results   Component Value Date    CREATININE 1.10 04/04/2025     No results found for: \"IRON\", \"TIBC\", \"FERRITIN\"  Lab Results   Component Value Date    INR 1.15 (H) 12/05/2024    INR 2.28 (H) 10/01/2024    INR 3.02 (H) 09/30/2024    PROTIME 14.8 (H) 12/05/2024    PROTIME 25.2 (H) 10/01/2024    PROTIME 31.3 (H) 09/30/2024        Lab Results   Component Value Date    ABSOLUTELUNG 24 01/03/2025    ABSOLUTELUNG 25 11/01/2024    ABSOLUTELUNG 27 09/06/2024             1. 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 Coronary artery disease, anterior wall myocardial infarction with directional atherectomy of LAD in 1983 and bare-metal stent to the LAD in 1995, nonobstructive disease 2015    3. Essential hypertension    4. Ischemic cardiomyopathy          ORDERS PLACED TODAY:  Orders Placed This Encounter   Procedures    ReDs Vest    Basic Metabolic Panel    Magnesium    proBNP    Basic Metabolic Panel    Magnesium    proBNP        Diagnoses and all orders for this visit:    1. " 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, 46 to 50%) (Primary)  -     Basic Metabolic Panel; Future  -     Magnesium; Future  -     proBNP; Future  -     Basic Metabolic Panel; Standing  -     Basic Metabolic Panel  -     Magnesium; Standing  -     Magnesium  -     proBNP; Standing  -     proBNP  -     ReDs Vest    2. Coronary artery disease, anterior wall myocardial infarction with directional atherectomy of LAD in 1983 and bare-metal stent to the LAD in 1995, nonobstructive disease 2015    3. Essential hypertension    4. Ischemic cardiomyopathy             MEDS ORDERED TODAY:    No orders of the defined types were placed in this encounter.       ---------------------------------------------------------------------------------------------------------------------------          ASSESSMENT/PLAN:      Diagnosis Plan   1. 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, 46 to 50%)  Basic Metabolic Panel    Magnesium    proBNP    Basic Metabolic Panel    Basic Metabolic Panel    Magnesium    Magnesium    proBNP    proBNP    ReDs Vest      2. Coronary artery disease, anterior wall myocardial infarction with directional atherectomy of LAD in 1983 and bare-metal stent to the LAD in 1995, nonobstructive disease 2015        3. Essential hypertension        4. Ischemic cardiomyopathy            not acutely decompensated chronic moderate systolic and diastolic heart failure. Ischemic cardiomyopathy.     NYHA stage Stage C: Structural heart disease is present AND symptoms have occurredFC-Class III: Marked limitation of physical activity. Comfortable at rest. Less than ordinary activity causes fatigue, palpitation, or dyspnea.     Today, Patient is approaching euvolemiaand with  Moderate perfusion. The patient's hemodynamics are currently acceptable. HR is: normal and is at goal. BP/MAP was reviewed and there isroom for medication up-titration.  Clinical trajectory was assessed and haswaxed and  waned.     CHF GOAL DIRECTED MEDICAL THERAPY FOR PATIENT ADDRESSED/ADJUSTED:     GDMT: HFrEF    Drug Class   Drug   Dose Last Dose Adjustment Notes   ACEi/ARB/ARNI Entresto 24-26 mg BID 1/2 tab     Beta Blocker        MRA HypoTN      SGLT2i Freq UTIs   N/A   Secondaries if applicable:  Verquevo 5mg       -CHF Specific BB:    Toprol XL stopped by another provider 2/2 hypotension.  Will attempt to restart low dose Toprol XL or Coreg on next visit.    We discussed processes/benefits of HF clinic including nursing, pharmacist, and provider evaluation during each visit with ability for in office ReDS vest, labs, and ability to provide IV diuresis in the clinic with close outpatient monitoring.  Additionally, patient was educated about the availability of delivery of medications to patient's clinic room prior to leaving the building which assists with medication compliance and insures medications are in hands when changes are made (if patient opts for apothecary usage) with thorough guidance regarding changes and medication schedule provided.          -ACE/ARB/ARNi:   Continue Entresto with patient no longer missing evening dose.  We have expanded the hold parameter and discussed with daughter to hold if top number blood pressure is less than 100.    -MRA:   Consider and future visit but at present our ability to add is limited secondary to hypotension asked patient to bring blood pressure log with next visit to further review record.     -SGLT2 inhibitor therapy:   Previously on Jardiance but began having frequent UTIs.  Will hold off on further SGLT2i at present as risk of frequent UTIs likely outweighs benefit and patient is 89 years old.    -Diuretic regimen:   ReDS Vest reading for. 04/04/25 is  21; ReDs Vest reading reviewed with patient.    Continiue Bumex 1mg daily at present.   BMP, Mag, & ProBNP obtained & reviewed with patient.  ProBNP remains WNL.      -Fluid restriction/Sodium restriction:   Requested 2000 ml  restriction  Patient has been asked to weigh daily and was provided with a printed diuretic strategy.  1,500 mg Na restriction was discussed.      -Essential HTN  Entresto 24-26mg BID on board.   Continue monitoring.     -Chronic anticoagulation with Coumadin:   -Atrial flutter with ventricular paced rhythm:   Continue Coumadin with management per PCP.   Continue f/u with [facility].       -ASCVD with prior directional arthrectomy and bare metal stent, stable without chest pain:   -Hyperlipidemia:    Continue ASA & statin.   Continue f/u with [facility] (02/05/25).         Barriers to GDMT/dose titration:  Hemodynamic limitations (HR, BP), Advanced age, UTIs with SGL2Ti  Goal for next visit: Ongoing activity tolerance, patient is 89.    RTC: 3 months  Criteria that would warrant earlier follow-up: Worsening swelling/shortness of breath   Lab monitoring requirements: Repeat labs quarterly.   Patient education provided: AVS   Self-monitoring instructions:  Daily weights, daily blood pressure monitoring.           --------------------------------------------------------------------------------        BMI is within normal parameters. No other follow-up for BMI required.            This document has been electronically signed by Rosa Gavin PA-C  April 4, 2025 11:36 EDT      Dictated Utilizing Dragon Dictation: Part of this note may be an electronic transcription/translation of spoken language to printed text using the Dragon Dictation System.    Follow-up appointment and medication changes provided in hand delivered After Visit Summary as well as reviewed in the room.

## 2025-04-04 NOTE — PATIENT INSTRUCTIONS
Heart Failure Action Plan  A heart failure action plan helps you know what to do when you have symptoms of heart failure. Your action plan is a color-coded plan that lists the symptoms to watch for and indicates what actions to take.  If you have symptoms in the green zone, you're doing well.  If you have symptoms in the yellow zone, you're having problems.  If you have symptoms in the red zone, you need medical care right away.  Follow the plan that was created by you and your health care provider. Review your plan each time you visit your provider.  Green zone  These signs mean you're doing well and can continue what you're doing:  You don't have new or worsening shortness of breath.  You have very little swelling or no new swelling.  Your weight is stable (no gain or loss).  You have a normal activity level.  You don't have chest pain or any other new symptoms.  Yellow zone  These signs and symptoms mean your condition may be getting worse and you should make some changes:  You have trouble breathing when you're active.  You have swelling in your feet or legs or have discomfort in your belly.  You gain 2-3 lb (0.9-1.4 kg) in 24 hours, or 5 lb (2.3 kg) in a week. This amount may be more or less depending on your condition.  You get tired easily.  You have trouble sleeping.  You have a dry cough.  If you have any of these symptoms:  Contact your provider within the next day.  Your provider may adjust your medicines.  Red zone  These signs and symptoms mean you should get medical help right away:  You have trouble breathing when resting or cannot lie flat and you need to raise your head to help you breathe.  You have a dry cough that's getting worse.  You have swelling or pain in your feet or legs or discomfort in your belly that's getting worse.  You suddenly gain more than 2-3 lb (0.9-1.4 kg) in 24 hours, or more than 5 lb (2.3 kg) in a week. This amount may be more or less depending on your condition.  You have  trouble staying awake or you feel confused.  You don't have an appetite.  You have worsening sadness or depression.  These symptoms may be an emergency. Call 911 right away.  Do not wait to see if the symptoms will go away.  Do not drive yourself to the hospital.  Follow these instructions at home:  Take medicines only as told.  Eat a heart-healthy diet. Work with a dietitian to create an eating plan that's best for you.  Weigh yourself each day.   Call your provider if you gain more than 3 lb in 24 hours, or more than 5 lb in a week.  Health care provider name: Rosa [facility] care provider phone number: [phone]

## 2025-04-04 NOTE — PROGRESS NOTES
Heart Failure Clinic    Date: 04/04/25     Vitals:    04/04/25 1036   BP: 122/58   Pulse: 87   SpO2: 97%    Weight 214    Method of arrival: Ambulatory with walker    Weighing self daily: Yes    Monitoring Heart Failure Zones: Yes    Today's HF Zone: Yellow     Taking medications as prescribed: Yes    Edema Yes    Shortness of Air: Yes with activity    Number of pillows used at night:Recliner    Educational Materials given:  avs                                                                         ReDS Value: 21  21-24 Low Normal      Ishmael Camilo RN 04/04/25 10:40 EDT

## 2025-05-07 ENCOUNTER — OFFICE VISIT (OUTPATIENT)
Dept: CARDIOLOGY | Facility: CLINIC | Age: OVER 89
End: 2025-05-07
Payer: MEDICARE

## 2025-05-07 VITALS
HEIGHT: 75 IN | BODY MASS INDEX: 26.75 KG/M2 | SYSTOLIC BLOOD PRESSURE: 126 MMHG | DIASTOLIC BLOOD PRESSURE: 75 MMHG | HEART RATE: 85 BPM

## 2025-05-07 DIAGNOSIS — I25.10 CORONARY ARTERY DISEASE INVOLVING NATIVE CORONARY ARTERY OF NATIVE HEART WITHOUT ANGINA PECTORIS: Primary | ICD-10-CM

## 2025-05-07 DIAGNOSIS — I50.22 CHRONIC HFREF (HEART FAILURE WITH REDUCED EJECTION FRACTION): ICD-10-CM

## 2025-05-07 DIAGNOSIS — Z95.0 PRESENCE OF CARDIAC PACEMAKER: ICD-10-CM

## 2025-05-07 DIAGNOSIS — I10 ESSENTIAL HYPERTENSION: ICD-10-CM

## 2025-05-07 DIAGNOSIS — E78.5 HYPERLIPIDEMIA LDL GOAL <70: ICD-10-CM

## 2025-05-07 DIAGNOSIS — Z79.01 CHRONIC ANTICOAGULATION: ICD-10-CM

## 2025-05-07 DIAGNOSIS — I25.5 ISCHEMIC CARDIOMYOPATHY: ICD-10-CM

## 2025-05-09 NOTE — PROGRESS NOTES
subjective     Chief Complaint   Patient presents with    Follow-up     Routine 3 MONTH       Problems Addressed This Visit  1. Coronary artery disease, anterior wall myocardial infarction with directional atherectomy of LAD in 1983 and bare-metal stent to the LAD in 1995, nonobstructive disease 2015    2. Ischemic cardiomyopathy, LV ejection fraction around 45 to 50%    3. 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 Hyperlipidemia LDL goal <55    5. Essential hypertension    6. Chronic anticoagulation with Coumadin    7. Presence of cardiac pacemaker generator change in December 2024        History of Present Illness  History of Present Illness  The patient is a 89-year-old gentleman here for a cardiology follow-up.   Patient was hospitalized from September 24 to October 1, 2024 with small bowel obstruction and urinary retention.  Patient had expiratory laparotomy with reduction of internal hernia and end colostomy creation due to patient's recurrent bowel obstruction     Patient has history of anterior wall myocardial infarction with a directional atherectomy of LAD in 1983 with bare-metal stent in 1995 to LAD.    He denies any chest pain but complains of being tired and fatigued.    He has history of chronic HFrEF with ejection fraction of around 45 to 50%.      Paroxysmal atrial fibrillation with chronic anticoagulation with Coumadin.  Eliquis was too expensive.   Permanent pacemaker with generator change December 2024.   Patient has history of colon CA s/p radiation.      He has hyperlipidemia and is on statin therapy with Lipitor, with an LDL goal of less than 55. He is accompanied by his family.    He reports experiencing tiredness, fatigue, and frequent sleepiness, raising concerns about the functionality of his pacemaker.         Past Surgical History:   Procedure Laterality Date    CATARACT EXTRACTION Bilateral     CHOLECYSTECTOMY      COLON RESECTION      COLONOSCOPY       CORONARY ANGIOPLASTY WITH STENT PLACEMENT  1995    X1    EXPLORATORY LAPAROTOMY N/A 06/12/2024    Procedure: LAPAROTOMY EXPLORATORY;  Surgeon: Deo Lennon MD;  Location: [facility] OR;  Service: General;  Laterality: N/A;    EXPLORATORY LAPAROTOMY N/A 9/26/2024    Procedure: LAPAROTOMY EXPLORATORY, REDUCTION OF INTERNAL HERNIA, END COLOSTOMY CREATION;  Surgeon: Deo Lennon MD;  Location: [facility] OR;  Service: General;  Laterality: N/A;    HEMORROIDECTOMY      HERNIA REPAIR      left inguinal/umbilical    HIP ARTHROPLASTY Right     INSERT / REPLACE / REMOVE PACEMAKER      JOINT REPLACEMENT Right     HIP    PACEMAKER IMPLANTATION      PACEMAKER IMPLANTATION N/A 12/5/2024    Procedure: Pacemaker SC generator change;  Surgeon: Eyad Worrell MD;  Location: [facility] CATH INVASIVE LOCATION;  Service: Cardiology;  Laterality: N/A;    PROSTATE SURGERY      TRANSURETHRAL RESECTION OF BLADDER TUMOR N/A 04/18/2018    Procedure: CYSTOSCOPY TRANSURETHRAL RESECTION OF SMALL BLADDER TUMOR;  Surgeon: Alfonso Collins MD;  Location: [facility] OR;  Service: Urology    TRANSURETHRAL RESECTION OF BLADDER TUMOR N/A 08/29/2018    Procedure: CYSTOSCOPY TRANSURETHRAL RESECTION OF BLADDER TUMOR;  Surgeon: Alfonso Collins MD;  Location: [facility] OR;  Service: Urology     Family History   Problem Relation Age of Onset    No Known Problems Mother     No Known Problems Father     Heart disease Brother      Past Medical History:   Diagnosis Date    Arthritis     Atrial flutter     Back pain     Benign prostatic hyperplasia     Bladder tumor     Cancer     [id number]    Chronic systolic heart failure 09/14/2021    Colon cancer     Coronary artery disease     DVT (deep venous thrombosis)     r leg    Elevated cholesterol     Heart attack     1993    Hypertension     Numbness     feet/hands    Osteoporosis     PVD (peripheral vascular disease)     Rheumatoid arthritis     Stroke     Ventricular tachycardia       Patient Active Problem List   Diagnosis    Urinary retention    Bladder neck contracture    Bladder tumor    Aftercare following surgery of the genitourinary system    Atrial flutter , ventricular paced rhythm    Presence of cardiac pacemaker generator change in December 2024    Hyperlipidemia LDL goal <55    Essential hypertension    Chronic anticoagulation with Coumadin    Ventricular tachycardia (paroxysmal)    Asymptomatic PVD (peripheral vascular disease)    Coronary artery disease, anterior wall myocardial infarction with directional atherectomy of LAD in 1983 and bare-metal stent to the LAD in 1995, nonobstructive disease 2015    Ischemic cardiomyopathy, LV ejection fraction around 45 to 50%    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, 46 to 50%)    Bilateral lower extremity edema    Stroke (cerebrum)    Small bowel obstruction    Hypokalemia    Hypomagnesemia    Partial small bowel obstruction    Pacemaker end of life    Diabetic peripheral neuropathy associated with type 2 diabetes mellitus    Osteoarthritis of left hip    Peripheral angiopathy due to type 2 diabetes mellitus    Secondary hyperaldosteronism    Peripheral vascular disease       Social History     Tobacco Use    Smoking status: Former     Types: Cigarettes     Passive exposure: Past    Smokeless tobacco: Never   Vaping Use    Vaping status: Never Used   Substance Use Topics    Alcohol use: Yes     Comment: Occasional few times a year    Drug use: No       No Known Allergies    Current Outpatient Medications on File Prior to Visit   Medication Sig    atorvastatin (LIPITOR) 20 MG tablet Take 1 tablet by mouth Every Night.    bumetanide (BUMEX) 1 MG tablet Take 1 tablet by mouth Daily for 30 days. Resume 2 days post discharge or sooner if lower extremity swelling returns    COLLAGEN-VITAMIN C PO Take 1 capsule by mouth Daily.    gabapentin (NEURONTIN) 600 MG tablet Take 1 tablet by mouth Every Night.     HYDROcodone-acetaminophen (NORCO)  MG per tablet Take 1 tablet by mouth Every 6 (Six) Hours As Needed for Moderate Pain.    pantoprazole (PROTONIX) 40 MG EC tablet Take 1 tablet by mouth Daily.    sacubitril-valsartan (ENTRESTO) 24-26 MG tablet Take 1/2 tablet by mouth Every 12 (Twelve) Hours. Hold medication if top number of blood pressure is less than 100.    tamsulosin (FLOMAX) 0.4 MG capsule 24 hr capsule Take 1 capsule by mouth Daily.    Vericiguat (Verquvo) 5 MG tablet Take 1 tablet by mouth Daily    warfarin (COUMADIN) 1 MG tablet Take 1 tablet by mouth 3 (Three) Times a Week.    warfarin (COUMADIN) 5 MG tablet Take 1 tablet by mouth Daily.     No current facility-administered medications on file prior to visit.     (Not in a hospital admission)      Results for orders placed during the hospital encounter of 06/09/24    Adult Transthoracic Echo Complete w/ Color, Spectral and Contrast if Necessary Per Protocol    Interpretation Summary    The study is technically difficult for diagnosis. The quality of the study is limited with poor acoustic windows.    Normal left ventricular cavity size noted.    The following left ventricular wall segments are dyskinetic: apical lateral, apical septal and apex.    Left ventricular ejection fraction appears to be 36 - 40%.    The aortic valve exhibits sclerosis. There is mild calcification of the aortic valve. The aortic valve was poorly visualized but appears trileaflet.    Trace aortic valve regurgitation is present. No hemodynamically significant aortic valve stenosis is present.    There is mild calcification of the mitral valve. Mild mitral valve regurgitation is present. No significant mitral valve stenosis is present.    Mild tricuspid valve regurgitation is present. Estimated right ventricular systolic pressure from tricuspid regurgitation is mildly elevated (35-45 mmHg).    There is no evidence of pericardial effusion. .            The following portions of  "the patient's history were reviewed and updated as appropriate: allergies, current medications, past family history, past medical history, past social history, past surgical history and problem list.    Review of Systems   Constitutional: Positive for malaise/fatigue.   Cardiovascular:  Positive for dyspnea on exertion and leg swelling. Negative for chest pain, near-syncope, orthopnea, palpitations, paroxysmal nocturnal dyspnea and syncope.   Respiratory:  Positive for shortness of breath.    Gastrointestinal:  Positive for bloating.          Objective:     /75   Pulse 85   Ht 190.5 cm (75\")   BMI 26.75 kg/m²   Pulmonary:      Effort: Pulmonary effort is normal.      Breath sounds: Normal breath sounds. No stridor. No wheezing. No rhonchi. No rales.   Cardiovascular:      PMI at left midclavicular line. Normal rate. Regular rhythm. Normal S1. Normal S2.       Murmurs: There is no murmur.      No gallop.  No click. No rub.   Pulses:     Intact distal pulses.   Edema:     Peripheral edema present.     Pretibial: 4+ edema of the left pretibial area and 2+ edema of the right pretibial area.     Ankle: 4+ edema of the left ankle and 2+ edema of the right ankle.     Feet: 3+ edema of the left foot and 2+ edema of the right foot.      Physical Exam        Lab Review  Lab Results   Component Value Date     04/04/2025    K 4.1 04/04/2025     04/04/2025    BUN 12 04/04/2025    CREATININE 1.10 04/04/2025    GLUCOSE 110 (H) 04/04/2025    CALCIUM 8.6 04/04/2025    ALT 7 09/25/2024    ALKPHOS 73 09/25/2024     Lab Results   Component Value Date    CKTOTAL 39 09/24/2024     Lab Results   Component Value Date    WBC 4.83 12/05/2024    HGB 11.7 (L) 12/05/2024    HCT 36.6 (L) 12/05/2024     12/05/2024     Lab Results   Component Value Date    INR 1.15 (H) 12/05/2024     Lab Results   Component Value Date    MG 1.8 04/04/2025     Lab Results   Component Value Date    TSH 1.250 06/10/2024     No results " "found for: \"BNP\"  No results found for: \"CHLPL\", \"CHOL\", \"TRIG\", \"HDL\", \"VLDL\", \"LDL\"  No results found for: \"LDL\"  Lab Results   Component Value Date    PROBNP 550.4 04/04/2025               Procedures    Results  Imaging  LV ejection fraction is 46 to 50%.     I personally viewed and interpreted the patient's LAB data         Assessment:     1. Coronary artery disease, anterior wall myocardial infarction with directional atherectomy of LAD in 1983 and bare-metal stent to the LAD in 1995, nonobstructive disease 2015    2. Ischemic cardiomyopathy, LV ejection fraction around 45 to 50%    3. 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 Hyperlipidemia LDL goal <55    5. Essential hypertension    6. Chronic anticoagulation with Coumadin    7. Presence of cardiac pacemaker generator change in December 2024        Assessment & Plan  1. Coronary artery disease, status post anterior wall myocardial infarction.  He enies any chest pain or palpitations but reports weakness and fatigue. He also presents with bilateral lower extremity edema, worse on the left leg .  He saw Rosa Gavin recently and has been taking Bumex 1 mg daily along with Entresto and Verquvo.  Which were continued.    Hypertension  Blood pressure is very well-controlled with Entresto    2. Hyperlipidemia.  His LDL goal is set at 55. He is on Lipitor.  He is following closely with his PCP .  Goal of LDL less than 55 is stressed    3. Chronic HFrEF.  Patient still has a lot of bilateral lower extremity edema worse in the right leg compared to left otherwise is feeling some better but he complains of fatigue tiredness and lack of energy.  He will continue Verquvo along with Entresto and Bumex.    Pacemaker has been functioning normally.  Last generator change was recently in December 2024.    Anticoagulation with Coumadin.  Patient could not afford Eliquis.        No follow-ups on file.        "

## 2025-05-12 ENCOUNTER — DOCUMENTATION (OUTPATIENT)
Dept: CARDIOLOGY | Facility: HOSPITAL | Age: OVER 89
End: 2025-05-12
Payer: MEDICARE

## 2025-07-03 ENCOUNTER — HOSPITAL ENCOUNTER (OUTPATIENT)
Dept: CARDIOLOGY | Facility: HOSPITAL | Age: OVER 89
Discharge: HOME | End: 2025-07-03
Admitting: PHYSICIAN ASSISTANT
Payer: MEDICARE

## 2025-07-03 VITALS
HEIGHT: 75 IN | BODY MASS INDEX: 26.68 KG/M2 | SYSTOLIC BLOOD PRESSURE: 106 MMHG | HEART RATE: 62 BPM | DIASTOLIC BLOOD PRESSURE: 64 MMHG | OXYGEN SATURATION: 100 % | WEIGHT: 214.6 LBS

## 2025-07-03 DIAGNOSIS — I48.92 ATRIAL FLUTTER WITH CONTROLLED RESPONSE: ICD-10-CM

## 2025-07-03 DIAGNOSIS — Z79.01 CHRONIC ANTICOAGULATION: ICD-10-CM

## 2025-07-03 DIAGNOSIS — I25.10 CORONARY ARTERY DISEASE INVOLVING NATIVE CORONARY ARTERY OF NATIVE HEART WITHOUT ANGINA PECTORIS: ICD-10-CM

## 2025-07-03 DIAGNOSIS — I50.22 CHRONIC HFREF (HEART FAILURE WITH REDUCED EJECTION FRACTION): Primary | ICD-10-CM

## 2025-07-03 DIAGNOSIS — I10 ESSENTIAL HYPERTENSION: ICD-10-CM

## 2025-07-03 DIAGNOSIS — I25.5 ISCHEMIC CARDIOMYOPATHY: ICD-10-CM

## 2025-07-03 LAB
ABSOLUTE LUNG FLUID CONTENT: 20 % (ref 20–35)
ANION GAP SERPL CALCULATED.3IONS-SCNC: 12.8 MMOL/L (ref 5–15)
BUN SERPL-MCNC: 11.8 MG/DL (ref 8–23)
BUN/CREAT SERPL: 10.5 (ref 7–25)
CALCIUM SPEC-SCNC: 8.7 MG/DL (ref 8.2–9.6)
CHLORIDE SERPL-SCNC: 101 MMOL/L (ref 98–107)
CO2 SERPL-SCNC: 25.2 MMOL/L (ref 22–29)
CREAT SERPL-MCNC: 1.12 MG/DL (ref 0.76–1.27)
EGFRCR SERPLBLD CKD-EPI 2021: 60.9 ML/MIN/1.73
GLUCOSE SERPL-MCNC: 130 MG/DL (ref 65–99)
MAGNESIUM SERPL-MCNC: 1.6 MG/DL (ref 1.7–2.3)
NT-PROBNP SERPL-MCNC: 547 PG/ML (ref 0–1800)
POTASSIUM SERPL-SCNC: 3.5 MMOL/L (ref 3.5–5.2)
SODIUM SERPL-SCNC: 139 MMOL/L (ref 136–145)

## 2025-07-03 PROCEDURE — 36415 COLL VENOUS BLD VENIPUNCTURE: CPT | Performed by: PHYSICIAN ASSISTANT

## 2025-07-03 PROCEDURE — 83735 ASSAY OF MAGNESIUM: CPT | Performed by: PHYSICIAN ASSISTANT

## 2025-07-03 PROCEDURE — 83880 ASSAY OF NATRIURETIC PEPTIDE: CPT | Performed by: PHYSICIAN ASSISTANT

## 2025-07-03 PROCEDURE — 80048 BASIC METABOLIC PNL TOTAL CA: CPT | Performed by: PHYSICIAN ASSISTANT

## 2025-07-03 PROCEDURE — 94726 PLETHYSMOGRAPHY LUNG VOLUMES: CPT | Performed by: PHYSICIAN ASSISTANT

## 2025-07-03 RX ORDER — VERICIGUAT 5 MG/1
5 TABLET, FILM COATED ORAL DAILY
Qty: 90 TABLET | Refills: 3 | Status: SHIPPED | OUTPATIENT
Start: 2025-07-03

## 2025-07-03 RX ORDER — DULOXETIN HYDROCHLORIDE 30 MG/1
30 CAPSULE, DELAYED RELEASE ORAL DAILY
COMMUNITY

## 2025-07-03 RX ORDER — BUMETANIDE 1 MG/1
1 TABLET ORAL DAILY
Qty: 90 TABLET | Refills: 3 | Status: SHIPPED | OUTPATIENT
Start: 2025-07-03

## 2025-07-03 NOTE — ADDENDUM NOTE
Encounter addended by: Keyanna Tinoco, PharmD on: 7/3/2025 12:45 PM   Actions taken: Cosign clinical note with attestation

## 2025-07-03 NOTE — PATIENT INSTRUCTIONS
Heart Failure Action Plan  A heart failure action plan helps you know what to do when you have symptoms of heart failure. Your action plan is a color-coded plan that lists the symptoms to watch for and indicates what actions to take.  If you have symptoms in the green zone, you're doing well.  If you have symptoms in the yellow zone, you're having problems.  If you have symptoms in the red zone, you need medical care right away.  Follow the plan that was created by you and your health care provider. Review your plan each time you visit your provider.  Green zone  These signs mean you're doing well and can continue what you're doing:  You don't have new or worsening shortness of breath.  You have very little swelling or no new swelling.  Your weight is stable (no gain or loss).  You have a normal activity level.  You don't have chest pain or any other new symptoms.  Yellow zone  These signs and symptoms mean your condition may be getting worse and you should make some changes:  You have trouble breathing when you're active.  You have swelling in your feet or legs or have discomfort in your belly.  You gain 2-3 lb (0.9-1.4 kg) in 24 hours, or 5 lb (2.3 kg) in a week. This amount may be more or less depending on your condition.  You get tired easily.  You have trouble sleeping.  You have a dry cough.  If you have any of these symptoms:  Contact your provider within the next day.  Your provider may adjust your medicines.  Red zone  These signs and symptoms mean you should get medical help right away:  You have trouble breathing when resting or cannot lie flat and you need to raise your head to help you breathe.  You have a dry cough that's getting worse.  You have swelling or pain in your feet or legs or discomfort in your belly that's getting worse.  You suddenly gain more than 2-3 lb (0.9-1.4 kg) in 24 hours, or more than 5 lb (2.3 kg) in a week. This amount may be more or less depending on your condition.  You have  trouble staying awake or you feel confused.  You don't have an appetite.  You have worsening sadness or depression.  These symptoms may be an emergency. Call 911 right away.  Do not wait to see if the symptoms will go away.  Do not drive yourself to the hospital.  Follow these instructions at home:  Take medicines only as told.  Eat a heart-healthy diet. Work with a dietitian to create an eating plan that's best for you.  Weigh yourself each day.   Call your provider if you gain more than 3 lb in 24 hours, or more than 5 lb in a week.  Health care provider name: Rosa [facility] care provider phone number: [phone]

## 2025-07-03 NOTE — PROGRESS NOTES
[facility]  RYAN Viera Tammie L, APRN  [address] [place] [address]    Thank you for asking me to see Alberto Guzman for congestive heart failure.    HPI:     This is a 89 y.o. male with known past medical history of:    Chronic HFrEF  Ischemic cardiomyopathy  TTE from June 2024 with EF 36-40%; mild TVR.  Mild mitral valve calcification.    TTE from 2015 with EF 45-50% per [facility] records  ASCVD  Anterior MI with directional atherectomy of LAD in 1983 & Baremetal stent to LAD in 1995  Chronic anticoagulation with Coumadin  Paroxysmal Atrial fib/flutter  PPM placement  PPM gen change in 12/2024  Colon CA s/p radiation  Hx of SBO requiring hospitalization in June 2024    Alberto Guzman presents for today for Heart Failure clinic evlauation.  The patient is typically seen by Lianet Dia APRN.  Patient's primary cardiologist is Dr. Selam Alcaraz.     Last known EF 36-40%.   Last known hospitalization and/or ED visit: Patient was hospitalized in June 2024 secondary to small bowel obstruction secondary to internal hernia he later had ED visit after being sent from doctor's office for hypotension.  Patient hospitalized since last evaluation from September 24 through October 1, 2024 with small bowel obstruction and acute urinary retention.  Patient was taken for ex lap with reduction of internal hernia and end colostomy creation due to patient's recurrence of bowel obstruction.  Patient did have some urinary retention requiring Michael catheter placement during hospitalization with inability to void and empty bladder in spite of bladder training with Michael catheter replaced and referral made for outpatient urology follow-up.  There was an attempt made for chronic anticoagulation with DOAC however given difficulties with insurance he was discharged with Coumadin.  Accompanied by: Daughter          07/03/25 visit data/details  regarding:   Dyspnea: Improving dyspnea on exertion  Lower extremity swelling: Lower extremity edema improved but present.    Abdominal swelling: Denies  Home weight: Weight monitoring booklet provided during initial visit; has scale  Home BP: BP monitoring booklet provided during initial visit; has blood pressure cuff  Home heart rate: HR monitoring booklet provided during initial visit;  Daily activities of living: Performing on his own with some assistance from daughter  Pillows/lying flat: Pillow  HF zone: Green  Mr. Eid is chest pain free. He is without significant shortness of breath. His swelling is at baseline.  He is doing well.  He is helping his daughter restore an RV from the 1970s and they plan to travel in this.    Ambulated using walker.  Insisted on ambulating in today from the parking lot rather than being dropped off at the door by his daughter.          Review of Systems - Review of Systems   Constitutional: Negative for decreased appetite, diaphoresis and fever.   HENT:  Negative for congestion and ear pain.    Eyes:  Negative for blurred vision.   Cardiovascular:  Positive for dyspnea on exertion. Negative for chest pain.   Respiratory:  Positive for shortness of breath. Negative for cough and hemoptysis.    Endocrine: Negative for cold intolerance and polydipsia.   Hematologic/Lymphatic: Negative for adenopathy and bleeding problem.   Skin:  Negative for dry skin and nail changes.   Musculoskeletal:  Negative for arthritis and falls.   Gastrointestinal:  Negative for bloating and anorexia.   Genitourinary:  Negative for bladder incontinence and dysuria.   Neurological:  Negative for aphonia and difficulty with concentration.   Psychiatric/Behavioral:  Negative for altered mental status and hallucinations.    Allergic/Immunologic: Negative for environmental allergies and HIV exposure.         All other systems were reviewed and were negative.    Patient Active Problem List   Diagnosis     Urinary retention    Bladder neck contracture    Bladder tumor    Aftercare following surgery of the genitourinary system    Atrial flutter , ventricular paced rhythm    Presence of cardiac pacemaker generator change in December 2024    Hyperlipidemia LDL goal <55    Essential hypertension    Chronic anticoagulation with Coumadin    Ventricular tachycardia (paroxysmal)    Asymptomatic PVD (peripheral vascular disease)    Coronary artery disease, anterior wall myocardial infarction with directional atherectomy of LAD in 1983 and bare-metal stent to the LAD in 1995, nonobstructive disease 2015    Ischemic cardiomyopathy, LV ejection fraction around 45 to 50%    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, 46 to 50%)    Bilateral lower extremity edema    Stroke (cerebrum)    Small bowel obstruction    Hypokalemia    Hypomagnesemia    Partial small bowel obstruction    Pacemaker end of life    Diabetic peripheral neuropathy associated with type 2 diabetes mellitus    Osteoarthritis of left hip    Peripheral angiopathy due to type 2 diabetes mellitus    Secondary hyperaldosteronism    Peripheral vascular disease       family history includes Heart disease in his brother; No Known Problems in his father and mother.     reports that he has quit smoking. His smoking use included cigarettes. He has been exposed to tobacco smoke. He has never used smokeless tobacco. He reports current alcohol use. He reports that he does not use drugs.    No Known Allergies      Current Outpatient Medications:     atorvastatin (LIPITOR) 20 MG tablet, Take 1 tablet by mouth Every Night., Disp: , Rfl:     bumetanide (BUMEX) 1 MG tablet, Take 1 tablet by mouth Daily., Disp: 90 tablet, Rfl: 3    COLLAGEN-VITAMIN C PO, Take 1 capsule by mouth Daily., Disp: , Rfl:     DULoxetine (CYMBALTA) 30 MG capsule, Take 1 capsule by mouth Daily., Disp: , Rfl:     HYDROcodone-acetaminophen (NORCO)  MG per tablet, Take 1 tablet by mouth  Every 6 (Six) Hours As Needed for Moderate Pain., Disp: , Rfl:     pantoprazole (PROTONIX) 40 MG EC tablet, Take 1 tablet by mouth Daily., Disp: , Rfl:     sacubitril-valsartan (ENTRESTO) 24-26 MG tablet, Take 1/2 tablet by mouth Every 12 (Twelve) Hours. Hold medication if top number of blood pressure is less than 100., Disp: 90 tablet, Rfl: 3    tamsulosin (FLOMAX) 0.4 MG capsule 24 hr capsule, Take 1 capsule by mouth Daily., Disp: , Rfl:     Vericiguat (Verquvo) 5 MG tablet, Take 1 tablet by mouth Daily, Disp: 90 tablet, Rfl: 3    warfarin (COUMADIN) 1 MG tablet, Take 1 tablet by mouth 3 (Three) Times a Week., Disp: , Rfl:     warfarin (COUMADIN) 5 MG tablet, Take 1 tablet by mouth Daily., Disp: , Rfl:       Physical Exam:  I have reviewed the patient's current vital signs as documented in the patient's EMR.   Vitals:    07/03/25 1038   BP: 106/64   Pulse: 62   SpO2: 100%             Body mass index is 26.82 kg/m².       07/03/25  1038   Weight: 97.3 kg (214 lb 9.6 oz)              Physical Exam  Vitals and nursing note reviewed.   Constitutional:       General: He is awake.      Appearance: Normal appearance. He is well-developed and well-groomed.      Comments: Chronically ill-appearing   HENT:      Head: Normocephalic and atraumatic.   Eyes:      General: Lids are normal.      Extraocular Movements:      Right eye: Normal extraocular motion.      Left eye: Normal extraocular motion.      Conjunctiva/sclera:      Right eye: Right conjunctiva is not injected.      Left eye: Left conjunctiva is not injected.   Cardiovascular:      Rate and Rhythm: Normal rate and regular rhythm.   Pulmonary:      Breath sounds: No decreased breath sounds, wheezing, rhonchi or rales.   Abdominal:      General: Bowel sounds are normal.      Palpations: Abdomen is soft.   Musculoskeletal:      Comments: L>R edema;  patient reports LLE always swells more than right.     Neurological:      Mental Status: He is alert and oriented to  person, place, and time. Mental status is at baseline.   Psychiatric:         Attention and Perception: Attention normal.         Mood and Affect: Mood normal.         Behavior: Behavior is cooperative.          JVP: Volume/Pulsation: Normal.        DATA REVIEWED:       ---------------------------------------------------  TTE/DEYANIRA:  Results for orders placed during the hospital encounter of 06/09/24    Adult Transthoracic Echo Complete w/ Color, Spectral and Contrast if Necessary Per Protocol 06/10/2024  6:26 PM    Interpretation Summary    The study is technically difficult for diagnosis. The quality of the study is limited with poor acoustic windows.    Normal left ventricular cavity size noted.    The following left ventricular wall segments are dyskinetic: apical lateral, apical septal and apex.    Left ventricular ejection fraction appears to be 36 - 40%.    The aortic valve exhibits sclerosis. There is mild calcification of the aortic valve. The aortic valve was poorly visualized but appears trileaflet.    Trace aortic valve regurgitation is present. No hemodynamically significant aortic valve stenosis is present.    There is mild calcification of the mitral valve. Mild mitral valve regurgitation is present. No significant mitral valve stenosis is present.    Mild tricuspid valve regurgitation is present. Estimated right ventricular systolic pressure from tricuspid regurgitation is mildly elevated (35-45 mmHg).    There is no evidence of pericardial effusion. .        LAST HEART CATH/IF AVAILABLE:     No results found for this or any previous visit.      -----------------------------------------------------  CXR/Imaging:   Imaging Results (Most Recent)       None            I personally reviewed and interpreted the CXR.      -----------------------------------------------------  CT:   No radiology results for the last 30 days.  I personally reviewed the images of the CT scan.  My personal interpretation is  "below.      ----------------------------------------------------    --------------------------------------------------------------------------------------------------    Laboratory evaluations:    Lab Results   Component Value Date    GLUCOSE 130 (H) 07/03/2025    BUN 11.8 07/03/2025    CREATININE 1.12 07/03/2025    EGFRIFNONA 73 05/14/2021    BCR 10.5 07/03/2025    K 3.5 07/03/2025    CO2 25.2 07/03/2025    CALCIUM 8.7 07/03/2025    ALBUMIN 3.7 09/25/2024    AST 15 09/25/2024    ALT 7 09/25/2024     Lab Results   Component Value Date    WBC 4.83 12/05/2024    HGB 11.7 (L) 12/05/2024    HCT 36.6 (L) 12/05/2024    .4 (H) 12/05/2024     12/05/2024     No results found for: \"CHOL\", \"CHLPL\", \"TRIG\", \"HDL\", \"LDL\", \"LDLDIRECT\"  Lab Results   Component Value Date    TSH 1.250 06/10/2024     No results found for: \"HGBA1C\"  Lab Results   Component Value Date    ALT 7 09/25/2024     No results found for: \"HGBA1C\"  Lab Results   Component Value Date    CREATININE 1.12 07/03/2025     No results found for: \"IRON\", \"TIBC\", \"FERRITIN\"  Lab Results   Component Value Date    INR 1.15 (H) 12/05/2024    INR 2.28 (H) 10/01/2024    INR 3.02 (H) 09/30/2024    PROTIME 14.8 (H) 12/05/2024    PROTIME 25.2 (H) 10/01/2024    PROTIME 31.3 (H) 09/30/2024        Lab Results   Component Value Date    ABSOLUTELUNG 20 07/03/2025    ABSOLUTELUNG 24 01/03/2025    ABSOLUTELUNG 25 11/01/2024             1. 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 Ischemic cardiomyopathy, LV ejection fraction around 45 to 50%    3. Atrial flutter , ventricular paced rhythm    4. Chronic anticoagulation with Coumadin    5. Essential hypertension    6. Coronary artery disease, anterior wall myocardial infarction with directional atherectomy of LAD in 1983 and bare-metal stent to the LAD in 1995, nonobstructive disease 2015          ORDERS PLACED TODAY:  Orders Placed This Encounter   Procedures    ReDs Vest    Basic " Metabolic Panel    Magnesium    proBNP    Basic Metabolic Panel    Magnesium    proBNP        Diagnoses and all orders for this visit:    1. 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, 46 to 50%) (Primary)  -     Basic Metabolic Panel; Future  -     Magnesium; Future  -     proBNP; Future  -     Basic Metabolic Panel; Standing  -     Basic Metabolic Panel  -     Magnesium; Standing  -     Magnesium  -     proBNP; Standing  -     proBNP  -     ReDs Vest  -     Vericiguat (Verquvo) 5 MG tablet; Take 1 tablet by mouth Daily  Dispense: 90 tablet; Refill: 3    2. Ischemic cardiomyopathy, LV ejection fraction around 45 to 50%  -     Vericiguat (Verquvo) 5 MG tablet; Take 1 tablet by mouth Daily  Dispense: 90 tablet; Refill: 3    3. Atrial flutter , ventricular paced rhythm    4. Chronic anticoagulation with Coumadin    5. Essential hypertension    6. Coronary artery disease, anterior wall myocardial infarction with directional atherectomy of LAD in 1983 and bare-metal stent to the LAD in 1995, nonobstructive disease 2015    Other orders  -     sacubitril-valsartan (ENTRESTO) 24-26 MG tablet; Take 1/2 tablet by mouth Every 12 (Twelve) Hours. Hold medication if top number of blood pressure is less than 100.  Dispense: 90 tablet; Refill: 3  -     bumetanide (BUMEX) 1 MG tablet; Take 1 tablet by mouth Daily.  Dispense: 90 tablet; Refill: 3             MEDS ORDERED TODAY:    New Medications Ordered This Visit   Medications    Vericiguat (Verquvo) 5 MG tablet     Sig: Take 1 tablet by mouth Daily     Dispense:  90 tablet     Refill:  3    sacubitril-valsartan (ENTRESTO) 24-26 MG tablet     Sig: Take 1/2 tablet by mouth Every 12 (Twelve) Hours. Hold medication if top number of blood pressure is less than 100.     Dispense:  90 tablet     Refill:  3    bumetanide (BUMEX) 1 MG tablet     Sig: Take 1 tablet by mouth Daily.     Dispense:  90 tablet     Refill:  3         ---------------------------------------------------------------------------------------------------------------------------          ASSESSMENT/PLAN:      Diagnosis Plan   1. 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, 46 to 50%)  Basic Metabolic Panel    Magnesium    proBNP    Basic Metabolic Panel    Basic Metabolic Panel    Magnesium    Magnesium    proBNP    proBNP    ReDs Vest    Vericiguat (Verquvo) 5 MG tablet      2. Ischemic cardiomyopathy, LV ejection fraction around 45 to 50%  Vericiguat (Verquvo) 5 MG tablet      3. Atrial flutter , ventricular paced rhythm        4. Chronic anticoagulation with Coumadin        5. Essential hypertension        6. Coronary artery disease, anterior wall myocardial infarction with directional atherectomy of LAD in 1983 and bare-metal stent to the LAD in 1995, nonobstructive disease 2015            not acutely decompensated chronic moderate systolic and diastolic heart failure. Ischemic cardiomyopathy.     NYHA stage Stage C: Structural heart disease is present AND symptoms have occurredFC-Class III: Marked limitation of physical activity. Comfortable at rest. Less than ordinary activity causes fatigue, palpitation, or dyspnea.     Today, Patient is approaching euvolemiaand with  Moderate perfusion. The patient's hemodynamics are currently acceptable. HR is: normal and is at goal. BP/MAP was reviewed and there isroom for medication up-titration.  Clinical trajectory was assessed and haswaxed and waned.     CHF GOAL DIRECTED MEDICAL THERAPY FOR PATIENT ADDRESSED/ADJUSTED:     GDMT: HFrEF    Drug Class   Drug   Dose Last Dose Adjustment Notes   ACEi/ARB/ARNI Entresto 24-26 mg BID 1/2 tab     Beta Blocker        MRA HypoTN      SGLT2i Freq UTIs   N/A   Secondaries if applicable:  Verquevo 5mg       -CHF Specific BB:    Toprol XL stopped by another provider 2/2 hypotension.  Continue current regimen with patient tolerating well.  HR in the 60s and SBP low  normal.   We discussed processes/benefits of HF clinic including nursing, pharmacist, and provider evaluation during each visit with ability for in office ReDS vest, labs, and ability to provide IV diuresis in the clinic with close outpatient monitoring.  Additionally, patient was educated about the availability of delivery of medications to patient's clinic room prior to leaving the building which assists with medication compliance and insures medications are in hands when changes are made (if patient opts for apothecary usage) with thorough guidance regarding changes and medication schedule provided.          -ACE/ARB/ARNi:   Continue Entresto with patient no longer missing evening dose.  We have expanded the hold parameter and discussed with daughter to hold if top number blood pressure is less than 100.    -MRA:   Consider and future visit but at present our ability to add is limited secondary to hypotension asked patient to bring blood pressure log with next visit to further review record.     -SGLT2 inhibitor therapy:   Previously on Jardiance but began having frequent UTIs.  Will hold off on further SGLT2i at present as risk of frequent UTIs likely outweighs benefit and patient is 89 years old.    -Diuretic regimen:   ReDS Vest reading for. 07/03/25 is 20; ReDs Vest reading reviewed with patient.    Continiue Bumex 1mg daily at present.   BMP, Mag, & ProBNP obtained & reviewed.  Found to be stable.      -Fluid restriction/Sodium restriction:   Requested 2000 ml restriction  Patient has been asked to weigh daily and was provided with a printed diuretic strategy.  1,500 mg Na restriction was discussed.      -Essential HTN  Entresto 24-26mg BID on board.   Continue monitoring.     -Chronic anticoagulation with Coumadin:   -Atrial flutter with ventricular paced rhythm:   Continue Coumadin with management per PCP.   Continue f/u with [facility].       -ASCVD with prior directional arthrectomy and bare metal stent,  stable without chest pain:   -Hyperlipidemia:    Continue ASA & statin.   Continue f/u with [facility] (02/05/25).         Barriers to GDMT/dose titration:  Hemodynamic limitations (HR, BP), Advanced age, UTIs with SGL2Ti  Goal for next visit: Ongoing activity tolerance, patient is 89.    RTC: 3 months  Criteria that would warrant earlier follow-up: Worsening swelling/shortness of breath   Lab monitoring requirements: Repeat labs quarterly.   Patient education provided: AVS   Self-monitoring instructions:  Daily weights, daily blood pressure monitoring.           --------------------------------------------------------------------------------        BMI is within normal parameters. No other follow-up for BMI required.            This document has been electronically signed by Rosa Gavin PA-C  July 3, 2025 12:31 EDT      Dictated Utilizing Dragon Dictation: Part of this note may be an electronic transcription/translation of spoken language to printed text using the Dragon Dictation System.    Follow-up appointment and medication changes provided in hand delivered After Visit Summary as well as reviewed in the room.      Some documentation in this note has been copied forward from a previous note, as the information is still current and accurate.  Text has been changed to reflect changes.

## 2025-07-03 NOTE — PROGRESS NOTES
Heart Failure Clinic  Pharmacist Note     Alberto Guzman is a 89 y.o. male seen in the Heart Failure Clinic for HFrEF with most recent EF of 36-40% on 6/10/24.      Alberto Guzman reports a fair understanding of medications.  He is accompanied by his daughter today who manages his medications and takes care of him altogether.     Patient reports that there is mild swelling in his left leg, but states that this is his baseline and that it hasn't worsened. Patient also reports SOA with exertion. His daughter reports that she has continued giving him the 1/2 tablet of Entresto bid and Verquvo 5mg daily now and he has tolerated it well. He denies any episodes of lightheadedness or dizziness. Daughter and patient confirm that he takes 1-2 Bumex tablets daily and that it has been working well. They have not been checking his BP because they feel he is doing well; daughter states that she only checks it if the patient seems more lethargic or confused than usual. His BP in clinic today was 106/64 and his HR was 62.    They report having plenty of Entresto to last through May and that they still had ~20 tablets of Verquvo.     Medication Use:   Hx of med intolerances:  None related to HF  Retail Rx Management: [facility]- has murtaza approved through 5/17/25 for Verquvo and Entresto- ship to patient (Patient currently does not have enough in MURTAZA funds to fill Verquvo. Patient has been provided samples until Murtaza can be re-applied for in May).     Past Medical History:   Diagnosis Date    Arthritis     Atrial flutter     Back pain     Benign prostatic hyperplasia     Bladder tumor     Cancer     [id number]    Chronic systolic heart failure 09/14/2021    Colon cancer     Coronary artery disease     DVT (deep venous thrombosis)     r leg    Elevated cholesterol     Heart attack     1993    Hypertension     Numbness     feet/hands    Osteoporosis     PVD (peripheral vascular disease)     Rheumatoid arthritis      "Stroke     Ventricular tachycardia      ALLERGIES: Patient has no known allergies.  Current Outpatient Medications   Medication Sig Dispense Refill    atorvastatin (LIPITOR) 20 MG tablet Take 1 tablet by mouth Every Night.      bumetanide (BUMEX) 1 MG tablet Take 1 tablet by mouth Daily. 90 tablet 3    COLLAGEN-VITAMIN C PO Take 1 capsule by mouth Daily.      DULoxetine (CYMBALTA) 30 MG capsule Take 1 capsule by mouth Daily.      HYDROcodone-acetaminophen (NORCO)  MG per tablet Take 1 tablet by mouth Every 6 (Six) Hours As Needed for Moderate Pain.      pantoprazole (PROTONIX) 40 MG EC tablet Take 1 tablet by mouth Daily.      sacubitril-valsartan (ENTRESTO) 24-26 MG tablet Take 1/2 tablet by mouth Every 12 (Twelve) Hours. Hold medication if top number of blood pressure is less than 100. 90 tablet 3    tamsulosin (FLOMAX) 0.4 MG capsule 24 hr capsule Take 1 capsule by mouth Daily.      Vericiguat (Verquvo) 5 MG tablet Take 1 tablet by mouth Daily 90 tablet 3    warfarin (COUMADIN) 1 MG tablet Take 1 tablet by mouth 3 (Three) Times a Week.      warfarin (COUMADIN) 5 MG tablet Take 1 tablet by mouth Daily.       No current facility-administered medications for this encounter.       Vaccination History:   Pneumonia: reports UTD  Annual Influenza: wUTD 24/25 season  Shingles: reports UTD    Objective  Vitals:    07/03/25 1038   BP: 106/64   BP Location: Left arm   Patient Position: Sitting   Cuff Size: Adult   Pulse: 62   SpO2: 100%   Weight: 97.3 kg (214 lb 9.6 oz)   Height: 190.5 cm (75\")     Wt Readings from Last 3 Encounters:   07/03/25 97.3 kg (214 lb 9.6 oz)   04/04/25 97.1 kg (214 lb)   02/05/25 94.8 kg (209 lb)         07/03/25  1038   Weight: 97.3 kg (214 lb 9.6 oz)     Lab Results   Component Value Date    GLUCOSE 130 (H) 07/03/2025    BUN 11.8 07/03/2025    CREATININE 1.12 07/03/2025    EGFRIFNONA 73 05/14/2021    BCR 10.5 07/03/2025    K 3.5 07/03/2025    CO2 25.2 07/03/2025    CALCIUM 8.7 07/03/2025 "    ALBUMIN 3.7 09/25/2024    AST 15 09/25/2024    ALT 7 09/25/2024     Lab Results   Component Value Date    WBC 4.83 12/05/2024    HGB 11.7 (L) 12/05/2024    HCT 36.6 (L) 12/05/2024    .4 (H) 12/05/2024     12/05/2024     Lab Results   Component Value Date    CKTOTAL 39 09/24/2024    TROPONINT 31 (H) 09/24/2024     Lab Results   Component Value Date    PROBNP 547.0 07/03/2025     Results for orders placed during the hospital encounter of 06/09/24    Adult Transthoracic Echo Complete w/ Color, Spectral and Contrast if Necessary Per Protocol 06/10/2024  6:26 PM    Interpretation Summary    The study is technically difficult for diagnosis. The quality of the study is limited with poor acoustic windows.    Normal left ventricular cavity size noted.    The following left ventricular wall segments are dyskinetic: apical lateral, apical septal and apex.    Left ventricular ejection fraction appears to be 36 - 40%.    The aortic valve exhibits sclerosis. There is mild calcification of the aortic valve. The aortic valve was poorly visualized but appears trileaflet.    Trace aortic valve regurgitation is present. No hemodynamically significant aortic valve stenosis is present.    There is mild calcification of the mitral valve. Mild mitral valve regurgitation is present. No significant mitral valve stenosis is present.    Mild tricuspid valve regurgitation is present. Estimated right ventricular systolic pressure from tricuspid regurgitation is mildly elevated (35-45 mmHg).    There is no evidence of pericardial effusion. .         GDMT    Drug Class   Drug   Dose Last Dose Adjustment Additional Titration   Notes   ACEi/ARB/ARNI Entresto 24/26mg 1/2 tab BID 8/9/24      Beta Blocker   Labile BPs   MRA     Labile BPs   SGLT2i   Hx of UTIs with Jardiance    Verquvo 5mg 9/6/24 increased     Bumex 1mg dailly    Drug Therapy Problems  GDMT     Recommendations   No new recommendations today; patient is doing well.  Continue GDMT therapy as directed; patient may be considered for Coreg or Toprolol at next visit depending on pressures.     Patient was educated on heart failure medications and the importance of medication adherence. All questions were addressed and patient expressed understanding. Used teach-back method to assess understanding.     Thank you for allowing me to participate in the care of your patient,    Blanka Pratt, Pharmacy Intern  07/03/25  11:22 EDT

## 2025-07-03 NOTE — PROGRESS NOTES
Heart Failure Clinic    Date: 07/03/25     Vitals:    07/03/25 1038   BP: 106/64   Pulse: 62   SpO2: 100%    Weight 214.6    Method of arrival: Ambulatory with walker    Weighing self daily: Most days    Monitoring Heart Failure Zones: Most days    Today's HF Zone: Green    Taking medications as prescribed: Yes    Edema Yes left leg    Shortness of Air: Yes with activity    Number of pillows used at night:Recliner    Educational Materials given:  avs                                                                         ReDS Value: 20  Below 21 Below Normal      Ishmael Camilo RN 07/03/25 10:42 EDT

## 2025-08-04 ENCOUNTER — OFFICE VISIT (OUTPATIENT)
Dept: CARDIOLOGY | Facility: CLINIC | Age: OVER 89
End: 2025-08-04
Payer: MEDICARE

## 2025-08-04 VITALS
HEART RATE: 90 BPM | WEIGHT: 212 LBS | HEIGHT: 75 IN | SYSTOLIC BLOOD PRESSURE: 120 MMHG | DIASTOLIC BLOOD PRESSURE: 70 MMHG | BODY MASS INDEX: 26.36 KG/M2 | OXYGEN SATURATION: 99 %

## 2025-08-04 DIAGNOSIS — Z95.0 PRESENCE OF CARDIAC PACEMAKER: ICD-10-CM

## 2025-08-04 DIAGNOSIS — I48.92 ATRIAL FLUTTER WITH CONTROLLED RESPONSE: ICD-10-CM

## 2025-08-04 DIAGNOSIS — I25.10 CORONARY ARTERY DISEASE INVOLVING NATIVE CORONARY ARTERY OF NATIVE HEART WITHOUT ANGINA PECTORIS: Primary | ICD-10-CM

## 2025-08-04 DIAGNOSIS — E78.5 HYPERLIPIDEMIA LDL GOAL <70: ICD-10-CM

## 2025-08-04 DIAGNOSIS — I50.22 CHRONIC HFREF (HEART FAILURE WITH REDUCED EJECTION FRACTION): ICD-10-CM

## 2025-08-04 PROCEDURE — 99214 OFFICE O/P EST MOD 30 MIN: CPT | Performed by: INTERNAL MEDICINE

## 2025-08-11 ENCOUNTER — TELEPHONE (OUTPATIENT)
Dept: SURGERY | Facility: CLINIC | Age: OVER 89
End: 2025-08-11
Payer: MEDICARE

## (undated) DEVICE — SUT VIC 3/0 SH CR8 27IN DYED J784G

## (undated) DEVICE — Y-TYPE TUR/BLADDER IRRIGATION SET, REGULATING CLAMP

## (undated) DEVICE — GLV SURG NEOLON 2G PF LF 8 STRL

## (undated) DEVICE — PENCL SMOKE/EVAC MEGADYNE TELESCP 10FT

## (undated) DEVICE — ENSEAL 20 CM SHAFT, LARGE JAW: Brand: ENSEAL X1

## (undated) DEVICE — PLASMABLADE PS200-040 4.0: Brand: PLASMABLADE™

## (undated) DEVICE — ELECTRD LP CUT 24F90DEG

## (undated) DEVICE — URINE DRAINAGE BAG,LUER-LOCK SAMPLING, ANTI-REFLUX DEVICE, DRAIN PORT: Brand: DOVER

## (undated) DEVICE — SUT SILK 2/0 TIES 30IN SA85H

## (undated) DEVICE — PAD GRND REM POLYHESIVE A/ DISP

## (undated) DEVICE — HOLDER: Brand: DEROYAL

## (undated) DEVICE — DRSNG WND BORDR/ADHS NONADHR/GZ LF 4X10IN STRL

## (undated) DEVICE — ADULT DISPOSABLE SINGLE-PATIENT USE PULSE OXIMETER SENSOR: Brand: NONIN

## (undated) DEVICE — PATIENT RETURN ELECTRODE, SINGLE-USE, CONTACT QUALITY MONITORING, ADULT, WITH 9FT CORD, FOR PATIENTS WEIGING OVER 33LBS. (15KG): Brand: MEGADYNE

## (undated) DEVICE — POOLE SUCTION INSTRUMENT WITH REMOVABLE SHEATH: Brand: POOLE

## (undated) DEVICE — GOWN,PREVENTION PLUS,XLONG/XLARGE,STRL: Brand: MEDLINE

## (undated) DEVICE — EVAC BLDR UROVAC W ADAPT

## (undated) DEVICE — GOWN,REINF,POLY,ECL,PP SLV,XXL: Brand: MEDLINE

## (undated) DEVICE — SUT PDS 1 TP1 48IN Z880G BX/12

## (undated) DEVICE — IRRIGATOR TOOMEY 70CC

## (undated) DEVICE — UTILITY MARKER W/MED LABELS: Brand: MEDLINE

## (undated) DEVICE — COR CYSTO: Brand: MEDLINE INDUSTRIES, INC.

## (undated) DEVICE — PAD, DEFIB, ADULT, RADIOTRANS, ZOLL: Brand: MEDLINE

## (undated) DEVICE — DBD-DRAPE,LAP,CHOLE,W/TROUGHS,STERILE: Brand: MEDLINE

## (undated) DEVICE — DRSNG SURESITE123 6X8IN

## (undated) DEVICE — PROXIMATE PLUS MD MULTI-DIRECTIONAL RELEASE SKIN STAPLERS CONTAINS 35 STAINLESS STEEL STAPLES APPROXIMATE CLOSED DIMENSIONS: 6.9MM X 3.9MM WIDE: Brand: PROXIMATE

## (undated) DEVICE — ELECTRD BLD EZ CLN STD 6.5IN

## (undated) DEVICE — GLV SURG PREMIERPRO MIC LTX PF SZ8 BRN

## (undated) DEVICE — ST INF PRI SMRTSTE 20DRP 2VLV 24ML 117

## (undated) DEVICE — SUT SILK 3/0 SH CR8 18IN C013D

## (undated) DEVICE — TOTAL TRAY, 16FR 10ML SIL FOLEY, URN: Brand: MEDLINE

## (undated) DEVICE — LN FLTR ORL/NASL MICROSTREAM NONINTUB A/LNG

## (undated) DEVICE — GLV SURG SENSICARE W/ALOE PF LF 7.5 STRL

## (undated) DEVICE — PROXIMATE RH ROTATING HEAD SKIN STAPLERS (35 WIDE) CONTAINS 35 STAINLESS STEEL STAPLES: Brand: PROXIMATE

## (undated) DEVICE — SPNG LAP PREWSH SFTPK 18X18IN STRL PK/5

## (undated) DEVICE — CATHETER,FOLEY,100%SILICONE,20FR,10ML,LF: Brand: MEDLINE

## (undated) DEVICE — PK BASIC 70

## (undated) DEVICE — Device: Brand: OLYMPUS

## (undated) DEVICE — COR PACEMAKER: Brand: MEDLINE INDUSTRIES, INC.

## (undated) DEVICE — ADHS SKIN PREMIERPRO EXOFIN TOPICAL HI/VISC .5ML

## (undated) DEVICE — BNDR ABD 4PANEL 12IN 46 TO 62IN

## (undated) DEVICE — SPNG GZ WOVN 4X4IN 12PLY 10/BX STRL

## (undated) DEVICE — INTENDED FOR TISSUE SEPARATION, AND OTHER PROCEDURES THAT REQUIRE A SHARP SURGICAL BLADE TO PUNCTURE OR CUT.: Brand: BARD-PARKER ® DISPOSABLE SCALPELS

## (undated) DEVICE — ST EXT IV SMRTSTE 2VLV FIX M LL 6ML 41

## (undated) DEVICE — CORD BOVIE 1P/U